# Patient Record
Sex: FEMALE | Race: WHITE | Employment: OTHER | URBAN - NONMETROPOLITAN AREA
[De-identification: names, ages, dates, MRNs, and addresses within clinical notes are randomized per-mention and may not be internally consistent; named-entity substitution may affect disease eponyms.]

---

## 2017-08-08 ENCOUNTER — HOSPITAL ENCOUNTER (OUTPATIENT)
Dept: GENERAL RADIOLOGY | Age: 77
Discharge: HOME | End: 2017-08-08
Payer: MEDICARE

## 2017-08-08 ENCOUNTER — HOSPITAL ENCOUNTER (OUTPATIENT)
Age: 77
Discharge: HOME | End: 2017-08-08
Payer: MEDICARE

## 2017-08-08 DIAGNOSIS — R10.84 GENERALIZED ABDOMINAL PAIN: ICD-10-CM

## 2017-08-08 PROCEDURE — 74000 XR ABDOMEN LIMITED (KUB): CPT

## 2017-08-14 ENCOUNTER — HOSPITAL ENCOUNTER (OUTPATIENT)
Age: 77
Discharge: HOME | End: 2017-08-14
Payer: MEDICARE

## 2017-08-14 ENCOUNTER — HOSPITAL ENCOUNTER (OUTPATIENT)
Dept: GENERAL RADIOLOGY | Age: 77
Discharge: HOME | End: 2017-08-14
Payer: MEDICARE

## 2017-08-14 DIAGNOSIS — K56.0 ADYNAMIC ILEUS (HCC): ICD-10-CM

## 2017-08-14 PROCEDURE — 74000 XR ABDOMEN LIMITED (KUB): CPT

## 2018-06-07 ENCOUNTER — OFFICE VISIT (OUTPATIENT)
Dept: SURGERY | Age: 78
End: 2018-06-07
Payer: MEDICARE

## 2018-06-07 ENCOUNTER — TELEPHONE (OUTPATIENT)
Dept: SURGERY | Age: 78
End: 2018-06-07

## 2018-06-07 VITALS
HEART RATE: 74 BPM | SYSTOLIC BLOOD PRESSURE: 120 MMHG | TEMPERATURE: 97.1 F | BODY MASS INDEX: 27.47 KG/M2 | OXYGEN SATURATION: 96 % | RESPIRATION RATE: 18 BRPM | WEIGHT: 160.9 LBS | HEIGHT: 64 IN | DIASTOLIC BLOOD PRESSURE: 62 MMHG

## 2018-06-07 DIAGNOSIS — R10.84 GENERALIZED ABDOMINAL PAIN: Primary | ICD-10-CM

## 2018-06-07 DIAGNOSIS — Z01.818 PRE-OP TESTING: ICD-10-CM

## 2018-06-07 DIAGNOSIS — K57.90 DIVERTICULOSIS OF INTESTINE WITHOUT BLEEDING, UNSPECIFIED INTESTINAL TRACT LOCATION: ICD-10-CM

## 2018-06-07 PROCEDURE — 1123F ACP DISCUSS/DSCN MKR DOCD: CPT | Performed by: SURGERY

## 2018-06-07 PROCEDURE — G8400 PT W/DXA NO RESULTS DOC: HCPCS | Performed by: SURGERY

## 2018-06-07 PROCEDURE — G8419 CALC BMI OUT NRM PARAM NOF/U: HCPCS | Performed by: SURGERY

## 2018-06-07 PROCEDURE — G8427 DOCREV CUR MEDS BY ELIG CLIN: HCPCS | Performed by: SURGERY

## 2018-06-07 PROCEDURE — 99205 OFFICE O/P NEW HI 60 MIN: CPT | Performed by: SURGERY

## 2018-06-07 PROCEDURE — 1036F TOBACCO NON-USER: CPT | Performed by: SURGERY

## 2018-06-07 PROCEDURE — 4040F PNEUMOC VAC/ADMIN/RCVD: CPT | Performed by: SURGERY

## 2018-06-07 PROCEDURE — 1090F PRES/ABSN URINE INCON ASSESS: CPT | Performed by: SURGERY

## 2018-06-07 RX ORDER — METRONIDAZOLE 500 MG/1
TABLET ORAL
Qty: 3 TABLET | Refills: 0 | Status: ON HOLD | OUTPATIENT
Start: 2018-06-07 | End: 2018-07-06 | Stop reason: HOSPADM

## 2018-06-07 RX ORDER — ACETAMINOPHEN 325 MG/1
650 TABLET ORAL EVERY 6 HOURS PRN
COMMUNITY

## 2018-06-07 RX ORDER — IBUPROFEN 200 MG
200 TABLET ORAL EVERY 6 HOURS PRN
Status: ON HOLD | COMMUNITY
End: 2018-11-09 | Stop reason: HOSPADM

## 2018-06-07 RX ORDER — CHLORAL HYDRATE 500 MG
1000 CAPSULE ORAL DAILY
Status: ON HOLD | COMMUNITY
End: 2018-11-09 | Stop reason: HOSPADM

## 2018-06-07 ASSESSMENT — ENCOUNTER SYMPTOMS
ABDOMINAL PAIN: 1
BACK PAIN: 1
CHEST TIGHTNESS: 0
EYE PAIN: 0
EYE ITCHING: 0
ABDOMINAL DISTENTION: 1
EYE DISCHARGE: 1
COUGH: 1
PHOTOPHOBIA: 0
COLOR CHANGE: 0
SINUS PAIN: 0
CONSTIPATION: 1
SINUS PRESSURE: 1
SHORTNESS OF BREATH: 1
CHOKING: 0
APNEA: 0
STRIDOR: 0
EYE REDNESS: 0
SORE THROAT: 0
TROUBLE SWALLOWING: 0
RHINORRHEA: 0
WHEEZING: 0
FACIAL SWELLING: 0
RECTAL PAIN: 1
VOICE CHANGE: 0

## 2018-06-11 ENCOUNTER — HOSPITAL ENCOUNTER (OUTPATIENT)
Age: 78
Discharge: HOME | End: 2018-06-11
Payer: MEDICARE

## 2018-06-11 DIAGNOSIS — Z01.818 PRE-OP TESTING: ICD-10-CM

## 2018-06-11 DIAGNOSIS — R10.84 GENERALIZED ABDOMINAL PAIN: ICD-10-CM

## 2018-06-11 DIAGNOSIS — K57.90 DIVERTICULOSIS OF INTESTINE WITHOUT BLEEDING, UNSPECIFIED INTESTINAL TRACT LOCATION: ICD-10-CM

## 2018-06-11 LAB
EKG ATRIAL RATE: 64 BPM
EKG P AXIS: 77 DEGREES
EKG P-R INTERVAL: 154 MS
EKG Q-T INTERVAL: 434 MS
EKG QRS DURATION: 96 MS
EKG QTC CALCULATION (BAZETT): 447 MS
EKG R AXIS: -49 DEGREES
EKG T AXIS: 60 DEGREES
EKG VENTRICULAR RATE: 64 BPM
HCT VFR BLD CALC: 40.2 % (ref 37–47)
HEMOGLOBIN: 13.7 GM/DL (ref 12–16)

## 2018-06-11 PROCEDURE — 36415 COLL VENOUS BLD VENIPUNCTURE: CPT

## 2018-06-11 PROCEDURE — 93005 ELECTROCARDIOGRAM TRACING: CPT

## 2018-06-11 PROCEDURE — 85014 HEMATOCRIT: CPT

## 2018-06-11 PROCEDURE — 85018 HEMOGLOBIN: CPT

## 2018-06-12 PROCEDURE — 93010 ELECTROCARDIOGRAM REPORT: CPT | Performed by: INTERNAL MEDICINE

## 2018-06-19 ENCOUNTER — APPOINTMENT (OUTPATIENT)
Dept: CT IMAGING | Age: 78
End: 2018-06-19
Payer: MEDICARE

## 2018-06-19 ENCOUNTER — HOSPITAL ENCOUNTER (EMERGENCY)
Age: 78
Discharge: HOME | End: 2018-06-19
Payer: MEDICARE

## 2018-06-19 ENCOUNTER — APPOINTMENT (OUTPATIENT)
Dept: GENERAL RADIOLOGY | Age: 78
End: 2018-06-19
Payer: MEDICARE

## 2018-06-19 VITALS
TEMPERATURE: 97.7 F | WEIGHT: 165 LBS | OXYGEN SATURATION: 99 % | RESPIRATION RATE: 16 BRPM | DIASTOLIC BLOOD PRESSURE: 70 MMHG | HEART RATE: 67 BPM | SYSTOLIC BLOOD PRESSURE: 129 MMHG | BODY MASS INDEX: 28.32 KG/M2

## 2018-06-19 DIAGNOSIS — K59.01 SLOW TRANSIT CONSTIPATION: ICD-10-CM

## 2018-06-19 DIAGNOSIS — R10.13 EPIGASTRIC PAIN: ICD-10-CM

## 2018-06-19 DIAGNOSIS — R42 DIZZINESS: Primary | ICD-10-CM

## 2018-06-19 LAB
BASOPHILS # BLD: 0.3 %
BASOPHILS ABSOLUTE: 0 THOU/MM3 (ref 0–0.1)
BUN BLDV-MCNC: 10 MG/DL (ref 7–22)
CREAT SERPL-MCNC: 0.5 MG/DL (ref 0.4–1.2)
EKG ATRIAL RATE: 68 BPM
EKG P AXIS: 67 DEGREES
EKG P-R INTERVAL: 156 MS
EKG Q-T INTERVAL: 432 MS
EKG QRS DURATION: 88 MS
EKG QTC CALCULATION (BAZETT): 459 MS
EKG R AXIS: -42 DEGREES
EKG T AXIS: 53 DEGREES
EKG VENTRICULAR RATE: 68 BPM
EOSINOPHIL # BLD: 1.9 %
EOSINOPHILS ABSOLUTE: 0.1 THOU/MM3 (ref 0–0.4)
GFR SERPL CREATININE-BSD FRML MDRD: > 90 ML/MIN/1.73M2
HCT VFR BLD CALC: 42.5 % (ref 37–47)
HEMOGLOBIN: 14.3 GM/DL (ref 12–16)
LACTIC ACID: 1.2 MMOL/L (ref 0.5–2.2)
LYMPHOCYTES # BLD: 21.4 %
LYMPHOCYTES ABSOLUTE: 1.3 THOU/MM3 (ref 1–4.8)
MCH RBC QN AUTO: 30.5 PG (ref 27–31)
MCHC RBC AUTO-ENTMCNC: 33.7 GM/DL (ref 33–37)
MCV RBC AUTO: 90.4 FL (ref 81–99)
MONOCYTES # BLD: 5.3 %
MONOCYTES ABSOLUTE: 0.3 THOU/MM3 (ref 0.4–1.3)
NUCLEATED RED BLOOD CELLS: 0 /100 WBC
PDW BLD-RTO: 14.1 % (ref 11.5–14.5)
PLATELET # BLD: 167 THOU/MM3 (ref 130–400)
PMV BLD AUTO: 9 FL (ref 7.4–10.4)
RBC # BLD: 4.7 MILL/MM3 (ref 4.2–5.4)
SEG NEUTROPHILS: 71.1 %
SEGMENTED NEUTROPHILS ABSOLUTE COUNT: 4.2 THOU/MM3 (ref 1.8–7.7)
TROPONIN T: < 0.01 NG/ML
WBC # BLD: 5.9 THOU/MM3 (ref 4.8–10.8)

## 2018-06-19 PROCEDURE — 6360000004 HC RX CONTRAST MEDICATION: Performed by: PHYSICIAN ASSISTANT

## 2018-06-19 PROCEDURE — 85025 COMPLETE CBC W/AUTO DIFF WBC: CPT

## 2018-06-19 PROCEDURE — 2580000003 HC RX 258: Performed by: PHYSICIAN ASSISTANT

## 2018-06-19 PROCEDURE — 74022 RADEX COMPL AQT ABD SERIES: CPT

## 2018-06-19 PROCEDURE — 93010 ELECTROCARDIOGRAM REPORT: CPT | Performed by: INTERNAL MEDICINE

## 2018-06-19 PROCEDURE — 93005 ELECTROCARDIOGRAM TRACING: CPT | Performed by: PHYSICIAN ASSISTANT

## 2018-06-19 PROCEDURE — 82565 ASSAY OF CREATININE: CPT

## 2018-06-19 PROCEDURE — 96374 THER/PROPH/DIAG INJ IV PUSH: CPT

## 2018-06-19 PROCEDURE — 6360000002 HC RX W HCPCS: Performed by: PHYSICIAN ASSISTANT

## 2018-06-19 PROCEDURE — 84520 ASSAY OF UREA NITROGEN: CPT

## 2018-06-19 PROCEDURE — 99284 EMERGENCY DEPT VISIT MOD MDM: CPT

## 2018-06-19 PROCEDURE — 36415 COLL VENOUS BLD VENIPUNCTURE: CPT

## 2018-06-19 PROCEDURE — 70450 CT HEAD/BRAIN W/O DYE: CPT

## 2018-06-19 PROCEDURE — 74177 CT ABD & PELVIS W/CONTRAST: CPT

## 2018-06-19 PROCEDURE — 83605 ASSAY OF LACTIC ACID: CPT

## 2018-06-19 RX ORDER — ONDANSETRON 2 MG/ML
4 INJECTION INTRAMUSCULAR; INTRAVENOUS ONCE
Status: COMPLETED | OUTPATIENT
Start: 2018-06-19 | End: 2018-06-19

## 2018-06-19 RX ORDER — 0.9 % SODIUM CHLORIDE 0.9 %
1000 INTRAVENOUS SOLUTION INTRAVENOUS ONCE
Status: COMPLETED | OUTPATIENT
Start: 2018-06-19 | End: 2018-06-19

## 2018-06-19 RX ADMIN — SODIUM CHLORIDE 1000 ML: 9 INJECTION, SOLUTION INTRAVENOUS at 11:47

## 2018-06-19 RX ADMIN — IOPAMIDOL 80 ML: 755 INJECTION, SOLUTION INTRAVENOUS at 14:27

## 2018-06-19 RX ADMIN — ONDANSETRON 4 MG: 2 INJECTION INTRAMUSCULAR; INTRAVENOUS at 11:47

## 2018-06-19 ASSESSMENT — ENCOUNTER SYMPTOMS
NAUSEA: 1
DIARRHEA: 0
COUGH: 0
BACK PAIN: 0
VOMITING: 0
SHORTNESS OF BREATH: 0
ABDOMINAL PAIN: 1
RHINORRHEA: 0
PHOTOPHOBIA: 0
SORE THROAT: 0

## 2018-06-19 ASSESSMENT — PAIN DESCRIPTION - LOCATION: LOCATION: CHEST

## 2018-06-19 ASSESSMENT — PAIN DESCRIPTION - DESCRIPTORS: DESCRIPTORS: SHARP

## 2018-06-19 ASSESSMENT — PAIN SCALES - GENERAL: PAINLEVEL_OUTOF10: 8

## 2018-06-19 ASSESSMENT — PAIN DESCRIPTION - ORIENTATION: ORIENTATION: MID

## 2018-06-19 ASSESSMENT — PAIN DESCRIPTION - FREQUENCY: FREQUENCY: CONTINUOUS

## 2018-06-19 ASSESSMENT — PAIN DESCRIPTION - ONSET: ONSET: ON-GOING

## 2018-06-19 ASSESSMENT — PAIN DESCRIPTION - PAIN TYPE: TYPE: ACUTE PAIN

## 2018-06-24 NOTE — H&P
vomiting     Pneumonia     Shingles     Torus mandibularis 2006      Past Surgical History:   Procedure Laterality Date    ABDOMINAL ADHESION SURGERY  1990's    Dr Sam Rene  09/09/2013    Lysis of Adhesions-Dr. Lyle Hunting     BACK SURGERY  1979/1980/2006/2013    X3    [facility]LE[facility]STECTOMY  1975    [facility]    COLONOSCOPY  over 10 yrs ago    COLONOSCOPY  08/11/2017    Dr Pete Spotted ? [facility]    LUMBAR LAMINECTOMY  4/15/2013    OTHER SURGICAL HISTORY  7/8/2013    evacation of seroma-back     UPPER GASTROINTESTINAL ENDOSCOPY  08/11/2017    Dr Mercy Mayberry     Family History   Problem Relation Age of Onset    Arthritis Mother     Asthma Mother     Arthritis Father     Cancer Father     High Blood Pressure Father     Diabetes Brother     Heart Disease Brother     COPD Brother     High Blood Pressure Brother      Social History   Substance Use Topics    Smoking status: Never Smoker    Smokeless tobacco: Never Used    Alcohol use No       Current Outpatient Prescriptions   Medication Sig Dispense Refill    acetaminophen (TYLENOL) 325 MG tablet Take 650 mg by mouth every 6 hours as needed for Pain      ibuprofen (ADVIL;MOTRIN) 200 MG tablet Take 200 mg by mouth every 6 hours as needed for Pain      Omega-3 Fatty Acids (FISH OIL) 1000 MG CAPS Take 1,000 mg by mouth daily      Multiple Vitamins-Minerals (PRESERVISION AREDS 2 PO) Take 1 tablet by mouth daily      polyethylene glycol (GLYCOLAX) powder Take 17 g by mouth daily.  Probiotic Product (PROBIOTIC DAILY PO) Take 1 tablet by mouth daily.  lactulose (CHRONULAC) 10 GM/15ML solution Take 10 g by mouth 2 times daily. No current facility-administered medications for this visit.       Allergies   Allergen Reactions    Corticosteroids Swelling    Pregabalin      Dizziness        Subjective:      Review of Systems   Constitutional: Positive

## 2018-06-25 ENCOUNTER — ANESTHESIA EVENT (OUTPATIENT)
Dept: OPERATING ROOM | Age: 78
DRG: 329 | End: 2018-06-25
Payer: MEDICARE

## 2018-06-25 ENCOUNTER — ANESTHESIA (OUTPATIENT)
Dept: OPERATING ROOM | Age: 78
DRG: 329 | End: 2018-06-25
Payer: MEDICARE

## 2018-06-25 ENCOUNTER — HOSPITAL ENCOUNTER (INPATIENT)
Age: 78
LOS: 11 days | Discharge: HOME | DRG: 329 | End: 2018-07-06
Attending: SURGERY | Admitting: SURGERY
Payer: MEDICARE

## 2018-06-25 VITALS
DIASTOLIC BLOOD PRESSURE: 55 MMHG | OXYGEN SATURATION: 100 % | RESPIRATION RATE: 12 BRPM | SYSTOLIC BLOOD PRESSURE: 123 MMHG | TEMPERATURE: 98.6 F

## 2018-06-25 DIAGNOSIS — Z90.49 S/P PARTIAL RESECTION OF COLON: ICD-10-CM

## 2018-06-25 DIAGNOSIS — R10.9 ACUTE POSTOPERATIVE ABDOMINAL PAIN: Primary | ICD-10-CM

## 2018-06-25 DIAGNOSIS — G89.18 ACUTE POSTOPERATIVE ABDOMINAL PAIN: Primary | ICD-10-CM

## 2018-06-25 PROBLEM — K57.30 DIVERTICULAR DISEASE OF COLON: Status: ACTIVE | Noted: 2018-06-25

## 2018-06-25 LAB
ABO: NORMAL
ANTIBODY SCREEN: NORMAL
RH FACTOR: NORMAL

## 2018-06-25 PROCEDURE — 0UN20ZZ RELEASE BILATERAL OVARIES, OPEN APPROACH: ICD-10-PCS | Performed by: SURGERY

## 2018-06-25 PROCEDURE — 1200000000 HC SEMI PRIVATE

## 2018-06-25 PROCEDURE — 44140 PARTIAL REMOVAL OF COLON: CPT | Performed by: SURGERY

## 2018-06-25 PROCEDURE — 86900 BLOOD TYPING SEROLOGIC ABO: CPT

## 2018-06-25 PROCEDURE — 6370000000 HC RX 637 (ALT 250 FOR IP): Performed by: SURGERY

## 2018-06-25 PROCEDURE — P9045 ALBUMIN (HUMAN), 5%, 250 ML: HCPCS | Performed by: NURSE ANESTHETIST, CERTIFIED REGISTERED

## 2018-06-25 PROCEDURE — 6360000002 HC RX W HCPCS: Performed by: SURGERY

## 2018-06-25 PROCEDURE — 2720000010 HC SURG SUPPLY STERILE: Performed by: SURGERY

## 2018-06-25 PROCEDURE — 0DQ80ZZ REPAIR SMALL INTESTINE, OPEN APPROACH: ICD-10-PCS | Performed by: SURGERY

## 2018-06-25 PROCEDURE — 0DTG0ZZ RESECTION OF LEFT LARGE INTESTINE, OPEN APPROACH: ICD-10-PCS | Performed by: SURGERY

## 2018-06-25 PROCEDURE — C9113 INJ PANTOPRAZOLE SODIUM, VIA: HCPCS | Performed by: SURGERY

## 2018-06-25 PROCEDURE — 3700000001 HC ADD 15 MINUTES (ANESTHESIA): Performed by: SURGERY

## 2018-06-25 PROCEDURE — 88307 TISSUE EXAM BY PATHOLOGIST: CPT

## 2018-06-25 PROCEDURE — 6360000002 HC RX W HCPCS: Performed by: ANESTHESIOLOGY

## 2018-06-25 PROCEDURE — 6360000002 HC RX W HCPCS: Performed by: NURSE ANESTHETIST, CERTIFIED REGISTERED

## 2018-06-25 PROCEDURE — 86901 BLOOD TYPING SEROLOGIC RH(D): CPT

## 2018-06-25 PROCEDURE — 0DN80ZZ RELEASE SMALL INTESTINE, OPEN APPROACH: ICD-10-PCS | Performed by: SURGERY

## 2018-06-25 PROCEDURE — 7100000000 HC PACU RECOVERY - FIRST 15 MIN: Performed by: SURGERY

## 2018-06-25 PROCEDURE — 2580000003 HC RX 258: Performed by: SURGERY

## 2018-06-25 PROCEDURE — 2580000003 HC RX 258: Performed by: ANESTHESIOLOGY

## 2018-06-25 PROCEDURE — 2780000010 HC IMPLANT OTHER: Performed by: SURGERY

## 2018-06-25 PROCEDURE — 3700000000 HC ANESTHESIA ATTENDED CARE: Performed by: SURGERY

## 2018-06-25 PROCEDURE — 7100000001 HC PACU RECOVERY - ADDTL 15 MIN: Performed by: SURGERY

## 2018-06-25 PROCEDURE — 2500000003 HC RX 250 WO HCPCS: Performed by: NURSE ANESTHETIST, CERTIFIED REGISTERED

## 2018-06-25 PROCEDURE — 3600000014 HC SURGERY LEVEL 4 ADDTL 15MIN: Performed by: SURGERY

## 2018-06-25 PROCEDURE — 36415 COLL VENOUS BLD VENIPUNCTURE: CPT

## 2018-06-25 PROCEDURE — 2500000003 HC RX 250 WO HCPCS: Performed by: SURGERY

## 2018-06-25 PROCEDURE — 86850 RBC ANTIBODY SCREEN: CPT

## 2018-06-25 PROCEDURE — 0UN70ZZ RELEASE BILATERAL FALLOPIAN TUBES, OPEN APPROACH: ICD-10-PCS | Performed by: SURGERY

## 2018-06-25 PROCEDURE — 3600000004 HC SURGERY LEVEL 4 BASE: Performed by: SURGERY

## 2018-06-25 PROCEDURE — 2500000003 HC RX 250 WO HCPCS: Performed by: ANESTHESIOLOGY

## 2018-06-25 DEVICE — RELOAD STPL L75MM OPN H3.8MM CLS 1.5MM WIRE DIA0.2MM REG: Type: IMPLANTABLE DEVICE | Status: FUNCTIONAL

## 2018-06-25 RX ORDER — PROMETHAZINE HYDROCHLORIDE 25 MG/ML
12.5 INJECTION, SOLUTION INTRAMUSCULAR; INTRAVENOUS
Status: DISCONTINUED | OUTPATIENT
Start: 2018-06-25 | End: 2018-06-25 | Stop reason: HOSPADM

## 2018-06-25 RX ORDER — PANTOPRAZOLE SODIUM 40 MG/10ML
40 INJECTION, POWDER, LYOPHILIZED, FOR SOLUTION INTRAVENOUS DAILY
Status: DISCONTINUED | OUTPATIENT
Start: 2018-06-25 | End: 2018-07-04 | Stop reason: ALTCHOICE

## 2018-06-25 RX ORDER — FENTANYL CITRATE 50 UG/ML
50 INJECTION, SOLUTION INTRAMUSCULAR; INTRAVENOUS EVERY 5 MIN PRN
Status: DISCONTINUED | OUTPATIENT
Start: 2018-06-25 | End: 2018-06-25 | Stop reason: HOSPADM

## 2018-06-25 RX ORDER — NEOSTIGMINE METHYLSULFATE 1 MG/ML
INJECTION, SOLUTION INTRAVENOUS PRN
Status: DISCONTINUED | OUTPATIENT
Start: 2018-06-25 | End: 2018-06-25 | Stop reason: SDUPTHER

## 2018-06-25 RX ORDER — LIDOCAINE HYDROCHLORIDE 20 MG/ML
INJECTION, SOLUTION INFILTRATION; PERINEURAL PRN
Status: DISCONTINUED | OUTPATIENT
Start: 2018-06-25 | End: 2018-06-25 | Stop reason: SDUPTHER

## 2018-06-25 RX ORDER — DIPHENHYDRAMINE HYDROCHLORIDE 50 MG/ML
INJECTION INTRAMUSCULAR; INTRAVENOUS PRN
Status: DISCONTINUED | OUTPATIENT
Start: 2018-06-25 | End: 2018-06-25 | Stop reason: SDUPTHER

## 2018-06-25 RX ORDER — ALBUMIN, HUMAN INJ 5% 5 %
SOLUTION INTRAVENOUS PRN
Status: DISCONTINUED | OUTPATIENT
Start: 2018-06-25 | End: 2018-06-25 | Stop reason: SDUPTHER

## 2018-06-25 RX ORDER — GLYCOPYRROLATE 1 MG/5 ML
SYRINGE (ML) INTRAVENOUS PRN
Status: DISCONTINUED | OUTPATIENT
Start: 2018-06-25 | End: 2018-06-25 | Stop reason: SDUPTHER

## 2018-06-25 RX ORDER — ACETAMINOPHEN 325 MG/1
650 TABLET ORAL EVERY 4 HOURS PRN
Status: DISCONTINUED | OUTPATIENT
Start: 2018-06-25 | End: 2018-07-06 | Stop reason: HOSPADM

## 2018-06-25 RX ORDER — SODIUM CHLORIDE, SODIUM LACTATE, POTASSIUM CHLORIDE, CALCIUM CHLORIDE 600; 310; 30; 20 MG/100ML; MG/100ML; MG/100ML; MG/100ML
INJECTION, SOLUTION INTRAVENOUS CONTINUOUS PRN
Status: DISCONTINUED | OUTPATIENT
Start: 2018-06-25 | End: 2018-06-25 | Stop reason: SDUPTHER

## 2018-06-25 RX ORDER — ONDANSETRON 2 MG/ML
4 INJECTION INTRAMUSCULAR; INTRAVENOUS EVERY 4 HOURS PRN
Status: DISCONTINUED | OUTPATIENT
Start: 2018-06-25 | End: 2018-06-25 | Stop reason: HOSPADM

## 2018-06-25 RX ORDER — SODIUM CHLORIDE 9 MG/ML
INJECTION, SOLUTION INTRAVENOUS CONTINUOUS
Status: DISCONTINUED | OUTPATIENT
Start: 2018-06-25 | End: 2018-06-25

## 2018-06-25 RX ORDER — MORPHINE SULFATE 2 MG/ML
2 INJECTION, SOLUTION INTRAMUSCULAR; INTRAVENOUS EVERY 4 HOURS PRN
Status: DISCONTINUED | OUTPATIENT
Start: 2018-06-25 | End: 2018-06-26

## 2018-06-25 RX ORDER — PROPOFOL 10 MG/ML
INJECTION, EMULSION INTRAVENOUS PRN
Status: DISCONTINUED | OUTPATIENT
Start: 2018-06-25 | End: 2018-06-25 | Stop reason: SDUPTHER

## 2018-06-25 RX ORDER — ONDANSETRON 2 MG/ML
INJECTION INTRAMUSCULAR; INTRAVENOUS PRN
Status: DISCONTINUED | OUTPATIENT
Start: 2018-06-25 | End: 2018-06-25 | Stop reason: SDUPTHER

## 2018-06-25 RX ORDER — ALVIMOPAN 12 MG/1
12 CAPSULE ORAL ONCE
Status: COMPLETED | OUTPATIENT
Start: 2018-06-25 | End: 2018-06-25

## 2018-06-25 RX ORDER — SODIUM CHLORIDE 0.9 % (FLUSH) 0.9 %
10 SYRINGE (ML) INJECTION EVERY 12 HOURS SCHEDULED
Status: DISCONTINUED | OUTPATIENT
Start: 2018-06-25 | End: 2018-06-29 | Stop reason: SDUPTHER

## 2018-06-25 RX ORDER — SODIUM CHLORIDE 0.9 % (FLUSH) 0.9 %
10 SYRINGE (ML) INJECTION PRN
Status: DISCONTINUED | OUTPATIENT
Start: 2018-06-25 | End: 2018-06-29 | Stop reason: SDUPTHER

## 2018-06-25 RX ORDER — SUCCINYLCHOLINE CHLORIDE 20 MG/ML
INJECTION INTRAMUSCULAR; INTRAVENOUS PRN
Status: DISCONTINUED | OUTPATIENT
Start: 2018-06-25 | End: 2018-06-25 | Stop reason: SDUPTHER

## 2018-06-25 RX ORDER — FENTANYL CITRATE 50 UG/ML
INJECTION, SOLUTION INTRAMUSCULAR; INTRAVENOUS PRN
Status: DISCONTINUED | OUTPATIENT
Start: 2018-06-25 | End: 2018-06-25 | Stop reason: SDUPTHER

## 2018-06-25 RX ORDER — ROCURONIUM BROMIDE 10 MG/ML
INJECTION, SOLUTION INTRAVENOUS PRN
Status: DISCONTINUED | OUTPATIENT
Start: 2018-06-25 | End: 2018-06-25 | Stop reason: SDUPTHER

## 2018-06-25 RX ORDER — ALVIMOPAN 12 MG/1
12 CAPSULE ORAL 2 TIMES DAILY
Status: DISPENSED | OUTPATIENT
Start: 2018-06-26 | End: 2018-07-03

## 2018-06-25 RX ORDER — ONDANSETRON 2 MG/ML
4 INJECTION INTRAMUSCULAR; INTRAVENOUS EVERY 6 HOURS PRN
Status: DISCONTINUED | OUTPATIENT
Start: 2018-06-25 | End: 2018-07-06 | Stop reason: HOSPADM

## 2018-06-25 RX ORDER — 0.9 % SODIUM CHLORIDE 0.9 %
10 VIAL (ML) INJECTION DAILY
Status: DISCONTINUED | OUTPATIENT
Start: 2018-06-25 | End: 2018-07-04 | Stop reason: ALTCHOICE

## 2018-06-25 RX ORDER — ESMOLOL HYDROCHLORIDE 10 MG/ML
INJECTION INTRAVENOUS PRN
Status: DISCONTINUED | OUTPATIENT
Start: 2018-06-25 | End: 2018-06-25 | Stop reason: SDUPTHER

## 2018-06-25 RX ORDER — SODIUM CHLORIDE 9 MG/ML
INJECTION, SOLUTION INTRAVENOUS CONTINUOUS
Status: ACTIVE | OUTPATIENT
Start: 2018-06-25 | End: 2018-06-29

## 2018-06-25 RX ORDER — LABETALOL HYDROCHLORIDE 5 MG/ML
10 INJECTION, SOLUTION INTRAVENOUS EVERY 10 MIN PRN
Status: DISCONTINUED | OUTPATIENT
Start: 2018-06-25 | End: 2018-06-25 | Stop reason: HOSPADM

## 2018-06-25 RX ADMIN — ROCURONIUM BROMIDE 3 MG: 10 INJECTION, SOLUTION INTRAVENOUS at 13:07

## 2018-06-25 RX ADMIN — Medication 0.6 MG: at 16:36

## 2018-06-25 RX ADMIN — CEFTRIAXONE SODIUM 1 G: 1 INJECTION, POWDER, FOR SOLUTION INTRAMUSCULAR; INTRAVENOUS at 13:09

## 2018-06-25 RX ADMIN — ROCURONIUM BROMIDE 10 MG: 10 INJECTION, SOLUTION INTRAVENOUS at 16:19

## 2018-06-25 RX ADMIN — LIDOCAINE HYDROCHLORIDE 50 MG: 20 INJECTION, SOLUTION INFILTRATION; PERINEURAL at 13:12

## 2018-06-25 RX ADMIN — DIPHENHYDRAMINE HYDROCHLORIDE 12.5 MG: 50 INJECTION, SOLUTION INTRAMUSCULAR; INTRAVENOUS at 15:39

## 2018-06-25 RX ADMIN — ROCURONIUM BROMIDE 20 MG: 10 INJECTION, SOLUTION INTRAVENOUS at 14:29

## 2018-06-25 RX ADMIN — FENTANYL CITRATE 50 MCG: 50 INJECTION INTRAMUSCULAR; INTRAVENOUS at 14:31

## 2018-06-25 RX ADMIN — FENTANYL CITRATE 50 MCG: 50 INJECTION, SOLUTION INTRAMUSCULAR; INTRAVENOUS at 17:45

## 2018-06-25 RX ADMIN — NEOSTIGMINE METHYLSULFATE 4 MG: 1 INJECTION, SOLUTION INTRAVENOUS at 16:36

## 2018-06-25 RX ADMIN — SODIUM CHLORIDE: 9 INJECTION, SOLUTION INTRAVENOUS at 18:02

## 2018-06-25 RX ADMIN — Medication 140 MG: at 13:12

## 2018-06-25 RX ADMIN — SODIUM CHLORIDE, POTASSIUM CHLORIDE, SODIUM LACTATE AND CALCIUM CHLORIDE: 600; 310; 30; 20 INJECTION, SOLUTION INTRAVENOUS at 13:00

## 2018-06-25 RX ADMIN — MORPHINE SULFATE 2 MG: 2 INJECTION, SOLUTION INTRAMUSCULAR; INTRAVENOUS at 20:35

## 2018-06-25 RX ADMIN — Medication 10 ML: at 20:34

## 2018-06-25 RX ADMIN — SODIUM CHLORIDE: 9 INJECTION, SOLUTION INTRAVENOUS at 10:12

## 2018-06-25 RX ADMIN — PHENYLEPHRINE HYDROCHLORIDE 100 MCG: 10 INJECTION INTRAVENOUS at 16:03

## 2018-06-25 RX ADMIN — ESMOLOL HYDROCHLORIDE 10 MG: 10 INJECTION, SOLUTION INTRAVENOUS at 16:38

## 2018-06-25 RX ADMIN — SODIUM CHLORIDE: 9 INJECTION, SOLUTION INTRAVENOUS at 20:35

## 2018-06-25 RX ADMIN — ALBUMIN (HUMAN) 250 ML: 12.5 SOLUTION INTRAVENOUS at 16:01

## 2018-06-25 RX ADMIN — ALVIMOPAN 12 MG: 12 CAPSULE ORAL at 10:12

## 2018-06-25 RX ADMIN — PHENYLEPHRINE HYDROCHLORIDE 100 MCG: 10 INJECTION INTRAVENOUS at 16:22

## 2018-06-25 RX ADMIN — FENTANYL CITRATE 50 MCG: 50 INJECTION INTRAMUSCULAR; INTRAVENOUS at 13:37

## 2018-06-25 RX ADMIN — PHENYLEPHRINE HYDROCHLORIDE 100 MCG: 10 INJECTION INTRAVENOUS at 16:17

## 2018-06-25 RX ADMIN — ONDANSETRON 4 MG: 2 INJECTION INTRAMUSCULAR; INTRAVENOUS at 22:03

## 2018-06-25 RX ADMIN — FENTANYL CITRATE 50 MCG: 50 INJECTION INTRAMUSCULAR; INTRAVENOUS at 13:46

## 2018-06-25 RX ADMIN — ALBUMIN (HUMAN) 250 ML: 12.5 SOLUTION INTRAVENOUS at 16:09

## 2018-06-25 RX ADMIN — PANTOPRAZOLE SODIUM 40 MG: 40 INJECTION, POWDER, FOR SOLUTION INTRAVENOUS at 20:33

## 2018-06-25 RX ADMIN — METRONIDAZOLE 500 MG: 500 INJECTION, SOLUTION INTRAVENOUS at 13:35

## 2018-06-25 RX ADMIN — PROPOFOL 130 MG: 10 INJECTION, EMULSION INTRAVENOUS at 13:12

## 2018-06-25 RX ADMIN — ROCURONIUM BROMIDE 10 MG: 10 INJECTION, SOLUTION INTRAVENOUS at 15:31

## 2018-06-25 RX ADMIN — ONDANSETRON HYDROCHLORIDE 4 MG: 4 INJECTION, SOLUTION INTRAMUSCULAR; INTRAVENOUS at 16:38

## 2018-06-25 RX ADMIN — PHENYLEPHRINE HYDROCHLORIDE 100 MCG: 10 INJECTION INTRAVENOUS at 15:36

## 2018-06-25 RX ADMIN — PIPERACILLIN SODIUM AND TAZOBACTAM SODIUM 3.38 G: 3; .375 INJECTION, POWDER, LYOPHILIZED, FOR SOLUTION INTRAVENOUS at 20:33

## 2018-06-25 RX ADMIN — PHENYLEPHRINE HYDROCHLORIDE 100 MCG: 10 INJECTION INTRAVENOUS at 15:38

## 2018-06-25 RX ADMIN — ROCURONIUM BROMIDE 10 MG: 10 INJECTION, SOLUTION INTRAVENOUS at 15:47

## 2018-06-25 RX ADMIN — FENTANYL CITRATE 50 MCG: 50 INJECTION INTRAMUSCULAR; INTRAVENOUS at 16:33

## 2018-06-25 RX ADMIN — ROCURONIUM BROMIDE 27 MG: 10 INJECTION, SOLUTION INTRAVENOUS at 13:19

## 2018-06-25 ASSESSMENT — PULMONARY FUNCTION TESTS
PIF_VALUE: 18
PIF_VALUE: 17
PIF_VALUE: 18
PIF_VALUE: 17
PIF_VALUE: 16
PIF_VALUE: 17
PIF_VALUE: 19
PIF_VALUE: 16
PIF_VALUE: 18
PIF_VALUE: 17
PIF_VALUE: 17
PIF_VALUE: 14
PIF_VALUE: 18
PIF_VALUE: 17
PIF_VALUE: 15
PIF_VALUE: 17
PIF_VALUE: 17
PIF_VALUE: 18
PIF_VALUE: 17
PIF_VALUE: 16
PIF_VALUE: 15
PIF_VALUE: 19
PIF_VALUE: 18
PIF_VALUE: 18
PIF_VALUE: 17
PIF_VALUE: 19
PIF_VALUE: 17
PIF_VALUE: 14
PIF_VALUE: 18
PIF_VALUE: 17
PIF_VALUE: 17
PIF_VALUE: 18
PIF_VALUE: 17
PIF_VALUE: 18
PIF_VALUE: 14
PIF_VALUE: 17
PIF_VALUE: 19
PIF_VALUE: 17
PIF_VALUE: 1
PIF_VALUE: 17
PIF_VALUE: 15
PIF_VALUE: 17
PIF_VALUE: 16
PIF_VALUE: 16
PIF_VALUE: 18
PIF_VALUE: 17
PIF_VALUE: 15
PIF_VALUE: 18
PIF_VALUE: 3
PIF_VALUE: 16
PIF_VALUE: 0
PIF_VALUE: 17
PIF_VALUE: 16
PIF_VALUE: 18
PIF_VALUE: 17
PIF_VALUE: 19
PIF_VALUE: 17
PIF_VALUE: 20
PIF_VALUE: 17
PIF_VALUE: 17
PIF_VALUE: 14
PIF_VALUE: 17
PIF_VALUE: 18
PIF_VALUE: 18
PIF_VALUE: 16
PIF_VALUE: 19
PIF_VALUE: 18
PIF_VALUE: 15
PIF_VALUE: 28
PIF_VALUE: 17
PIF_VALUE: 17
PIF_VALUE: 18
PIF_VALUE: 17
PIF_VALUE: 20
PIF_VALUE: 18
PIF_VALUE: 17
PIF_VALUE: 16
PIF_VALUE: 19
PIF_VALUE: 7
PIF_VALUE: 16
PIF_VALUE: 18
PIF_VALUE: 16
PIF_VALUE: 16
PIF_VALUE: 17
PIF_VALUE: 18
PIF_VALUE: 11
PIF_VALUE: 16
PIF_VALUE: 15
PIF_VALUE: 16
PIF_VALUE: 18
PIF_VALUE: 17
PIF_VALUE: 16
PIF_VALUE: 14
PIF_VALUE: 14
PIF_VALUE: 17
PIF_VALUE: 16
PIF_VALUE: 1
PIF_VALUE: 16
PIF_VALUE: 17
PIF_VALUE: 18
PIF_VALUE: 18
PIF_VALUE: 17
PIF_VALUE: 17
PIF_VALUE: 18
PIF_VALUE: 14
PIF_VALUE: 16
PIF_VALUE: 16
PIF_VALUE: 14
PIF_VALUE: 18
PIF_VALUE: 18
PIF_VALUE: 16
PIF_VALUE: 17
PIF_VALUE: 1
PIF_VALUE: 16
PIF_VALUE: 18
PIF_VALUE: 16
PIF_VALUE: 17
PIF_VALUE: 20
PIF_VALUE: 14
PIF_VALUE: 16
PIF_VALUE: 16
PIF_VALUE: 17
PIF_VALUE: 15
PIF_VALUE: 18
PIF_VALUE: 1
PIF_VALUE: 16
PIF_VALUE: 14
PIF_VALUE: 16
PIF_VALUE: 18
PIF_VALUE: 17
PIF_VALUE: 17
PIF_VALUE: 16
PIF_VALUE: 18
PIF_VALUE: 14
PIF_VALUE: 16
PIF_VALUE: 18
PIF_VALUE: 15
PIF_VALUE: 18
PIF_VALUE: 1
PIF_VALUE: 16
PIF_VALUE: 18
PIF_VALUE: 16
PIF_VALUE: 17
PIF_VALUE: 18
PIF_VALUE: 17
PIF_VALUE: 1
PIF_VALUE: 17
PIF_VALUE: 19
PIF_VALUE: 16
PIF_VALUE: 17
PIF_VALUE: 16
PIF_VALUE: 17
PIF_VALUE: 17
PIF_VALUE: 19
PIF_VALUE: 16
PIF_VALUE: 14
PIF_VALUE: 18
PIF_VALUE: 1
PIF_VALUE: 14
PIF_VALUE: 18
PIF_VALUE: 17
PIF_VALUE: 17
PIF_VALUE: 14
PIF_VALUE: 17
PIF_VALUE: 17
PIF_VALUE: 14
PIF_VALUE: 14
PIF_VALUE: 17
PIF_VALUE: 16
PIF_VALUE: 18
PIF_VALUE: 17
PIF_VALUE: 18
PIF_VALUE: 17
PIF_VALUE: 18
PIF_VALUE: 17
PIF_VALUE: 16
PIF_VALUE: 17
PIF_VALUE: 18
PIF_VALUE: 17
PIF_VALUE: 17
PIF_VALUE: 16
PIF_VALUE: 16
PIF_VALUE: 18
PIF_VALUE: 3
PIF_VALUE: 17
PIF_VALUE: 16
PIF_VALUE: 14
PIF_VALUE: 15
PIF_VALUE: 18
PIF_VALUE: 17
PIF_VALUE: 19
PIF_VALUE: 19
PIF_VALUE: 16
PIF_VALUE: 17
PIF_VALUE: 16
PIF_VALUE: 18
PIF_VALUE: 19
PIF_VALUE: 16
PIF_VALUE: 17
PIF_VALUE: 3
PIF_VALUE: 17
PIF_VALUE: 4
PIF_VALUE: 16
PIF_VALUE: 2

## 2018-06-25 ASSESSMENT — PAIN DESCRIPTION - PAIN TYPE
TYPE: SURGICAL PAIN

## 2018-06-25 ASSESSMENT — PAIN DESCRIPTION - LOCATION
LOCATION: ABDOMEN

## 2018-06-25 ASSESSMENT — PAIN SCALES - GENERAL
PAINLEVEL_OUTOF10: 6
PAINLEVEL_OUTOF10: 8
PAINLEVEL_OUTOF10: 8
PAINLEVEL_OUTOF10: 0
PAINLEVEL_OUTOF10: 8

## 2018-06-25 ASSESSMENT — PAIN - FUNCTIONAL ASSESSMENT: PAIN_FUNCTIONAL_ASSESSMENT: 0-10

## 2018-06-25 NOTE — BRIEF OP NOTE
Brief Postoperative Note    Collin Jang  YOB: 1940  [id number]    Pre-operative Diagnosis: Diverticular Stricture/Redundant Colon    Post-operative Diagnosis: Same with Severe Adhesions    Procedure: 1. Abd Exploration  2. Lysis Adhesions Severe 3. Extended L Colon Resection    Anesthesia: General    Surgeons/Assistants:  Kieran Bhatia    Estimated Blood Loss: less than 50     Complications: None    Specimens: Was Obtained:     Findings: see op note    Electronically signed by Ciera Hogue MD on 6/25/2018 at 5:02 PM

## 2018-06-26 LAB
ANION GAP SERPL CALCULATED.3IONS-SCNC: 15 MEQ/L (ref 8–16)
BASOPHILS # BLD: 0 %
BASOPHILS ABSOLUTE: 0 THOU/MM3 (ref 0–0.1)
BUN BLDV-MCNC: 14 MG/DL (ref 7–22)
CALCIUM SERPL-MCNC: 8.8 MG/DL (ref 8.5–10.5)
CHLORIDE BLD-SCNC: 109 MEQ/L (ref 98–111)
CO2: 15 MEQ/L (ref 23–33)
CREAT SERPL-MCNC: 0.9 MG/DL (ref 0.4–1.2)
EOSINOPHIL # BLD: 0 %
EOSINOPHILS ABSOLUTE: 0 THOU/MM3 (ref 0–0.4)
GFR SERPL CREATININE-BSD FRML MDRD: 60 ML/MIN/1.73M2
GLUCOSE BLD-MCNC: 202 MG/DL (ref 70–108)
HCT VFR BLD CALC: 37.2 % (ref 37–47)
HCT VFR BLD CALC: 37.7 % (ref 37–47)
HEMOGLOBIN: 12.5 GM/DL (ref 12–16)
HEMOGLOBIN: 12.5 GM/DL (ref 12–16)
LYMPHOCYTES # BLD: 5.3 %
LYMPHOCYTES ABSOLUTE: 0.8 THOU/MM3 (ref 1–4.8)
MCH RBC QN AUTO: 30.3 PG (ref 27–31)
MCHC RBC AUTO-ENTMCNC: 33.1 GM/DL (ref 33–37)
MCV RBC AUTO: 91.5 FL (ref 81–99)
MONOCYTES # BLD: 3.7 %
MONOCYTES ABSOLUTE: 0.6 THOU/MM3 (ref 0.4–1.3)
NUCLEATED RED BLOOD CELLS: 0 /100 WBC
PDW BLD-RTO: 14.2 % (ref 11.5–14.5)
PLATELET # BLD: 232 THOU/MM3 (ref 130–400)
PMV BLD AUTO: 8.6 FL (ref 7.4–10.4)
POTASSIUM REFLEX MAGNESIUM: 4 MEQ/L (ref 3.5–5.2)
RBC # BLD: 4.12 MILL/MM3 (ref 4.2–5.4)
SEG NEUTROPHILS: 91 %
SEGMENTED NEUTROPHILS ABSOLUTE COUNT: 13.9 THOU/MM3 (ref 1.8–7.7)
SODIUM BLD-SCNC: 139 MEQ/L (ref 135–145)
WBC # BLD: 15.3 THOU/MM3 (ref 4.8–10.8)

## 2018-06-26 PROCEDURE — APPSS30 APP SPLIT SHARED TIME 16-30 MINUTES: Performed by: NURSE PRACTITIONER

## 2018-06-26 PROCEDURE — 85018 HEMOGLOBIN: CPT

## 2018-06-26 PROCEDURE — 1200000000 HC SEMI PRIVATE

## 2018-06-26 PROCEDURE — 6360000002 HC RX W HCPCS: Performed by: SURGERY

## 2018-06-26 PROCEDURE — 6370000000 HC RX 637 (ALT 250 FOR IP): Performed by: SURGERY

## 2018-06-26 PROCEDURE — 85025 COMPLETE CBC W/AUTO DIFF WBC: CPT

## 2018-06-26 PROCEDURE — 99024 POSTOP FOLLOW-UP VISIT: CPT | Performed by: SURGERY

## 2018-06-26 PROCEDURE — 36415 COLL VENOUS BLD VENIPUNCTURE: CPT

## 2018-06-26 PROCEDURE — 80048 BASIC METABOLIC PNL TOTAL CA: CPT

## 2018-06-26 PROCEDURE — 99024 POSTOP FOLLOW-UP VISIT: CPT | Performed by: NURSE PRACTITIONER

## 2018-06-26 PROCEDURE — C9113 INJ PANTOPRAZOLE SODIUM, VIA: HCPCS | Performed by: SURGERY

## 2018-06-26 PROCEDURE — 2580000003 HC RX 258: Performed by: SURGERY

## 2018-06-26 PROCEDURE — 85014 HEMATOCRIT: CPT

## 2018-06-26 RX ORDER — KETOROLAC TROMETHAMINE 30 MG/ML
15 INJECTION, SOLUTION INTRAMUSCULAR; INTRAVENOUS EVERY 6 HOURS
Status: DISPENSED | OUTPATIENT
Start: 2018-06-26 | End: 2018-06-28

## 2018-06-26 RX ORDER — MORPHINE SULFATE 4 MG/ML
4 INJECTION, SOLUTION INTRAMUSCULAR; INTRAVENOUS
Status: DISCONTINUED | OUTPATIENT
Start: 2018-06-26 | End: 2018-07-06 | Stop reason: HOSPADM

## 2018-06-26 RX ORDER — 0.9 % SODIUM CHLORIDE 0.9 %
250 INTRAVENOUS SOLUTION INTRAVENOUS ONCE
Status: COMPLETED | OUTPATIENT
Start: 2018-06-26 | End: 2018-06-26

## 2018-06-26 RX ORDER — MORPHINE SULFATE 2 MG/ML
2 INJECTION, SOLUTION INTRAMUSCULAR; INTRAVENOUS
Status: DISPENSED | OUTPATIENT
Start: 2018-06-26 | End: 2018-06-27

## 2018-06-26 RX ADMIN — SODIUM CHLORIDE: 9 INJECTION, SOLUTION INTRAVENOUS at 00:52

## 2018-06-26 RX ADMIN — MORPHINE SULFATE 2 MG: 2 INJECTION, SOLUTION INTRAMUSCULAR; INTRAVENOUS at 11:09

## 2018-06-26 RX ADMIN — SODIUM CHLORIDE 250 ML: 9 INJECTION, SOLUTION INTRAVENOUS at 08:38

## 2018-06-26 RX ADMIN — Medication 10 ML: at 09:38

## 2018-06-26 RX ADMIN — PIPERACILLIN SODIUM AND TAZOBACTAM SODIUM 3.38 G: 3; .375 INJECTION, POWDER, LYOPHILIZED, FOR SOLUTION INTRAVENOUS at 04:29

## 2018-06-26 RX ADMIN — PIPERACILLIN SODIUM AND TAZOBACTAM SODIUM 3.38 G: 3; .375 INJECTION, POWDER, LYOPHILIZED, FOR SOLUTION INTRAVENOUS at 21:01

## 2018-06-26 RX ADMIN — SODIUM CHLORIDE: 9 INJECTION, SOLUTION INTRAVENOUS at 08:38

## 2018-06-26 RX ADMIN — KETOROLAC TROMETHAMINE 15 MG: 30 INJECTION, SOLUTION INTRAMUSCULAR at 22:09

## 2018-06-26 RX ADMIN — PANTOPRAZOLE SODIUM 40 MG: 40 INJECTION, POWDER, FOR SOLUTION INTRAVENOUS at 09:38

## 2018-06-26 RX ADMIN — MORPHINE SULFATE 2 MG: 2 INJECTION, SOLUTION INTRAMUSCULAR; INTRAVENOUS at 20:46

## 2018-06-26 RX ADMIN — PIPERACILLIN SODIUM AND TAZOBACTAM SODIUM 3.38 G: 3; .375 INJECTION, POWDER, LYOPHILIZED, FOR SOLUTION INTRAVENOUS at 12:06

## 2018-06-26 RX ADMIN — KETOROLAC TROMETHAMINE 15 MG: 30 INJECTION, SOLUTION INTRAMUSCULAR at 16:13

## 2018-06-26 RX ADMIN — SODIUM CHLORIDE: 9 INJECTION, SOLUTION INTRAVENOUS at 17:45

## 2018-06-26 RX ADMIN — MORPHINE SULFATE 2 MG: 2 INJECTION, SOLUTION INTRAMUSCULAR; INTRAVENOUS at 05:10

## 2018-06-26 RX ADMIN — ALVIMOPAN 12 MG: 12 CAPSULE ORAL at 09:38

## 2018-06-26 RX ADMIN — ALVIMOPAN 12 MG: 12 CAPSULE ORAL at 20:57

## 2018-06-26 RX ADMIN — MORPHINE SULFATE 2 MG: 2 INJECTION, SOLUTION INTRAMUSCULAR; INTRAVENOUS at 00:51

## 2018-06-26 RX ADMIN — KETOROLAC TROMETHAMINE 15 MG: 30 INJECTION, SOLUTION INTRAMUSCULAR at 04:29

## 2018-06-26 ASSESSMENT — PAIN DESCRIPTION - LOCATION
LOCATION: ABDOMEN

## 2018-06-26 ASSESSMENT — PAIN DESCRIPTION - PAIN TYPE
TYPE: SURGICAL PAIN

## 2018-06-26 ASSESSMENT — PAIN SCALES - GENERAL
PAINLEVEL_OUTOF10: 5
PAINLEVEL_OUTOF10: 9
PAINLEVEL_OUTOF10: 6
PAINLEVEL_OUTOF10: 6
PAINLEVEL_OUTOF10: 0
PAINLEVEL_OUTOF10: 6
PAINLEVEL_OUTOF10: 4
PAINLEVEL_OUTOF10: 8
PAINLEVEL_OUTOF10: 9
PAINLEVEL_OUTOF10: 3
PAINLEVEL_OUTOF10: 4
PAINLEVEL_OUTOF10: 5
PAINLEVEL_OUTOF10: 8
PAINLEVEL_OUTOF10: 7
PAINLEVEL_OUTOF10: 9
PAINLEVEL_OUTOF10: 9

## 2018-06-26 ASSESSMENT — PAIN DESCRIPTION - DESCRIPTORS
DESCRIPTORS: DISCOMFORT
DESCRIPTORS: DISCOMFORT
DESCRIPTORS: CONSTANT;DISCOMFORT;SHARP;SHOOTING;THROBBING
DESCRIPTORS: DISCOMFORT
DESCRIPTORS: DISCOMFORT

## 2018-06-26 ASSESSMENT — PAIN DESCRIPTION - ORIENTATION: ORIENTATION: LOWER;MID

## 2018-06-26 NOTE — PLAN OF CARE
Problem: Pain:  Goal: Pain level will decrease  Pain level will decrease   Outcome: Ongoing  Patient states she is having pain of 8 out of 10 on raul scale. Pain medication provided, physician called for patient's lack of pain relief. Orders received    Problem: Cardiovascular  Goal: No DVT, peripheral vascular complications  Outcome: Ongoing  No signs or symptoms of dvt present.   Patient wearing scds  Goal: Understanding of dietary restrictions  Outcome: Ongoing  Patient verbalizes understanding of nothing to eat or drink    Problem: Respiratory  Goal: No pulmonary complications  Outcome: Ongoing  Patient lung sounds are clear, shallow breathing due to pain  Goal: O2 Sat > 90%  Outcome: Ongoing  Patient's oxygen saturations are 95% on 2 liters oxygen, will maintain while patient is asleep  Goal: Supplemental O2 requirements decreased  Outcome: Ongoing  Will attempt o2 decrease with spot check    Problem: GI  Goal: No bowel complications  Outcome: Ongoing  Patient bowel sounds are absent, not passing gas  Goal: Bowel movement at least every other day  Outcome: Ongoing  No bowel movement this shift    Problem:   Goal: Adequate urinary output  Outcome: Ongoing  Patient has low urine output in chaparro catheter  Goal: No urinary complication  Outcome: Ongoing  Urine is yellow, low amount    Problem: Nutrition  Goal: Optimal nutrition therapy  Outcome: Ongoing  To be discussed with patient when diet order is changed    Problem: Skin Integrity/Risk  Goal: No skin breakdown during hospitalization  Outcome: Ongoing  Patient has surgical incision that is intact, no drainage noted  Goal: Wound healing  Outcome: Ongoing  Surgical incision is well approximated, without excessive bleeding    Problem: Musculor/Skeletal Functional Status  Goal: Absence of falls  Outcome: Ongoing  Patient free from falls this shift, skid proof footwear on while up    Problem: Intellectual/Education/Knowledge Deficit  Goal: Teaching initiated upon admission  Outcome: Ongoing  Teaching started when admitted  Goal: Written Disposition Instruction form completed  Outcome: Ongoing  To be completed with patient at discharge    Comments: Care plan reviewed with patient. Patient verbalize understanding of the plan of care and contribute to goal setting.

## 2018-06-26 NOTE — FLOWSHEET NOTE
Daniel Miller CNP updated this am on rounds regarding drainage outpu. Orders received to hold lovenox do to output and to keep chaparro catheter in due low urine out put.

## 2018-06-26 NOTE — PLAN OF CARE
Problem: Pain:  Goal: Pain level will decrease  Pain level will decrease   Outcome: Ongoing  Patient rates pain 6-7 on scale 0-10. Patient resting in bed at intervals and moans in her sleep. Morphine given for pain when patient more awake and alert. Patient becomes drowsy after morphine given    Problem: Cardiovascular  Goal: No DVT, peripheral vascular complications  Outcome: Ongoing  No signs of peripheral vascular complications. lovenox held due to output from drain. scd on while in bed. Problem: Respiratory  Goal: No pulmonary complications  Outcome: Ongoing  Lung sounds clear. resp easy. Pulse ox 96% on room air    Problem: GI  Goal: No bowel complications  Outcome: Ongoing  Bowel sounds absent. Ng tube to LIWS. Denies passing flatus. No c/o nausea    Problem:   Goal: Adequate urinary output  Outcome: Ongoing  Iv bolus given this am due to low urine output. Stearns remains for adequate I & O    Problem: Nutrition  Goal: Optimal nutrition therapy  Outcome: Ongoing  Patient remains NPO. Iv fluids infusing    Problem: Skin Integrity/Risk  Goal: No skin breakdown during hospitalization  Outcome: Ongoing  Skin remains dry and intact. No new skin issues noted. Waffle cushion in chair. Changed and repositioned with pillow support    Problem: Musculor/Skeletal Functional Status  Goal: Highest potential functional level  Outcome: Ongoing  Up with assist and walker to chair. General weakness noted    Problem: Discharge Planning  Intervention: Interaction with patient/family and care team  Plans to go home with faily support. Daughter at bedside and denies need of home health services      Problem: Falls - Risk of:  Goal: Will remain free from falls  Will remain free from falls   Outcome: Ongoing  Remains free from falls. Call light in reach and able to make needs known    Comments: Care plan reviewed with patient. Patient verbalize understanding of the plan of care and contribute to goal setting.

## 2018-06-27 LAB
ANION GAP SERPL CALCULATED.3IONS-SCNC: 10 MEQ/L (ref 8–16)
BUN BLDV-MCNC: 20 MG/DL (ref 7–22)
CALCIUM SERPL-MCNC: 8.9 MG/DL (ref 8.5–10.5)
CHLORIDE BLD-SCNC: 113 MEQ/L (ref 98–111)
CO2: 20 MEQ/L (ref 23–33)
CREAT SERPL-MCNC: 0.7 MG/DL (ref 0.4–1.2)
ERYTHROCYTE [DISTWIDTH] IN BLOOD BY AUTOMATED COUNT: 13.7 % (ref 11.5–14.5)
ERYTHROCYTE [DISTWIDTH] IN BLOOD BY AUTOMATED COUNT: 46.3 FL (ref 35–45)
GFR SERPL CREATININE-BSD FRML MDRD: 81 ML/MIN/1.73M2
GLUCOSE BLD-MCNC: 112 MG/DL (ref 70–108)
HCT VFR BLD CALC: 32.5 % (ref 37–47)
HEMOGLOBIN: 10.6 GM/DL (ref 12–16)
MCH RBC QN AUTO: 29.9 PG (ref 26–33)
MCHC RBC AUTO-ENTMCNC: 32.6 GM/DL (ref 32.2–35.5)
MCV RBC AUTO: 91.8 FL (ref 81–99)
PLATELET # BLD: 200 THOU/MM3 (ref 130–400)
PMV BLD AUTO: 10.2 FL (ref 9.4–12.4)
POTASSIUM SERPL-SCNC: 4.2 MEQ/L (ref 3.5–5.2)
RBC # BLD: 3.54 MILL/MM3 (ref 4.2–5.4)
SODIUM BLD-SCNC: 143 MEQ/L (ref 135–145)
WBC # BLD: 15.1 THOU/MM3 (ref 4.8–10.8)

## 2018-06-27 PROCEDURE — 99024 POSTOP FOLLOW-UP VISIT: CPT | Performed by: NURSE PRACTITIONER

## 2018-06-27 PROCEDURE — 97530 THERAPEUTIC ACTIVITIES: CPT

## 2018-06-27 PROCEDURE — 97167 OT EVAL HIGH COMPLEX 60 MIN: CPT

## 2018-06-27 PROCEDURE — 1200000000 HC SEMI PRIVATE

## 2018-06-27 PROCEDURE — APPSS30 APP SPLIT SHARED TIME 16-30 MINUTES: Performed by: NURSE PRACTITIONER

## 2018-06-27 PROCEDURE — 80048 BASIC METABOLIC PNL TOTAL CA: CPT

## 2018-06-27 PROCEDURE — 99024 POSTOP FOLLOW-UP VISIT: CPT | Performed by: SURGERY

## 2018-06-27 PROCEDURE — 6370000000 HC RX 637 (ALT 250 FOR IP): Performed by: SURGERY

## 2018-06-27 PROCEDURE — G8988 SELF CARE GOAL STATUS: HCPCS

## 2018-06-27 PROCEDURE — 6360000002 HC RX W HCPCS: Performed by: SURGERY

## 2018-06-27 PROCEDURE — 2580000003 HC RX 258: Performed by: NURSE PRACTITIONER

## 2018-06-27 PROCEDURE — C9113 INJ PANTOPRAZOLE SODIUM, VIA: HCPCS | Performed by: SURGERY

## 2018-06-27 PROCEDURE — G8987 SELF CARE CURRENT STATUS: HCPCS

## 2018-06-27 PROCEDURE — 85027 COMPLETE CBC AUTOMATED: CPT

## 2018-06-27 PROCEDURE — 2580000003 HC RX 258: Performed by: SURGERY

## 2018-06-27 PROCEDURE — 36415 COLL VENOUS BLD VENIPUNCTURE: CPT

## 2018-06-27 RX ORDER — 0.9 % SODIUM CHLORIDE 0.9 %
500 INTRAVENOUS SOLUTION INTRAVENOUS ONCE
Status: COMPLETED | OUTPATIENT
Start: 2018-06-27 | End: 2018-06-27

## 2018-06-27 RX ADMIN — PIPERACILLIN SODIUM AND TAZOBACTAM SODIUM 3.38 G: 3; .375 INJECTION, POWDER, LYOPHILIZED, FOR SOLUTION INTRAVENOUS at 11:36

## 2018-06-27 RX ADMIN — Medication 10 ML: at 08:57

## 2018-06-27 RX ADMIN — MORPHINE SULFATE 2 MG: 2 INJECTION, SOLUTION INTRAMUSCULAR; INTRAVENOUS at 03:41

## 2018-06-27 RX ADMIN — CHLORASEPTIC 1 SPRAY: 1.5 LIQUID ORAL at 23:24

## 2018-06-27 RX ADMIN — MORPHINE SULFATE 4 MG: 4 INJECTION INTRAVENOUS at 11:36

## 2018-06-27 RX ADMIN — KETOROLAC TROMETHAMINE 15 MG: 30 INJECTION, SOLUTION INTRAMUSCULAR at 03:41

## 2018-06-27 RX ADMIN — SODIUM CHLORIDE: 9 INJECTION, SOLUTION INTRAVENOUS at 22:38

## 2018-06-27 RX ADMIN — KETOROLAC TROMETHAMINE: 30 INJECTION, SOLUTION INTRAMUSCULAR at 11:36

## 2018-06-27 RX ADMIN — SODIUM CHLORIDE 500 ML: 9 INJECTION, SOLUTION INTRAVENOUS at 17:15

## 2018-06-27 RX ADMIN — KETOROLAC TROMETHAMINE 15 MG: 30 INJECTION, SOLUTION INTRAMUSCULAR at 16:16

## 2018-06-27 RX ADMIN — PANTOPRAZOLE SODIUM 40 MG: 40 INJECTION, POWDER, FOR SOLUTION INTRAVENOUS at 08:57

## 2018-06-27 RX ADMIN — PIPERACILLIN SODIUM AND TAZOBACTAM SODIUM 3.38 G: 3; .375 INJECTION, POWDER, LYOPHILIZED, FOR SOLUTION INTRAVENOUS at 04:52

## 2018-06-27 RX ADMIN — PIPERACILLIN SODIUM AND TAZOBACTAM SODIUM 3.38 G: 3; .375 INJECTION, POWDER, LYOPHILIZED, FOR SOLUTION INTRAVENOUS at 21:08

## 2018-06-27 RX ADMIN — ALVIMOPAN 12 MG: 12 CAPSULE ORAL at 08:57

## 2018-06-27 RX ADMIN — SODIUM CHLORIDE: 9 INJECTION, SOLUTION INTRAVENOUS at 08:57

## 2018-06-27 RX ADMIN — KETOROLAC TROMETHAMINE 15 MG: 30 INJECTION, SOLUTION INTRAMUSCULAR at 22:33

## 2018-06-27 ASSESSMENT — PAIN DESCRIPTION - LOCATION
LOCATION: THROAT
LOCATION: ABDOMEN
LOCATION: ABDOMEN;THROAT

## 2018-06-27 ASSESSMENT — PAIN SCALES - GENERAL
PAINLEVEL_OUTOF10: 0
PAINLEVEL_OUTOF10: 5
PAINLEVEL_OUTOF10: 7
PAINLEVEL_OUTOF10: 2
PAINLEVEL_OUTOF10: 6
PAINLEVEL_OUTOF10: 2
PAINLEVEL_OUTOF10: 3
PAINLEVEL_OUTOF10: 5

## 2018-06-27 ASSESSMENT — PAIN DESCRIPTION - DESCRIPTORS
DESCRIPTORS: ACHING;DISCOMFORT
DESCRIPTORS: CONSTANT;DISCOMFORT;SORE
DESCRIPTORS: ACHING;DISCOMFORT;DULL
DESCRIPTORS: ACHING;DISCOMFORT;SORE;TENDER

## 2018-06-27 ASSESSMENT — PAIN DESCRIPTION - PAIN TYPE
TYPE: ACUTE PAIN;SURGICAL PAIN
TYPE: SURGICAL PAIN
TYPE: SURGICAL PAIN
TYPE: ACUTE PAIN

## 2018-06-27 NOTE — PROGRESS NOTES
Decreased balance, Decreased ROM  Prognosis: Fair  Discharge Recommendations: Patient would benefit from continued therapy after discharge    Clinical Decision Making: Clinical Decision making was of High Complexity as the result of analysis of data from a comprehensive assessment, the presence of comorbidities affecting the plan of care and the need for signficant modifications or assistance required to complete the evaluation. Patient Education:  Patient Education: OT role, OT POC, transfer education     Equipment Recommendations: Other: Continue to monitor. No needs ID'ed at this time. Safety:  Safety Devices in place: Yes  Type of devices: Gait belt, All fall risk precautions in place, Left in chair, Chair alarm in place, Nurse notified    Plan:  Times per week: 3-5x  Current Treatment Recommendations: Strengthening, Balance Training, ROM, Functional Mobility Training, Endurance Training, Self-Care / ADL, Safety Education & Training, Patient/Caregiver Education & Training  Plan Comment: Trial RED FOAM for utensils/toothbrush when pt no longer NPO. Goals:  Patient goals : return home to take care of her daughter     Short term goals  Time Frame for Short term goals: 2 weeks   Short term goal 1: Pt will complete functional mobility to/from BR with CGA to increase endurance needed for ADL routine. Short term goal 2: Pt will tolerate standing >3 minutes with CGA and 1-2 hand release to increase independence in grooming. Short term goal 3: Pt will complete simple ADLs with Min A while utilizing compensatory techniques for grasping ADL objects (silverwear, toothbrush, etc). Short term goal 4: Pt will complete light UE AROM exercises with 0-2 cues for technique to increase strength needed for bathing. Short term goal 5: Pt will complete functional transfers to/from bed/chair/toilet with SBA and 0 cues for safety to increase safety with toileting.    Long term goals  Time Frame for Long term goals : not est d/t ELOS     Evaluation Complexity: Based on the findings of patient history, examination, clinical presentation, and decision making during this evaluation, this patient is of high complexity. OT G-codes  Functional Limitation: Self care  Self Care Current Status (): At least 60 percent but less than 80 percent impaired, limited or restricted  Self Care Goal Status ():  At least 40 percent but less than 60 percent impaired, limited or restricted  AM-PAC Inpatient Daily Activity Raw Score: 11  AM-PAC Inpatient ADL T-Scale Score : 29.04  ADL Inpatient CMS 0-100% Score: 70.42  ADL Inpatient CMS G-Code Modifier : CL

## 2018-06-27 NOTE — FLOWSHEET NOTE
06/27/18 1637   Provider Notification   Reason for Communication Evaluate     Urine output for [id number] was 200ml

## 2018-06-27 NOTE — OP NOTE
TA 60 stapler and we removed the specimen. It should be noted in the  dissection process, the tubes and ovaries had been attached to the small  bowel and they were freed up and both ovaries were still present but  atrophic. At this point in time, we passed a 33 dilator up through the  rectum with a dilated transverse colon, which actually easily swung down  into the upper pelvis. We took a 35 EEA, sutured the proximal into the  descending colon. We passed the other end up through the rectum. We _____  the two firing the stapler. We then checked the anastomosis with Betadine  up through the rectum x2 with no evidence of leak. I then placed some  Shea down into the pelvis. We ran the small bowel again. I saw no  evidence of any other enterotomies and then closure was begun. The fascia  was closed with #1 Vicryl sutures. The skin was closed with staples. I  did place a TIANNA drain laying it anterior to the small bowel. The patient  tolerated the procedure well. EDDIE QUINTERO [facility], MD    D: 06/26/2018 13:33:21       T: 06/26/2018 13:36:57     [email or web address]  Job#: [id number]     Doc#: [id number]    CC:

## 2018-06-27 NOTE — PROGRESS NOTES
[facility]  Post Operative Progress Note  Dr Itzel Dumont    Pt Name: Silvino Mccracken Record Number: [id number]  Date of Birth 1940   Today's Date: 6/27/2018    Hospital day # 2   POD # 2  S/p 1. Abd Exploration  2. Lysis Adhesions Severe 3. Extended L Colon Resection    Chief complaint: diverticular stricture, redundant colon    Patient was stable overnight. She is very sleepy, and having some moderate pain as expected, still having low urine output. Chart reviewed. Updated by nursing staff. Denies chest discomfort or dyspnea. No N/V ; (-) belching, flatus and BM. Choking on pills with NG tube. Patient is also complaining of a very dry mouth, but denies sore throat. Tolerating Diet NPO Effective Now     Past, Family, Social History unchanged from admission. Diet:  Diet NPO Effective Now    Medications:  Scheduled Meds:   ketorolac  15 mg Intravenous Q6H    alvimopan  12 mg Oral BID    sodium chloride flush  10 mL Intravenous 2 times per day    enoxaparin  40 mg Subcutaneous Daily    pantoprazole  40 mg Intravenous Daily    And    sodium chloride (PF)  10 mL Intravenous Daily    piperacillin-tazobactam (ZOSYN) 3.375 g in dextrose 5% IVPB extended infusion (mini-bag)  3.375 g Intravenous Q8H     Continuous Infusions:   sodium chloride Stopped (06/27/18 0858)     PRN Meds:morphine, sodium chloride flush, acetaminophen, ondansetron    Objective:    CBC:   Recent Labs      06/26/18   0639  06/26/18   1653  06/27/18   0509   WBC  15.3*   --   15.1*   HGB  12.5  12.5  10.6*   PLT  232   --   200     BMP:    Recent Labs      06/26/18   0639  06/27/18   0509   NA  139  143   K  4.0  4.2   CL  109  113*   CO2  15*  20*   BUN  14  20   CREATININE  0.9  0.7   GLUCOSE  202*  112*     Calcium:  Recent Labs      06/27/18   0509   CALCIUM  8.9     Ionized Calcium:No results for input(s): IONCA in the last 72 hours.   Magnesium:No results for input(s): MG in the last 72 INFORMATION: Dizzy, nausea, off balance . COMPARISON: 10/31/2013 TECHNIQUE: Noncontrast images from the base of the skull to the vertex. All CT scans at this facility use dose modulation, iterative reconstruction, and/or weight-based dosing when appropriate to reduce radiation dose to as low as reasonably achievable. FINDINGS: No hemorrhage. No extra-axial collection. Gray-white differentiation is maintained. Both testicles low-attenuation area in the right temporal lobe may be related to remote infarction possible chronic small vessel ischemic disease. This is not seen on the prior exam. Ventricles are normal. Calvarium is intact. Visualized mastoid air cells and paranasal sinuses are clear. Low-attenuation focus in the right temporal lobe possibly related to remote infarction or chronic small vessel ischemic disease. No acute process. **This report has been created using voice recognition software. It may contain minor errors which are inherent in voice recognition technology. ** Final report electronically signed by Dr. Faith Coto on 6/19/2018 11:36 AM    Ct Abdomen Pelvis W Iv Contrast Additional Contrast? Oral    Result Date: 6/19/2018  PROCEDURE: CT ABDOMEN PELVIS W IV CONTRAST CLINICAL INFORMATION: ABDOMINAL PAIN,  . COMPARISON: October 31 2013 TECHNIQUE: 5 mm axial CT images were obtained through the abdomen and pelvis after the administration of intravenous and oral contrast. Coronal and sagittal reconstructions were obtained. All CT scans at this facility use dose modulation, iterative reconstruction, and/or weight-based dosing when appropriate to reduce radiation dose to as low as reasonably achievable. FINDINGS: Atelectasis or scarring in the lung bases. There are dilated biliary ducts predominantly on the left. Cholecystectomy. Pancreas, spleen and adrenal glands are unremarkable. Few faint right adrenal gland calcifications unchanged from prior. Kidneys are symmetric without hydronephrosis.  Normal peripheral edema   Surgical Incision: Dressing dry and intact, Hernán drain in place, no stool present (+) moderate bloody drainage    DVT prophylaxis: [x] Lovenox                                 [] SCDs                                 [] SQ Heparin                                 [] Encourage ambulation           [] Already on Anticoagulation                 ASSESSMENT:  S/p S/p 1. Abd Exploration  2. Lysis Adhesions Severe 3. Extended L Colon Resection  POD# 1  1. Acute postoperative pain  2. Low urine output  3. Hypertension- improved  4. Leukocytosis  5. Deconditioned      has a past medical history of Arthritis; Difficult intubation; GERD (gastroesophageal reflux disease); H pylori ulcer; Hx of blood clots; Nausea & vomiting; Pneumonia; Shingles; and Torus mandibularis. PLAN:  1. Incisional care daily   2. Monitor labs, replace lytes per protocol  3. NPO may have ice chips  4. NG tube LIWS continued  5. Continue Iv hydration    6. DVT prophylaxis hold x 24 hours  and GI Prophylaxis  7. Activity - ambulate, OOB to chair, C&DB,  IS, PT/OT on  8. Analgesia and antiemetics as needed  9. AntibioticTherapy continued   10. Stearns for strict I&O  11. Hernán drain- monitor color       Electronically signed by Indiana Berumen on 6/27/2018 at 9:43 AM     Patient has been seen and examined. Reviewed chart and updated by nursing staff. Agree with current plan and assessment above, discussed with student. Electronically signed by SULEIMAN Akers CNP on 6/27/2018 at 10:32 AM Patient seen and examined independently by me. Above discussed and I agree with CNP. Labs, cultures, and radiographs where available were reviewed. See orders for the updated patient care plan.     Robert Maya MD, hgb 10.6 no stool in TIANNA  6/27/2018   12:40 PM

## 2018-06-27 NOTE — PLAN OF CARE
Problem: Pain:  Goal: Pain level will decrease  Pain level will decrease   Outcome: Ongoing  Pt provided pain medications Morphine iv for post op pain. Pt continued NPO. Pt drowsy times but arouses easily A&O. Problem: Cardiovascular  Goal: No DVT, peripheral vascular complications  Outcome: Ongoing  DVT prevention scd while in bed. Up and walking with staff and walker . Up in chair . with assistance

## 2018-06-28 LAB
ERYTHROCYTE [DISTWIDTH] IN BLOOD BY AUTOMATED COUNT: 14.1 % (ref 11.5–14.5)
ERYTHROCYTE [DISTWIDTH] IN BLOOD BY AUTOMATED COUNT: 47.9 FL (ref 35–45)
HCT VFR BLD CALC: 27.7 % (ref 37–47)
HEMOGLOBIN: 8.8 GM/DL (ref 12–16)
MCH RBC QN AUTO: 29.7 PG (ref 26–33)
MCHC RBC AUTO-ENTMCNC: 31.8 GM/DL (ref 32.2–35.5)
MCV RBC AUTO: 93.6 FL (ref 81–99)
PLATELET # BLD: 174 THOU/MM3 (ref 130–400)
PMV BLD AUTO: 10.3 FL (ref 9.4–12.4)
RBC # BLD: 2.96 MILL/MM3 (ref 4.2–5.4)
WBC # BLD: 7.8 THOU/MM3 (ref 4.8–10.8)

## 2018-06-28 PROCEDURE — 97110 THERAPEUTIC EXERCISES: CPT

## 2018-06-28 PROCEDURE — C9113 INJ PANTOPRAZOLE SODIUM, VIA: HCPCS | Performed by: SURGERY

## 2018-06-28 PROCEDURE — 6360000002 HC RX W HCPCS: Performed by: SURGERY

## 2018-06-28 PROCEDURE — APPSS30 APP SPLIT SHARED TIME 16-30 MINUTES: Performed by: NURSE PRACTITIONER

## 2018-06-28 PROCEDURE — 85027 COMPLETE CBC AUTOMATED: CPT

## 2018-06-28 PROCEDURE — G8979 MOBILITY GOAL STATUS: HCPCS

## 2018-06-28 PROCEDURE — 97161 PT EVAL LOW COMPLEX 20 MIN: CPT

## 2018-06-28 PROCEDURE — 99024 POSTOP FOLLOW-UP VISIT: CPT | Performed by: SURGERY

## 2018-06-28 PROCEDURE — 6370000000 HC RX 637 (ALT 250 FOR IP): Performed by: SURGERY

## 2018-06-28 PROCEDURE — 1200000000 HC SEMI PRIVATE

## 2018-06-28 PROCEDURE — 99024 POSTOP FOLLOW-UP VISIT: CPT | Performed by: NURSE PRACTITIONER

## 2018-06-28 PROCEDURE — 2580000003 HC RX 258: Performed by: SURGERY

## 2018-06-28 PROCEDURE — G8978 MOBILITY CURRENT STATUS: HCPCS

## 2018-06-28 PROCEDURE — 36415 COLL VENOUS BLD VENIPUNCTURE: CPT

## 2018-06-28 RX ADMIN — Medication 10 ML: at 09:09

## 2018-06-28 RX ADMIN — SODIUM CHLORIDE: 9 INJECTION, SOLUTION INTRAVENOUS at 16:05

## 2018-06-28 RX ADMIN — PIPERACILLIN SODIUM AND TAZOBACTAM SODIUM 3.38 G: 3; .375 INJECTION, POWDER, LYOPHILIZED, FOR SOLUTION INTRAVENOUS at 12:23

## 2018-06-28 RX ADMIN — MORPHINE SULFATE 4 MG: 4 INJECTION INTRAVENOUS at 14:57

## 2018-06-28 RX ADMIN — MORPHINE SULFATE 4 MG: 4 INJECTION INTRAVENOUS at 20:05

## 2018-06-28 RX ADMIN — ALVIMOPAN 12 MG: 12 CAPSULE ORAL at 09:24

## 2018-06-28 RX ADMIN — CHLORASEPTIC 1 SPRAY: 1.5 LIQUID ORAL at 04:29

## 2018-06-28 RX ADMIN — PIPERACILLIN SODIUM AND TAZOBACTAM SODIUM 3.38 G: 3; .375 INJECTION, POWDER, LYOPHILIZED, FOR SOLUTION INTRAVENOUS at 04:00

## 2018-06-28 RX ADMIN — ALVIMOPAN 12 MG: 12 CAPSULE ORAL at 19:57

## 2018-06-28 RX ADMIN — SODIUM CHLORIDE: 9 INJECTION, SOLUTION INTRAVENOUS at 08:12

## 2018-06-28 RX ADMIN — PIPERACILLIN SODIUM AND TAZOBACTAM SODIUM 3.38 G: 3; .375 INJECTION, POWDER, LYOPHILIZED, FOR SOLUTION INTRAVENOUS at 19:57

## 2018-06-28 RX ADMIN — PANTOPRAZOLE SODIUM 40 MG: 40 INJECTION, POWDER, FOR SOLUTION INTRAVENOUS at 09:08

## 2018-06-28 ASSESSMENT — PAIN DESCRIPTION - LOCATION
LOCATION: ABDOMEN
LOCATION: THROAT
LOCATION: ABDOMEN

## 2018-06-28 ASSESSMENT — PAIN DESCRIPTION - PAIN TYPE
TYPE: SURGICAL PAIN
TYPE: ACUTE PAIN
TYPE: SURGICAL PAIN

## 2018-06-28 ASSESSMENT — PAIN DESCRIPTION - DESCRIPTORS
DESCRIPTORS: DISCOMFORT;SORE
DESCRIPTORS: SORE
DESCRIPTORS: DISCOMFORT;SORE
DESCRIPTORS: DISCOMFORT;SORE
DESCRIPTORS: SORE
DESCRIPTORS: DISCOMFORT;SORE
DESCRIPTORS: SORE

## 2018-06-28 ASSESSMENT — PAIN SCALES - GENERAL
PAINLEVEL_OUTOF10: 6
PAINLEVEL_OUTOF10: 4
PAINLEVEL_OUTOF10: 3
PAINLEVEL_OUTOF10: 5
PAINLEVEL_OUTOF10: 7
PAINLEVEL_OUTOF10: 5
PAINLEVEL_OUTOF10: 3
PAINLEVEL_OUTOF10: 4
PAINLEVEL_OUTOF10: 7
PAINLEVEL_OUTOF10: 4

## 2018-06-28 NOTE — PROGRESS NOTES
[facility]  Post Operative Progress Note  Dr Kota Sparrow    Pt Name: Brice Mariano Record Number: [id number]  Date of Birth 1940   Today's Date: 6/28/2018    Hospital day # 3   POD # 3  S/p 1. Abd Exploration  2. Lysis Adhesions Severe 3. Extended L Colon Resection    Chief complaint: diverticular stricture, redundant colon    Patient was stable overnight. She is awake, having some moderate pain as expected. Low urine output last evening, x 1 fluid bolus, urine output improved, Stearns removed. Chart reviewed. Updated by nursing staff. Denies chest discomfort or dyspnea. No N/V ; (-) belching, flatus and BM. NG tube removed. Patient is also complaining of a very dry mouth, but denies sore throat. Tolerating NPO advance to  DIET CLEAR LIQUID;     Past, Family, Social History unchanged from admission. Diet:  DIET CLEAR LIQUID;    Medications:  Scheduled Meds:   alvimopan  12 mg Oral BID    sodium chloride flush  10 mL Intravenous 2 times per day    enoxaparin  40 mg Subcutaneous Daily    pantoprazole  40 mg Intravenous Daily    And    sodium chloride (PF)  10 mL Intravenous Daily    piperacillin-tazobactam (ZOSYN) 3.375 g in dextrose 5% IVPB extended infusion (mini-bag)  3.375 g Intravenous Q8H     Continuous Infusions:   sodium chloride 125 mL/hr at 06/28/18 0812     PRN Meds:phenol, morphine, sodium chloride flush, acetaminophen, ondansetron    Objective:    CBC:   Recent Labs      06/26/18   0639  06/26/18   1653  06/27/18   0509  06/28/18   0556   WBC  15.3*   --   15.1*  7.8   HGB  12.5  12.5  10.6*  8.8*   PLT  232   --   200  174     BMP:    Recent Labs      06/26/18   0639  06/27/18   0509   NA  139  143   K  4.0  4.2   CL  109  113*   CO2  15*  20*   BUN  14  20   CREATININE  0.9  0.7   GLUCOSE  202*  112*     Calcium:  Recent Labs      06/27/18   0509   CALCIUM  8.9     Ionized Calcium:No results for input(s): IONCA in the last 72 hours.   Magnesium:No results CT HEAD WO CONTRAST CLINICAL INFORMATION: Dizzy, nausea, off balance . COMPARISON: 10/31/2013 TECHNIQUE: Noncontrast images from the base of the skull to the vertex. All CT scans at this facility use dose modulation, iterative reconstruction, and/or weight-based dosing when appropriate to reduce radiation dose to as low as reasonably achievable. FINDINGS: No hemorrhage. No extra-axial collection. Gray-white differentiation is maintained. Both testicles low-attenuation area in the right temporal lobe may be related to remote infarction possible chronic small vessel ischemic disease. This is not seen on the prior exam. Ventricles are normal. Calvarium is intact. Visualized mastoid air cells and paranasal sinuses are clear. Low-attenuation focus in the right temporal lobe possibly related to remote infarction or chronic small vessel ischemic disease. No acute process. **This report has been created using voice recognition software. It may contain minor errors which are inherent in voice recognition technology. ** Final report electronically signed by Dr. Oly Hunt on 6/19/2018 11:36 AM    Ct Abdomen Pelvis W Iv Contrast Additional Contrast? Oral    Result Date: 6/19/2018  PROCEDURE: CT ABDOMEN PELVIS W IV CONTRAST CLINICAL INFORMATION: ABDOMINAL PAIN,  . COMPARISON: October 31 2013 TECHNIQUE: 5 mm axial CT images were obtained through the abdomen and pelvis after the administration of intravenous and oral contrast. Coronal and sagittal reconstructions were obtained. All CT scans at this facility use dose modulation, iterative reconstruction, and/or weight-based dosing when appropriate to reduce radiation dose to as low as reasonably achievable. FINDINGS: Atelectasis or scarring in the lung bases. There are dilated biliary ducts predominantly on the left. Cholecystectomy. Pancreas, spleen and adrenal glands are unremarkable. Few faint right adrenal gland calcifications unchanged from prior.  Kidneys are symmetric

## 2018-06-28 NOTE — PROGRESS NOTES
ESTENDED LEFT COLON RESECTION, LYSIS OF COMPLEX ADHESIONS performed by Sarah Hyde MD at [facility]  08/11/2017    Dr Raynaldo Olszewski       Restrictions/Precautions:  Modified Diet, General Precautions, Fall Risk         Other position/activity restrictions: TIANNA drain anterior to the small bowel. Subjective:  Chart Reviewed: Yes  Patient assessed for rehabilitation services?: Yes  Subjective: Pt resting in bed and agrees to therapy. General:  Overall Orientation Status: Within Functional Limits    Vision: Impaired  Vision Exceptions: Wears glasses for reading    Hearing: Within functional limits       Pain:  Yes. Pain Assessment  Pain Assessment: 0-10  Pain Level: 3  Pain Type: Surgical pain  Pain Location: Abdomen       Social/Functional History:    Lives With: Daughter (disabled daughter whom paitient is the caregiver)  Type of Home: House  Home Layout: One level  Home Access: Stairs to enter with rails  Entrance Stairs - Number of Steps: 1  Home Equipment: Rolling walker, Cane     Bathroom Shower/Tub: Tub/Shower unit, Walk-in shower  Bathroom Toilet: Handicap height  Bathroom Equipment: Shower chair    Receives Help From: Family  ADL Assistance: Independent  Homemaking Assistance: Independent  Homemaking Responsibilities: Yes  Ambulation Assistance: Independent  Transfer Assistance: Independent    Active : No     Additional Comments: 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 Recieves help from 2 other daughters who live in town. Reports recently using the RW for ambulation d/t decreased balance.        Objective:     RLE AROM: WFL     LLE AROM : WFL       Strength RLE: WFL  Comment: grossly 4-/5    Strength LLE: WFL  Comment: grossly 4-/5       Sensation  Overall Sensation Status: WFL       Supine to Sit: Contact guard assistance (with extra time and use of bedrail)    Transfers  Sit to Stand: Contact guard assistance (verbal cue for hand placement)  Stand to sit: Contact term goals: 4 days  Short term goal 1: Pt to be Supervision for supine <> sit to get in/out of bed  Short term goal 2: Pt to be Supervision for sit <> stand to get up to ambulate  Short term goal 3: Pt to ambulate > 80 ft with RW with Supervision for household distances  Long term goals  Time Frame for Long term goals : not set due to short ELOS    Evaluation Complexity: Based on the findings of patient history, examination, clinical presentation, and decision making during this evaluation, the evaluation of Darby Westfall is of low complexity. PT G-Codes  Functional Limitation: Mobility: Walking and moving around  Mobility: Walking and Moving Around Current Status (): At least 40 percent but less than 60 percent impaired, limited or restricted  Mobility: Walking and Moving Around Goal Status (): At least 40 percent but less than 60 percent impaired, limited or restricted       AM-PAC Inpatient Mobility without Stair Climbing Raw Score : 15  AM-PAC Inpatient without Stair Climbing T-Scale Score : 43.03  Mobility Inpatient CMS 0-100% Score: 47.43  Mobility Inpatient without Stair CMS G-Code Modifier : Yossi Lazcano [place] [address]

## 2018-06-28 NOTE — PLAN OF CARE
Goal: No bowel complications  Outcome: Ongoing  abd soft faint bowel sounds no flatus passed no bm. N/G continued with Liws draining yellow /green liquids NPO with mouth swabs     Problem:   Goal: Adequate urinary output  Outcome: Ongoing  chaparro catheter in place patent draining yellow urine. Pt iv fluids continued. .      Problem: Skin Integrity/Risk  Goal: No skin breakdown during hospitalization  Outcome: Ongoing  Abd drsg dry intact with TIANNA drain patent dry intact      Problem: Falls - Risk of:  Goal: Will remain free from falls  Will remain free from falls   Outcome: Ongoing  Safety measures discussed with pt reminded needs call staff assist oob/chair. Call light in reach sr up x2. . Bed alarm on and or chair . Personal items in reach on BS table.        Mauro Pratt RN Registered Nurse Signed   Plan of Care Date of Service: 6/27/2018  3:06 AM         Problem: Pain:  Goal: Pain level will decrease  Pain level will decrease   Outcome: Ongoing  Pt provided pain medications Morphine iv for post op pain. Pt continued NPO. Pt drowsy times but arouses easily A&O.     Problem: Cardiovascular  Goal: No DVT, peripheral vascular complications  Outcome: Ongoing  DVT prevention scd while in bed. Up and walking with staff and walker . Up in chair . with assistance

## 2018-06-29 LAB
ANION GAP SERPL CALCULATED.3IONS-SCNC: 10 MEQ/L (ref 8–16)
BUN BLDV-MCNC: 12 MG/DL (ref 7–22)
CALCIUM IONIZED: 1.02 MMOL/L (ref 1.12–1.32)
CALCIUM SERPL-MCNC: 8.1 MG/DL (ref 8.5–10.5)
CHLORIDE BLD-SCNC: 113 MEQ/L (ref 98–111)
CO2: 20 MEQ/L (ref 23–33)
CREAT SERPL-MCNC: 0.4 MG/DL (ref 0.4–1.2)
ERYTHROCYTE [DISTWIDTH] IN BLOOD BY AUTOMATED COUNT: 14 % (ref 11.5–14.5)
ERYTHROCYTE [DISTWIDTH] IN BLOOD BY AUTOMATED COUNT: 49.1 FL (ref 35–45)
GFR SERPL CREATININE-BSD FRML MDRD: > 90 ML/MIN/1.73M2
GLUCOSE BLD-MCNC: 103 MG/DL (ref 70–108)
HCT VFR BLD CALC: 27.4 % (ref 37–47)
HEMOGLOBIN: 8.7 GM/DL (ref 12–16)
MAGNESIUM: 2 MG/DL (ref 1.6–2.4)
MCH RBC QN AUTO: 30.4 PG (ref 26–33)
MCHC RBC AUTO-ENTMCNC: 31.8 GM/DL (ref 32.2–35.5)
MCV RBC AUTO: 95.8 FL (ref 81–99)
PHOSPHORUS: 1.8 MG/DL (ref 2.4–4.7)
PLATELET # BLD: 204 THOU/MM3 (ref 130–400)
PMV BLD AUTO: 9.9 FL (ref 9.4–12.4)
POTASSIUM SERPL-SCNC: 3.2 MEQ/L (ref 3.5–5.2)
PREALBUMIN: 9.8 MG/DL (ref 20–40)
RBC # BLD: 2.86 MILL/MM3 (ref 4.2–5.4)
SODIUM BLD-SCNC: 143 MEQ/L (ref 135–145)
WBC # BLD: 7 THOU/MM3 (ref 4.8–10.8)

## 2018-06-29 PROCEDURE — 2500000003 HC RX 250 WO HCPCS: Performed by: SURGERY

## 2018-06-29 PROCEDURE — 85027 COMPLETE CBC AUTOMATED: CPT

## 2018-06-29 PROCEDURE — 76937 US GUIDE VASCULAR ACCESS: CPT

## 2018-06-29 PROCEDURE — 6360000002 HC RX W HCPCS: Performed by: NURSE PRACTITIONER

## 2018-06-29 PROCEDURE — APPSS30 APP SPLIT SHARED TIME 16-30 MINUTES: Performed by: NURSE PRACTITIONER

## 2018-06-29 PROCEDURE — C9113 INJ PANTOPRAZOLE SODIUM, VIA: HCPCS | Performed by: SURGERY

## 2018-06-29 PROCEDURE — 84100 ASSAY OF PHOSPHORUS: CPT

## 2018-06-29 PROCEDURE — 1200000000 HC SEMI PRIVATE

## 2018-06-29 PROCEDURE — 2580000003 HC RX 258: Performed by: NURSE PRACTITIONER

## 2018-06-29 PROCEDURE — 36569 INSJ PICC 5 YR+ W/O IMAGING: CPT

## 2018-06-29 PROCEDURE — A4212 NON CORING NEEDLE OR STYLET: HCPCS

## 2018-06-29 PROCEDURE — 82330 ASSAY OF CALCIUM: CPT

## 2018-06-29 PROCEDURE — 36415 COLL VENOUS BLD VENIPUNCTURE: CPT

## 2018-06-29 PROCEDURE — 99024 POSTOP FOLLOW-UP VISIT: CPT | Performed by: SURGERY

## 2018-06-29 PROCEDURE — 6360000002 HC RX W HCPCS: Performed by: SURGERY

## 2018-06-29 PROCEDURE — 83735 ASSAY OF MAGNESIUM: CPT

## 2018-06-29 PROCEDURE — 99024 POSTOP FOLLOW-UP VISIT: CPT | Performed by: NURSE PRACTITIONER

## 2018-06-29 PROCEDURE — 2580000003 HC RX 258: Performed by: SURGERY

## 2018-06-29 PROCEDURE — C1751 CATH, INF, PER/CENT/MIDLINE: HCPCS

## 2018-06-29 PROCEDURE — 2500000003 HC RX 250 WO HCPCS: Performed by: NURSE PRACTITIONER

## 2018-06-29 PROCEDURE — 80048 BASIC METABOLIC PNL TOTAL CA: CPT

## 2018-06-29 PROCEDURE — 6370000000 HC RX 637 (ALT 250 FOR IP): Performed by: NURSE PRACTITIONER

## 2018-06-29 PROCEDURE — 84134 ASSAY OF PREALBUMIN: CPT

## 2018-06-29 PROCEDURE — 6370000000 HC RX 637 (ALT 250 FOR IP): Performed by: SURGERY

## 2018-06-29 RX ORDER — SODIUM CHLORIDE 9 MG/ML
INJECTION, SOLUTION INTRAVENOUS CONTINUOUS
Status: DISCONTINUED | OUTPATIENT
Start: 2018-06-29 | End: 2018-06-29

## 2018-06-29 RX ORDER — LIDOCAINE HYDROCHLORIDE 10 MG/ML
5 INJECTION, SOLUTION EPIDURAL; INFILTRATION; INTRACAUDAL; PERINEURAL ONCE
Status: DISCONTINUED | OUTPATIENT
Start: 2018-06-29 | End: 2018-07-06 | Stop reason: HOSPADM

## 2018-06-29 RX ORDER — SODIUM CHLORIDE 0.9 % (FLUSH) 0.9 %
10 SYRINGE (ML) INJECTION EVERY 12 HOURS SCHEDULED
Status: DISCONTINUED | OUTPATIENT
Start: 2018-06-29 | End: 2018-07-06 | Stop reason: HOSPADM

## 2018-06-29 RX ORDER — SODIUM CHLORIDE 0.9 % (FLUSH) 0.9 %
10 SYRINGE (ML) INJECTION PRN
Status: DISCONTINUED | OUTPATIENT
Start: 2018-06-29 | End: 2018-07-06 | Stop reason: HOSPADM

## 2018-06-29 RX ORDER — POTASSIUM CHLORIDE 7.45 MG/ML
10 INJECTION INTRAVENOUS
Status: COMPLETED | OUTPATIENT
Start: 2018-06-29 | End: 2018-06-29

## 2018-06-29 RX ADMIN — POTASSIUM CHLORIDE 10 MEQ: 10 INJECTION, SOLUTION INTRAVENOUS at 16:16

## 2018-06-29 RX ADMIN — CALCIUM GLUCONATE: 94 INJECTION, SOLUTION INTRAVENOUS at 18:55

## 2018-06-29 RX ADMIN — SODIUM CHLORIDE: 9 INJECTION, SOLUTION INTRAVENOUS at 14:26

## 2018-06-29 RX ADMIN — PIPERACILLIN SODIUM AND TAZOBACTAM SODIUM 3.38 G: 3; .375 INJECTION, POWDER, LYOPHILIZED, FOR SOLUTION INTRAVENOUS at 20:08

## 2018-06-29 RX ADMIN — ALVIMOPAN 12 MG: 12 CAPSULE ORAL at 09:20

## 2018-06-29 RX ADMIN — PIPERACILLIN SODIUM AND TAZOBACTAM SODIUM 3.38 G: 3; .375 INJECTION, POWDER, LYOPHILIZED, FOR SOLUTION INTRAVENOUS at 11:58

## 2018-06-29 RX ADMIN — ENOXAPARIN SODIUM 40 MG: 40 INJECTION, SOLUTION INTRAVENOUS; SUBCUTANEOUS at 09:20

## 2018-06-29 RX ADMIN — POTASSIUM CHLORIDE 10 MEQ: 10 INJECTION, SOLUTION INTRAVENOUS at 13:19

## 2018-06-29 RX ADMIN — PIPERACILLIN SODIUM AND TAZOBACTAM SODIUM 3.38 G: 3; .375 INJECTION, POWDER, LYOPHILIZED, FOR SOLUTION INTRAVENOUS at 04:39

## 2018-06-29 RX ADMIN — Medication 10 ML: at 09:21

## 2018-06-29 RX ADMIN — PANTOPRAZOLE SODIUM 40 MG: 40 INJECTION, POWDER, FOR SOLUTION INTRAVENOUS at 09:21

## 2018-06-29 RX ADMIN — ALVIMOPAN 12 MG: 12 CAPSULE ORAL at 21:16

## 2018-06-29 RX ADMIN — POTASSIUM PHOSPHATE, MONOBASIC AND POTASSIUM PHOSPHATE, DIBASIC 11 MMOL: 224; 236 INJECTION, SOLUTION, CONCENTRATE INTRAVENOUS at 12:14

## 2018-06-29 RX ADMIN — POTASSIUM CHLORIDE 10 MEQ: 10 INJECTION, SOLUTION INTRAVENOUS at 12:09

## 2018-06-29 RX ADMIN — SODIUM CHLORIDE: 9 INJECTION, SOLUTION INTRAVENOUS at 00:19

## 2018-06-29 RX ADMIN — POTASSIUM CHLORIDE 10 MEQ: 10 INJECTION, SOLUTION INTRAVENOUS at 14:22

## 2018-06-29 RX ADMIN — ONDANSETRON 4 MG: 2 INJECTION INTRAMUSCULAR; INTRAVENOUS at 06:15

## 2018-06-29 RX ADMIN — SODIUM CHLORIDE: 9 INJECTION, SOLUTION INTRAVENOUS at 08:07

## 2018-06-29 ASSESSMENT — PAIN DESCRIPTION - PAIN TYPE
TYPE: SURGICAL PAIN

## 2018-06-29 ASSESSMENT — PAIN DESCRIPTION - DESCRIPTORS
DESCRIPTORS: ACHING

## 2018-06-29 ASSESSMENT — PAIN SCALES - GENERAL
PAINLEVEL_OUTOF10: 4
PAINLEVEL_OUTOF10: 5
PAINLEVEL_OUTOF10: 4

## 2018-06-29 ASSESSMENT — PAIN DESCRIPTION - LOCATION
LOCATION: ABDOMEN

## 2018-06-29 NOTE — PROGRESS NOTES
inherent in voice recognition technology. ** Final report electronically signed by Dr. Amanda Newsome on 6/19/2018 11:42 AM    Ct Head Wo Contrast    Result Date: 6/19/2018  PROCEDURE: CT HEAD WO CONTRAST CLINICAL INFORMATION: Dizzy, nausea, off balance . COMPARISON: 10/31/2013 TECHNIQUE: Noncontrast images from the base of the skull to the vertex. All CT scans at this facility use dose modulation, iterative reconstruction, and/or weight-based dosing when appropriate to reduce radiation dose to as low as reasonably achievable. FINDINGS: No hemorrhage. No extra-axial collection. Gray-white differentiation is maintained. Both testicles low-attenuation area in the right temporal lobe may be related to remote infarction possible chronic small vessel ischemic disease. This is not seen on the prior exam. Ventricles are normal. Calvarium is intact. Visualized mastoid air cells and paranasal sinuses are clear. Low-attenuation focus in the right temporal lobe possibly related to remote infarction or chronic small vessel ischemic disease. No acute process. **This report has been created using voice recognition software. It may contain minor errors which are inherent in voice recognition technology. ** Final report electronically signed by Dr. Dionisio Valera on 6/19/2018 11:36 AM    Ct Abdomen Pelvis W Iv Contrast Additional Contrast? Oral    Result Date: 6/19/2018  PROCEDURE: CT ABDOMEN PELVIS W IV CONTRAST CLINICAL INFORMATION: ABDOMINAL PAIN,  . COMPARISON: October 31 2013 TECHNIQUE: 5 mm axial CT images were obtained through the abdomen and pelvis after the administration of intravenous and oral contrast. Coronal and sagittal reconstructions were obtained. All CT scans at this facility use dose modulation, iterative reconstruction, and/or weight-based dosing when appropriate to reduce radiation dose to as low as reasonably achievable. FINDINGS: Atelectasis or scarring in the lung bases.  There are dilated biliary ducts predominantly on the left. Cholecystectomy. Pancreas, spleen and adrenal glands are unremarkable. Few faint right adrenal gland calcifications unchanged from prior. Kidneys are symmetric without hydronephrosis. Normal caliber aorta with atherosclerotic changes. No free intraperitoneal air. Moderate stool throughout the colon with diverticulosis. No evidence for diverticulitis or obstruction. Appendix is not seen. Distended bladder. No bladder wall thickening. Postsurgical changes lumbar spine with laminectomy and bilateral pedicle fusions. SI joints are symmetric. 1. Dilated biliary tree, predominantly in the left. Cholecystectomy. Appearance is similar to prior study. 2. Postsurgical changes lumbar spine following pedicle fusion, stable in appearance from prior. 3. Moderate stool throughout the colon with diverticulosis. No evidence for diverticulitis at this time. **This report has been created using voice recognition software. It may contain minor errors which are inherent in voice recognition technology. ** Final report electronically signed by Dr. Kyle Núñez on 6/19/2018 2:47 PM       Physical Exam:  Vitals: BP (!) 151/68   Pulse 79   Temp 96.7 °F (35.9 °C) (Oral)   Resp 18   Ht 5' 3\" (1.6 m)   Wt 154 lb 12.8 oz (70.2 kg)   SpO2 97%   BMI 27.42 kg/m²   24 hour intake/output:    Intake/Output Summary (Last 24 hours) at 06/29/18 1053  Last data filed at 06/29/18 0600   Gross per 24 hour   Intake             3894 ml   Output             1010 ml   Net             2884 ml     Last 3 weights:   Wt Readings from Last 3 Encounters:   06/25/18 154 lb 12.8 oz (70.2 kg)   06/19/18 165 lb (74.8 kg)   06/07/18 160 lb 14.4 oz (73 kg)       General appearance - ill-appearing, awake and alert  HEENT: Normocephalic and Atraumatic  Chest - clear to auscultation, no wheezes, rales or rhonchi, symmetric air entry  Cardiovascular - normal rate and regular rhythm  Abdomen - soft,  distended, BS active    Neurological - shows no evidence of bile however patient likely has postoperative ileus will place PICC line and begin TPN and keep nothing by mouth for now  6/29/2018   4:34 PM

## 2018-06-29 NOTE — PLAN OF CARE
Problem: Nutrition  Goal: Optimal nutrition therapy  Outcome: Ongoing  Nutrition Problem: Inadequate oral intake  Intervention: Food and/or Nutrient Delivery: Start Parenteral Nutrition  Nutritional Goals: Patient will tolerate adequate nutrition support to meet 75% or more of estimated nutrition needs until appropriate to transition to PO feeds during LOS.

## 2018-06-29 NOTE — PROGRESS NOTES
PICC Procedure Note    [facility]   Admitted- 6/25/2018  9:22 AM  Admission diagnosis- Diverticular disease of colon [K57.30]      Attending Physician- Miri Gao MD  Ordering Physician-KERRI Miller  Indication for Insertion: TPN    Catheter Insertion Date- 6/29/2018   Lot Number- [id number]  Gauge-5  Lumen-dual    Insertion Site- MARYURI Brachial  Vein Diameter- 2.5 mm  Catheter Length- 33 cm  Internal Length- 36 cm  Exposed Catheter Length- 3cm   Upper Arm Circumference- 28cm  Tip Confirmation System Bundle met- Yes  If X-ray required, Tip Location-   Radiologist-     PICC insertion successful- Yes  Ultrasound- yes    Okay To Use PICC- Yes    Electronically signed by Carrie Charles RN on 6/29/2018 at 4:37 PM

## 2018-06-29 NOTE — PROGRESS NOTES
Physical Therapy  [facility]  PHYSICAL THERAPY MISSED TREATMENT NOTE  ACUTE CARE    Date: 2018  Patient Name: Brittany Stephenson        MRN: [id number]   : 1940  (66 y.o.)  Gender: female   Referring Practitioner: SULEIMAN Fleming CNP  Referring Practitioner: SULEIMAN Melo CNP  Diagnosis: Diverticular disease of colon  Diagnosis: Diverticular Disease of Colon         REASON FOR MISSED TREATMENT:  Missed treat due to pt refusal. Pt just had PICC line placed and still resting in bed. Pt states she was up and around all morning and now would like to rest. Will try back later as time allows.

## 2018-06-29 NOTE — PROGRESS NOTES
Body Wt:  (per EHR: 6/7/18 160# 14.4oz, 10/3/13 153# 9.6oz)  · % Weight Change: 3.75% loss,  ~3 weeks  · Ideal Body Wt: 115 lb (52.2 kg),   · Adjusted Body Wt: 125 lb (56.7 kg),   · BMI Classification: BMI 25.0 - 29.9 Overweight  · Comparative Standards (Estimated Nutrition Needs):  · Estimated Daily Total Kcal: 2864-9968 kcals (28-30 kcal/kgm based on adjusted weight 57 kgm)  · Estimated Daily Protein (g): 68-85 grams protein (1.2-1.5 grams protein/kgm based on adjusted weight 57 kgm)    Estimated Intake vs Estimated Needs: Intake Less Than Needs    Nutrition Risk Level: High    Nutrition Interventions:   Start Parenteral Nutrition  Continued Inpatient Monitoring, Education not appropriate at this time    Nutrition Evaluation:   · Evaluation: Goals set   · Goals: Patient will tolerate adequate nutrition support to meet 75% or more of estimated nutrition needs until appropriate to transition to PO feeds during LOS. · Monitoring: Diet Progression, Meal Intake, PN Intake, PN Tolerance, Wound Healing, Weight, Pertinent Labs    See Adult Nutrition Doc Flowsheet for more detail.      Electronically signed by Mariaa Alvarez RD, LD on 6/29/18 at 10:42 AM    Contact Number: [phone]

## 2018-06-30 LAB
ANION GAP SERPL CALCULATED.3IONS-SCNC: 10 MEQ/L (ref 8–16)
BUN BLDV-MCNC: 9 MG/DL (ref 7–22)
CALCIUM IONIZED: 1.17 MMOL/L (ref 1.12–1.32)
CALCIUM SERPL-MCNC: 8 MG/DL (ref 8.5–10.5)
CHLORIDE BLD-SCNC: 106 MEQ/L (ref 98–111)
CO2: 24 MEQ/L (ref 23–33)
CREAT SERPL-MCNC: 0.3 MG/DL (ref 0.4–1.2)
GFR SERPL CREATININE-BSD FRML MDRD: > 90 ML/MIN/1.73M2
GLUCOSE BLD-MCNC: 120 MG/DL (ref 70–108)
GLUCOSE BLD-MCNC: 121 MG/DL (ref 70–108)
GLUCOSE BLD-MCNC: 122 MG/DL (ref 70–108)
GLUCOSE BLD-MCNC: 123 MG/DL (ref 70–108)
GLUCOSE BLD-MCNC: 131 MG/DL (ref 70–108)
GLUCOSE BLD-MCNC: 139 MG/DL (ref 70–108)
HCT VFR BLD CALC: 23.4 % (ref 37–47)
HEMOGLOBIN: 7.7 GM/DL (ref 12–16)
MAGNESIUM: 1.8 MG/DL (ref 1.6–2.4)
PHOSPHORUS: 1.7 MG/DL (ref 2.4–4.7)
POTASSIUM SERPL-SCNC: 3 MEQ/L (ref 3.5–5.2)
SODIUM BLD-SCNC: 140 MEQ/L (ref 135–145)

## 2018-06-30 PROCEDURE — 83735 ASSAY OF MAGNESIUM: CPT

## 2018-06-30 PROCEDURE — 2580000003 HC RX 258: Performed by: SURGERY

## 2018-06-30 PROCEDURE — 36415 COLL VENOUS BLD VENIPUNCTURE: CPT

## 2018-06-30 PROCEDURE — 1200000000 HC SEMI PRIVATE

## 2018-06-30 PROCEDURE — 85018 HEMOGLOBIN: CPT

## 2018-06-30 PROCEDURE — 6360000002 HC RX W HCPCS: Performed by: SURGERY

## 2018-06-30 PROCEDURE — 85014 HEMATOCRIT: CPT

## 2018-06-30 PROCEDURE — 2500000003 HC RX 250 WO HCPCS: Performed by: PHARMACIST

## 2018-06-30 PROCEDURE — 6370000000 HC RX 637 (ALT 250 FOR IP): Performed by: SURGERY

## 2018-06-30 PROCEDURE — 82330 ASSAY OF CALCIUM: CPT

## 2018-06-30 PROCEDURE — 6360000002 HC RX W HCPCS: Performed by: PHARMACIST

## 2018-06-30 PROCEDURE — 80048 BASIC METABOLIC PNL TOTAL CA: CPT

## 2018-06-30 PROCEDURE — C9113 INJ PANTOPRAZOLE SODIUM, VIA: HCPCS | Performed by: SURGERY

## 2018-06-30 PROCEDURE — 84100 ASSAY OF PHOSPHORUS: CPT

## 2018-06-30 PROCEDURE — 99024 POSTOP FOLLOW-UP VISIT: CPT | Performed by: SURGERY

## 2018-06-30 PROCEDURE — 82948 REAGENT STRIP/BLOOD GLUCOSE: CPT

## 2018-06-30 PROCEDURE — 2580000003 HC RX 258: Performed by: PHARMACIST

## 2018-06-30 PROCEDURE — 2580000003 HC RX 258: Performed by: NURSE PRACTITIONER

## 2018-06-30 RX ORDER — DEXTROSE MONOHYDRATE 50 MG/ML
100 INJECTION, SOLUTION INTRAVENOUS PRN
Status: DISCONTINUED | OUTPATIENT
Start: 2018-06-30 | End: 2018-07-06 | Stop reason: HOSPADM

## 2018-06-30 RX ORDER — POTASSIUM CHLORIDE 29.8 MG/ML
20 INJECTION INTRAVENOUS
Status: COMPLETED | OUTPATIENT
Start: 2018-06-30 | End: 2018-06-30

## 2018-06-30 RX ORDER — NICOTINE POLACRILEX 4 MG
15 LOZENGE BUCCAL PRN
Status: DISCONTINUED | OUTPATIENT
Start: 2018-06-30 | End: 2018-07-06 | Stop reason: HOSPADM

## 2018-06-30 RX ORDER — DEXTROSE MONOHYDRATE 25 G/50ML
12.5 INJECTION, SOLUTION INTRAVENOUS PRN
Status: DISCONTINUED | OUTPATIENT
Start: 2018-06-30 | End: 2018-07-06 | Stop reason: HOSPADM

## 2018-06-30 RX ADMIN — Medication 10 ML: at 18:59

## 2018-06-30 RX ADMIN — PIPERACILLIN SODIUM AND TAZOBACTAM SODIUM 3.38 G: 3; .375 INJECTION, POWDER, LYOPHILIZED, FOR SOLUTION INTRAVENOUS at 04:16

## 2018-06-30 RX ADMIN — POTASSIUM CHLORIDE 20 MEQ: 400 INJECTION, SOLUTION INTRAVENOUS at 11:21

## 2018-06-30 RX ADMIN — POTASSIUM CHLORIDE 20 MEQ: 400 INJECTION, SOLUTION INTRAVENOUS at 10:09

## 2018-06-30 RX ADMIN — PANTOPRAZOLE SODIUM 40 MG: 40 INJECTION, POWDER, FOR SOLUTION INTRAVENOUS at 10:14

## 2018-06-30 RX ADMIN — MORPHINE SULFATE 4 MG: 4 INJECTION INTRAVENOUS at 23:36

## 2018-06-30 RX ADMIN — POTASSIUM CHLORIDE 20 MEQ: 400 INJECTION, SOLUTION INTRAVENOUS at 13:41

## 2018-06-30 RX ADMIN — MORPHINE SULFATE 4 MG: 4 INJECTION INTRAVENOUS at 15:45

## 2018-06-30 RX ADMIN — MORPHINE SULFATE 4 MG: 4 INJECTION INTRAVENOUS at 04:17

## 2018-06-30 RX ADMIN — PIPERACILLIN SODIUM AND TAZOBACTAM SODIUM 3.38 G: 3; .375 INJECTION, POWDER, LYOPHILIZED, FOR SOLUTION INTRAVENOUS at 20:23

## 2018-06-30 RX ADMIN — ALVIMOPAN 12 MG: 12 CAPSULE ORAL at 10:14

## 2018-06-30 RX ADMIN — Medication 10 ML: at 21:20

## 2018-06-30 RX ADMIN — Medication 10 ML: at 10:13

## 2018-06-30 RX ADMIN — CALCIUM GLUCONATE: 94 INJECTION, SOLUTION INTRAVENOUS at 18:17

## 2018-06-30 RX ADMIN — PIPERACILLIN SODIUM AND TAZOBACTAM SODIUM 3.38 G: 3; .375 INJECTION, POWDER, LYOPHILIZED, FOR SOLUTION INTRAVENOUS at 12:24

## 2018-06-30 RX ADMIN — ALVIMOPAN 12 MG: 12 CAPSULE ORAL at 20:30

## 2018-06-30 RX ADMIN — ENOXAPARIN SODIUM 40 MG: 40 INJECTION, SOLUTION INTRAVENOUS; SUBCUTANEOUS at 10:14

## 2018-06-30 RX ADMIN — POTASSIUM PHOSPHATE, MONOBASIC AND POTASSIUM PHOSPHATE, DIBASIC 12 MMOL: 224; 236 INJECTION, SOLUTION, CONCENTRATE INTRAVENOUS at 14:48

## 2018-06-30 ASSESSMENT — PAIN DESCRIPTION - PAIN TYPE
TYPE: SURGICAL PAIN

## 2018-06-30 ASSESSMENT — PAIN DESCRIPTION - DESCRIPTORS
DESCRIPTORS: DISCOMFORT;ACHING
DESCRIPTORS: ACHING;DISCOMFORT;SORE;TENDER
DESCRIPTORS: DISCOMFORT
DESCRIPTORS: DISCOMFORT;ACHING
DESCRIPTORS: ACHING
DESCRIPTORS: DISCOMFORT;ACHING
DESCRIPTORS: ACHING;DISCOMFORT;DULL
DESCRIPTORS: ACHING

## 2018-06-30 ASSESSMENT — PAIN SCALES - GENERAL
PAINLEVEL_OUTOF10: 7
PAINLEVEL_OUTOF10: 2
PAINLEVEL_OUTOF10: 4
PAINLEVEL_OUTOF10: 7
PAINLEVEL_OUTOF10: 5
PAINLEVEL_OUTOF10: 3
PAINLEVEL_OUTOF10: 3
PAINLEVEL_OUTOF10: 7
PAINLEVEL_OUTOF10: 4

## 2018-06-30 ASSESSMENT — PAIN DESCRIPTION - LOCATION
LOCATION: ABDOMEN
LOCATION: ABDOMEN;GENERALIZED
LOCATION: ABDOMEN

## 2018-06-30 NOTE — PLAN OF CARE
Problem: Pain:  Goal: Pain level will decrease  Pain level will decrease   Outcome: Ongoing  Patient states pain is 5 out of 10 on raul scale. Pain medication given, patient resting quietly with eyes closed  Goal: Control of acute pain  Control of acute pain   Outcome: Ongoing  Patient's surgical pain is being decreased with pain medication  Goal: Control of chronic pain  Control of chronic pain   Outcome: Completed Date Met: 06/29/18        Problem: Cardiovascular  Goal: No DVT, peripheral vascular complications  Outcome: Ongoing  Patient has no signs or symptoms of dvt present, wearing scds  Goal: Understanding of dietary restrictions  Outcome: Ongoing  Patient verbalizes understanding of clear liquid diet     Problem: Respiratory  Goal: No pulmonary complications  Outcome: Ongoing  Patient's lungs sounds are clear  Goal: O2 Sat > 90%  Outcome: Ongoing  Oxygen saturation is 97% on room air  Goal: Supplemental O2 requirements decreased  Outcome: Completed Date Met: 06/29/18  Patient is not wearing oxygen     Problem: GI  Goal: No bowel complications  Outcome: Ongoing  Patient has active bowel sounds, passing gas  Goal: Bowel movement at least every other day  Outcome: Ongoing  2 bowel movements on day shift     Problem:   Goal: Adequate urinary output  Outcome: Ongoing  Patient is voiding adequate amounts of urine  Goal: No urinary complication  Outcome: Ongoing  Patient is not having difficulty with urinating     Problem: Nutrition  Goal: Optimal nutrition therapy  Outcome: Ongoing  Patient is currently on sparing ice chips and tolerating it well.  TPN started.     Problem: Skin Integrity/Risk  Goal: No skin breakdown during hospitalization  Outcome: Ongoing  Patient has surgical incision and lefty drain opening, no further breakdown noted  Goal: Wound healing  Outcome: Ongoing  Patient's surgical incision is open to air with staples and well approximated     Problem: Musculor/Skeletal Functional Status  Goal: Highest potential functional level  Outcome: Ongoing  Patient is alert and oriented, ambulating as encouraged  Goal: Absence of falls  Outcome: Ongoing  Patient free from falls this shift, up with assistance, skid proof footwear on while up, bed alarm on     Problem: Intellectual/Education/Knowledge Deficit  Goal: Teaching initiated upon admission  Outcome: Ongoing  Patient teaching initiated upon admission  Goal: Written Disposition Instruction form completed  Outcome: Ongoing  To be discussed at discharge     Problem: Falls - Risk of:  Goal: Will remain free from falls  Will remain free from falls   Outcome: Ongoing  Patient free from falls this shift, up with assistance and skid proof footwear     Comments: Care plan reviewed with patient and daughter. Patient and daughter verbalize understanding of the plan of care and contribute to goal setting.

## 2018-06-30 NOTE — PLAN OF CARE
Problem: Pain:  Goal: Pain level will decrease  Pain level will decrease   Outcome: Ongoing  Pain goal met after IV Morphine given. Problem: Cardiovascular  Goal: No DVT, peripheral vascular complications  Outcome: Ongoing  Negative Homans sign. SCDs and Lovenox for DVT prophylaxis. Problem: Respiratory  Goal: No pulmonary complications  Outcome: Ongoing  Lungs clear, diminished. Encouraged use of incentive spirometer, goal 1000, achieves 500. O2 saturation > 90%. Problem: GI  Goal: No bowel complications  Outcome: Ongoing  Abdomen soft, rounded, hypoactive bowel sounds. Problem:   Goal: Adequate urinary output  Outcome: Ongoing  Voiding cloudy yellow urine. Problem: Nutrition  Goal: Optimal nutrition therapy  Outcome: Ongoing  NPO, TPN continues. Problem: Skin Integrity/Risk  Goal: No skin breakdown during hospitalization  Outcome: Ongoing  TIANNA drain intact/left abdomen draining serosanguinous liquid; dressing to site changed once this shift. Midline abdominal incision intact, healing with staples. Problem: Musculor/Skeletal Functional Status  Goal: Highest potential functional level  Outcome: Ongoing  Up with assistance. Slow, steady gait. Problem: Discharge Planning  Intervention: Interaction with patient/family and care team  Plan home on discharge, no needs identified at present time. Problem: Falls - Risk of:  Goal: Will remain free from falls  Will remain free from falls   Outcome: Ongoing  Alert, oriented, slow to respond. Call light within reach, appropriate use. Up with assistance, gait slow and steady. Problem: Infection - Central Venous Catheter-Associated Bloodstream Infection:  Goal: Will show no infection signs and symptoms  Will show no infection signs and symptoms   Outcome: Met This Shift      Comments: Care plan reviewed with patient. Patient verbalizes understanding of the plan of care and contribute to goal setting.

## 2018-07-01 LAB
ANION GAP SERPL CALCULATED.3IONS-SCNC: 10 MEQ/L (ref 8–16)
BUN BLDV-MCNC: 8 MG/DL (ref 7–22)
CALCIUM IONIZED: 1.14 MMOL/L (ref 1.12–1.32)
CALCIUM SERPL-MCNC: 8.4 MG/DL (ref 8.5–10.5)
CHLORIDE BLD-SCNC: 103 MEQ/L (ref 98–111)
CO2: 23 MEQ/L (ref 23–33)
CREAT SERPL-MCNC: 0.3 MG/DL (ref 0.4–1.2)
GFR SERPL CREATININE-BSD FRML MDRD: > 90 ML/MIN/1.73M2
GLUCOSE BLD-MCNC: 113 MG/DL (ref 70–108)
GLUCOSE BLD-MCNC: 117 MG/DL (ref 70–108)
GLUCOSE BLD-MCNC: 121 MG/DL (ref 70–108)
GLUCOSE BLD-MCNC: 122 MG/DL (ref 70–108)
MAGNESIUM: 1.9 MG/DL (ref 1.6–2.4)
PHOSPHORUS: 2.5 MG/DL (ref 2.4–4.7)
POTASSIUM SERPL-SCNC: 3.7 MEQ/L (ref 3.5–5.2)
SODIUM BLD-SCNC: 136 MEQ/L (ref 135–145)

## 2018-07-01 PROCEDURE — 6360000002 HC RX W HCPCS: Performed by: SURGERY

## 2018-07-01 PROCEDURE — 36415 COLL VENOUS BLD VENIPUNCTURE: CPT

## 2018-07-01 PROCEDURE — 80048 BASIC METABOLIC PNL TOTAL CA: CPT

## 2018-07-01 PROCEDURE — 82948 REAGENT STRIP/BLOOD GLUCOSE: CPT

## 2018-07-01 PROCEDURE — 1200000000 HC SEMI PRIVATE

## 2018-07-01 PROCEDURE — 2580000003 HC RX 258: Performed by: NURSE PRACTITIONER

## 2018-07-01 PROCEDURE — 82330 ASSAY OF CALCIUM: CPT

## 2018-07-01 PROCEDURE — 6370000000 HC RX 637 (ALT 250 FOR IP): Performed by: SURGERY

## 2018-07-01 PROCEDURE — 2580000003 HC RX 258: Performed by: SURGERY

## 2018-07-01 PROCEDURE — 84100 ASSAY OF PHOSPHORUS: CPT

## 2018-07-01 PROCEDURE — 99024 POSTOP FOLLOW-UP VISIT: CPT | Performed by: SURGERY

## 2018-07-01 PROCEDURE — 83735 ASSAY OF MAGNESIUM: CPT

## 2018-07-01 PROCEDURE — C9113 INJ PANTOPRAZOLE SODIUM, VIA: HCPCS | Performed by: SURGERY

## 2018-07-01 PROCEDURE — 2500000003 HC RX 250 WO HCPCS: Performed by: PHARMACIST

## 2018-07-01 RX ORDER — METOCLOPRAMIDE HYDROCHLORIDE 5 MG/ML
10 INJECTION INTRAMUSCULAR; INTRAVENOUS EVERY 8 HOURS
Status: DISCONTINUED | OUTPATIENT
Start: 2018-07-01 | End: 2018-07-06 | Stop reason: HOSPADM

## 2018-07-01 RX ORDER — BISACODYL 10 MG
10 SUPPOSITORY, RECTAL RECTAL DAILY
Status: DISCONTINUED | OUTPATIENT
Start: 2018-07-01 | End: 2018-07-06 | Stop reason: HOSPADM

## 2018-07-01 RX ADMIN — CALCIUM GLUCONATE: 94 INJECTION, SOLUTION INTRAVENOUS at 19:02

## 2018-07-01 RX ADMIN — PIPERACILLIN SODIUM AND TAZOBACTAM SODIUM 3.38 G: 3; .375 INJECTION, POWDER, LYOPHILIZED, FOR SOLUTION INTRAVENOUS at 04:26

## 2018-07-01 RX ADMIN — ENOXAPARIN SODIUM 40 MG: 40 INJECTION, SOLUTION INTRAVENOUS; SUBCUTANEOUS at 08:32

## 2018-07-01 RX ADMIN — MORPHINE SULFATE 4 MG: 4 INJECTION INTRAVENOUS at 08:32

## 2018-07-01 RX ADMIN — PANTOPRAZOLE SODIUM 40 MG: 40 INJECTION, POWDER, FOR SOLUTION INTRAVENOUS at 08:31

## 2018-07-01 RX ADMIN — Medication 10 ML: at 08:32

## 2018-07-01 RX ADMIN — Medication 10 ML: at 11:45

## 2018-07-01 RX ADMIN — Medication 10 ML: at 19:57

## 2018-07-01 RX ADMIN — ALVIMOPAN 12 MG: 12 CAPSULE ORAL at 08:32

## 2018-07-01 RX ADMIN — Medication 10 ML: at 12:40

## 2018-07-01 RX ADMIN — MORPHINE SULFATE 4 MG: 4 INJECTION INTRAVENOUS at 05:22

## 2018-07-01 RX ADMIN — Medication 10 ML: at 14:15

## 2018-07-01 RX ADMIN — ONDANSETRON 4 MG: 2 INJECTION INTRAMUSCULAR; INTRAVENOUS at 05:21

## 2018-07-01 RX ADMIN — PIPERACILLIN SODIUM AND TAZOBACTAM SODIUM 3.38 G: 3; .375 INJECTION, POWDER, LYOPHILIZED, FOR SOLUTION INTRAVENOUS at 20:39

## 2018-07-01 RX ADMIN — ALVIMOPAN 12 MG: 12 CAPSULE ORAL at 20:44

## 2018-07-01 RX ADMIN — MORPHINE SULFATE 4 MG: 4 INJECTION INTRAVENOUS at 14:14

## 2018-07-01 RX ADMIN — BISACODYL 10 MG: 10 SUPPOSITORY RECTAL at 11:45

## 2018-07-01 RX ADMIN — METOCLOPRAMIDE 10 MG: 5 INJECTION, SOLUTION INTRAMUSCULAR; INTRAVENOUS at 19:55

## 2018-07-01 RX ADMIN — METOCLOPRAMIDE 10 MG: 5 INJECTION, SOLUTION INTRAMUSCULAR; INTRAVENOUS at 11:45

## 2018-07-01 RX ADMIN — PIPERACILLIN SODIUM AND TAZOBACTAM SODIUM 3.38 G: 3; .375 INJECTION, POWDER, LYOPHILIZED, FOR SOLUTION INTRAVENOUS at 12:40

## 2018-07-01 RX ADMIN — Medication 10 ML: at 16:45

## 2018-07-01 ASSESSMENT — PAIN SCALES - GENERAL
PAINLEVEL_OUTOF10: 5
PAINLEVEL_OUTOF10: 0
PAINLEVEL_OUTOF10: 7
PAINLEVEL_OUTOF10: 0
PAINLEVEL_OUTOF10: 6
PAINLEVEL_OUTOF10: 3
PAINLEVEL_OUTOF10: 7
PAINLEVEL_OUTOF10: 7

## 2018-07-01 ASSESSMENT — PAIN DESCRIPTION - LOCATION
LOCATION: ABDOMEN
LOCATION: ABDOMEN;GENERALIZED
LOCATION: ABDOMEN

## 2018-07-01 ASSESSMENT — PAIN DESCRIPTION - PAIN TYPE
TYPE: SURGICAL PAIN

## 2018-07-01 ASSESSMENT — PAIN DESCRIPTION - DESCRIPTORS
DESCRIPTORS: DISCOMFORT
DESCRIPTORS: ACHING;DISCOMFORT
DESCRIPTORS: ACHING;DISCOMFORT

## 2018-07-01 NOTE — PROGRESS NOTES
0.3*  0.3*   GLUCOSE  103  120*  121*     Calcium:  Recent Labs      07/01/18   0425   CALCIUM  8.4*     Ionized Calcium:No results for input(s): IONCA in the last 72 hours. Magnesium:  Recent Labs      07/01/18   0425   MG  1.9     Phosphorus:  Recent Labs      07/01/18   0425   PHOS  2.5     BNP:No results for input(s): BNP in the last 72 hours. Glucose:  Recent Labs      06/30/18   1343  06/30/18   1819  06/30/18   2342   POCGLU  123*  131*  139*     HgbA1C: No results for input(s): LABA1C in the last 72 hours. INR: No results for input(s): INR in the last 72 hours. Hepatic: No results for input(s): ALKPHOS, ALT, AST, PROT, BILITOT, BILIDIR, LABALBU in the last 72 hours. Amylase and Lipase:No results for input(s): LACTA, AMYLASE in the last 72 hours. Lactic Acid: No results for input(s): LACTA in the last 72 hours. Troponin: No results for input(s): CKTOTAL, CKMB, TROPONINT in the last 72 hours. BNP: No results for input(s): BNP in the last 72 hours. Lipids: No results for input(s): CHOL, TRIG, HDL, LDLCALC in the last 72 hours. Invalid input(s): LDL  ABGs: No results found for: PH, PCO2, PO2, HCO3, O2SAT    Radiology reports as per the Radiologist  Radiology: Xr Acute Abd Series Chest 1 Vw    Result Date: 6/19/2018  PROCEDURE: XR ACUTE ABD SERIES CHEST 1 VW CLINICAL INFORMATION: Epigastric pain, shortness of breath, nausea, TECHNIQUE: A PA view of the chest as well as supine and upright views of the abdomen were obtained. FINDINGS: The accompanying chest radiograph reveals no acute findings. No infiltrates or effusions are seen. There is mild gaseous distention of several bowel loops in the pelvic region, most of these represent colonic loops. Findings consistent with mild ileus. There has been prior lumbar fusion, L1-L5. Moderate degenerative changes right hip. 1. Accompanying chest x-ray unremarkable.  2. Findings of mild pelvic/abdominal ileus **This report has been created using voice rhythm  Abdomen - soft,  distended, BS hypoactive    Neurological - Alert and oriented and Normal speech  Integumentary - Skin color, texture, turgor normal. No Rashes or lesions  Musculoskeletal -Full ROM times 4 extremities, No clubbing or cyanosis and No peripheral edema   Surgical Incision: midline incision looks good, Hernán drain in place, no stool present in drain, serous output from both incision and drain    DVT prophylaxis: [x] Lovenox                                 [] SCDs                                 [] SQ Heparin                                 [] Encourage ambulation           [] Already on Anticoagulation                 ASSESSMENT:  S/p Abd Exploration Lysis Adhesions Extended L Colon Resection POD #6  1. Acute postoperative pain  2. Hgb stable   3. adequate urine output  4. Hypertension- improved  5. Leukocytosis resolved  6. Deconditioned   7. Acute blood loss anemia secondary to surgical intervention   8. Hypokalemia - corrected  9. Overweight  10. Malnutrition pre albumin 9.8  11. + stool      has a past medical history of Arthritis; Difficult intubation; GERD (gastroesophageal reflux disease); H pylori ulcer; Hx of blood clots; Nausea & vomiting; Pneumonia; Shingles; and Torus mandibularis. PLAN:  1. Incisional care daily   2. Monitor labs, replace lytes per protocol  3. Clear liquids continued, reglan and dulcolax started  4. TPN for nutrition  5. DVT prophylaxis today  and GI Prophylaxis  6. Activity - ambulate, OOB to chair, C&DB,  IS, PT/OT on  7. Analgesia and antiemetics as needed  8. AntibioticTherapy continued   9. I&O  10. Hernán drain- serous output.        Electronically signed by Jazzmine Koch DO on 7/1/2018 at 9:33 AM

## 2018-07-01 NOTE — PROGRESS NOTES
TPN Follow Up Note    Assessment: starting clear liquids. Remain on TPN  Electrolyte Replacement: none    TPN changes for (today) at 1800: Increase Na acetate to 60 mEq  Increase NaCl to 180 mEq    Re-check BMP, Mg, PO4, iCa in am          Cinda Perdomo, BCPS 7/1/2018 11:21 AM

## 2018-07-01 NOTE — PLAN OF CARE
Problem: Pain:  Goal: Pain level will decrease  Pain level will decrease   Outcome: Ongoing  Given IV Morphine for pain. Patient rested after given. Problem: Cardiovascular  Goal: No DVT, peripheral vascular complications  Outcome: Ongoing  Negative Homans sign. SCDs maintained when in bed. Lovenox given for DVT prophylaxis. Problem: Respiratory  Goal: No pulmonary complications  Outcome: Ongoing  Lungs clear, diminished. Encouraged use of incentive spirometer. O2 saturation >90%. Problem: GI  Goal: No bowel complications  Outcome: Ongoing  Abdomen soft, rounded, hypoactive bowel sounds. Passed one loose stool this am.    Problem:   Goal: Adequate urinary output  Outcome: Ongoing  Voiding without difficulty. Problem: Nutrition  Goal: Optimal nutrition therapy  Outcome: Ongoing  Clear liquid diet ordered. TPN continued. Problem: Skin Integrity/Risk  Goal: No skin breakdown during hospitalization  Outcome: Ongoing  Midline abdominal incision healing without signs of infection, staples intact. TIANNA drain to LLQ abdomen intact, compressed, draining serosanguinous fluid. TIANNA dressing 2/3 saturated, dressing changed. Problem: Musculor/Skeletal Functional Status  Goal: Highest potential functional level  Outcome: Ongoing  Generalized weakness. Patient up to bathroom, up in vivas, up in chair. Gait slow and steady. Uses front wheeled walker. Problem: Intellectual/Education/Knowledge Deficit  Goal: Teaching initiated upon admission  Outcome: Ongoing  Reinforced importance of using incentive spirometer to prevent pneumonia. Reinforced importance of ambulation to decrease risk of DVT. Problem: Discharge Planning  Intervention: Interaction with patient/family and care team  Plan home with family upon discharge. May need home health.  to follow. Problem: Falls - Risk of:  Goal: Will remain free from falls  Will remain free from falls   Outcome: Ongoing  Alert, oriented.  Call light within

## 2018-07-02 PROBLEM — E43 SEVERE MALNUTRITION (HCC): Status: ACTIVE | Noted: 2018-07-02

## 2018-07-02 LAB
ANION GAP SERPL CALCULATED.3IONS-SCNC: 8 MEQ/L (ref 8–16)
BUN BLDV-MCNC: 10 MG/DL (ref 7–22)
CALCIUM IONIZED: 1.13 MMOL/L (ref 1.12–1.32)
CALCIUM SERPL-MCNC: 8.3 MG/DL (ref 8.5–10.5)
CHLORIDE BLD-SCNC: 101 MEQ/L (ref 98–111)
CO2: 25 MEQ/L (ref 23–33)
CREAT SERPL-MCNC: 0.4 MG/DL (ref 0.4–1.2)
GFR SERPL CREATININE-BSD FRML MDRD: > 90 ML/MIN/1.73M2
GLUCOSE BLD-MCNC: 117 MG/DL (ref 70–108)
GLUCOSE BLD-MCNC: 120 MG/DL (ref 70–108)
GLUCOSE BLD-MCNC: 129 MG/DL (ref 70–108)
MAGNESIUM: 2 MG/DL (ref 1.6–2.4)
PHOSPHORUS: 3 MG/DL (ref 2.4–4.7)
POTASSIUM SERPL-SCNC: 3.8 MEQ/L (ref 3.5–5.2)
SODIUM BLD-SCNC: 134 MEQ/L (ref 135–145)

## 2018-07-02 PROCEDURE — 97116 GAIT TRAINING THERAPY: CPT

## 2018-07-02 PROCEDURE — 97110 THERAPEUTIC EXERCISES: CPT

## 2018-07-02 PROCEDURE — 84100 ASSAY OF PHOSPHORUS: CPT

## 2018-07-02 PROCEDURE — 36415 COLL VENOUS BLD VENIPUNCTURE: CPT

## 2018-07-02 PROCEDURE — 6370000000 HC RX 637 (ALT 250 FOR IP): Performed by: SURGERY

## 2018-07-02 PROCEDURE — 97535 SELF CARE MNGMENT TRAINING: CPT

## 2018-07-02 PROCEDURE — C9113 INJ PANTOPRAZOLE SODIUM, VIA: HCPCS | Performed by: SURGERY

## 2018-07-02 PROCEDURE — 6360000002 HC RX W HCPCS: Performed by: SURGERY

## 2018-07-02 PROCEDURE — 82948 REAGENT STRIP/BLOOD GLUCOSE: CPT

## 2018-07-02 PROCEDURE — 2580000003 HC RX 258: Performed by: SURGERY

## 2018-07-02 PROCEDURE — 99024 POSTOP FOLLOW-UP VISIT: CPT | Performed by: SURGERY

## 2018-07-02 PROCEDURE — 2500000003 HC RX 250 WO HCPCS: Performed by: SURGERY

## 2018-07-02 PROCEDURE — 2580000003 HC RX 258: Performed by: NURSE PRACTITIONER

## 2018-07-02 PROCEDURE — 83735 ASSAY OF MAGNESIUM: CPT

## 2018-07-02 PROCEDURE — 80048 BASIC METABOLIC PNL TOTAL CA: CPT

## 2018-07-02 PROCEDURE — 82330 ASSAY OF CALCIUM: CPT

## 2018-07-02 PROCEDURE — 97530 THERAPEUTIC ACTIVITIES: CPT

## 2018-07-02 PROCEDURE — 1200000000 HC SEMI PRIVATE

## 2018-07-02 RX ORDER — OXYCODONE HYDROCHLORIDE AND ACETAMINOPHEN 5; 325 MG/1; MG/1
1 TABLET ORAL EVERY 4 HOURS PRN
Status: DISCONTINUED | OUTPATIENT
Start: 2018-07-02 | End: 2018-07-06 | Stop reason: HOSPADM

## 2018-07-02 RX ADMIN — Medication 10 ML: at 08:01

## 2018-07-02 RX ADMIN — OXYCODONE HYDROCHLORIDE AND ACETAMINOPHEN 1 TABLET: 5; 325 TABLET ORAL at 20:26

## 2018-07-02 RX ADMIN — PIPERACILLIN SODIUM AND TAZOBACTAM SODIUM 3.38 G: 3; .375 INJECTION, POWDER, LYOPHILIZED, FOR SOLUTION INTRAVENOUS at 20:26

## 2018-07-02 RX ADMIN — PANTOPRAZOLE SODIUM 40 MG: 40 INJECTION, POWDER, FOR SOLUTION INTRAVENOUS at 08:00

## 2018-07-02 RX ADMIN — BISACODYL 10 MG: 10 SUPPOSITORY RECTAL at 08:00

## 2018-07-02 RX ADMIN — ALVIMOPAN 12 MG: 12 CAPSULE ORAL at 20:26

## 2018-07-02 RX ADMIN — OXYCODONE HYDROCHLORIDE AND ACETAMINOPHEN 1 TABLET: 5; 325 TABLET ORAL at 15:29

## 2018-07-02 RX ADMIN — MORPHINE SULFATE 4 MG: 4 INJECTION INTRAVENOUS at 03:37

## 2018-07-02 RX ADMIN — METOCLOPRAMIDE 10 MG: 5 INJECTION, SOLUTION INTRAMUSCULAR; INTRAVENOUS at 03:25

## 2018-07-02 RX ADMIN — PIPERACILLIN SODIUM AND TAZOBACTAM SODIUM 3.38 G: 3; .375 INJECTION, POWDER, LYOPHILIZED, FOR SOLUTION INTRAVENOUS at 04:20

## 2018-07-02 RX ADMIN — Medication 10 ML: at 08:02

## 2018-07-02 RX ADMIN — ENOXAPARIN SODIUM 40 MG: 40 INJECTION, SOLUTION INTRAVENOUS; SUBCUTANEOUS at 08:00

## 2018-07-02 RX ADMIN — CALCIUM GLUCONATE: 94 INJECTION, SOLUTION INTRAVENOUS at 17:25

## 2018-07-02 RX ADMIN — ALVIMOPAN 12 MG: 12 CAPSULE ORAL at 08:00

## 2018-07-02 RX ADMIN — PIPERACILLIN SODIUM AND TAZOBACTAM SODIUM 3.38 G: 3; .375 INJECTION, POWDER, LYOPHILIZED, FOR SOLUTION INTRAVENOUS at 11:08

## 2018-07-02 ASSESSMENT — PAIN SCALES - GENERAL
PAINLEVEL_OUTOF10: 5
PAINLEVEL_OUTOF10: 5
PAINLEVEL_OUTOF10: 7
PAINLEVEL_OUTOF10: 6
PAINLEVEL_OUTOF10: 6
PAINLEVEL_OUTOF10: 0
PAINLEVEL_OUTOF10: 5
PAINLEVEL_OUTOF10: 6
PAINLEVEL_OUTOF10: 3

## 2018-07-02 ASSESSMENT — PAIN DESCRIPTION - PROGRESSION

## 2018-07-02 ASSESSMENT — PAIN DESCRIPTION - PAIN TYPE
TYPE: SURGICAL PAIN

## 2018-07-02 ASSESSMENT — PAIN DESCRIPTION - DESCRIPTORS
DESCRIPTORS: ACHING
DESCRIPTORS: ACHING

## 2018-07-02 ASSESSMENT — PAIN DESCRIPTION - FREQUENCY: FREQUENCY: CONTINUOUS

## 2018-07-02 ASSESSMENT — PAIN DESCRIPTION - ORIENTATION
ORIENTATION: MID
ORIENTATION: MID

## 2018-07-02 ASSESSMENT — PAIN DESCRIPTION - LOCATION
LOCATION: ABDOMEN

## 2018-07-02 ASSESSMENT — PAIN DESCRIPTION - ONSET: ONSET: ON-GOING

## 2018-07-02 NOTE — PLAN OF CARE
Problem: Nutrition  Goal: Optimal nutrition therapy  Outcome: Ongoing  Nutrition Problem: Severe malnutrition, in context of acute illness or injury  Intervention: Food and/or Nutrient Delivery: Modify current Parenteral Nutrition (diet as per Surgeon)  Nutritional Goals: Patient will tolerate adequate nutrition support to meet 75% or more of estimated nutrition needs until appropriate to transition to PO feeds during LOS.

## 2018-07-02 NOTE — PROGRESS NOTES
Kristaelizabeth Degrootangela 60  INPATIENT OCCUPATIONAL THERAPY  STR SURGICAL 5E  DAILY NOTE    Time:  Time In: 1330  Time Out: 1354  Timed Code Treatment Minutes: 24 Minutes  Minutes: 24          Date: 2018  Patient Name: Renetta Owens,   Gender: female      Room: [facility]/052-A  MRN: [id number]  : 1940  (66 y.o.)  Referring Practitioner: SULEIMAN Matos - CNP  Diagnosis: Diverticular disease of colon  Additional Pertinent Hx: 1. Abd Exploration  2. Lysis Adhesions Severe 3. Extended L Colon Resection, on     Past Medical History:   Diagnosis Date    Arthritis     Difficult intubation     GERD (gastroesophageal reflux disease)     H pylori ulcer     Hx of blood clots     RIGHT LEG    Nausea & vomiting     Pneumonia     Shingles     Torus mandibularis 2006     Past Surgical History:   Procedure Laterality Date    ABDOMINAL ADHESION SURGERY      Dr Fernando Iqbal  2013    Lysis of Adhesions-Dr. Wisdom Varinder     BACK SURGERY  //2006/2013    X3    [facility]  [facility]    [facility]    COLONOSCOPY  over 10 yrs ago    COLONOSCOPY  2017    Dr Marita Avalos ? [facility]    LUMBAR LAMINECTOMY  4/15/2013    OTHER SURGICAL HISTORY  2013    evacation of seroma-back     [place] OFFICE/OUTPT VISIT,PROCEDURE ONLY N/A 2018    ABDOMINAL EXPLORATION AND ESTENDED LEFT COLON RESECTION, LYSIS OF COMPLEX ADHESIONS performed by Ashish Kaur MD at [address] [place] [address]  2017    Dr Calvo Upham       Restrictions/Precautions:  Modified Diet, General Precautions, Fall Risk                    Other position/activity restrictions: TIANNA drain anterior to the small bowel.         Prior Level of Function:  ADL Assistance: Independent  Homemaking Assistance: Independent  Ambulation Assistance: Independent  Transfer Assistance: Independent  Additional Comments: Pt is

## 2018-07-02 NOTE — PLAN OF CARE
complications  Outcome: Ongoing  abd soft bowel sounds passing flatus having loose green stools     Problem:   Goal: Adequate urinary output  Outcome: Ongoing  Pt up to br with staff voiding well. Yellow urine taking ice chips po     Problem: Skin Integrity/Risk  Goal: No skin breakdown during hospitalization  Outcome: Ongoing  Abd surgical incision healing  with TIANNA drain patent dry intact      Problem: Falls - Risk of:  Goal: Will remain free from falls  Will remain free from falls   Outcome: Ongoing  Safety measures discussed with pt reminded needs call staff assist oob/chair. Call light in reach sr up x2. . Bed alarm on and or chair .  Personal items in reach on BS table.

## 2018-07-02 NOTE — PROGRESS NOTES
EXPLORATION AND ESTENDED LEFT COLON RESECTION, LYSIS OF COMPLEX ADHESIONS performed by Trever Ayers MD at [facility]  08/11/2017    Dr Ford Stands       Restrictions/Precautions:  Modified Diet, General Precautions, Fall Risk        Other position/activity restrictions: TIANNA drain anterior to the small bowel. 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 Recieves help from 2 other daughters who live in town. Reports recently using the RW for ambulation d/t decreased balance. Subjective:  Chart Reviewed: Yes  Family / Caregiver Present: No  Subjective: RN approved session. Pt resting in bedside chair upon arrival, pleasant and agreeable to therapy. Pt requests to use restroom before going for a walk. Pain:  Yes. Pain Assessment  Pain Level: 5  Pain Type: Surgical pain  Pain Location: Abdomen       Social/Functional:  Lives With: Daughter (disabled daughter whom paitient is the caregiver)  Type of Home: House  Home Layout: One level  Home Access: Stairs to enter with rails  Entrance Stairs - Number of Steps: 1  Home Equipment: Rolling walker, Cane     Objective:       Transfers  Sit to Stand: Contact guard assistance (From bedside chair and from toilet. )  Stand to sit: Contact guard assistance       Ambulation 1  Surface: level tile  Device: Rolling Walker  Assistance: Contact guard assistance  Quality of Gait: Slow jon. Cuing for improved upright posture. Steady with no LOB  Distance: 150 feet x1          Balance  Comments: Dynamic stand to complete kailey clean up and to wash hands in sink all at Stand by Assist for safety.        Exercises:  Exercises  Comments: Pt completed reclined there ex including BLE ankle pumps, quad set, glut set, hip abd/add, heel slides, SLR All x10 reps to increase strength for improved functional mobility. Activity Tolerance:  Activity Tolerance: Patient limited by pain; Patient limited by endurance; Patient limited by fatigue    Assessment: Body structures, Functions, Activity limitations: Decreased functional mobility , Decreased endurance, Decreased strength  Assessment: Pt tolerated session well. Limited by decreased strength and decreased endurance. Would benefit from continued therapy at discharge. Prognosis: Excellent  REQUIRES PT FOLLOW UP: Yes  Discharge Recommendations: Continue to assess pending progress    Patient Education:  Patient Education: Gait technique. POC    Equipment Recommendations:  Equipment Needed: No    Safety:  Type of devices:  All fall risk precautions in place, Left in chair, Gait belt, Call light within reach, Patient at risk for falls, Nurse notified    Plan:  Times per week: 5x GM  Times per day: Daily  Specific instructions for Next Treatment: ther ex, ther act, gait trng  Current Treatment Recommendations: Strengthening, Functional Mobility Training, Transfer Training, Endurance Training, Gait Training, Safety Education & Training, Patient/Caregiver Education & Training    Goals:  Patient goals : go home    Short term goals  Time Frame for Short term goals: 4 days  Short term goal 1: Pt to be Supervision for supine <> sit to get in/out of bed  Short term goal 2: Pt to be Supervision for sit <> stand to get up to ambulate  Short term goal 3: Pt to ambulate > 80 ft with RW with Supervision for household distances    Long term goals  Time Frame for Long term goals : not set due to short ELOS

## 2018-07-02 NOTE — PROGRESS NOTES
Labs    See Adult Nutrition Doc Flowsheet for more detail.      Electronically signed by Sadie Alves RD, DIGNA on 7/2/18 at 10:34 AM    Contact Number: [phone]

## 2018-07-03 LAB
ANION GAP SERPL CALCULATED.3IONS-SCNC: 13 MEQ/L (ref 8–16)
BUN BLDV-MCNC: 10 MG/DL (ref 7–22)
CALCIUM IONIZED: 1.14 MMOL/L (ref 1.12–1.32)
CALCIUM SERPL-MCNC: 8.5 MG/DL (ref 8.5–10.5)
CHLORIDE BLD-SCNC: 101 MEQ/L (ref 98–111)
CO2: 23 MEQ/L (ref 23–33)
CREAT SERPL-MCNC: 0.4 MG/DL (ref 0.4–1.2)
GFR SERPL CREATININE-BSD FRML MDRD: > 90 ML/MIN/1.73M2
GLUCOSE BLD-MCNC: 118 MG/DL (ref 70–108)
GLUCOSE BLD-MCNC: 121 MG/DL (ref 70–108)
GLUCOSE BLD-MCNC: 129 MG/DL (ref 70–108)
GLUCOSE BLD-MCNC: 134 MG/DL (ref 70–108)
HCT VFR BLD CALC: 24.7 % (ref 37–47)
HEMOGLOBIN: 8.1 GM/DL (ref 12–16)
MAGNESIUM: 2.1 MG/DL (ref 1.6–2.4)
PHOSPHORUS: 3.3 MG/DL (ref 2.4–4.7)
POTASSIUM SERPL-SCNC: 3.9 MEQ/L (ref 3.5–5.2)
SODIUM BLD-SCNC: 137 MEQ/L (ref 135–145)

## 2018-07-03 PROCEDURE — 80048 BASIC METABOLIC PNL TOTAL CA: CPT

## 2018-07-03 PROCEDURE — 85018 HEMOGLOBIN: CPT

## 2018-07-03 PROCEDURE — 97110 THERAPEUTIC EXERCISES: CPT

## 2018-07-03 PROCEDURE — 2580000003 HC RX 258: Performed by: NURSE PRACTITIONER

## 2018-07-03 PROCEDURE — 82330 ASSAY OF CALCIUM: CPT

## 2018-07-03 PROCEDURE — 36415 COLL VENOUS BLD VENIPUNCTURE: CPT

## 2018-07-03 PROCEDURE — 6370000000 HC RX 637 (ALT 250 FOR IP): Performed by: SURGERY

## 2018-07-03 PROCEDURE — 99024 POSTOP FOLLOW-UP VISIT: CPT | Performed by: NURSE PRACTITIONER

## 2018-07-03 PROCEDURE — APPSS30 APP SPLIT SHARED TIME 16-30 MINUTES: Performed by: NURSE PRACTITIONER

## 2018-07-03 PROCEDURE — 99024 POSTOP FOLLOW-UP VISIT: CPT | Performed by: SURGERY

## 2018-07-03 PROCEDURE — 97535 SELF CARE MNGMENT TRAINING: CPT

## 2018-07-03 PROCEDURE — 2500000003 HC RX 250 WO HCPCS: Performed by: NURSE PRACTITIONER

## 2018-07-03 PROCEDURE — 6360000002 HC RX W HCPCS: Performed by: SURGERY

## 2018-07-03 PROCEDURE — 83735 ASSAY OF MAGNESIUM: CPT

## 2018-07-03 PROCEDURE — 2580000003 HC RX 258: Performed by: SURGERY

## 2018-07-03 PROCEDURE — C9113 INJ PANTOPRAZOLE SODIUM, VIA: HCPCS | Performed by: SURGERY

## 2018-07-03 PROCEDURE — 85014 HEMATOCRIT: CPT

## 2018-07-03 PROCEDURE — 82948 REAGENT STRIP/BLOOD GLUCOSE: CPT

## 2018-07-03 PROCEDURE — 84100 ASSAY OF PHOSPHORUS: CPT

## 2018-07-03 PROCEDURE — 1200000000 HC SEMI PRIVATE

## 2018-07-03 PROCEDURE — 97116 GAIT TRAINING THERAPY: CPT

## 2018-07-03 RX ADMIN — ENOXAPARIN SODIUM 40 MG: 40 INJECTION, SOLUTION INTRAVENOUS; SUBCUTANEOUS at 09:03

## 2018-07-03 RX ADMIN — OXYCODONE HYDROCHLORIDE AND ACETAMINOPHEN 1 TABLET: 5; 325 TABLET ORAL at 04:36

## 2018-07-03 RX ADMIN — PIPERACILLIN SODIUM AND TAZOBACTAM SODIUM 3.38 G: 3; .375 INJECTION, POWDER, LYOPHILIZED, FOR SOLUTION INTRAVENOUS at 04:36

## 2018-07-03 RX ADMIN — Medication 10 ML: at 09:03

## 2018-07-03 RX ADMIN — METOCLOPRAMIDE 10 MG: 5 INJECTION, SOLUTION INTRAMUSCULAR; INTRAVENOUS at 04:36

## 2018-07-03 RX ADMIN — PANTOPRAZOLE SODIUM 40 MG: 40 INJECTION, POWDER, FOR SOLUTION INTRAVENOUS at 09:02

## 2018-07-03 RX ADMIN — CALCIUM GLUCONATE: 94 INJECTION, SOLUTION INTRAVENOUS at 17:56

## 2018-07-03 ASSESSMENT — PAIN DESCRIPTION - FREQUENCY
FREQUENCY: INTERMITTENT
FREQUENCY: CONTINUOUS

## 2018-07-03 ASSESSMENT — PAIN DESCRIPTION - LOCATION
LOCATION: ABDOMEN

## 2018-07-03 ASSESSMENT — PAIN SCALES - GENERAL
PAINLEVEL_OUTOF10: 6
PAINLEVEL_OUTOF10: 3
PAINLEVEL_OUTOF10: 5

## 2018-07-03 ASSESSMENT — PAIN DESCRIPTION - ORIENTATION
ORIENTATION: MID
ORIENTATION: MID

## 2018-07-03 ASSESSMENT — PAIN DESCRIPTION - PROGRESSION: CLINICAL_PROGRESSION: GRADUALLY IMPROVING

## 2018-07-03 ASSESSMENT — PAIN DESCRIPTION - PAIN TYPE
TYPE: SURGICAL PAIN

## 2018-07-03 ASSESSMENT — PAIN DESCRIPTION - DESCRIPTORS
DESCRIPTORS: ACHING
DESCRIPTORS: ACHING

## 2018-07-03 ASSESSMENT — PAIN DESCRIPTION - ONSET
ONSET: ON-GOING
ONSET: ON-GOING

## 2018-07-03 NOTE — PLAN OF CARE
Problem: Nutrition  Goal: Optimal nutrition therapy  Outcome: Ongoing  Nutrition Problem: Severe malnutrition, in context of acute illness or injury  Intervention: Food and/or Nutrient Delivery: Continue Parenteral Nutrition (rate to be cut in half today and to discontinue tomorrow if doing well per Surgeon and diet progession per Surgeon)  Nutritional Goals: Patient will tolerate adequate nutrition support to meet 75% or more of estimated nutrition needs until appropriate to transition to PO feeds during LOS.

## 2018-07-03 NOTE — PROGRESS NOTES
[facility]  Post Operative Progress Note  Dr Indiana Hall    Pt Name: Maddie Castillo Record Number: [id number]  Date of Birth 1940   Today's Date: 7/3/2018    Hospital day # 8   POD # 8  S/p 1. Abd Exploration  2. Lysis Adhesions Severe 3. Extended L Colon Resection    Chief complaint: diverticular stricture, redundant colon    Patient was stable overnight. She is awake and in chair. Having small amount of postoperative pain, denies N&V, multiple stools. Abdomen is distended and firm,  Chart reviewed. Updated by nursing staff. Denies chest discomfort or dyspnea. Taking in full liquids, tolerated well     Past, Family, Social History unchanged from admission.     Diet:  DIET FULL LIQUID;  Dietary Nutrition Supplements: Standard High Calorie Oral Supplement  PN-Adult  3 IN 1 Central Line (Custom)    Medications:  Scheduled Meds:   metoclopramide  10 mg Intravenous Q8H    bisacodyl  10 mg Rectal Daily    lidocaine 1 % injection  5 mL Intradermal Once    sodium chloride flush  10 mL Intravenous 2 times per day    potassium replacement protocol   Other RX Placeholder    insulin lispro  0-12 Units Subcutaneous Q6H    enoxaparin  40 mg Subcutaneous Daily    pantoprazole  40 mg Intravenous Daily    And    sodium chloride (PF)  10 mL Intravenous Daily     Continuous Infusions:   PN-Adult  3 IN 1 Central Line (Custom) 100 mL/hr at 07/02/18 1725    dextrose       PRN Meds:oxyCODONE-acetaminophen, glucose, dextrose, glucagon (rDNA), dextrose, sodium chloride flush, phenol, morphine, acetaminophen, ondansetron    Objective:    CBC:   Recent Labs      07/03/18   0500   HGB  8.1*     BMP:    Recent Labs      07/01/18   0425  07/02/18   0447  07/03/18   0500   NA  136  134*  137   K  3.7  3.8  3.9   CL  103  101  101   CO2  23  25  23   BUN  8  10  10   CREATININE  0.3*  0.4  0.4   GLUCOSE  121*  117*  118*     Calcium:  Recent Labs      07/03/18   0500   CALCIUM  8.5     Ionized electronically signed by Dr. Quin Leiva on 6/19/2018 11:42 AM    Ct Head Wo Contrast    Result Date: 6/19/2018  PROCEDURE: CT HEAD WO CONTRAST CLINICAL INFORMATION: Dizzy, nausea, off balance . COMPARISON: 10/31/2013 TECHNIQUE: Noncontrast images from the base of the skull to the vertex. All CT scans at this facility use dose modulation, iterative reconstruction, and/or weight-based dosing when appropriate to reduce radiation dose to as low as reasonably achievable. FINDINGS: No hemorrhage. No extra-axial collection. Gray-white differentiation is maintained. Both testicles low-attenuation area in the right temporal lobe may be related to remote infarction possible chronic small vessel ischemic disease. This is not seen on the prior exam. Ventricles are normal. Calvarium is intact. Visualized mastoid air cells and paranasal sinuses are clear. Low-attenuation focus in the right temporal lobe possibly related to remote infarction or chronic small vessel ischemic disease. No acute process. **This report has been created using voice recognition software. It may contain minor errors which are inherent in voice recognition technology. ** Final report electronically signed by Dr. Mahnaz Read on 6/19/2018 11:36 AM    Ct Abdomen Pelvis W Iv Contrast Additional Contrast? Oral    Result Date: 6/19/2018  PROCEDURE: CT ABDOMEN PELVIS W IV CONTRAST CLINICAL INFORMATION: ABDOMINAL PAIN,  . COMPARISON: October 31 2013 TECHNIQUE: 5 mm axial CT images were obtained through the abdomen and pelvis after the administration of intravenous and oral contrast. Coronal and sagittal reconstructions were obtained. All CT scans at this facility use dose modulation, iterative reconstruction, and/or weight-based dosing when appropriate to reduce radiation dose to as low as reasonably achievable. FINDINGS: Atelectasis or scarring in the lung bases. There are dilated biliary ducts predominantly on the left. Cholecystectomy.  Pancreas, spleen

## 2018-07-03 NOTE — PROGRESS NOTES
Physical Therapy   [address] - 5E-52/052-A    Time In: 1496  Time Out: 3092  Timed Code Treatment Minutes: 24 Minutes  Minutes: 24          Date: 7/3/2018  Patient Name: Marlyn Saucedo,  Gender:  female        MRN: [id number]  : 1940  (66 y.o.)     Referring Practitioner: SULEIMAN Casiano CNP  Diagnosis: Diverticular Disease of Colon  Additional Pertinent Hx: 80-year-old white female who has seen in the past performing an attempted laparoscopy in  which had to be converted to an open procedure for lysis of adhesions of the omentum. The patient has a lifelong history of problems with bowel movements mainly constipation but over the last several months it's got increasingly severe she's been having a lot of abdominal bloating and pain especially after she eats. She is on multiple agents to help her with bowel movements but still goes per days without having a bowel movement. Pt is now s/p 1. Abd Exploration  2. Lysis Adhesions Severe 3. Extended L Colon Resection on . Past Medical History:   Diagnosis Date    Arthritis     Difficult intubation     GERD (gastroesophageal reflux disease)     H pylori ulcer     Hx of blood clots     RIGHT LEG    Nausea & vomiting     Pneumonia     Shingles     Torus mandibularis 2006     Past Surgical History:   Procedure Laterality Date    ABDOMINAL ADHESION SURGERY  's    Dr Neeta Briscoe  2013    Lysis of Adhesions-Dr. Jailyn Rojas     BACK SURGERY  //2006/2013    X3    [facility]    COLONOSCOPY  over 10 yrs ago    COLONOSCOPY  2017    Dr Saida Preston ?     [facility]    LUMBAR LAMINECTOMY  4/15/2013    OTHER SURGICAL HISTORY  2013    evacation of seroma-back     [place] OFFICE/OUTPT VISIT,PROCEDURE ONLY N/A 2018    ABDOMINAL functional mobility. Activity Tolerance:  Activity Tolerance: Patient limited by pain; Patient limited by endurance; Patient limited by fatigue    Assessment: Body structures, Functions, Activity limitations: Decreased functional mobility , Decreased endurance, Decreased strength  Assessment: Pt tolerated session well. Limited by decreased strength, decreased endurance. Would benefit from continued therapy at discharge. Prognosis: Excellent  REQUIRES PT FOLLOW UP: Yes  Discharge Recommendations: Continue to assess pending progress    Patient Education:  Patient Education: Gait technique. POC    Equipment Recommendations:  Equipment Needed: No    Safety:  Type of devices:  All fall risk precautions in place, Bed alarm in place, Call light within reach, Left in bed, Gait belt, Nurse notified    Plan:  Times per week: 5x GM  Times per day: Daily  Specific instructions for Next Treatment: ther ex, ther act, gait trng  Current Treatment Recommendations: Strengthening, Functional Mobility Training, Transfer Training, Endurance Training, Gait Training, Safety Education & Training, Patient/Caregiver Education & Training    Goals:  Patient goals : go home    Short term goals  Time Frame for Short term goals: 4 days  Short term goal 1: Pt to be Supervision for supine <> sit to get in/out of bed  Short term goal 2: Pt to be Supervision for sit <> stand to get up to ambulate  Short term goal 3: Pt to ambulate > 80 ft with RW with Supervision for household distances    Long term goals  Time Frame for Long term goals : not set due to short ELOS

## 2018-07-03 NOTE — PROGRESS NOTES
Vel Loving 60  OCCUPATIONAL THERAPY MISSED TREATMENT NOTE  STRZ SURGICAL 5E  5E-52/052-A      Date: 7/3/2018  Patient Name: Maggie Farnsworth        CSN: [id number]   : 1940  (66 y.o.)  Gender: female   Referring Practitioner: SULEIMAN Eid CNP  Diagnosis: Diverticular disease of colon         REASON FOR MISSED TREATMENT:  Pt lying in bed, reports that she was up twice this AM, and reports she is \"exhausted. \" Agreeable for therapy in the PM if OT has time to check back.

## 2018-07-03 NOTE — PROGRESS NOTES
Vel Loving 60  INPATIENT OCCUPATIONAL THERAPY  STR SURGICAL 5E  DAILY NOTE    Time:  Time In: 5351  Time Out: 1533  Timed Code Treatment Minutes: 26 Minutes  Minutes: 26          Date: 7/3/2018  Patient Name: Caryl Arango,   Gender: female      Room: [facility]/052-  MRN: [id number]  : 1940  (66 y.o.)  Referring Practitioner: SULEIMAN Aguila - CNP  Diagnosis: Diverticular disease of colon  Additional Pertinent Hx: 1. Abd Exploration  2. Lysis Adhesions Severe 3. Extended L Colon Resection, on     Past Medical History:   Diagnosis Date    Arthritis     Difficult intubation     GERD (gastroesophageal reflux disease)     H pylori ulcer     Hx of blood clots     RIGHT LEG    Nausea & vomiting     Pneumonia     Shingles     Torus mandibularis 2006     Past Surgical History:   Procedure Laterality Date    ABDOMINAL ADHESION SURGERY      Dr Michael   2013    Lysis of Adhesions-Dr. Dejesus Cymro     BACK SURGERY  //2006/2013    X3    [facility]Y  1975    [facility]    COLONOSCOPY  over 10 yrs ago    COLONOSCOPY  2017    Dr Frances Anderson ? [facility]    LUMBAR LAMINECTOMY  4/15/2013    OTHER SURGICAL HISTORY  2013    evacation of seroma-back     [place] OFFICE/OUTPT VISIT,PROCEDURE ONLY N/A 2018    ABDOMINAL EXPLORATION AND ESTENDED LEFT COLON RESECTION, LYSIS OF COMPLEX ADHESIONS performed by Juliet Lewis MD at [address] [facility]  2017    Dr Maximiliano Dallas       Restrictions/Precautions:  Modified Diet, General Precautions, Fall Risk         Other position/activity restrictions: TIANNA drain anterior to the small bowel.         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 Recieves help from 2 other daughters who live in town. Reports recently using the RW for ambulation d/t decreased balance. Subjective       Subjective: cooperative, pleasant, requesting to go back to bed              Pain:  Pain Assessment  Patient Currently in Pain: No (just tired)       Objective  Overall Cognitive Status: WNL            ADL  Grooming: Contact guard assistance (stood at sink to wash hands)  Toileting: Contact guard assistance          Bed mobility  Sit to Supine: Stand by assistance    Transfers  Sit to stand: Stand by assistance  Stand to sit: Stand by assistance  Toilet Transfers  Toilet - Technique: Ambulating  Equipment Used: Grab bars  Toilet Transfer: Contact guard assistance    Balance  Sitting Balance: Stand by assistance  Standing Balance: Contact guard assistance           Functional Mobility  Functional - Mobility Device: Rolling Walker  Activity: To/from bathroom (+ around nursing unit)  Assist Level: Contact guard assistance (-SBA at times)                                                                                                                                                                                     Activity Tolerance:  Activity Tolerance: Patient Tolerated treatment well    Assessment:     Performance deficits / Impairments: Decreased functional mobility , Decreased ADL status, Decreased endurance, Decreased strength, Decreased balance, Decreased ROM  Prognosis: Fair  Discharge Recommendations: Patient would benefit from continued therapy after discharge    Patient Education:  Patient Education: OT role, OT POC, transfer education     Equipment Recommendations: Other: Continue to monitor. No needs ID'ed at this time. Safety:  Safety Devices in place: Yes  Type of devices:  All fall risk precautions in place, Call light within reach, Gait belt, Bed alarm in place    Plan:  Times per week:  5x  Current Treatment Recommendations:

## 2018-07-03 NOTE — PLAN OF CARE
Problem: GI  Goal: No bowel complications  Outcome: Met This Shift  Pt passing gas passing stool  Active bowel sounds      Problem: Nutrition  Goal: Optimal nutrition therapy  Outcome: Ongoing  Pt continues with TPN decreased to 50 ml /hr  Pt on soft diet taking in 25% of meals      Problem: Falls - Risk of:  Goal: Absence of physical injury  Absence of physical injury   Outcome: Met This Shift  No falls this shift  Call light in reach  Up 1 assist with walker with steady gait

## 2018-07-04 LAB
GLUCOSE BLD-MCNC: 102 MG/DL (ref 70–108)
GLUCOSE BLD-MCNC: 111 MG/DL (ref 70–108)
GLUCOSE BLD-MCNC: 113 MG/DL (ref 70–108)

## 2018-07-04 PROCEDURE — 99024 POSTOP FOLLOW-UP VISIT: CPT | Performed by: SURGERY

## 2018-07-04 PROCEDURE — 1200000000 HC SEMI PRIVATE

## 2018-07-04 PROCEDURE — 6360000002 HC RX W HCPCS: Performed by: SURGERY

## 2018-07-04 PROCEDURE — 6370000000 HC RX 637 (ALT 250 FOR IP): Performed by: SURGERY

## 2018-07-04 PROCEDURE — 2580000003 HC RX 258: Performed by: NURSE PRACTITIONER

## 2018-07-04 PROCEDURE — 82948 REAGENT STRIP/BLOOD GLUCOSE: CPT

## 2018-07-04 RX ORDER — PANTOPRAZOLE SODIUM 40 MG/1
40 TABLET, DELAYED RELEASE ORAL
Status: DISCONTINUED | OUTPATIENT
Start: 2018-07-04 | End: 2018-07-06 | Stop reason: HOSPADM

## 2018-07-04 RX ADMIN — ENOXAPARIN SODIUM 40 MG: 40 INJECTION, SOLUTION INTRAVENOUS; SUBCUTANEOUS at 08:45

## 2018-07-04 RX ADMIN — Medication 10 ML: at 21:45

## 2018-07-04 RX ADMIN — Medication 10 ML: at 08:45

## 2018-07-04 RX ADMIN — PANTOPRAZOLE SODIUM 40 MG: 40 TABLET, DELAYED RELEASE ORAL at 08:45

## 2018-07-04 RX ADMIN — METOCLOPRAMIDE 10 MG: 5 INJECTION, SOLUTION INTRAMUSCULAR; INTRAVENOUS at 18:55

## 2018-07-04 RX ADMIN — METOCLOPRAMIDE 10 MG: 5 INJECTION, SOLUTION INTRAMUSCULAR; INTRAVENOUS at 04:28

## 2018-07-04 RX ADMIN — METOCLOPRAMIDE 10 MG: 5 INJECTION, SOLUTION INTRAMUSCULAR; INTRAVENOUS at 11:41

## 2018-07-04 ASSESSMENT — PAIN DESCRIPTION - LOCATION
LOCATION: ABDOMEN

## 2018-07-04 ASSESSMENT — PAIN SCALES - GENERAL
PAINLEVEL_OUTOF10: 5
PAINLEVEL_OUTOF10: 5
PAINLEVEL_OUTOF10: 4
PAINLEVEL_OUTOF10: 3

## 2018-07-04 ASSESSMENT — PAIN DESCRIPTION - DESCRIPTORS
DESCRIPTORS: DISCOMFORT
DESCRIPTORS: ACHING
DESCRIPTORS: ACHING

## 2018-07-04 ASSESSMENT — PAIN DESCRIPTION - ORIENTATION: ORIENTATION: MID

## 2018-07-04 ASSESSMENT — PAIN DESCRIPTION - PAIN TYPE
TYPE: SURGICAL PAIN

## 2018-07-04 ASSESSMENT — PAIN DESCRIPTION - PROGRESSION: CLINICAL_PROGRESSION: GRADUALLY IMPROVING

## 2018-07-04 ASSESSMENT — PAIN DESCRIPTION - FREQUENCY: FREQUENCY: CONTINUOUS

## 2018-07-04 ASSESSMENT — PAIN DESCRIPTION - ONSET: ONSET: ON-GOING

## 2018-07-04 NOTE — PLAN OF CARE
Problem: Pain:  Goal: Pain level will decrease  Pain level will decrease   Outcome: Ongoing  Pain goal of 3 and reaching with rest and percocet, taking prn percocet with pain of 5, stating surgical aching pain of the abdomen.  Denies any other interventions needed.      Problem: Cardiovascular  Goal: No DVT, peripheral vascular complications  Outcome: Ongoing  Patient denies calf pain, no redness of legs, scd's refused while in bed, patient to ambulate in hallway     Problem: Respiratory  Goal: No pulmonary complications  Outcome: Ongoing  Lungs clear in all lobes, chest expansion symmetrical, unlabored, oxygen levels above 93%, no complaints from patient, using incentive spirometer and reaching goal      Problem: GI  Goal: No bowel complications  Outcome: Ongoing  Hypoactive bowel sounds, passing gas, having loose bm's on day shift, tolerating little food, denies needing any food this evening, on TPN through PICC line     Problem:   Goal: Adequate urinary output  Outcome: Ongoing  Voiding sediment yellow urine with no complaints from patient     Problem: Nutrition  Goal: Optimal nutrition therapy  Outcome: Ongoing  TPN through PICC line, tolerating little food, decreased appetite     Problem: Skin Integrity/Risk  Goal: Wound healing  Outcome: Ongoing  Midline incision held with staples open to air that is dry and intact, TIANNA to left lower quadrant that is draining serosanguinous fluid with dry dressing overtop     Problem: Musculor/Skeletal Functional Status  Goal: Highest potential functional level  Outcome: Ongoing  Patient to ambulate in hallway, up with walker and gait belt, patient able to get out of bed without help, standby assist     Problem: Discharge Planning  Intervention: Interaction with patient/family and care team  Patient to go home at discharge, no other needs expressed at this time        Problem: Falls - Risk of:  Goal: Will remain free from falls  Will remain free from falls   Outcome:

## 2018-07-04 NOTE — PLAN OF CARE
noted.    Comments: Care plan reviewed with patient. Patient verbalize understanding of the plan of care and contribute to goal setting.

## 2018-07-05 PROCEDURE — 1200000000 HC SEMI PRIVATE

## 2018-07-05 PROCEDURE — 97110 THERAPEUTIC EXERCISES: CPT

## 2018-07-05 PROCEDURE — 97116 GAIT TRAINING THERAPY: CPT

## 2018-07-05 PROCEDURE — 97530 THERAPEUTIC ACTIVITIES: CPT

## 2018-07-05 PROCEDURE — 2580000003 HC RX 258: Performed by: NURSE PRACTITIONER

## 2018-07-05 PROCEDURE — 99024 POSTOP FOLLOW-UP VISIT: CPT | Performed by: NURSE PRACTITIONER

## 2018-07-05 PROCEDURE — 6360000002 HC RX W HCPCS: Performed by: SURGERY

## 2018-07-05 PROCEDURE — 99024 POSTOP FOLLOW-UP VISIT: CPT | Performed by: SURGERY

## 2018-07-05 PROCEDURE — APPSS30 APP SPLIT SHARED TIME 16-30 MINUTES: Performed by: NURSE PRACTITIONER

## 2018-07-05 PROCEDURE — 6370000000 HC RX 637 (ALT 250 FOR IP): Performed by: SURGERY

## 2018-07-05 RX ORDER — SIMETHICONE 80 MG
160 TABLET,CHEWABLE ORAL 3 TIMES DAILY PRN
Status: DISCONTINUED | OUTPATIENT
Start: 2018-07-05 | End: 2018-07-06 | Stop reason: HOSPADM

## 2018-07-05 RX ADMIN — PANTOPRAZOLE SODIUM 40 MG: 40 TABLET, DELAYED RELEASE ORAL at 06:54

## 2018-07-05 RX ADMIN — METOCLOPRAMIDE 10 MG: 5 INJECTION, SOLUTION INTRAMUSCULAR; INTRAVENOUS at 02:52

## 2018-07-05 RX ADMIN — Medication 10 ML: at 08:05

## 2018-07-05 RX ADMIN — ENOXAPARIN SODIUM 40 MG: 40 INJECTION, SOLUTION INTRAVENOUS; SUBCUTANEOUS at 08:04

## 2018-07-05 RX ADMIN — METOCLOPRAMIDE 10 MG: 5 INJECTION, SOLUTION INTRAMUSCULAR; INTRAVENOUS at 18:57

## 2018-07-05 RX ADMIN — Medication 10 ML: at 21:00

## 2018-07-05 RX ADMIN — METOCLOPRAMIDE 10 MG: 5 INJECTION, SOLUTION INTRAMUSCULAR; INTRAVENOUS at 11:26

## 2018-07-05 ASSESSMENT — PAIN SCALES - GENERAL
PAINLEVEL_OUTOF10: 4
PAINLEVEL_OUTOF10: 3
PAINLEVEL_OUTOF10: 4
PAINLEVEL_OUTOF10: 3
PAINLEVEL_OUTOF10: 5

## 2018-07-05 ASSESSMENT — PAIN DESCRIPTION - FREQUENCY
FREQUENCY: CONTINUOUS
FREQUENCY: INTERMITTENT

## 2018-07-05 ASSESSMENT — PAIN DESCRIPTION - DESCRIPTORS
DESCRIPTORS: ACHING
DESCRIPTORS: ACHING

## 2018-07-05 ASSESSMENT — PAIN DESCRIPTION - PAIN TYPE
TYPE: SURGICAL PAIN

## 2018-07-05 ASSESSMENT — PAIN DESCRIPTION - LOCATION
LOCATION: ABDOMEN

## 2018-07-05 ASSESSMENT — PAIN DESCRIPTION - ORIENTATION
ORIENTATION: MID
ORIENTATION: MID

## 2018-07-05 ASSESSMENT — PAIN DESCRIPTION - ONSET
ONSET: ON-GOING
ONSET: ON-GOING

## 2018-07-05 ASSESSMENT — PAIN DESCRIPTION - PROGRESSION
CLINICAL_PROGRESSION: NOT CHANGED
CLINICAL_PROGRESSION: NOT CHANGED

## 2018-07-05 NOTE — PROGRESS NOTES
Physical Therapy   [address] SURGICAL 5E - 5E-52/052-A    Time In: 0805  Time Out: 8377  Timed Code Treatment Minutes: 27 Minutes  Minutes: 30          Date: 2018  Patient Name: Marlyn Saucedo,  Gender:  female        MRN: [id number]  : 1940  (66 y.o.)     Referring Practitioner: SULEIMAN Casiano CNP  Diagnosis: Diverticular Disease of Colon  Additional Pertinent Hx: 72-year-old white female who has seen in the past performing an attempted laparoscopy in  which had to be converted to an open procedure for lysis of adhesions of the omentum. The patient has a lifelong history of problems with bowel movements mainly constipation but over the last several months it's got increasingly severe she's been having a lot of abdominal bloating and pain especially after she eats. She is on multiple agents to help her with bowel movements but still goes per days without having a bowel movement. Pt is now s/p 1. Abd Exploration  2. Lysis Adhesions Severe 3. Extended L Colon Resection on . Past Medical History:   Diagnosis Date    Arthritis     Difficult intubation     GERD (gastroesophageal reflux disease)     H pylori ulcer     Hx of blood clots     RIGHT LEG    Nausea & vomiting     Pneumonia     Shingles     Torus mandibularis 2006     Past Surgical History:   Procedure Laterality Date    ABDOMINAL ADHESION SURGERY  's    Dr Neeta Briscoe  2013    Lysis of Adhesions-Dr. Jailyn Rojas     BACK SURGERY  //2006/2013    X3    [facility]  1975    [facility]    COLONOSCOPY  over 10 yrs ago    COLONOSCOPY  2017    Dr Saida Preston ?     [facility]    LUMBAR LAMINECTOMY  4/15/2013    OTHER SURGICAL HISTORY  2013    evacation of seroma-back     [place] OFFICE/OUTPT VISIT,PROCEDURE ONLY N/A 2018    ABDOMINAL

## 2018-07-05 NOTE — PLAN OF CARE
Problem: Pain:  Goal: Pain level will decrease  Pain level will decrease   Outcome: Met This Shift  Pt has no dvt noted. scds on while in bed.  lovenox given. Problem: GI  Goal: No bowel complications  Outcome: Met This Shift  Pt having bowel movements passing gas. Up to chair. abd still distended. Encourage splinting for movement. Problem: Nutrition  Goal: Optimal nutrition therapy  Outcome: Met This Shift  Pt eating small frequent meals. Encouraged to eat meals and drink shakes for snacks. Problem: Skin Integrity/Risk  Goal: No skin breakdown during hospitalization  Outcome: Met This [facility] [facility] intact with staples. Cleansed wound with geoff hex. No redness or edema noted. Problem: Musculor/Skeletal Functional Status  Goal: Highest potential functional level  Outcome: Met This Shift  Pt up ambulation with 1 assist and walker. Problem: Discharge Planning  Intervention: Interaction with patient/family and care team  Patient speaking openly about discharge plan. Patient up in room as tolerated, ambulating and tolerating well, preforming ADLs. Pt tolerating oral medications. Nutritional status and needs discussed, tolerating PO well. Patient participating in plan of care, discussing options, needs and concerns. Pt will be discharged home with family support. Problem: Falls - Risk of:  Goal: Will remain free from falls  Will remain free from falls   Outcome: Met This Shift  No falls this shift. Up with help   Gait steady. Comments: Pt aware of plan of care, continuing to update and work with patient to address needs and expectations.

## 2018-07-05 NOTE — PROGRESS NOTES
POCGLU  113*  111*  102     HgbA1C: No results for input(s): LABA1C in the last 72 hours. INR: No results for input(s): INR in the last 72 hours. Hepatic: No results for input(s): ALKPHOS, ALT, AST, PROT, BILITOT, BILIDIR, LABALBU in the last 72 hours. Amylase and Lipase:No results for input(s): LACTA, AMYLASE in the last 72 hours. Lactic Acid: No results for input(s): LACTA in the last 72 hours. Troponin: No results for input(s): CKTOTAL, CKMB, TROPONINT in the last 72 hours. BNP: No results for input(s): BNP in the last 72 hours. Lipids: No results for input(s): CHOL, TRIG, HDL, LDLCALC in the last 72 hours. Invalid input(s): LDL  ABGs: No results found for: PH, PCO2, PO2, HCO3, O2SAT    Radiology reports as per the Radiologist  Radiology: Xr Acute Abd Series Chest 1 Vw    Result Date: 6/19/2018  PROCEDURE: XR ACUTE ABD SERIES CHEST 1 VW CLINICAL INFORMATION: Epigastric pain, shortness of breath, nausea, TECHNIQUE: A PA view of the chest as well as supine and upright views of the abdomen were obtained. FINDINGS: The accompanying chest radiograph reveals no acute findings. No infiltrates or effusions are seen. There is mild gaseous distention of several bowel loops in the pelvic region, most of these represent colonic loops. Findings consistent with mild ileus. There has been prior lumbar fusion, L1-L5. Moderate degenerative changes right hip. 1. Accompanying chest x-ray unremarkable. 2. Findings of mild pelvic/abdominal ileus **This report has been created using voice recognition software. It may contain minor errors which are inherent in voice recognition technology. ** Final report electronically signed by Dr. Troy Alves on 6/19/2018 11:42 AM    Ct Head Wo Contrast    Result Date: 6/19/2018  PROCEDURE: CT HEAD WO CONTRAST CLINICAL INFORMATION: Dizzy, nausea, off balance . COMPARISON: 10/31/2013 TECHNIQUE: Noncontrast images from the base of the skull to the vertex.  All CT scans at this facility use dose modulation, iterative reconstruction, and/or weight-based dosing when appropriate to reduce radiation dose to as low as reasonably achievable. FINDINGS: No hemorrhage. No extra-axial collection. Gray-white differentiation is maintained. Both testicles low-attenuation area in the right temporal lobe may be related to remote infarction possible chronic small vessel ischemic disease. This is not seen on the prior exam. Ventricles are normal. Calvarium is intact. Visualized mastoid air cells and paranasal sinuses are clear. Low-attenuation focus in the right temporal lobe possibly related to remote infarction or chronic small vessel ischemic disease. No acute process. **This report has been created using voice recognition software. It may contain minor errors which are inherent in voice recognition technology. ** Final report electronically signed by Dr. Elizabeth Staley on 6/19/2018 11:36 AM    Ct Abdomen Pelvis W Iv Contrast Additional Contrast? Oral    Result Date: 6/19/2018  PROCEDURE: CT ABDOMEN PELVIS W IV CONTRAST CLINICAL INFORMATION: ABDOMINAL PAIN,  . COMPARISON: October 31 2013 TECHNIQUE: 5 mm axial CT images were obtained through the abdomen and pelvis after the administration of intravenous and oral contrast. Coronal and sagittal reconstructions were obtained. All CT scans at this facility use dose modulation, iterative reconstruction, and/or weight-based dosing when appropriate to reduce radiation dose to as low as reasonably achievable. FINDINGS: Atelectasis or scarring in the lung bases. There are dilated biliary ducts predominantly on the left. Cholecystectomy. Pancreas, spleen and adrenal glands are unremarkable. Few faint right adrenal gland calcifications unchanged from prior. Kidneys are symmetric without hydronephrosis. Normal caliber aorta with atherosclerotic changes. No free intraperitoneal air. Moderate stool throughout the colon with diverticulosis.  No evidence for diverticulitis or obstruction. Appendix is not seen. Distended bladder. No bladder wall thickening. Postsurgical changes lumbar spine with laminectomy and bilateral pedicle fusions. SI joints are symmetric. 1. Dilated biliary tree, predominantly in the left. Cholecystectomy. Appearance is similar to prior study. 2. Postsurgical changes lumbar spine following pedicle fusion, stable in appearance from prior. 3. Moderate stool throughout the colon with diverticulosis. No evidence for diverticulitis at this time. **This report has been created using voice recognition software. It may contain minor errors which are inherent in voice recognition technology. ** Final report electronically signed by Dr. Roxana Fang on 6/19/2018 2:47 PM       Physical Exam:  Vitals: BP (!) 130/58   Pulse 95   Temp 96.4 °F (35.8 °C)   Resp 18   Ht 5' 3\" (1.6 m)   Wt 154 lb 12.8 oz (70.2 kg)   SpO2 96%   BMI 27.42 kg/m²   24 hour intake/output:    Intake/Output Summary (Last 24 hours) at 07/05/18 1054  Last data filed at 07/05/18 0432   Gross per 24 hour   Intake             1397 ml   Output              600 ml   Net              797 ml     Last 3 weights:   Wt Readings from Last 3 Encounters:   06/25/18 154 lb 12.8 oz (70.2 kg)   06/19/18 165 lb (74.8 kg)   06/07/18 160 lb 14.4 oz (73 kg)       General appearance - appears fatigued, awake and alert  HEENT: Normocephalic and Atraumatic  Chest - clear to auscultation, no wheezes, rales or rhonchi, symmetric air entry  Cardiovascular - normal rate and regular rhythm  Abdomen - firm,  distended, BS active    Neurological - Alert and oriented and Normal speech  Integumentary - Skin color, texture, turgor normal. No Rashes or lesions  Musculoskeletal -Full ROM times 4 extremities, No clubbing or cyanosis and No peripheral edema   Surgical Incision: midline incision looks good, Hernán drain removed    DVT prophylaxis: [x] Lovenox                                 [] SCDs [] SQ Heparin                                 [] Encourage ambulation           [] Already on Anticoagulation                 ASSESSMENT:  S/p Abd Exploration Lysis Adhesions Extended L Colon Resection POD #6  1. Acute postoperative pain  2. Hgb stable   3. Leukocytosis resolved  4. Deconditioned   5. Acute blood loss anemia secondary to surgical intervention   6. Overweight  7. Severe Malnutrition pre albumin 9.8  8. + stool   9. Gas pain      has a past medical history of Arthritis; Difficult intubation; GERD (gastroesophageal reflux disease); H pylori ulcer; Hx of blood clots; Nausea & vomiting; Pneumonia; Shingles; and Torus mandibularis. PLAN:  1. Incisional care daily   2. Monitor labs, replace lytes per protocol  3. reglan and dulcolax started  4. Increase to soft diet  5. TPN discontinued   6. DVT prophylaxis and GI Prophylaxis  7. Activity - ambulate, OOB to chair, C&DB,  IS, PT/OT on  8. Analgesia and antiemetics as needed added simethicone   9. AntibioticTherapy discontinued   10. I&O  11. Hernán drain removed  12. Plan to discharge Friday if doing well      Electronically signed by SULEIMAN Aguila - CNP on 7/5/2018 at 10:54 AM Patient seen and examined independently by me. Above discussed and I agree with CNP. Labs, cultures, and radiographs where available were reviewed. See orders for the updated patient care plan.     Juliet Lewis MD,   7/5/2018   2:14 PM

## 2018-07-06 VITALS
BODY MASS INDEX: 27.43 KG/M2 | HEART RATE: 91 BPM | SYSTOLIC BLOOD PRESSURE: 129 MMHG | TEMPERATURE: 98 F | HEIGHT: 63 IN | OXYGEN SATURATION: 98 % | RESPIRATION RATE: 16 BRPM | WEIGHT: 154.8 LBS | DIASTOLIC BLOOD PRESSURE: 58 MMHG

## 2018-07-06 PROBLEM — K57.30 DIVERTICULAR DISEASE OF COLON: Status: RESOLVED | Noted: 2018-06-25 | Resolved: 2018-07-06

## 2018-07-06 PROBLEM — Z90.49 S/P PARTIAL RESECTION OF COLON: Status: ACTIVE | Noted: 2018-07-06

## 2018-07-06 PROCEDURE — 6370000000 HC RX 637 (ALT 250 FOR IP): Performed by: SURGERY

## 2018-07-06 PROCEDURE — 6360000002 HC RX W HCPCS: Performed by: SURGERY

## 2018-07-06 PROCEDURE — 6370000000 HC RX 637 (ALT 250 FOR IP): Performed by: NURSE PRACTITIONER

## 2018-07-06 PROCEDURE — 97116 GAIT TRAINING THERAPY: CPT

## 2018-07-06 PROCEDURE — 99024 POSTOP FOLLOW-UP VISIT: CPT | Performed by: NURSE PRACTITIONER

## 2018-07-06 PROCEDURE — 99024 POSTOP FOLLOW-UP VISIT: CPT | Performed by: SURGERY

## 2018-07-06 PROCEDURE — 97110 THERAPEUTIC EXERCISES: CPT

## 2018-07-06 RX ORDER — HYDROCODONE BITARTRATE AND ACETAMINOPHEN 5; 325 MG/1; MG/1
1 TABLET ORAL EVERY 6 HOURS PRN
Qty: 20 TABLET | Refills: 0 | Status: SHIPPED | OUTPATIENT
Start: 2018-07-06 | End: 2018-07-13

## 2018-07-06 RX ORDER — SIMETHICONE 80 MG
160 TABLET,CHEWABLE ORAL 3 TIMES DAILY PRN
Qty: 42 TABLET | Refills: 0 | Status: ON HOLD | OUTPATIENT
Start: 2018-07-06 | End: 2018-09-02

## 2018-07-06 RX ADMIN — METOCLOPRAMIDE 10 MG: 5 INJECTION, SOLUTION INTRAMUSCULAR; INTRAVENOUS at 01:29

## 2018-07-06 RX ADMIN — PANTOPRAZOLE SODIUM 40 MG: 40 TABLET, DELAYED RELEASE ORAL at 05:55

## 2018-07-06 RX ADMIN — ENOXAPARIN SODIUM 40 MG: 40 INJECTION, SOLUTION INTRAVENOUS; SUBCUTANEOUS at 09:10

## 2018-07-06 RX ADMIN — SIMETHICONE CHEW TAB 80 MG 160 MG: 80 TABLET ORAL at 01:28

## 2018-07-06 ASSESSMENT — PAIN SCALES - GENERAL
PAINLEVEL_OUTOF10: 4
PAINLEVEL_OUTOF10: 0
PAINLEVEL_OUTOF10: 0

## 2018-07-06 NOTE — CARE COORDINATION
6/28/18, 3:06 PM      Vickienia 40 day: 3  Location: -/[facility] Reason for admit: Diverticular disease of colon [K57.30]   Procedure: Abd Exploration  2. Lysis Adhesions Severe 3. Extended L Colon Resection     Treatment Plan of Care: POD #3  Low U/O, IV bolus, N/G tube removed, begin ice, clear liq for supper, daily wound care,  Stearns removed, PT & OT following    PCP: Ileana Fitzgerald MD  Readmission Risk Score: 7%  Discharge Plan:  Home with daughter; monitor for needs.
7/2/18, 2:50 PM      Lana 40 day: 7  Location: -/[facility] Reason for admit: Diverticular disease of colon [K57.30]   Procedure: 6/25/18    1. Abd Exploration  2. Lysis Adhesions Severe 3. Extended L Colon Resection    Treatment Plan of Care: TPN thru PICC line, Zosyn IVPB conts, monitor drain outputs 10ml, on full liquids, ambulate, following PT & OT notes, incentive spirometer, wound care, accu checks every 6 hours with medium -dose ISS      PCP: Justin Kennedy MD  Readmission Risk Score: 10%  Discharge Plan: spoke with Rosy Hinkle; she currently feels her daughters (3) can assist her and she denies needs for [facility] when discharged. Will monitor progress.
7/6/18, 10:01 AM    Discharge plan discussed by  and . Discharge plan reviewed with patient/ family. Patient/ family verbalize understanding of discharge plan and are in agreement with plan. Understanding was demonstrated using the teach back method. IMM Letter  IMM Letter date given[de-identified] 07/05/18  IMM Letter time given[de-identified] 1506       Discharge order in place. Revisited pt today and again offered [facility] services but pt again denies needs stating that her daughters are very helpful and are familiar with physical therapy exercises due to previous surgery she has had. Plans to return home with daughter.
Expected Discharge date: Follow Up Appointment: Best Day/ Time:      Discharge Plan: Spoke with daughter this morning as pt appears asleep with snoring respirations. Pt from home with another daughter. They are self sufficient although they do not drive. No services or DME's. There are numerous family members who are supportive and helpful. No transportation or medication issues. Pt advised that CM is here Help with any needs at discharge. Dtr feels they will not have needs but she is understanding of CM role.        Evaluation: no

## 2018-07-06 NOTE — PROGRESS NOTES
to increase strength for improved functional mobility. Activity Tolerance:  Activity Tolerance: Patient limited by pain; Patient limited by endurance; Patient limited by fatigue    Assessment: Body structures, Functions, Activity limitations: Decreased functional mobility , Decreased endurance, Decreased strength  Assessment: Pt tolerated sesesion fairly well. Limited by decreased strength and decreased mobility. Would benefit fom home health therapy at discharge. Prognosis: Excellent  REQUIRES PT FOLLOW UP: Yes  Discharge Recommendations: Continue to assess pending progress, Home with Home health PT    Patient Education:  Patient Education: POC    Equipment Recommendations:  Equipment Needed: No    Safety:  Type of devices:  All fall risk precautions in place, Call light within reach, Gait belt, Nurse notified, Left in chair, Chair alarm in place    Plan:  Times per week: 5x GM  Times per day: Daily  Specific instructions for Next Treatment: ther ex, ther act, gait trng  Current Treatment Recommendations: Strengthening, Functional Mobility Training, Transfer Training, Endurance Training, Gait Training, Safety Education & Training, Patient/Caregiver Education & Training    Goals:  Patient goals : go home    Short term goals  Time Frame for Short term goals: 4 days  Short term goal 1: Pt to be Supervision for supine <> sit to get in/out of bed  Short term goal 2: Pt to be Supervision for sit <> stand to get up to ambulate  Short term goal 3: Pt to ambulate > 80 ft with RW with Supervision for household distances    Long term goals  Time Frame for Long term goals : not set due to short ELOS

## 2018-07-06 NOTE — DISCHARGE SUMMARY
Discharge Summary     Patient Identification:  Mireille La  : 1940  MRN: [id number]   Account: [de-identified]     Admit date: 2018  Discharge date: 18  Attending provider: Karolyn Real MD        Primary care provider: Norm Vallecillo MD     Discharge Diagnoses: Active Problems:    Severe protein calorie malnutrition (HCC)    S/P partial resection of colon  Resolved Problems:    Diverticular disease of colon       Hospital Course:   Mireille La is a 66 y.o. female admitted to [facility] on 2018 for abdominal exploration for chronic constipation, chronic abdominal bloating and a sigmoid stricture. she was taken to the operative suite per Guille Rick MD and the planned procedure was performed as noted above. She was admitted to [address] [place] [address] for postoperative care and was initially managed with nasogastric decompression, bowel rest, analgesics for pain control, IV fluid hydration, GI and DVT prophylaxis and IV antibiotics. She developed an ileus, continued NPO and started TPN. Over the hospital stay she slowly improved in ability to tolerate increasing levels of activity and to take po fluids, solid foods with evidence of returning bowel function, and spontaneously voiding, her abdomen is distended and firm but clinically she is doing well. At the time of discharge she was able to tolerate pain with oral analgesic and was medically stable. Procedures:   DATE OF PROCEDURE:  2018     PREOPERATIVE DIAGNOSES:  1. Sigmoid stricture with atonic colon. 2.  Probable adhesions.     POSTOPERATIVE DIAGNOSES:  1. Sigmoid stricture with atonic colon. 2.  Probable adhesions.     OPERATION:  1. Abdominal exploration. 2.  Lysis of severe adhesions (complex case).   3.  Extended left colon resection.     SURGEON:  Dr. Dariela Stafford.     ANESTHESIA:  General.     COMPLICATIONS:  None.     INDICATION FOR PROCEDURE:  The patient is a 66-year-old white female who  has had a history of some chronic abdominal bloating. The patient has had  severe constipation associated with this, has been worked up by the GI  physicians and has what appears to be a sigmoid stricture, but a very  atonic and redundant colon. The patient is here for probable left colon  resection.     FINDINGS:  The patient had severe adhesions of small bowel. A complete  small bowel lysis of adhesions was performed in a painstaking fashion. The  left colon started basically at the mid transverse, splenic flexure,  descending was redundant along with a significantly redundant sigmoid colon  that was all dilated. A extended left colon resection was performed. It  should be noted the ovaries were still present. The uterus was gone.     DESCRIPTION OF PROCEDURE:  The patient was brought into the operating  suite, placed supine on the table. After adequate inhalational anesthesia  was administered, the patient's abdomen was prepped and draped in the usual  sterile fashion. She was placed up in stirrups. A Stearns catheter was  placed. SCDs were in place. A midline incision was made, taken down  through the subcutaneous tissue down to a scarred linea alba and as we  tried to enter into the abdominal cavity, there were significant adhesions  of omentum and these had to be taken down. We freed up the peritoneum of  the omentum. We also encountered a lot of small bowel attached to the  peritoneum and then we could see markedly dilated colon, but we began  lysing significant small bowel adhesions. The small bowel is attached to  itself, small bowel is attached to the colon, mesentery to the colon. Small bowel is attached to other small bowel mesentery. We did a  painstaking lysis of adhesions. There were two areas where we had made  enterotomies, and these were closed with interrupted 3-0 Vicryl sutures. There were some other serosal tears that were again closed with 3-0 Vicryl  sutures.   We it anterior to the small bowel. The patient  tolerated the procedure well.          Code Status: Full Code     Consults:   none    Examination:  Vitals:  Vitals:    07/05/18 2013 07/06/18 0127 07/06/18 0439 07/06/18 0734   BP: (!) 116/58 (!) 118/56 (!) 120/56 (!) 124/56   Pulse: 97 97 95 92   Resp: 14  16 16   Temp: 99.1 °F (37.3 °C) 98.3 °F (36.8 °C)  98.2 °F (36.8 °C)   TempSrc: Oral Oral Oral Oral   SpO2: 96% 96% 96% 96%   Weight:       Height:         Weight: Weight: 154 lb 12.8 oz (70.2 kg)     24 hour intake/output:    Intake/Output Summary (Last 24 hours) at 07/06/18 6982  Last data filed at 07/06/18 0601   Gross per 24 hour   Intake              480 ml   Output              200 ml   Net              280 ml       General appearance - oriented to person, place, and time, overweight and chronically ill-appearing  Chest - clear to auscultation, no wheezes, rales or rhonchi, symmetric air entry  Heart - normal rate and regular rhythm  Abdomen - tenderness noted as expected, distended, BS active and firm  Neurological - alert, oriented, normal speech, no focal findings or movement disorder noted  Extremities - peripheral pulses normal, no pedal edema, no clubbing or cyanosis  Skin - normal coloration and turgor, no rashes, no suspicious skin lesions noted    Significant Diagnostics:   Radiology: Xr Acute Abd Series Chest 1 Vw    Result Date: 6/19/2018  PROCEDURE: XR ACUTE ABD SERIES CHEST 1 VW CLINICAL INFORMATION: Epigastric pain, shortness of breath, nausea, TECHNIQUE: A PA view of the chest as well as supine and upright views of the abdomen were obtained. FINDINGS: The accompanying chest radiograph reveals no acute findings. No infiltrates or effusions are seen. There is mild gaseous distention of several bowel loops in the pelvic region, most of these represent colonic loops. Findings consistent with mild ileus. There has been prior lumbar fusion, L1-L5. Moderate degenerative changes right hip.      1. Accompanying chest x-ray unremarkable. 2. Findings of mild pelvic/abdominal ileus **This report has been created using voice recognition software. It may contain minor errors which are inherent in voice recognition technology. ** Final report electronically signed by Dr. Colten Castrejon on 6/19/2018 11:42 AM    Ct Head Wo Contrast    Result Date: 6/19/2018  PROCEDURE: CT HEAD WO CONTRAST CLINICAL INFORMATION: Dizzy, nausea, off balance . COMPARISON: 10/31/2013 TECHNIQUE: Noncontrast images from the base of the skull to the vertex. All CT scans at this facility use dose modulation, iterative reconstruction, and/or weight-based dosing when appropriate to reduce radiation dose to as low as reasonably achievable. FINDINGS: No hemorrhage. No extra-axial collection. Gray-white differentiation is maintained. Both testicles low-attenuation area in the right temporal lobe may be related to remote infarction possible chronic small vessel ischemic disease. This is not seen on the prior exam. Ventricles are normal. Calvarium is intact. Visualized mastoid air cells and paranasal sinuses are clear. Low-attenuation focus in the right temporal lobe possibly related to remote infarction or chronic small vessel ischemic disease. No acute process. **This report has been created using voice recognition software. It may contain minor errors which are inherent in voice recognition technology. ** Final report electronically signed by Dr. Dane Bowen on 6/19/2018 11:36 AM    Ct Abdomen Pelvis W Iv Contrast Additional Contrast? Oral    Result Date: 6/19/2018  PROCEDURE: CT ABDOMEN PELVIS W IV CONTRAST CLINICAL INFORMATION: ABDOMINAL PAIN,  . COMPARISON: October 31 2013 TECHNIQUE: 5 mm axial CT images were obtained through the abdomen and pelvis after the administration of intravenous and oral contrast. Coronal and sagittal reconstructions were obtained.  All CT scans at this facility use dose modulation, iterative reconstruction, and/or weight-based dosing when appropriate to reduce radiation dose to as low as reasonably achievable. FINDINGS: Atelectasis or scarring in the lung bases. There are dilated biliary ducts predominantly on the left. Cholecystectomy. Pancreas, spleen and adrenal glands are unremarkable. Few faint right adrenal gland calcifications unchanged from prior. Kidneys are symmetric without hydronephrosis. Normal caliber aorta with atherosclerotic changes. No free intraperitoneal air. Moderate stool throughout the colon with diverticulosis. No evidence for diverticulitis or obstruction. Appendix is not seen. Distended bladder. No bladder wall thickening. Postsurgical changes lumbar spine with laminectomy and bilateral pedicle fusions. SI joints are symmetric. 1. Dilated biliary tree, predominantly in the left. Cholecystectomy. Appearance is similar to prior study. 2. Postsurgical changes lumbar spine following pedicle fusion, stable in appearance from prior. 3. Moderate stool throughout the colon with diverticulosis. No evidence for diverticulitis at this time. **This report has been created using voice recognition software. It may contain minor errors which are inherent in voice recognition technology. ** Final report electronically signed by Dr. Paulo Grubbs on 6/19/2018 2:47 PM      Labs:   Recent Results (from the past 72 hour(s))   POCT glucose    Collection Time: 07/03/18  5:06 PM   Result Value Ref Range    POC Glucose 121 (H) 70 - 108 mg/dl   POCT glucose    Collection Time: 07/04/18  7:21 AM   Result Value Ref Range    POC Glucose 113 (H) 70 - 108 mg/dl   POCT glucose    Collection Time: 07/04/18 12:08 PM   Result Value Ref Range    POC Glucose 111 (H) 70 - 108 mg/dl   POCT glucose    Collection Time: 07/04/18  4:21 PM   Result Value Ref Range    POC Glucose 102 70 - 108 mg/dl       Patient Instructions:    DR. Tang [facility]    Pt Name: [address] [place] [facility] Record Number: [id number]  Today's shower daily,  but do not soak in a bath. Keep the abdominal binder in place during the day. May remove to shower and at night. ABDOMINAL/LAPAROSCOPIC SURGERY  [x]You are encouraged to get up and move around as this helps with the circulation and speeds up the healing process. [x]Breath deeply and cough from time to time. This helps to clear your lungs and helps prevent pneumonia. [x]Supporting your incision with a pillow or your hand helps to minimize discomfort and pain. [x]Laparoscopic patients may develop shoulder pain in the first 48 hours from the gas used during the procedure. FOLLOW-UP CARE. SPECIFICALLY WATCH FOR:   Fever over 101 degrees by mouth   Increased redness, warmth, hardness at operative site. Blood soaked dressing (small amounts of oozing may be normal.)   Increased or progressive drainage from the surgical area   Inability to urinate or blood in the urine   Pain not relieved by the medications ordered   Persistent nausea and/or vomiting, unable to retain fluids. FOLLOW-UP APPOINTMENT:  As scheduled     Call my office if you have any problem that concerns you [phone]. After hours, you can reach the answering service via the office phone number. IF YOU NEED IMMEDIATE ATTENTION, GO TO THE EMERGENCY ROOM AND YOUR DOCTOR WILL BE CONTACTED.     Prepared By:  Wilmar Cevallos CNP  For Paulina Angulo MD    Electronically signed 7/6/2018 at 9:15 AM    Diet: Dietary Nutrition Supplements: Standard High Calorie Oral Supplement  DIET DENTAL SOFT;      Follow-up visits:   Dante Comer MD  [address] [place] [address]  [address] [place] [address], [address] [place]  [phone]    On 7/12/2018  3:30       Discharge condition: fair  Disposition: Home  Time spent on discharge: 35 minutes    Discharge Medications:   Kb Leavitt   Home Medication Instructions ILM:[id number]    Printed on:07/06/18 2575   Medication Information                      acetaminophen (TYLENOL) 325 MG tablet  Take 650 mg by mouth every 6 hours as needed for Pain             HYDROcodone-acetaminophen (NORCO) 5-325 MG per tablet  Take 1 tablet by mouth every 6 hours as needed for Pain for up to 7 days. Dequan Inch ibuprofen (ADVIL;MOTRIN) 200 MG tablet  Take 200 mg by mouth every 6 hours as needed for Pain             lactulose (CHRONULAC) 10 GM/15ML solution  Take 10 g by mouth 2 times daily. Multiple Vitamins-Minerals (PRESERVISION AREDS 2 PO)  Take 1 tablet by mouth daily             Omega-3 Fatty Acids (FISH OIL) 1000 MG CAPS  Take 1,000 mg by mouth daily             polyethylene glycol (GLYCOLAX) powder  Take 17 g by mouth daily. Probiotic Product (PROBIOTIC DAILY PO)  Take 1 tablet by mouth daily.              simethicone (MYLICON) 80 MG chewable tablet  Take 2 tablets by mouth 3 times daily as needed for Flatulence                 Electronically signed by SULEIMAN Moore CNP on 7/6/2018 at 9:25 AM

## 2018-07-12 ENCOUNTER — OFFICE VISIT (OUTPATIENT)
Dept: SURGERY | Age: 78
End: 2018-07-12

## 2018-07-12 VITALS
BODY MASS INDEX: 28.35 KG/M2 | TEMPERATURE: 98.3 F | SYSTOLIC BLOOD PRESSURE: 120 MMHG | DIASTOLIC BLOOD PRESSURE: 66 MMHG | HEART RATE: 64 BPM | OXYGEN SATURATION: 96 % | HEIGHT: 63 IN | WEIGHT: 160 LBS | RESPIRATION RATE: 18 BRPM

## 2018-07-12 DIAGNOSIS — Z48.02 REMOVAL OF STAPLES: ICD-10-CM

## 2018-07-12 DIAGNOSIS — Z90.49 S/P PARTIAL RESECTION OF COLON: Primary | ICD-10-CM

## 2018-07-12 PROCEDURE — 99024 POSTOP FOLLOW-UP VISIT: CPT | Performed by: NURSE PRACTITIONER

## 2018-07-16 ENCOUNTER — TELEPHONE (OUTPATIENT)
Dept: SURGERY | Age: 78
End: 2018-07-16

## 2018-07-16 ASSESSMENT — ENCOUNTER SYMPTOMS
DIARRHEA: 1
SHORTNESS OF BREATH: 1
ABDOMINAL PAIN: 1

## 2018-07-16 NOTE — TELEPHONE ENCOUNTER
I will discuss concerns with Dr Jailyn Rojas tomorrow. As I have explained to the patient and daughter the poor appetite is normal, needs to eat small frequent meals x 6 through out the day. Patient needs to be up ambulating in home, x 4 daily appx 10 min. The swelling should start to resolve when able to ambulate more. I encouraged an appt with her PCP last week if swelling was not improving. They may want to do further work up, this is something I will not follow. Keep scheduled appointment. If Dr Jailyn Rojas has any other suggestions or recommendations we will get back with her tomorrow. Patient may need to think about ECF placement temporarily if she needs extra medical attention.

## 2018-07-16 NOTE — PATIENT INSTRUCTIONS
1. Continue wound care. Monitor for redness, swelling, or purulent drainage. No lotions, ointments, alcohol or peroxide to incision sites. 2. Maintain lifting restrictions for the full 6 weeks after surgery. No heavy lifting, pulling, or tugging greater than 10-15 lbs. Gradually ease into normal routine (total of 8 weeks after surgery) before lifting heavier objects. 3. Bowel Function: Continue stool softeners as needed. Over the counter- Colace or Miralax is recommended. Do not allow yourself to become constipated     4. Increase water to 64oz per day    5. Ambulate 4 times a day for 15 minutes each time to help increase increase stamina and help stimulate GI motility    6. Continue at home diet- may need to eat smaller more frequent meals     7. Call for any other the follow symptoms:    Fever over 101 degrees by mouth   Increased redness, warmth, hardness at operative site   Blood soaked dressing (small amounts of oozing may be normal)   Increased or progressive drainage from the surgical area   Inability to urinate or blood in the urine   Pain not relieved by the medications ordered   Persistent nausea and/or vomiting, unable to retain fluids   Pain or swelling in your legs   Shortness of breath or chest pain    8. Signs and symptoms have been reviewed with patient that would be concerning and need her to return to office for re-evaluation. Patient states she will call if she has questions or concerns.

## 2018-07-16 NOTE — TELEPHONE ENCOUNTER
Relayed information to daughter. Daughter is just not happy at this time. Does not understand why our office just cannot order water pill. Johnna Hoit up on nurse.

## 2018-07-16 NOTE — PROGRESS NOTES
OTHER SURGICAL HISTORY  7/8/2013    evacation of seroma-back     [place] OFFICE/OUTPT VISIT,PROCEDURE ONLY N/A 6/25/2018    ABDOMINAL EXPLORATION AND ESTENDED LEFT COLON RESECTION, LYSIS OF COMPLEX ADHESIONS performed by Ashish Kaur MD at [address]  08/11/2017    Dr Calvo Bethel     Family History   Problem Relation Age of Onset    Arthritis Mother     Asthma Mother     Arthritis Father     Cancer Father     High Blood Pressure Father     Diabetes Brother     Heart Disease Brother     COPD Brother     High Blood Pressure Brother      Social History   Substance Use Topics    Smoking status: Never Smoker    Smokeless tobacco: Never Used    Alcohol use No        Current Outpatient Prescriptions   Medication Sig Dispense Refill    simethicone (MYLICON) 80 MG chewable tablet Take 2 tablets by mouth 3 times daily as needed for Flatulence 42 tablet 0    acetaminophen (TYLENOL) 325 MG tablet Take 650 mg by mouth every 6 hours as needed for Pain      ibuprofen (ADVIL;MOTRIN) 200 MG tablet Take 200 mg by mouth every 6 hours as needed for Pain      Omega-3 Fatty Acids (FISH OIL) 1000 MG CAPS Take 1,000 mg by mouth daily      Multiple Vitamins-Minerals (PRESERVISION AREDS 2 PO) Take 1 tablet by mouth daily      polyethylene glycol (GLYCOLAX) powder Take 17 g by mouth daily.  Probiotic Product (PROBIOTIC DAILY PO) Take 1 tablet by mouth daily.  lactulose (CHRONULAC) 10 GM/15ML solution Take 10 g by mouth 2 times daily. No current facility-administered medications for this visit. Allergies   Allergen Reactions    Corticosteroids Swelling    Pregabalin      Dizziness        Subjective:      Review of Systems   Constitutional: Positive for activity change, appetite change and fatigue. Respiratory: Positive for shortness of breath. Cardiovascular: Positive for leg swelling. Gastrointestinal: Positive for abdominal pain and diarrhea.    Musculoskeletal: Positive for gait problem. Neurological: Positive for weakness. Objective:     /66 (Site: Left Arm, Position: Sitting, Cuff Size: Medium Adult)   Pulse 64   Temp 98.3 °F (36.8 °C) (Tympanic)   Resp 18   Ht 5' 3\" (1.6 m)   Wt 160 lb (72.6 kg)   SpO2 96%   Breastfeeding? No   BMI 28.34 kg/m²     Wt Readings from Last 3 Encounters:   07/12/18 160 lb (72.6 kg)   06/25/18 154 lb 12.8 oz (70.2 kg)   06/19/18 165 lb (74.8 kg)       Physical Exam   Constitutional: She is oriented to person, place, and time. overweight   HENT:   Head: Normocephalic. Eyes: Pupils are equal, round, and reactive to light. Neck: Normal range of motion. Pulmonary/Chest: Breath sounds normal.   Abdominal: Soft. She exhibits distension. There is tenderness. Musculoskeletal:   Unsteady, uses cane and walker   Neurological: She is alert and oriented to person, place, and time. Assessment/Plan:     Tamela Piedra was seen today for post-op check. Diagnoses and all orders for this visit:    S/P partial resection of colon    Removal of staples        No Follow-up on file. Patient Instructions   1. Continue wound care. Monitor for redness, swelling, or purulent drainage. No lotions, ointments, alcohol or peroxide to incision sites. 2. Maintain lifting restrictions for the full 6 weeks after surgery. No heavy lifting, pulling, or tugging greater than 10-15 lbs. Gradually ease into normal routine (total of 8 weeks after surgery) before lifting heavier objects. 3. Bowel Function: Continue stool softeners as needed. Over the counter- Colace or Miralax is recommended. Do not allow yourself to become constipated     4. Increase water to 64oz per day    5. Ambulate 4 times a day for 15 minutes each time to help increase increase stamina and help stimulate GI motility    6. Continue at home diet- may need to eat smaller more frequent meals     7.  Call for any other the follow symptoms:    Fever over 101 degrees by

## 2018-07-19 ENCOUNTER — OFFICE VISIT (OUTPATIENT)
Dept: SURGERY | Age: 78
End: 2018-07-19

## 2018-07-19 VITALS
OXYGEN SATURATION: 97 % | TEMPERATURE: 97.7 F | RESPIRATION RATE: 18 BRPM | HEART RATE: 80 BPM | BODY MASS INDEX: 26.07 KG/M2 | DIASTOLIC BLOOD PRESSURE: 62 MMHG | WEIGHT: 152.7 LBS | HEIGHT: 64 IN | SYSTOLIC BLOOD PRESSURE: 110 MMHG

## 2018-07-19 DIAGNOSIS — E43 PROTEIN-CALORIE MALNUTRITION, SEVERE (HCC): ICD-10-CM

## 2018-07-19 DIAGNOSIS — Z90.49 S/P PARTIAL RESECTION OF COLON: ICD-10-CM

## 2018-07-19 DIAGNOSIS — Z48.02 REMOVAL OF STAPLES: ICD-10-CM

## 2018-07-19 DIAGNOSIS — R63.0 POOR APPETITE: Primary | ICD-10-CM

## 2018-07-19 PROCEDURE — 99024 POSTOP FOLLOW-UP VISIT: CPT | Performed by: NURSE PRACTITIONER

## 2018-07-19 RX ORDER — DRONABINOL 2.5 MG/1
2.5 CAPSULE ORAL
Qty: 60 CAPSULE | Refills: 0 | Status: SHIPPED | OUTPATIENT
Start: 2018-07-19 | End: 2018-08-18

## 2018-07-19 ASSESSMENT — ENCOUNTER SYMPTOMS
SORE THROAT: 0
SINUS PAIN: 0
DIARRHEA: 1
RECTAL PAIN: 0
WHEEZING: 0
APNEA: 0
VOICE CHANGE: 0
EYE DISCHARGE: 0
PHOTOPHOBIA: 0
SHORTNESS OF BREATH: 0
EYE REDNESS: 0
EYE PAIN: 0
NAUSEA: 1
VOMITING: 0
COUGH: 0
EYE ITCHING: 0
RHINORRHEA: 0
SINUS PRESSURE: 0
STRIDOR: 0
FACIAL SWELLING: 0
ABDOMINAL DISTENTION: 0
COLOR CHANGE: 0
BACK PAIN: 0
ANAL BLEEDING: 0
CHEST TIGHTNESS: 0
BLOOD IN STOOL: 0
CHOKING: 0
ABDOMINAL PAIN: 1
CONSTIPATION: 0
TROUBLE SWALLOWING: 0

## 2018-07-19 NOTE — PROGRESS NOTES
[facility]  [facility]  [address] [address]  [address] [address]  Dept: [phone]  Dept Fax: [phone]  Loc: [phone]    Visit Date: 7/19/2018       Britta Juares is a 66 y.o. female who presents today for:  Chief Complaint   Patient presents with    Post-Op Check     s/p Abdominal exploration, GENOVEVA-complex, Extended left colon resection 6/26/18       HPI:     David Trimble presents today for a 3 week post op check. Today She complains of activity changes, weakness and fatigue which is expected after surgery. Her appetite is still poor and now has developed thrush, has a difficult time eating and drinking without pain, Rx for nystatin and magic mouthwash and marinol for appetite stimulant will be provided. The bilateral leg swelling has slightly improved,  No concerns at this time. This is a normal process after surgery, as she moves around more the swelling should resolve. Also encouraged her to follow up with PCP, appt was made today prior to leaving [facility]. She denies calf pain, no calf swelling, no erythema noted. The surgical incision looks good, remaining staples have been removed, Postsurgical tenderness expected. The constipation issues she had prior to surgery have improved, now she is having diarrhea, this will hopefully improved when she starts eating better. Follow up in 2 weeks.        Past Medical History:   Diagnosis Date    Arthritis     Difficult intubation 2006    GERD (gastroesophageal reflux disease)     H pylori ulcer     Hx of blood clots     RIGHT LEG    Nausea & vomiting     Pneumonia     S/P partial resection of colon 7/6/2018    Shingles     Torus mandibularis 2006      Past Surgical History:   Procedure Laterality Date    ABDOMINAL ADHESION SURGERY  1990's    Dr Emma Hemphill  09/09/2013    Lysis of Adhesions- [address] [facility]     BACK SURGERY  1979/1980/2006/2013    [address]  easily. Psychiatric/Behavioral: Negative for agitation, behavioral problems, confusion, decreased concentration, dysphoric mood, hallucinations, self-injury, sleep disturbance and suicidal ideas. The patient is not nervous/anxious and is not hyperactive. Objective:     /62 (Site: Left Arm, Position: Sitting, Cuff Size: Medium Adult)   Pulse 80   Temp 97.7 °F (36.5 °C) (Tympanic)   Resp 18   Ht 5' 3.5\" (1.613 m)   Wt 152 lb 11.2 oz (69.3 kg)   SpO2 97%   BMI 26.63 kg/m²     Wt Readings from Last 3 Encounters:   07/19/18 152 lb 11.2 oz (69.3 kg)   07/12/18 160 lb (72.6 kg)   06/25/18 154 lb 12.8 oz (70.2 kg)       Physical Exam   Constitutional: She is oriented to person, place, and time. overweight   HENT:   Head: Normocephalic. Eyes: Pupils are equal, round, and reactive to light. Neck: Normal range of motion. Pulmonary/Chest: Breath sounds normal.   Abdominal: Soft. She exhibits distension. There is tenderness. Musculoskeletal:   Unsteady, uses cane and walker   Neurological: She is alert and oriented to person, place, and time. Assessment/Plan:     Marguerite Perez was seen today for post-op check. Diagnoses and all orders for this visit:    Poor appetite  -     dronabinol (MARINOL) 2.5 MG capsule; Take 1 capsule by mouth 2 times daily (before meals) for 30 days. .    Protein-calorie malnutrition, severe ([address]  -     dronabinol (MARINOL) 2.5 MG capsule; Take 1 capsule by mouth 2 times daily (before meals) for 30 days. .    Removal of staples    S/P partial resection of colon    Other orders  -     nystatin (MYCOSTATIN) 379631 UNIT/ML suspension; Take 5 mLs by mouth 4 times daily        Return in about 2 weeks (around 8/2/2018). Patient Instructions   1. Continue wound care. Monitor for redness, swelling, or purulent drainage. No lotions, ointments, alcohol or peroxide to incision sites. 2. Maintain lifting restrictions for the full 6 weeks after surgery.  No heavy lifting,

## 2018-07-19 NOTE — PATIENT INSTRUCTIONS
1. Continue wound care. Monitor for redness, swelling, or purulent drainage. No lotions, ointments, alcohol or peroxide to incision sites. 2. Maintain lifting restrictions for the full 6 weeks after surgery. No heavy lifting, pulling, or tugging greater than 10-15 lbs. Gradually ease into normal routine (total of 8 weeks after surgery) before lifting heavier objects. 3. Bowel Function: Continue stool softeners as needed. Over the counter- Colace or Miralax is recommended. Do not allow yourself to become constipated     4. Increase water to 64oz per day    5. Ambulate 4 times a day for 15 minutes each time to help increase increase stamina and help stimulate GI motility    6. Continue at home diet- may need to eat smaller more frequent meals     7. Call for any other the follow symptoms:    Fever over 101 degrees by mouth   Increased redness, warmth, hardness at operative site   Blood soaked dressing (small amounts of oozing may be normal)   Increased or progressive drainage from the surgical area   Inability to urinate or blood in the urine   Pain not relieved by the medications ordered   Persistent nausea and/or vomiting, unable to retain fluids   Pain or swelling in your legs   Shortness of breath or chest pain    8. Signs and symptoms have been reviewed with patient that would be concerning and need her to return to office for re-evaluation. Patient states she will call if she has questions or concerns. Take nystatin, magic mouth for sores    Marinol for appetite stimulant        Appt with Dr Carrie Waldrop 7/19/18 @ 145pm to evaluate leg swelling.

## 2018-08-07 ENCOUNTER — OFFICE VISIT (OUTPATIENT)
Dept: SURGERY | Age: 78
End: 2018-08-07

## 2018-08-07 VITALS
HEIGHT: 63 IN | DIASTOLIC BLOOD PRESSURE: 64 MMHG | RESPIRATION RATE: 18 BRPM | HEART RATE: 94 BPM | TEMPERATURE: 98.7 F | WEIGHT: 148.6 LBS | OXYGEN SATURATION: 95 % | BODY MASS INDEX: 26.33 KG/M2 | SYSTOLIC BLOOD PRESSURE: 108 MMHG

## 2018-08-07 DIAGNOSIS — Z90.49 S/P PARTIAL RESECTION OF COLON: Primary | ICD-10-CM

## 2018-08-07 PROCEDURE — 99024 POSTOP FOLLOW-UP VISIT: CPT | Performed by: NURSE PRACTITIONER

## 2018-08-07 ASSESSMENT — ENCOUNTER SYMPTOMS
BLOOD IN STOOL: 0
ANAL BLEEDING: 0
PHOTOPHOBIA: 0
ABDOMINAL DISTENTION: 1
COLOR CHANGE: 0
STRIDOR: 0
CONSTIPATION: 0
ABDOMINAL PAIN: 1
CHEST TIGHTNESS: 0
VOICE CHANGE: 0
EYE REDNESS: 0
APNEA: 0
EYE DISCHARGE: 0
RHINORRHEA: 0
DIARRHEA: 1
EYE PAIN: 0
COUGH: 0
SHORTNESS OF BREATH: 0
VOMITING: 0
WHEEZING: 0
EYE ITCHING: 0
TROUBLE SWALLOWING: 0
SORE THROAT: 0
SINUS PRESSURE: 0
RECTAL PAIN: 0
CHOKING: 0
NAUSEA: 0
FACIAL SWELLING: 0
BACK PAIN: 0

## 2018-08-07 NOTE — PATIENT INSTRUCTIONS
Follow up with PCP for weight loss and nutrition, bilateral leg swelling.     No follow up from a surgical standpoint needed

## 2018-09-02 ENCOUNTER — HOSPITAL ENCOUNTER (INPATIENT)
Age: 78
LOS: 2 days | Discharge: HOME | DRG: 388 | End: 2018-09-04
Attending: SURGERY | Admitting: SURGERY
Payer: MEDICARE

## 2018-09-02 PROBLEM — K56.609 SMALL BOWEL OBSTRUCTION (HCC): Status: ACTIVE | Noted: 2018-09-02

## 2018-09-02 PROCEDURE — 2580000003 HC RX 258: Performed by: SURGERY

## 2018-09-02 PROCEDURE — 1200000000 HC SEMI PRIVATE

## 2018-09-02 PROCEDURE — 2709999900 HC NON-CHARGEABLE SUPPLY

## 2018-09-02 RX ORDER — SODIUM CHLORIDE 0.9 % (FLUSH) 0.9 %
10 SYRINGE (ML) INJECTION PRN
Status: DISCONTINUED | OUTPATIENT
Start: 2018-09-02 | End: 2018-09-04 | Stop reason: HOSPADM

## 2018-09-02 RX ORDER — ACETAMINOPHEN 325 MG/1
650 TABLET ORAL EVERY 4 HOURS PRN
Status: DISCONTINUED | OUTPATIENT
Start: 2018-09-02 | End: 2018-09-04 | Stop reason: HOSPADM

## 2018-09-02 RX ORDER — ONDANSETRON 2 MG/ML
4 INJECTION INTRAMUSCULAR; INTRAVENOUS EVERY 6 HOURS PRN
Status: DISCONTINUED | OUTPATIENT
Start: 2018-09-02 | End: 2018-09-04

## 2018-09-02 RX ORDER — SODIUM CHLORIDE 0.9 % (FLUSH) 0.9 %
10 SYRINGE (ML) INJECTION EVERY 12 HOURS SCHEDULED
Status: DISCONTINUED | OUTPATIENT
Start: 2018-09-02 | End: 2018-09-04 | Stop reason: HOSPADM

## 2018-09-02 RX ORDER — MORPHINE SULFATE 2 MG/ML
2 INJECTION, SOLUTION INTRAMUSCULAR; INTRAVENOUS
Status: DISCONTINUED | OUTPATIENT
Start: 2018-09-02 | End: 2018-09-04

## 2018-09-02 RX ORDER — SODIUM CHLORIDE 9 MG/ML
INJECTION, SOLUTION INTRAVENOUS CONTINUOUS
Status: DISCONTINUED | OUTPATIENT
Start: 2018-09-02 | End: 2018-09-04

## 2018-09-02 RX ORDER — MORPHINE SULFATE 4 MG/ML
4 INJECTION, SOLUTION INTRAMUSCULAR; INTRAVENOUS
Status: DISCONTINUED | OUTPATIENT
Start: 2018-09-02 | End: 2018-09-04

## 2018-09-02 RX ADMIN — SODIUM CHLORIDE: 9 INJECTION, SOLUTION INTRAVENOUS at 22:38

## 2018-09-02 RX ADMIN — Medication 10 ML: at 22:39

## 2018-09-03 ENCOUNTER — APPOINTMENT (OUTPATIENT)
Dept: GENERAL RADIOLOGY | Age: 78
DRG: 388 | End: 2018-09-03
Attending: SURGERY
Payer: MEDICARE

## 2018-09-03 LAB
ANION GAP SERPL CALCULATED.3IONS-SCNC: 10 MEQ/L (ref 8–16)
BASOPHILS # BLD: 0.3 %
BASOPHILS ABSOLUTE: 0 THOU/MM3 (ref 0–0.1)
BUN BLDV-MCNC: 8 MG/DL (ref 7–22)
CALCIUM SERPL-MCNC: 8.4 MG/DL (ref 8.5–10.5)
CHLORIDE BLD-SCNC: 95 MEQ/L (ref 98–111)
CO2: 27 MEQ/L (ref 23–33)
CREAT SERPL-MCNC: 0.5 MG/DL (ref 0.4–1.2)
EOSINOPHIL # BLD: 1.6 %
EOSINOPHILS ABSOLUTE: 0.1 THOU/MM3 (ref 0–0.4)
ERYTHROCYTE [DISTWIDTH] IN BLOOD BY AUTOMATED COUNT: 14.8 % (ref 11.5–14.5)
ERYTHROCYTE [DISTWIDTH] IN BLOOD BY AUTOMATED COUNT: 42.5 FL (ref 35–45)
GFR SERPL CREATININE-BSD FRML MDRD: > 90 ML/MIN/1.73M2
GLUCOSE BLD-MCNC: 99 MG/DL (ref 70–108)
HCT VFR BLD CALC: 28.2 % (ref 37–47)
HEMOGLOBIN: 9 GM/DL (ref 12–16)
IMMATURE GRANS (ABS): 0.05 THOU/MM3 (ref 0–0.07)
IMMATURE GRANULOCYTES: 0.7 %
LYMPHOCYTES # BLD: 38.8 %
LYMPHOCYTES ABSOLUTE: 2.9 THOU/MM3 (ref 1–4.8)
MCH RBC QN AUTO: 25.6 PG (ref 26–33)
MCHC RBC AUTO-ENTMCNC: 31.9 GM/DL (ref 32.2–35.5)
MCV RBC AUTO: 80.1 FL (ref 81–99)
MONOCYTES # BLD: 8.8 %
MONOCYTES ABSOLUTE: 0.7 THOU/MM3 (ref 0.4–1.3)
NUCLEATED RED BLOOD CELLS: 0 /100 WBC
PLATELET # BLD: 375 THOU/MM3 (ref 130–400)
PMV BLD AUTO: 8.3 FL (ref 9.4–12.4)
POTASSIUM REFLEX MAGNESIUM: 3.9 MEQ/L (ref 3.5–5.2)
RBC # BLD: 3.52 MILL/MM3 (ref 4.2–5.4)
SEG NEUTROPHILS: 49.8 %
SEGMENTED NEUTROPHILS ABSOLUTE COUNT: 3.7 THOU/MM3 (ref 1.8–7.7)
SODIUM BLD-SCNC: 132 MEQ/L (ref 135–145)
WBC # BLD: 7.5 THOU/MM3 (ref 4.8–10.8)

## 2018-09-03 PROCEDURE — 1200000000 HC SEMI PRIVATE

## 2018-09-03 PROCEDURE — 85025 COMPLETE CBC W/AUTO DIFF WBC: CPT

## 2018-09-03 PROCEDURE — 2580000003 HC RX 258: Performed by: SURGERY

## 2018-09-03 PROCEDURE — 6360000002 HC RX W HCPCS: Performed by: SURGERY

## 2018-09-03 PROCEDURE — 2709999900 HC NON-CHARGEABLE SUPPLY

## 2018-09-03 PROCEDURE — 99222 1ST HOSP IP/OBS MODERATE 55: CPT | Performed by: SURGERY

## 2018-09-03 PROCEDURE — 36415 COLL VENOUS BLD VENIPUNCTURE: CPT

## 2018-09-03 PROCEDURE — 6370000000 HC RX 637 (ALT 250 FOR IP): Performed by: SURGERY

## 2018-09-03 PROCEDURE — 80048 BASIC METABOLIC PNL TOTAL CA: CPT

## 2018-09-03 PROCEDURE — 74018 RADEX ABDOMEN 1 VIEW: CPT

## 2018-09-03 RX ADMIN — ACETAMINOPHEN 650 MG: 325 TABLET ORAL at 23:23

## 2018-09-03 RX ADMIN — CEFTRIAXONE SODIUM 1 G: 1 INJECTION, POWDER, FOR SOLUTION INTRAMUSCULAR; INTRAVENOUS at 12:08

## 2018-09-03 RX ADMIN — SODIUM CHLORIDE: 9 INJECTION, SOLUTION INTRAVENOUS at 15:52

## 2018-09-03 ASSESSMENT — PAIN SCALES - GENERAL
PAINLEVEL_OUTOF10: 5
PAINLEVEL_OUTOF10: 0
PAINLEVEL_OUTOF10: 3

## 2018-09-03 ASSESSMENT — PAIN DESCRIPTION - PAIN TYPE
TYPE: ACUTE PAIN
TYPE: ACUTE PAIN

## 2018-09-03 ASSESSMENT — PAIN DESCRIPTION - DESCRIPTORS: DESCRIPTORS: ACHING

## 2018-09-03 ASSESSMENT — PAIN DESCRIPTION - ORIENTATION
ORIENTATION: RIGHT;LEFT
ORIENTATION: MID;UPPER

## 2018-09-03 ASSESSMENT — PAIN DESCRIPTION - LOCATION
LOCATION: LEG
LOCATION: ABDOMEN

## 2018-09-03 NOTE — PROGRESS NOTES
Pt alert and oriented x 4 pt taking in 50-75% of full liquids tolerating well. No c/o of abdominal pain, pt having loose liquid stools.

## 2018-09-03 NOTE — PLAN OF CARE
Problem: Neurological  Intervention: Neurological Status Assessment  Pt alert and oriented this shift        Problem: Nutrition  Goal: Optimal nutrition therapy  Outcome: Met This Shift  Pt taking in full liquids this shift  Tolerating well    Problem: Discharge Planning  Intervention: Interaction with patient/family and care team  Pt plans to return home at discharge with family

## 2018-09-04 VITALS
TEMPERATURE: 96.2 F | OXYGEN SATURATION: 99 % | RESPIRATION RATE: 16 BRPM | DIASTOLIC BLOOD PRESSURE: 70 MMHG | HEART RATE: 78 BPM | SYSTOLIC BLOOD PRESSURE: 112 MMHG

## 2018-09-04 PROBLEM — K56.609 SMALL BOWEL OBSTRUCTION (HCC): Status: RESOLVED | Noted: 2018-09-02 | Resolved: 2018-09-04

## 2018-09-04 LAB — PREALBUMIN: 11 MG/DL (ref 20–40)

## 2018-09-04 PROCEDURE — APPSS30 APP SPLIT SHARED TIME 16-30 MINUTES: Performed by: NURSE PRACTITIONER

## 2018-09-04 PROCEDURE — 2580000003 HC RX 258: Performed by: SURGERY

## 2018-09-04 PROCEDURE — 36415 COLL VENOUS BLD VENIPUNCTURE: CPT

## 2018-09-04 PROCEDURE — 6360000002 HC RX W HCPCS: Performed by: SURGERY

## 2018-09-04 PROCEDURE — 84134 ASSAY OF PREALBUMIN: CPT

## 2018-09-04 RX ORDER — CEFUROXIME AXETIL 250 MG/1
500 TABLET ORAL 2 TIMES DAILY
Qty: 20 TABLET | Refills: 0 | Status: SHIPPED | OUTPATIENT
Start: 2018-09-04 | End: 2018-09-09

## 2018-09-04 RX ADMIN — SODIUM CHLORIDE: 9 INJECTION, SOLUTION INTRAVENOUS at 04:53

## 2018-09-04 RX ADMIN — CEFTRIAXONE SODIUM 1 G: 1 INJECTION, POWDER, FOR SOLUTION INTRAMUSCULAR; INTRAVENOUS at 11:42

## 2018-09-04 NOTE — DISCHARGE SUMMARY
lower abdomen of indeterminate etiology. There is gas within colon distally. Posterior lumbar fusion hardware is noted. Surgical clips are seen overlying the left mid abdomen. Arthrosis of the right hip joint is noted with joint space narrowing, increased sclerosis and spurring along the superior-lateral acetabulum an superior-lateral right femoral head. 1. Indeterminate bowel gas pattern with distended gas-filled bowel loops overlying the left lower abdomen. This may represent focal ileus or partial small bowel obstruction. Recommend continued follow-up. **This report has been created using voice recognition software. It may contain minor errors which are inherent in voice recognition technology. ** Final report electronically signed by Dr. Qi Lopez on 9/3/2018 9:58 AM      Labs:   No results found for this or any previous visit (from the past 72 hour(s)). Patient Instructions:     Follow up with PCP  Take antibiotics as prescribed  High protein drinks       Follow-up visits:   Danita Carcamo MD  [address] [place] [phone]    On 9/11/2018  11:05am       Discharge condition: fair  Disposition: Home  Time spent on discharge: 35 minutes     Discharge Medications:   Amparo Heardgh   Home Medication Instructions [id number]    Printed on:09/07/18 1223   Medication Information                      acetaminophen (TYLENOL) 325 MG tablet  Take 650 mg by mouth every 6 hours as needed for Pain             cefUROXime (CEFTIN) 250 MG tablet  Take 2 tablets by mouth 2 times daily for 5 days             ibuprofen (ADVIL;MOTRIN) 200 MG tablet  Take 200 mg by mouth every 6 hours as needed for Pain             Multiple Vitamins-Minerals (PRESERVISION AREDS 2 PO)  Take 1 tablet by mouth daily             Omega-3 Fatty Acids (FISH OIL) 1000 MG CAPS  Take 1,000 mg by mouth daily                 Electronically signed by SULEIMAN Jenkins CNP on 9/7/2018 at 12:23 PM

## 2018-09-04 NOTE — H&P
patient does state  that she received apparently oral contrast for that CT at [address] [place] [address]  throughout the evening last night, and then when she got to [facility]. Cadence's, had  a rather extensive bowel movements and her abdomen did go down. Again, she  has been admitted for further evaluation. It should be noted that the  patient has a long history of rather extensive adhesions and has had a  lifelong problem with constipation and bowel habit abnormalities. PAST MEDICAL HISTORY:  Positive for arthritis, reflux disease, history of  DVT of the right leg, history of shingles, and a history of torus  mandibularis. PAST SURGICAL HISTORY:  Multiple including an abdominal exploration in the  1990s, then a repeat laparoscopy with abdominal exploration in 2013. She  has had back surgery x3. She has had a cholecystectomy. She has had a  cataract surgery. She has had a hysterectomy and then the surgery as  mentioned above a little over two months ago with an extended left colon  resection. FAMILY HISTORY:  Positive for asthma, hypertension, cancer, coronary artery  disease, and COPD. SOCIAL HISTORY:  The patient is a nonsmoker and nondrinker. MEDICATIONS AT HOME:  Includes Nystatin, Tylenol, Advil, and fish oil. REVIEW OF SYSTEMS:  A 10-point review of systems is otherwise negative  except as mentioned above. PHYSICAL EXAMINATION:  GENERAL:  The patient is a 80-year-old white female. She is resting in  bed. Her daughter is at the bedside. HEAD, EARS, EYES, NOSE, AND THROAT:  Pupils are equal.  EOMs are intact. Sclerae are clear. TMJs are normal.  NECK:  Normal.  There are no palpable masses. CARDIAC:  S1, S2.  RESPIRATORY:  Respirations are clear. ABDOMEN:  Distended, but soft. There is a well-healed midline incision. Bowel sounds are present, although not real active. Palpation does not  yield any type of guarding or peritonitis and is soft. EXTREMITIES:  Upper extremities, good radial pulses. Good  strength. Long bones are normal.  Lower extremities, the SCD compression hose is  removed. She has some mild 1+ pitting edema bilaterally, but there is no  cellulitis. She has good DP pulses bilaterally. ASSESSMENT AND PLAN:  Possible small bowel obstruction that may have  resolved with the CT dye as it apparently came through significantly last  night. However, she has been transferred here. We will begin full liquids  and monitor. Clinically, she has no evidence of small bowel obstruction. Should also be noted the patient had an albumin that was 2.5 at [facility]. We will check a prealbumin level. Thank you very much. EDDIE QUINTERO [facility], MD    D: 09/03/2018 19:04:02       T: 09/03/2018 19:06:39     [email or web address]  Job#: [id number]     Doc#: [id number]    CC:

## 2018-09-04 NOTE — CARE COORDINATION
where a special needs daughter lives with her. Pt states they help one another. Two other daughters are very helpful and supportive. No current services. She does have a quad cane that she uses. She has a PCP, transportation and no issues getting meds. She is advised that CM is here to Help with home going needs and she denies needs at this time.  Evaluation: no    9/4/18, 2:22 PM    Discharge plan discussed by  and . Discharge plan reviewed with patient/ family. Patient/ family verbalize understanding of discharge plan and are in agreement with plan. Understanding was demonstrated using the teach back method. Discharge order in place. Pt denies discharge needs.

## 2018-09-24 ENCOUNTER — HOSPITAL ENCOUNTER (INPATIENT)
Age: 78
LOS: 22 days | Discharge: NURSING, REHAB OR LONG-TERM CARE | DRG: 329 | End: 2018-10-16
Attending: SURGERY | Admitting: SURGERY
Payer: MEDICARE

## 2018-09-24 ENCOUNTER — APPOINTMENT (OUTPATIENT)
Dept: CT IMAGING | Age: 78
DRG: 329 | End: 2018-09-24
Payer: MEDICARE

## 2018-09-24 ENCOUNTER — APPOINTMENT (OUTPATIENT)
Dept: GENERAL RADIOLOGY | Age: 78
DRG: 329 | End: 2018-09-24
Payer: MEDICARE

## 2018-09-24 DIAGNOSIS — K92.2 UPPER GI BLEED: ICD-10-CM

## 2018-09-24 DIAGNOSIS — D50.0 IRON DEFICIENCY ANEMIA DUE TO CHRONIC BLOOD LOSS: ICD-10-CM

## 2018-09-24 DIAGNOSIS — K56.609 SBO (SMALL BOWEL OBSTRUCTION) (HCC): Primary | ICD-10-CM

## 2018-09-24 LAB
ALBUMIN SERPL-MCNC: 3.3 G/DL (ref 3.5–5.1)
ALP BLD-CCNC: 61 U/L (ref 38–126)
ALT SERPL-CCNC: 7 U/L (ref 11–66)
ANION GAP SERPL CALCULATED.3IONS-SCNC: 13 MEQ/L (ref 8–16)
AST SERPL-CCNC: 9 U/L (ref 5–40)
BASOPHILS # BLD: 0.1 %
BASOPHILS ABSOLUTE: 0 THOU/MM3 (ref 0–0.1)
BILIRUB SERPL-MCNC: 0.3 MG/DL (ref 0.3–1.2)
BILIRUBIN DIRECT: < 0.2 MG/DL (ref 0–0.3)
BUN BLDV-MCNC: 16 MG/DL (ref 7–22)
CALCIUM SERPL-MCNC: 9.3 MG/DL (ref 8.5–10.5)
CHLORIDE BLD-SCNC: 96 MEQ/L (ref 98–111)
CO2: 24 MEQ/L (ref 23–33)
CREAT SERPL-MCNC: 0.3 MG/DL (ref 0.4–1.2)
EKG ATRIAL RATE: 79 BPM
EKG P AXIS: 111 DEGREES
EKG P-R INTERVAL: 146 MS
EKG Q-T INTERVAL: 406 MS
EKG QRS DURATION: 86 MS
EKG QTC CALCULATION (BAZETT): 465 MS
EKG R AXIS: -35 DEGREES
EKG T AXIS: 48 DEGREES
EKG VENTRICULAR RATE: 79 BPM
EOSINOPHIL # BLD: 1.1 %
EOSINOPHILS ABSOLUTE: 0.1 THOU/MM3 (ref 0–0.4)
ERYTHROCYTE [DISTWIDTH] IN BLOOD BY AUTOMATED COUNT: 15.8 % (ref 11.5–14.5)
ERYTHROCYTE [DISTWIDTH] IN BLOOD BY AUTOMATED COUNT: 47 FL (ref 35–45)
FOLATE: 15.2 NG/ML (ref 4.8–24.2)
GFR SERPL CREATININE-BSD FRML MDRD: > 90 ML/MIN/1.73M2
GLUCOSE BLD-MCNC: 96 MG/DL (ref 70–108)
HCT VFR BLD CALC: 32.2 % (ref 37–47)
HEMOCCULT STL QL: POSITIVE
HEMOGLOBIN: 9.8 GM/DL (ref 12–16)
IMMATURE GRANS (ABS): 0.03 THOU/MM3 (ref 0–0.07)
IMMATURE GRANULOCYTES: 0.3 %
IRON: 18 UG/DL (ref 50–170)
LIPASE: 12.7 U/L (ref 5.6–51.3)
LYMPHOCYTES # BLD: 24.7 %
LYMPHOCYTES ABSOLUTE: 2.5 THOU/MM3 (ref 1–4.8)
MCH RBC QN AUTO: 25.3 PG (ref 26–33)
MCHC RBC AUTO-ENTMCNC: 30.4 GM/DL (ref 32.2–35.5)
MCV RBC AUTO: 83 FL (ref 81–99)
MONOCYTES # BLD: 7.4 %
MONOCYTES ABSOLUTE: 0.8 THOU/MM3 (ref 0.4–1.3)
NUCLEATED RED BLOOD CELLS: 0 /100 WBC
OSMOLALITY CALCULATION: 267.4 MOSMOL/KG (ref 275–300)
PLATELET # BLD: 439 THOU/MM3 (ref 130–400)
PMV BLD AUTO: 8.2 FL (ref 9.4–12.4)
POTASSIUM REFLEX MAGNESIUM: 3.6 MEQ/L (ref 3.5–5.2)
RBC # BLD: 3.88 MILL/MM3 (ref 4.2–5.4)
SEG NEUTROPHILS: 66.4 %
SEGMENTED NEUTROPHILS ABSOLUTE COUNT: 6.8 THOU/MM3 (ref 1.8–7.7)
SODIUM BLD-SCNC: 133 MEQ/L (ref 135–145)
TOTAL PROTEIN: 6.7 G/DL (ref 6.1–8)
TROPONIN T: < 0.01 NG/ML
VITAMIN B-12: 475 PG/ML (ref 211–911)
WBC # BLD: 10.2 THOU/MM3 (ref 4.8–10.8)

## 2018-09-24 PROCEDURE — 71045 X-RAY EXAM CHEST 1 VIEW: CPT

## 2018-09-24 PROCEDURE — 74177 CT ABD & PELVIS W/CONTRAST: CPT

## 2018-09-24 PROCEDURE — 2580000003 HC RX 258: Performed by: STUDENT IN AN ORGANIZED HEALTH CARE EDUCATION/TRAINING PROGRAM

## 2018-09-24 PROCEDURE — 6370000000 HC RX 637 (ALT 250 FOR IP): Performed by: STUDENT IN AN ORGANIZED HEALTH CARE EDUCATION/TRAINING PROGRAM

## 2018-09-24 PROCEDURE — 93005 ELECTROCARDIOGRAM TRACING: CPT | Performed by: STUDENT IN AN ORGANIZED HEALTH CARE EDUCATION/TRAINING PROGRAM

## 2018-09-24 PROCEDURE — 96374 THER/PROPH/DIAG INJ IV PUSH: CPT

## 2018-09-24 PROCEDURE — 84484 ASSAY OF TROPONIN QUANT: CPT

## 2018-09-24 PROCEDURE — 36415 COLL VENOUS BLD VENIPUNCTURE: CPT

## 2018-09-24 PROCEDURE — 83690 ASSAY OF LIPASE: CPT

## 2018-09-24 PROCEDURE — 80048 BASIC METABOLIC PNL TOTAL CA: CPT

## 2018-09-24 PROCEDURE — 2580000003 HC RX 258: Performed by: SURGERY

## 2018-09-24 PROCEDURE — 99285 EMERGENCY DEPT VISIT HI MDM: CPT

## 2018-09-24 PROCEDURE — 6360000002 HC RX W HCPCS: Performed by: STUDENT IN AN ORGANIZED HEALTH CARE EDUCATION/TRAINING PROGRAM

## 2018-09-24 PROCEDURE — 6360000004 HC RX CONTRAST MEDICATION: Performed by: NURSE PRACTITIONER

## 2018-09-24 PROCEDURE — 2709999900 HC NON-CHARGEABLE SUPPLY

## 2018-09-24 PROCEDURE — 93010 ELECTROCARDIOGRAM REPORT: CPT | Performed by: NUCLEAR MEDICINE

## 2018-09-24 PROCEDURE — C9113 INJ PANTOPRAZOLE SODIUM, VIA: HCPCS | Performed by: STUDENT IN AN ORGANIZED HEALTH CARE EDUCATION/TRAINING PROGRAM

## 2018-09-24 PROCEDURE — 1200000000 HC SEMI PRIVATE

## 2018-09-24 PROCEDURE — 85025 COMPLETE CBC W/AUTO DIFF WBC: CPT

## 2018-09-24 PROCEDURE — 82746 ASSAY OF FOLIC ACID SERUM: CPT

## 2018-09-24 PROCEDURE — 82272 OCCULT BLD FECES 1-3 TESTS: CPT

## 2018-09-24 PROCEDURE — 83540 ASSAY OF IRON: CPT

## 2018-09-24 PROCEDURE — 82607 VITAMIN B-12: CPT

## 2018-09-24 PROCEDURE — 80076 HEPATIC FUNCTION PANEL: CPT

## 2018-09-24 PROCEDURE — 82608 B-12 BINDING CAPACITY: CPT

## 2018-09-24 RX ORDER — SODIUM CHLORIDE 0.9 % (FLUSH) 0.9 %
10 SYRINGE (ML) INJECTION PRN
Status: DISCONTINUED | OUTPATIENT
Start: 2018-09-24 | End: 2018-09-24 | Stop reason: SDUPTHER

## 2018-09-24 RX ORDER — RANITIDINE HCL 75 MG
75 TABLET ORAL 2 TIMES DAILY
Status: ON HOLD | COMMUNITY
End: 2018-11-09 | Stop reason: HOSPADM

## 2018-09-24 RX ORDER — SUCRALFATE 1 G/1
1 TABLET ORAL ONCE
Status: COMPLETED | OUTPATIENT
Start: 2018-09-24 | End: 2018-09-24

## 2018-09-24 RX ORDER — LACTULOSE 10 G/15ML
20 SOLUTION ORAL 2 TIMES DAILY
Status: ON HOLD | COMMUNITY
End: 2018-10-16 | Stop reason: HOSPADM

## 2018-09-24 RX ORDER — FERROUS SULFATE 325(65) MG
325 TABLET ORAL
Status: ON HOLD | COMMUNITY
End: 2018-11-09 | Stop reason: HOSPADM

## 2018-09-24 RX ORDER — SODIUM CHLORIDE 9 MG/ML
INJECTION, SOLUTION INTRAVENOUS CONTINUOUS
Status: ACTIVE | OUTPATIENT
Start: 2018-09-25 | End: 2018-09-27

## 2018-09-24 RX ORDER — SIMETHICONE 125 MG
CAPSULE ORAL
Status: ON HOLD | COMMUNITY
End: 2018-11-09 | Stop reason: HOSPADM

## 2018-09-24 RX ORDER — SODIUM CHLORIDE 9 MG/ML
INJECTION, SOLUTION INTRAVENOUS CONTINUOUS
Status: DISCONTINUED | OUTPATIENT
Start: 2018-09-24 | End: 2018-09-24 | Stop reason: DRUGHIGH

## 2018-09-24 RX ORDER — FERROUS SULFATE 325(65) MG
325 TABLET ORAL
Status: DISCONTINUED | OUTPATIENT
Start: 2018-09-25 | End: 2018-10-05

## 2018-09-24 RX ORDER — LACTULOSE 10 G/15ML
20 SOLUTION ORAL 2 TIMES DAILY
Status: DISCONTINUED | OUTPATIENT
Start: 2018-09-25 | End: 2018-09-29

## 2018-09-24 RX ORDER — PANTOPRAZOLE SODIUM 40 MG/10ML
40 INJECTION, POWDER, LYOPHILIZED, FOR SOLUTION INTRAVENOUS DAILY
Status: DISCONTINUED | OUTPATIENT
Start: 2018-09-24 | End: 2018-10-04 | Stop reason: SDUPTHER

## 2018-09-24 RX ORDER — SODIUM CHLORIDE 0.9 % (FLUSH) 0.9 %
10 SYRINGE (ML) INJECTION EVERY 12 HOURS SCHEDULED
Status: DISCONTINUED | OUTPATIENT
Start: 2018-09-24 | End: 2018-09-24 | Stop reason: SDUPTHER

## 2018-09-24 RX ORDER — ONDANSETRON 2 MG/ML
4 INJECTION INTRAMUSCULAR; INTRAVENOUS EVERY 6 HOURS PRN
Status: DISCONTINUED | OUTPATIENT
Start: 2018-09-24 | End: 2018-10-04 | Stop reason: SDUPTHER

## 2018-09-24 RX ORDER — 0.9 % SODIUM CHLORIDE 0.9 %
1000 INTRAVENOUS SOLUTION INTRAVENOUS ONCE
Status: COMPLETED | OUTPATIENT
Start: 2018-09-24 | End: 2018-09-24

## 2018-09-24 RX ORDER — SODIUM CHLORIDE 0.9 % (FLUSH) 0.9 %
10 SYRINGE (ML) INJECTION EVERY 12 HOURS SCHEDULED
Status: DISCONTINUED | OUTPATIENT
Start: 2018-09-25 | End: 2018-10-04 | Stop reason: SDUPTHER

## 2018-09-24 RX ORDER — ONDANSETRON 4 MG/1
4 TABLET, ORALLY DISINTEGRATING ORAL ONCE
Status: COMPLETED | OUTPATIENT
Start: 2018-09-24 | End: 2018-09-24

## 2018-09-24 RX ORDER — SODIUM CHLORIDE 0.9 % (FLUSH) 0.9 %
10 SYRINGE (ML) INJECTION PRN
Status: DISCONTINUED | OUTPATIENT
Start: 2018-09-24 | End: 2018-10-04 | Stop reason: SDUPTHER

## 2018-09-24 RX ORDER — ACETAMINOPHEN 325 MG/1
650 TABLET ORAL EVERY 4 HOURS PRN
Status: DISCONTINUED | OUTPATIENT
Start: 2018-09-24 | End: 2018-10-04 | Stop reason: SDUPTHER

## 2018-09-24 RX ADMIN — ONDANSETRON 4 MG: 4 TABLET, ORALLY DISINTEGRATING ORAL at 12:25

## 2018-09-24 RX ADMIN — PANTOPRAZOLE SODIUM 40 MG: 40 INJECTION, POWDER, FOR SOLUTION INTRAVENOUS at 12:25

## 2018-09-24 RX ADMIN — SODIUM CHLORIDE 1000 ML: 9 INJECTION, SOLUTION INTRAVENOUS at 12:26

## 2018-09-24 RX ADMIN — IOPAMIDOL 80 ML: 755 INJECTION, SOLUTION INTRAVENOUS at 14:40

## 2018-09-24 RX ADMIN — SODIUM CHLORIDE: 9 INJECTION, SOLUTION INTRAVENOUS at 19:00

## 2018-09-24 RX ADMIN — SUCRALFATE 1 G: 1 TABLET ORAL at 12:33

## 2018-09-24 ASSESSMENT — ENCOUNTER SYMPTOMS
BACK PAIN: 0
DIARRHEA: 0
COUGH: 0
WHEEZING: 0
RHINORRHEA: 0
NAUSEA: 0
EYE PAIN: 0
SORE THROAT: 0
SHORTNESS OF BREATH: 0
ABDOMINAL PAIN: 0
EYE DISCHARGE: 0
VOMITING: 0

## 2018-09-24 ASSESSMENT — PAIN SCALES - GENERAL
PAINLEVEL_OUTOF10: 0
PAINLEVEL_OUTOF10: 0

## 2018-09-24 NOTE — ED PROVIDER NOTES
[facility]  eMERGENCY dEPARTMENT eNCOUnter          [address] [facility]       Chief Complaint   Patient presents with    Abdominal Pain     Not able to eat or drink       Nurses Notes reviewed and I agree except as noted in the HPI. HISTORY OF PRESENT ILLNESS    Gali Iraheta is a 66 y.o. female who has history of Pylori and cholecystectomy. Patient presents to the Emergency Department for the evaluation of abdominal pain that has been ongoing. Patient reports that she is experiencing abdominal distention, melena, decreased appetite, fear of eating secondary to abdominal pain, and small bowel movements. Patient was admitted to hospital on Sep. 2nd due to small bowel obstruction. She was cared for by Dr. Santi Paz (general surgeon). Per daughter, patient has been experiencing abdominal complications since left colon resection in June. While admitted patient was managed with nasogastric decompression, bowel rest, analgesics for pain control, IV fluid hydration, GI and DVT prophylaxis. Patient denies chest pain, SOB, emesis, or fevers. There are no further complaints at this time. REVIEW OF SYSTEMS     Review of Systems   Constitutional: Negative for appetite change, chills, fatigue and fever. HENT: Negative for congestion, ear pain, rhinorrhea and sore throat. Eyes: Negative for pain, discharge and visual disturbance. Respiratory: Negative for cough, shortness of breath and wheezing. Cardiovascular: Negative for chest pain, palpitations and leg swelling. Gastrointestinal: Negative for abdominal pain, diarrhea, nausea and vomiting. Genitourinary: Negative for difficulty urinating, dysuria, hematuria and vaginal discharge. Musculoskeletal: Negative for arthralgias, back pain, joint swelling and neck pain. Skin: Negative for pallor and rash. Neurological: Negative for dizziness, syncope, weakness, light-headedness, numbness and headaches.    Hematological: Negative Correlation    with symptoms and appropriate follow-up advised. These results were communicated directly with Joseph Cordova PA-C on 9/24/2018 3:06 PM,  [ ]   **This report has been created using voice recognition software. It may contain minor errors which are inherent in voice recognition technology. **      Final report electronically signed by Dr. Kodak Curiel on 9/24/2018 3:07 PM      XR CHEST PORTABLE   Final Result      Bibasilar atelectasis/infiltrate. **This report has been created using voice recognition software. It may contain minor errors which are inherent in voice recognition technology. **      Final report electronically signed by Dr. Juliette Garza on 9/24/2018 11:49 AM            LABS:   Labs Reviewed   IRON - Abnormal; Notable for the following:        Result Value    Iron 18 (*)     All other components within normal limits   CBC WITH AUTO DIFFERENTIAL - Abnormal; Notable for the following:     RBC 3.88 (*)     Hemoglobin 9.8 (*)     Hematocrit 32.2 (*)     MCH 25.3 (*)     MCHC 30.4 (*)     RDW-CV 15.8 (*)     RDW-SD 47.0 (*)     Platelets 071 (*)     MPV 8.2 (*)     All other components within normal limits   BASIC METABOLIC PANEL W/ REFLEX TO MG FOR LOW K  - Abnormal; Notable for the following:     Sodium 133 (*)     Chloride 96 (*)     CREATININE 0.3 (*)     All other components within normal limits   HEPATIC FUNCTION PANEL - Abnormal; Notable for the following:     Alb 3.3 (*)     ALT 7 (*)     All other components within normal limits   OSMOLALITY - Abnormal; Notable for the following:     Osmolality Calc 267.4 (*)     All other components within normal limits   LIPASE   VITAMIN B12 & FOLATE   BLOOD OCCULT STOOL SCREEN #1   TROPONIN   ANION GAP   GLOMERULAR FILTRATION RATE, ESTIMATED   B-12 BINDING CAPACITY       EMERGENCY DEPARTMENT COURSE:   Vitals:    Vitals:    09/24/18 1331 09/24/18 1518 09/24/18 1723 09/24/18 1725   BP: (!) 106/46 109/78 (!) 132/58    Pulse: 78 83

## 2018-09-24 NOTE — ED NOTES
Pt denies any pain at this time. Pt daughter states that earlier pt was c/o pain to feet and abd. Pt started to drink for CT scan. Pt voices no other c/o or needs at this time. Pt update given .   Will continue to monitor/     Pato Hernadez RN  09/24/18 3896

## 2018-09-25 ENCOUNTER — APPOINTMENT (OUTPATIENT)
Dept: GENERAL RADIOLOGY | Age: 78
DRG: 329 | End: 2018-09-25
Payer: MEDICARE

## 2018-09-25 PROBLEM — E43 SEVERE MALNUTRITION (HCC): Status: ACTIVE | Noted: 2018-09-25

## 2018-09-25 LAB
BASOPHILS # BLD: 0.3 %
BASOPHILS ABSOLUTE: 0 THOU/MM3 (ref 0–0.1)
EOSINOPHIL # BLD: 2.1 %
EOSINOPHILS ABSOLUTE: 0.1 THOU/MM3 (ref 0–0.4)
ERYTHROCYTE [DISTWIDTH] IN BLOOD BY AUTOMATED COUNT: 15.7 % (ref 11.5–14.5)
ERYTHROCYTE [DISTWIDTH] IN BLOOD BY AUTOMATED COUNT: 47.6 FL (ref 35–45)
HCT VFR BLD CALC: 29.4 % (ref 37–47)
HEMOGLOBIN: 8.9 GM/DL (ref 12–16)
IMMATURE GRANS (ABS): 0.03 THOU/MM3 (ref 0–0.07)
IMMATURE GRANULOCYTES: 0.4 %
LYMPHOCYTES # BLD: 41.6 %
LYMPHOCYTES ABSOLUTE: 3 THOU/MM3 (ref 1–4.8)
MCH RBC QN AUTO: 25.5 PG (ref 26–33)
MCHC RBC AUTO-ENTMCNC: 30.3 GM/DL (ref 32.2–35.5)
MCV RBC AUTO: 84.2 FL (ref 81–99)
MONOCYTES # BLD: 8.8 %
MONOCYTES ABSOLUTE: 0.6 THOU/MM3 (ref 0.4–1.3)
NUCLEATED RED BLOOD CELLS: 0 /100 WBC
PLATELET # BLD: 432 THOU/MM3 (ref 130–400)
PMV BLD AUTO: 9 FL (ref 9.4–12.4)
RBC # BLD: 3.49 MILL/MM3 (ref 4.2–5.4)
SEG NEUTROPHILS: 46.8 %
SEGMENTED NEUTROPHILS ABSOLUTE COUNT: 3.3 THOU/MM3 (ref 1.8–7.7)
WBC # BLD: 7.1 THOU/MM3 (ref 4.8–10.8)

## 2018-09-25 PROCEDURE — 6360000002 HC RX W HCPCS: Performed by: STUDENT IN AN ORGANIZED HEALTH CARE EDUCATION/TRAINING PROGRAM

## 2018-09-25 PROCEDURE — 6370000000 HC RX 637 (ALT 250 FOR IP): Performed by: NURSE PRACTITIONER

## 2018-09-25 PROCEDURE — 36415 COLL VENOUS BLD VENIPUNCTURE: CPT

## 2018-09-25 PROCEDURE — 94760 N-INVAS EAR/PLS OXIMETRY 1: CPT

## 2018-09-25 PROCEDURE — APPSS60 APP SPLIT SHARED TIME 46-60 MINUTES: Performed by: NURSE PRACTITIONER

## 2018-09-25 PROCEDURE — 85025 COMPLETE CBC W/AUTO DIFF WBC: CPT

## 2018-09-25 PROCEDURE — C9113 INJ PANTOPRAZOLE SODIUM, VIA: HCPCS | Performed by: STUDENT IN AN ORGANIZED HEALTH CARE EDUCATION/TRAINING PROGRAM

## 2018-09-25 PROCEDURE — 6360000004 HC RX CONTRAST MEDICATION: Performed by: NURSE PRACTITIONER

## 2018-09-25 PROCEDURE — 2709999900 HC NON-CHARGEABLE SUPPLY

## 2018-09-25 PROCEDURE — 6370000000 HC RX 637 (ALT 250 FOR IP): Performed by: SURGERY

## 2018-09-25 PROCEDURE — 74270 X-RAY XM COLON 1CNTRST STD: CPT

## 2018-09-25 PROCEDURE — 1200000000 HC SEMI PRIVATE

## 2018-09-25 PROCEDURE — 99223 1ST HOSP IP/OBS HIGH 75: CPT | Performed by: NURSE PRACTITIONER

## 2018-09-25 PROCEDURE — 2580000003 HC RX 258: Performed by: SURGERY

## 2018-09-25 RX ORDER — SODIUM PHOSPHATE, DIBASIC AND SODIUM PHOSPHATE, MONOBASIC 7; 19 G/133ML; G/133ML
1 ENEMA RECTAL PRN
Status: DISCONTINUED | OUTPATIENT
Start: 2018-09-25 | End: 2018-09-25

## 2018-09-25 RX ADMIN — Medication 10 ML: at 08:14

## 2018-09-25 RX ADMIN — SODIUM PHOSPHATE 1 ENEMA: 7; 19 ENEMA RECTAL at 11:51

## 2018-09-25 RX ADMIN — SODIUM CHLORIDE: 9 INJECTION, SOLUTION INTRAVENOUS at 00:20

## 2018-09-25 RX ADMIN — DIATRIZOATE MEGLUMINE AND DIATRIZOATE SODIUM 240 ML: 600; 100 SOLUTION ORAL; RECTAL at 13:54

## 2018-09-25 RX ADMIN — SODIUM CHLORIDE: 9 INJECTION, SOLUTION INTRAVENOUS at 02:28

## 2018-09-25 RX ADMIN — SODIUM CHLORIDE: 9 INJECTION, SOLUTION INTRAVENOUS at 18:13

## 2018-09-25 RX ADMIN — SODIUM CHLORIDE: 9 INJECTION, SOLUTION INTRAVENOUS at 10:16

## 2018-09-25 RX ADMIN — LACTULOSE 20 G: 20 SOLUTION ORAL at 00:19

## 2018-09-25 RX ADMIN — FERROUS SULFATE TAB 325 MG (65 MG ELEMENTAL FE) 325 MG: 325 (65 FE) TAB at 08:14

## 2018-09-25 RX ADMIN — PANTOPRAZOLE SODIUM 40 MG: 40 INJECTION, POWDER, FOR SOLUTION INTRAVENOUS at 08:14

## 2018-09-25 RX ADMIN — LACTULOSE 20 G: 20 SOLUTION ORAL at 08:14

## 2018-09-25 RX ADMIN — LACTULOSE 20 G: 20 SOLUTION ORAL at 20:27

## 2018-09-25 ASSESSMENT — PAIN DESCRIPTION - DESCRIPTORS: DESCRIPTORS: ACHING

## 2018-09-25 ASSESSMENT — PAIN DESCRIPTION - PAIN TYPE: TYPE: ACUTE PAIN

## 2018-09-25 ASSESSMENT — PAIN SCALES - GENERAL
PAINLEVEL_OUTOF10: 2
PAINLEVEL_OUTOF10: 0
PAINLEVEL_OUTOF10: 2

## 2018-09-25 ASSESSMENT — PAIN DESCRIPTION - LOCATION: LOCATION: ABDOMEN

## 2018-09-25 NOTE — H&P
systems:  Constitutional: no fever, no night sweats, + fatigue  Head: no headache, no head injury, no migranes. Eye: no blurring of vision, no double vision. Mouth/throat: no ulceration, dental caries, dysphagia  Lungs: no cough, no shortness of breath, no wheeze  CVS: no palpitation, no chest pain, + shortness of breath  GI: + abdominal discomfort, no nausea , no vomiting, + constipation  SUMIT: no dysuria, frequency and urgency  Musculoskeletal: no joint pain, swelling , stiffness, walks with cane   Hematology: no anemia, no easy brusing or bleeding, no hx of clotting disorder  Psychiatry: no depression, no anxiety,no panic attacks, no suicide ideation  Neurology: no syncope, no seizures, no numbness or tingling of hands, no numbness or tingling of feet, no paresis    10 point review of systems completed, all other than noted above are negative. Vitals:   Vitals:    09/25/18 1539   BP: (!) 162/72   Pulse: 86   Resp: 16   Temp: 96.9 °F (36.1 °C)   SpO2: 99%      BMI: Body mass index is 25.33 kg/m².                 Exam:  Physical Examination: General appearance - oriented to person, place, and time and chronically ill appearing  Mental status - alert, oriented to person, place, and time  Neck - supple, no significant adenopathy, no JVD, or carotid bruits  Chest - clear to auscultation, no wheezes, rales or rhonchi, symmetric air entry  Heart - normal rate and regular rhythm  Abdomen - tenderness noted and distended, borborygmi bowel sounds   Neurological - alert, oriented, normal speech, no focal findings or movement disorder noted  Musculoskeletal - no joint tenderness, deformity or swelling  Extremities - peripheral pulses normal, no pedal edema, no clubbing or cyanosis  Skin - normal coloration and turgor, no rashes, no suspicious skin lesions noted      Review of Labs and Diagnostic Testing:    Recent Results (from the past 24 hour(s))   CBC auto differential    Collection Time: 09/25/18  5:00 AM   Result There are multiple air-fluid levels within the distal small bowel loops with enhancement of the mucosa and areas of narrowing concerning for obstruction. There is mesenteric edema and likely reactive central mesenteric lymph nodes. Small amount of abdominal and pelvic ascites. There is oral contrast in the stomach and proximal small bowel bowel loops. Findings are concerning for obstruction which may be related to stricture versus adhesion high grade obstruction is suspected given extensive mesenteric edema. Correlation with symptoms and appropriate follow-up advised. Hysterectomy. There are multiple air-fluid levels within the distal small bowel loops with enhancement of the mucosa and areas of narrowing concerning for obstruction. There is mesenteric edema and likely reactive central mesenteric lymph nodes. Small amount of abdominal and pelvic ascites. There is oral contrast in the stomach and proximal small bowel bowel loops. Findings are concerning for obstruction which may be related to stricture versus adhesion. High grade obstruction cannot be excluded given extensive mesenteric edema. Correlation  with symptoms and appropriate follow-up advised. These results were communicated directly with Chantelle Tam PA-C on 9/24/2018 3:06 PM,  [ ] **This report has been created using voice recognition software. It may contain minor errors which are inherent in voice recognition technology. ** Final report electronically signed by Dr. Delia Castelan on 9/24/2018 3:07 PM    Xr Chest Portable    Result Date: 9/24/2018  PROCEDURE: XR CHEST PORTABLE CLINICAL INFORMATION: Heartburn TECHNIQUE: Mobile AP chest radiograph. COMPARISON: PA chest radiograph 6/19/2018 FINDINGS: Cardiomediastinal silhouette is within normal limits. Indistinct reticular opacities are seen at the bilateral lung bases. Degenerative and surgical changes in the thoracolumbar spine are poorly visualized. Soft tissues are unremarkable.      Bibasilar atelectasis/infiltrate. **This report has been created using voice recognition software. It may contain minor errors which are inherent in voice recognition technology. ** Final report electronically signed by Dr. Cuco Correa on 9/24/2018 11:49 AM      Assessment:  1. Stricture vs adhesion   2. Possible small bowel obstruction  3. Abdominal distention  4. Abdominal discomfort  5. Chronic anemia     Active Problems:    SBO (small bowel obstruction) (HCC)    Small bowel obstruction (HCC)    Severe malnutrition (HCC)  Resolved Problems:    * No resolved hospital problems. *      Plan:  1. Conservative treatment  2. Barium enema today- if negative will consider a sbft  3. NPO - may have ice chips  4. IV hydration  5. Analgesia and antiemetics as needed  6. Monitor labs   7. DVT prophylaxis - SCD's   8. GI prophylaxis   9.  I&O        DVT prophylaxis: [] Lovenox                                 [x] SCDs                                 [] SQ Heparin                                 [] Encourage ambulation, low risk for DVT, no chemical or mechanical prophylaxis necessary              [] Already on Anticoagulation                Anticipated Disposition upon discharge: [x] Home                                                                         [] Home with Home Health                                                                         [] [facility]                                                                         [] [address]          Electronically signed by SULEIMAN Sears CNP on 9/25/2018 at 4:14 PM

## 2018-09-26 ENCOUNTER — APPOINTMENT (OUTPATIENT)
Dept: CT IMAGING | Age: 78
DRG: 329 | End: 2018-09-26
Payer: MEDICARE

## 2018-09-26 LAB
ANION GAP SERPL CALCULATED.3IONS-SCNC: 13 MEQ/L (ref 8–16)
BUN BLDV-MCNC: 5 MG/DL (ref 7–22)
CALCIUM SERPL-MCNC: 8.1 MG/DL (ref 8.5–10.5)
CHLORIDE BLD-SCNC: 99 MEQ/L (ref 98–111)
CO2: 21 MEQ/L (ref 23–33)
CREAT SERPL-MCNC: 0.2 MG/DL (ref 0.4–1.2)
ERYTHROCYTE [DISTWIDTH] IN BLOOD BY AUTOMATED COUNT: 15.5 % (ref 11.5–14.5)
ERYTHROCYTE [DISTWIDTH] IN BLOOD BY AUTOMATED COUNT: 46.5 FL (ref 35–45)
GFR SERPL CREATININE-BSD FRML MDRD: > 90 ML/MIN/1.73M2
GLUCOSE BLD-MCNC: 89 MG/DL (ref 70–108)
HCT VFR BLD CALC: 27.4 % (ref 37–47)
HEMOGLOBIN: 8.5 GM/DL (ref 12–16)
MCH RBC QN AUTO: 25.6 PG (ref 26–33)
MCHC RBC AUTO-ENTMCNC: 31 GM/DL (ref 32.2–35.5)
MCV RBC AUTO: 82.5 FL (ref 81–99)
PLATELET # BLD: 298 THOU/MM3 (ref 130–400)
PMV BLD AUTO: 8.6 FL (ref 9.4–12.4)
POTASSIUM SERPL-SCNC: 2.6 MEQ/L (ref 3.5–5.2)
POTASSIUM SERPL-SCNC: 3.5 MEQ/L (ref 3.5–5.2)
PREALBUMIN: 10.2 MG/DL (ref 20–40)
RBC # BLD: 3.32 MILL/MM3 (ref 4.2–5.4)
SODIUM BLD-SCNC: 133 MEQ/L (ref 135–145)
WBC # BLD: 6.9 THOU/MM3 (ref 4.8–10.8)

## 2018-09-26 PROCEDURE — 6370000000 HC RX 637 (ALT 250 FOR IP): Performed by: SURGERY

## 2018-09-26 PROCEDURE — 36415 COLL VENOUS BLD VENIPUNCTURE: CPT

## 2018-09-26 PROCEDURE — 6360000002 HC RX W HCPCS

## 2018-09-26 PROCEDURE — 74177 CT ABD & PELVIS W/CONTRAST: CPT

## 2018-09-26 PROCEDURE — 6360000004 HC RX CONTRAST MEDICATION: Performed by: NURSE PRACTITIONER

## 2018-09-26 PROCEDURE — 99232 SBSQ HOSP IP/OBS MODERATE 35: CPT | Performed by: SURGERY

## 2018-09-26 PROCEDURE — 1200000000 HC SEMI PRIVATE

## 2018-09-26 PROCEDURE — 84132 ASSAY OF SERUM POTASSIUM: CPT

## 2018-09-26 PROCEDURE — C9113 INJ PANTOPRAZOLE SODIUM, VIA: HCPCS | Performed by: STUDENT IN AN ORGANIZED HEALTH CARE EDUCATION/TRAINING PROGRAM

## 2018-09-26 PROCEDURE — 84134 ASSAY OF PREALBUMIN: CPT

## 2018-09-26 PROCEDURE — 6360000002 HC RX W HCPCS: Performed by: SURGERY

## 2018-09-26 PROCEDURE — APPSS30 APP SPLIT SHARED TIME 16-30 MINUTES: Performed by: NURSE PRACTITIONER

## 2018-09-26 PROCEDURE — 85027 COMPLETE CBC AUTOMATED: CPT

## 2018-09-26 PROCEDURE — 80048 BASIC METABOLIC PNL TOTAL CA: CPT

## 2018-09-26 PROCEDURE — 2709999900 HC NON-CHARGEABLE SUPPLY

## 2018-09-26 PROCEDURE — 2580000003 HC RX 258: Performed by: SURGERY

## 2018-09-26 PROCEDURE — 6360000002 HC RX W HCPCS: Performed by: STUDENT IN AN ORGANIZED HEALTH CARE EDUCATION/TRAINING PROGRAM

## 2018-09-26 RX ORDER — POTASSIUM CHLORIDE 7.45 MG/ML
10 INJECTION INTRAVENOUS
Status: COMPLETED | OUTPATIENT
Start: 2018-09-26 | End: 2018-09-26

## 2018-09-26 RX ORDER — MORPHINE SULFATE 2 MG/ML
2 INJECTION, SOLUTION INTRAMUSCULAR; INTRAVENOUS
Status: DISCONTINUED | OUTPATIENT
Start: 2018-09-26 | End: 2018-10-16 | Stop reason: HOSPADM

## 2018-09-26 RX ORDER — POTASSIUM CHLORIDE 7.45 MG/ML
10 INJECTION INTRAVENOUS
Status: COMPLETED | OUTPATIENT
Start: 2018-09-26 | End: 2018-09-27

## 2018-09-26 RX ADMIN — POTASSIUM CHLORIDE 10 MEQ: 7.46 INJECTION, SOLUTION INTRAVENOUS at 11:47

## 2018-09-26 RX ADMIN — ACETAMINOPHEN 650 MG: 325 TABLET ORAL at 11:45

## 2018-09-26 RX ADMIN — POTASSIUM CHLORIDE 10 MEQ: 10 INJECTION, SOLUTION INTRAVENOUS at 23:25

## 2018-09-26 RX ADMIN — POTASSIUM CHLORIDE 10 MEQ: 7.46 INJECTION, SOLUTION INTRAVENOUS at 08:13

## 2018-09-26 RX ADMIN — MORPHINE SULFATE 2 MG: 2 INJECTION, SOLUTION INTRAMUSCULAR; INTRAVENOUS at 23:24

## 2018-09-26 RX ADMIN — POTASSIUM CHLORIDE 10 MEQ: 7.46 INJECTION, SOLUTION INTRAVENOUS at 14:35

## 2018-09-26 RX ADMIN — POTASSIUM CHLORIDE 10 MEQ: 7.46 INJECTION, SOLUTION INTRAVENOUS at 16:13

## 2018-09-26 RX ADMIN — POTASSIUM CHLORIDE 10 MEQ: 7.46 INJECTION, SOLUTION INTRAVENOUS at 09:30

## 2018-09-26 RX ADMIN — FERROUS SULFATE TAB 325 MG (65 MG ELEMENTAL FE) 325 MG: 325 (65 FE) TAB at 08:14

## 2018-09-26 RX ADMIN — SODIUM CHLORIDE: 9 INJECTION, SOLUTION INTRAVENOUS at 17:31

## 2018-09-26 RX ADMIN — PANTOPRAZOLE SODIUM 40 MG: 40 INJECTION, POWDER, FOR SOLUTION INTRAVENOUS at 08:14

## 2018-09-26 RX ADMIN — SODIUM CHLORIDE: 9 INJECTION, SOLUTION INTRAVENOUS at 06:02

## 2018-09-26 RX ADMIN — SODIUM CHLORIDE: 9 INJECTION, SOLUTION INTRAVENOUS at 23:29

## 2018-09-26 RX ADMIN — IOPAMIDOL 100 ML: 755 INJECTION, SOLUTION INTRAVENOUS at 16:01

## 2018-09-26 RX ADMIN — Medication 10 ML: at 08:14

## 2018-09-26 RX ADMIN — MORPHINE SULFATE 2 MG: 2 INJECTION, SOLUTION INTRAMUSCULAR; INTRAVENOUS at 20:42

## 2018-09-26 RX ADMIN — POTASSIUM CHLORIDE 10 MEQ: 10 INJECTION, SOLUTION INTRAVENOUS at 21:55

## 2018-09-26 RX ADMIN — POTASSIUM CHLORIDE 10 MEQ: 7.46 INJECTION, SOLUTION INTRAVENOUS at 13:12

## 2018-09-26 ASSESSMENT — PAIN SCALES - GENERAL
PAINLEVEL_OUTOF10: 5
PAINLEVEL_OUTOF10: 5
PAINLEVEL_OUTOF10: 6
PAINLEVEL_OUTOF10: 3
PAINLEVEL_OUTOF10: 0
PAINLEVEL_OUTOF10: 5
PAINLEVEL_OUTOF10: 0

## 2018-09-26 ASSESSMENT — PAIN DESCRIPTION - DESCRIPTORS
DESCRIPTORS: SHARP;SHOOTING
DESCRIPTORS: SHARP;SHOOTING

## 2018-09-26 ASSESSMENT — PAIN DESCRIPTION - FREQUENCY
FREQUENCY: INTERMITTENT
FREQUENCY: INTERMITTENT

## 2018-09-26 ASSESSMENT — PAIN DESCRIPTION - LOCATION
LOCATION: FOOT
LOCATION: FOOT

## 2018-09-26 ASSESSMENT — PAIN DESCRIPTION - PAIN TYPE
TYPE: ACUTE PAIN
TYPE: ACUTE PAIN

## 2018-09-26 ASSESSMENT — PAIN DESCRIPTION - PROGRESSION: CLINICAL_PROGRESSION: GRADUALLY WORSENING

## 2018-09-26 ASSESSMENT — PAIN DESCRIPTION - ORIENTATION
ORIENTATION: RIGHT;LEFT
ORIENTATION: RIGHT;LEFT

## 2018-09-26 NOTE — PLAN OF CARE
Problem: SAFETY  Goal: Free from accidental physical injury  Outcome: Ongoing  Patient in bed with alarms in place, ambulates with the assist of one. Problem: DAILY CARE  Goal: Daily care needs are met  Outcome: Met This Shift  Needs are met with maximum assistance    Problem: PAIN  Goal: Patient's pain/discomfort is manageable  Outcome: Ongoing  Patient continues with increased pain . Stating the unicorns will take it away     Problem: KNOWLEDGE DEFICIT  Goal: Patient/S.O. demonstrates understanding of disease process, treatment plan, medications, and discharge instructions. Outcome: Not Met This Shift      Problem: DISCHARGE BARRIERS  Goal: Patient's continuum of care needs are met  Outcome: Ongoing  Patient plans to discharge to home with help of a friend    Problem: Pain:  Goal: Pain level will decrease  Pain level will decrease   Outcome: Ongoing  Patient denies pain    Problem: Falls - Risk of:  Goal: Will remain free from falls  Will remain free from falls   Outcome: Met This Shift  Patient in bed with alarms in place    Problem: Nutrition  Goal: Optimal nutrition therapy  Outcome: Ongoing  NPO    Problem: GI  Goal: No bowel complications  Outcome: Ongoing  Continues with distended abdomen, bowel sounds active, passing bm    Comments: Care plan reviewed with patient and family. Patient and family verbalize understanding of the plan of care and contribute to goal setting.

## 2018-09-26 NOTE — PROGRESS NOTES
[facility]   Daily Progress Note  Dr Kojo Little      Patient:  Joshua Yancey  YOB: 1940  Date of Service: 9/26/2018  MRN: [id number]   Acct:  [de-identified]   Primary Care Physician: Any Eastman MD    Chief Complaint: abdominal distention  PAD#2    Subjective:   Patient sitting in chair today, incontinent of stools. The abdomen is still distended, has discomfort but no significant pain. She is NPO, denies N&V. No new concerns today. 10 point review of symptoms otherwise negative     Past Medical History:        Diagnosis Date    Arthritis     Cellulitis     Difficult intubation 2006    GERD (gastroesophageal reflux disease)     H pylori ulcer     Hx of blood clots     RIGHT LEG    Nausea & vomiting     Pneumonia     S/P partial resection of colon 7/6/2018    Shingles     Torus mandibularis 2006       Past Surgical History:        Procedure Laterality Date    ABDOMEN SURGERY      ABDOMINAL ADHESION SURGERY  1990's    Dr Briseida Back  09/09/2013    Lysis of Adhesions-Dr. Kojo Little     BACK SURGERY  1979/1980/2006/2013    X3    [facility]LECY[facility]OMY  1975    [facility]    COLON SURGERY      COLONOSCOPY  over 10 yrs ago    COLONOSCOPY  08/11/2017    Dr Ammon Lesches, COLON, DIAGNOSTIC     [address] ? [facility]    LUMBAR LAMINECTOMY  4/15/2013    OTHER SURGICAL HISTORY  7/8/2013    evacation of seroma-back     ME OFFICE/OUTPT VISIT,PROCEDURE ONLY N/A 6/25/2018    ABDOMINAL EXPLORATION AND ESTENDED LEFT COLON RESECTION, LYSIS OF COMPLEX ADHESIONS performed by Cheyanne Guidry MD at [address]  08/11/2017    Dr Pop Davila Medications:   No current facility-administered medications on file prior to encounter.       Current Outpatient Prescriptions on File Prior to Encounter   Medication Sig Dispense Refill   

## 2018-09-27 ENCOUNTER — APPOINTMENT (OUTPATIENT)
Dept: GENERAL RADIOLOGY | Age: 78
DRG: 329 | End: 2018-09-27
Payer: MEDICARE

## 2018-09-27 LAB
ANION GAP SERPL CALCULATED.3IONS-SCNC: 17 MEQ/L (ref 8–16)
BUN BLDV-MCNC: 4 MG/DL (ref 7–22)
CALCIUM IONIZED: 1.07 MMOL/L (ref 1.12–1.32)
CALCIUM SERPL-MCNC: 8.5 MG/DL (ref 8.5–10.5)
CHLORIDE BLD-SCNC: 99 MEQ/L (ref 98–111)
CO2: 18 MEQ/L (ref 23–33)
CREAT SERPL-MCNC: 0.3 MG/DL (ref 0.4–1.2)
GFR SERPL CREATININE-BSD FRML MDRD: > 90 ML/MIN/1.73M2
GLUCOSE BLD-MCNC: 78 MG/DL (ref 70–108)
MAGNESIUM: 1.7 MG/DL (ref 1.6–2.4)
PHOSPHORUS: 2.4 MG/DL (ref 2.4–4.7)
POTASSIUM SERPL-SCNC: 4 MEQ/L (ref 3.5–5.2)
POTASSIUM SERPL-SCNC: 4.2 MEQ/L (ref 3.5–5.2)
SODIUM BLD-SCNC: 134 MEQ/L (ref 135–145)
VITAMIN B12 BIND CAP: NORMAL

## 2018-09-27 PROCEDURE — 71045 X-RAY EXAM CHEST 1 VIEW: CPT

## 2018-09-27 PROCEDURE — 36569 INSJ PICC 5 YR+ W/O IMAGING: CPT

## 2018-09-27 PROCEDURE — 02HV33Z INSERTION OF INFUSION DEVICE INTO SUPERIOR VENA CAVA, PERCUTANEOUS APPROACH: ICD-10-PCS | Performed by: SURGERY

## 2018-09-27 PROCEDURE — 6370000000 HC RX 637 (ALT 250 FOR IP): Performed by: SURGERY

## 2018-09-27 PROCEDURE — 84132 ASSAY OF SERUM POTASSIUM: CPT

## 2018-09-27 PROCEDURE — 83735 ASSAY OF MAGNESIUM: CPT

## 2018-09-27 PROCEDURE — B548ZZA ULTRASONOGRAPHY OF SUPERIOR VENA CAVA, GUIDANCE: ICD-10-PCS | Performed by: SURGERY

## 2018-09-27 PROCEDURE — 2580000003 HC RX 258: Performed by: NURSE PRACTITIONER

## 2018-09-27 PROCEDURE — 80048 BASIC METABOLIC PNL TOTAL CA: CPT

## 2018-09-27 PROCEDURE — 2709999900 HC NON-CHARGEABLE SUPPLY

## 2018-09-27 PROCEDURE — 2580000003 HC RX 258: Performed by: SURGERY

## 2018-09-27 PROCEDURE — 82330 ASSAY OF CALCIUM: CPT

## 2018-09-27 PROCEDURE — 6360000002 HC RX W HCPCS: Performed by: SURGERY

## 2018-09-27 PROCEDURE — APPSS30 APP SPLIT SHARED TIME 16-30 MINUTES: Performed by: NURSE PRACTITIONER

## 2018-09-27 PROCEDURE — 2500000003 HC RX 250 WO HCPCS: Performed by: SURGERY

## 2018-09-27 PROCEDURE — 84100 ASSAY OF PHOSPHORUS: CPT

## 2018-09-27 PROCEDURE — 99233 SBSQ HOSP IP/OBS HIGH 50: CPT | Performed by: SURGERY

## 2018-09-27 PROCEDURE — 36415 COLL VENOUS BLD VENIPUNCTURE: CPT

## 2018-09-27 PROCEDURE — 6360000002 HC RX W HCPCS: Performed by: STUDENT IN AN ORGANIZED HEALTH CARE EDUCATION/TRAINING PROGRAM

## 2018-09-27 PROCEDURE — C9113 INJ PANTOPRAZOLE SODIUM, VIA: HCPCS | Performed by: STUDENT IN AN ORGANIZED HEALTH CARE EDUCATION/TRAINING PROGRAM

## 2018-09-27 PROCEDURE — 1200000000 HC SEMI PRIVATE

## 2018-09-27 RX ORDER — SODIUM CHLORIDE 9 MG/ML
INJECTION, SOLUTION INTRAVENOUS CONTINUOUS
Status: DISCONTINUED | OUTPATIENT
Start: 2018-09-27 | End: 2018-10-04

## 2018-09-27 RX ORDER — NICOTINE POLACRILEX 4 MG
15 LOZENGE BUCCAL PRN
Status: DISCONTINUED | OUTPATIENT
Start: 2018-09-27 | End: 2018-10-16 | Stop reason: HOSPADM

## 2018-09-27 RX ORDER — SODIUM CHLORIDE 0.9 % (FLUSH) 0.9 %
10 SYRINGE (ML) INJECTION EVERY 12 HOURS SCHEDULED
Status: DISCONTINUED | OUTPATIENT
Start: 2018-09-27 | End: 2018-10-04 | Stop reason: SDUPTHER

## 2018-09-27 RX ORDER — DEXTROSE MONOHYDRATE 50 MG/ML
100 INJECTION, SOLUTION INTRAVENOUS PRN
Status: DISCONTINUED | OUTPATIENT
Start: 2018-09-27 | End: 2018-10-16 | Stop reason: HOSPADM

## 2018-09-27 RX ORDER — DEXTROSE MONOHYDRATE 25 G/50ML
12.5 INJECTION, SOLUTION INTRAVENOUS PRN
Status: DISCONTINUED | OUTPATIENT
Start: 2018-09-27 | End: 2018-10-16 | Stop reason: HOSPADM

## 2018-09-27 RX ORDER — SODIUM CHLORIDE 0.9 % (FLUSH) 0.9 %
10 SYRINGE (ML) INJECTION PRN
Status: DISCONTINUED | OUTPATIENT
Start: 2018-09-27 | End: 2018-10-04 | Stop reason: SDUPTHER

## 2018-09-27 RX ORDER — LIDOCAINE HYDROCHLORIDE 10 MG/ML
5 INJECTION, SOLUTION EPIDURAL; INFILTRATION; INTRACAUDAL; PERINEURAL ONCE
Status: DISCONTINUED | OUTPATIENT
Start: 2018-09-27 | End: 2018-10-16 | Stop reason: HOSPADM

## 2018-09-27 RX ADMIN — SODIUM CHLORIDE: 9 INJECTION, SOLUTION INTRAVENOUS at 17:48

## 2018-09-27 RX ADMIN — POTASSIUM CHLORIDE 10 MEQ: 10 INJECTION, SOLUTION INTRAVENOUS at 03:38

## 2018-09-27 RX ADMIN — PANTOPRAZOLE SODIUM 40 MG: 40 INJECTION, POWDER, FOR SOLUTION INTRAVENOUS at 09:53

## 2018-09-27 RX ADMIN — MORPHINE SULFATE 2 MG: 2 INJECTION, SOLUTION INTRAMUSCULAR; INTRAVENOUS at 17:45

## 2018-09-27 RX ADMIN — Medication 10 ML: at 17:49

## 2018-09-27 RX ADMIN — Medication 10 ML: at 17:47

## 2018-09-27 RX ADMIN — MORPHINE SULFATE 2 MG: 2 INJECTION, SOLUTION INTRAMUSCULAR; INTRAVENOUS at 03:45

## 2018-09-27 RX ADMIN — POTASSIUM CHLORIDE 10 MEQ: 10 INJECTION, SOLUTION INTRAVENOUS at 01:41

## 2018-09-27 RX ADMIN — Medication 10 ML: at 21:00

## 2018-09-27 RX ADMIN — CALCIUM GLUCONATE: 94 INJECTION, SOLUTION INTRAVENOUS at 17:48

## 2018-09-27 RX ADMIN — SODIUM CHLORIDE: 9 INJECTION, SOLUTION INTRAVENOUS at 09:56

## 2018-09-27 RX ADMIN — Medication 10 ML: at 17:48

## 2018-09-27 RX ADMIN — MORPHINE SULFATE 2 MG: 2 INJECTION, SOLUTION INTRAMUSCULAR; INTRAVENOUS at 21:15

## 2018-09-27 ASSESSMENT — PAIN DESCRIPTION - PAIN TYPE
TYPE: ACUTE PAIN

## 2018-09-27 ASSESSMENT — PAIN DESCRIPTION - DESCRIPTORS
DESCRIPTORS: ACHING
DESCRIPTORS: ACHING

## 2018-09-27 ASSESSMENT — PAIN SCALES - GENERAL
PAINLEVEL_OUTOF10: 6
PAINLEVEL_OUTOF10: 3
PAINLEVEL_OUTOF10: 7
PAINLEVEL_OUTOF10: 2
PAINLEVEL_OUTOF10: 2
PAINLEVEL_OUTOF10: 6

## 2018-09-27 ASSESSMENT — PAIN DESCRIPTION - LOCATION
LOCATION: ABDOMEN
LOCATION: GENERALIZED
LOCATION: ABDOMEN
LOCATION: ARM

## 2018-09-27 ASSESSMENT — PAIN DESCRIPTION - ORIENTATION: ORIENTATION: LOWER

## 2018-09-27 ASSESSMENT — PAIN DESCRIPTION - FREQUENCY: FREQUENCY: INTERMITTENT

## 2018-09-27 ASSESSMENT — PAIN DESCRIPTION - PROGRESSION: CLINICAL_PROGRESSION: NOT CHANGED

## 2018-09-27 NOTE — PROGRESS NOTES
Pharmacy Consult       TPN    Ordering Provider: Dr. Flip Stephens     Indication for TPN: Bowel obstruction    Macronutrients   DW: 65kg   AA: 1.5 g/kg   Total Kcal: 975 Kcal/kg   Lipids: 30 % of Total Kcal   Infusion Rate: 100 mL/hr    Electrolytes (per bag)   Na Acetate:    140 meq   NaCl:         80 meq   NaPhos:       15 mmol     KCl:               60 meq     Calcium Gluc:   4.65 meq   Magnesium:      8.12 meq      MV:      10 mL    Electrolyte Replacement: 80 mEq potassium 9/26, 40 mEq 9/27    Assessment/ Plan:  1. Will start medium sliding scale insulin   2. Will order BMP, phos, mag, ical for AM   3.   Will decrease maintenance fluids to 20 ml/hm starting at 1800

## 2018-09-27 NOTE — PROGRESS NOTES
CHO/day  · Anthropometric Measures:  · Ht: 5' 3\" (160 cm)   · Current Body Wt: 143 lb (64.9 kg) (9/24, no edema)  · Admission Body Wt: 143 lb (64.9 kg) (9/24, no edema)  · Usual Body Wt:  (per EMR (6/25/18): 154# 12.8oz)  · % Weight Change: 7.1% unintentional wt. loss,  3 months  · Ideal Body Wt: 115 lb (52.2 kg),   · BMI Classification: BMI 25.0 - 29.9 Overweight  · Comparative Standards (Estimated Nutrition Needs):  · Estimated Daily Total Kcal: 1623-1947kcals (25-30kcals/kgm admit wt. 64.9kgm)  · Estimated Daily Protein (g):  grams (1.5-2 grams protein/kgm ideal wt 52kgm)    Estimated Intake vs Estimated Needs: Intake Less Than Needs    Nutrition Risk Level: High    Nutrition Interventions:   Continue NPO, Start Parenteral Nutrition  Continued Inpatient Monitoring, Education Initiated, Coordination of Care (9/27 Discussed TPN with pt and daughter. Answered questions)    Nutrition Evaluation:   · Evaluation: Goals set   · Goals: Pt. will tolerate TPN to meet 75% or more of nutritional needs until able to transition to po diet. · Monitoring: NPO Status, PN Intake, PN Tolerance, Ascites/Edema, Weight, Pertinent Labs, Nausea or Vomiting, Constipation, Patient/Family Education    See Adult Nutrition Doc Flowsheet for more detail.      Electronically signed by Zari Amezcua RD, LD on 9/27/18 at 12:25 PM    Contact Number: [phone]

## 2018-09-27 NOTE — PROGRESS NOTES
09/27/18  10:13 AM  Pt resting in chair talking on the phone to support member. Pt ambulated the vivas. Tolerated well. Pt voiced limited pain 2/10. No further concerns at this time. Call light placed within reach.

## 2018-09-27 NOTE — PROGRESS NOTES
Discussed new orders with patient (PICC line insertion and TPN). Instructed that she is to remain NPO (nothing to eat/drink). Voiced understanding.

## 2018-09-27 NOTE — PROGRESS NOTES
[facility]   Daily Progress Note  Dr Rosa Diaz      Patient:  Francis Helms  YOB: 1940  Date of Service: 9/27/2018  MRN: [id number]   Acct:  [de-identified]   Primary Care Physician: Muna Edwards MD    Chief Complaint: abdominal distention  PAD#3    Subjective:   Patient sitting in chair today, incontinent of stools. The abdomen is still distended, has discomfort but no significant pain. She states she feels better then she did on admission. She is NPO, denies N&V. No new concerns today. 10 point review of symptoms otherwise negative     Past Medical History:        Diagnosis Date    Arthritis     Cellulitis     Difficult intubation 2006    GERD (gastroesophageal reflux disease)     H pylori ulcer     Hx of blood clots     RIGHT LEG    Nausea & vomiting     Pneumonia     S/P partial resection of colon 7/6/2018    Shingles     Torus mandibularis 2006       Past Surgical History:        Procedure Laterality Date    ABDOMEN SURGERY      ABDOMINAL ADHESION SURGERY  1990's    Dr Sridhar Jose  09/09/2013    Lysis of Adhesions-Dr. Rosa Diaz     BACK SURGERY  1979/1980/2006/2013    X3    CHOLECYSTECTOMY  1975    [facility]    COLON SURGERY      COLONOSCOPY  over 10 yrs ago    COLONOSCOPY  08/11/2017    Dr Tani Huff, COLON, DIAGNOSTIC     [address] ? [facility]    LUMBAR LAMINECTOMY  4/15/2013    OTHER SURGICAL HISTORY  7/8/2013    evacation of seroma-back     CO OFFICE/OUTPT VISIT,PROCEDURE ONLY N/A 6/25/2018    ABDOMINAL EXPLORATION AND ESTENDED LEFT COLON RESECTION, LYSIS OF COMPLEX ADHESIONS performed by Kylee Gordillo MD at [facility]  08/11/2017    Dr Mary Beth Card Medications:   No current facility-administered medications on file prior to encounter.       Current Outpatient Prescriptions on File Prior to Encounter   Medication Sig Dispense Refill    acetaminophen (TYLENOL) 325 MG tablet Take 650 mg by mouth every 6 hours as needed for Pain      ibuprofen (ADVIL;MOTRIN) 200 MG tablet Take 200 mg by mouth every 6 hours as needed for Pain      Omega-3 Fatty Acids (FISH OIL) 1000 MG CAPS Take 1,000 mg by mouth daily      Multiple Vitamins-Minerals (PRESERVISION AREDS 2 PO) Take 1 tablet by mouth daily         Allergies:  Corticosteroids and Pregabalin    Social History:    reports that she has never smoked. She has never used smokeless tobacco. She reports that she does not drink alcohol or use drugs. Family History:   Family History   Problem Relation Age of Onset    Arthritis Mother     Asthma Mother     Arthritis Father     Cancer Father     High Blood Pressure Father     Diabetes Brother     Heart Disease Brother     COPD Brother     High Blood Pressure Brother           Vitals:   Vitals:    09/27/18 0720   BP:    Pulse: 84   Resp:    Temp:    SpO2:       BMI: Body mass index is 25.33 kg/m².                 Exam:  Physical Examination: General appearance - oriented to person, place, and time and chronically ill appearing  Mental status - alert, oriented to person, place, and time  Chest - clear to auscultation, no wheezes, rales or rhonchi, symmetric air entry  Heart - normal rate and regular rhythm  Abdomen - tenderness noted and distended,  bowel sounds   Neurological - alert, oriented, normal speech, no focal findings or movement disorder noted  Musculoskeletal - no joint tenderness, deformity or swelling  Extremities - peripheral pulses normal, no pedal edema, no clubbing or cyanosis  Skin - normal coloration and turgor, no rashes, no suspicious skin lesions noted      Review of Labs and Diagnostic Testing:    Recent Results (from the past 24 hour(s))   Potassium    Collection Time: 09/26/18  6:55 PM   Result Value Ref Range    Potassium 3.5 3.5 - 5.2 meq/L   Potassium    Collection Time: 09/27/18  6:27 AM   Result Value Ref Range    Potassium 4.2 3.5 - 5.2 meq/L       Radiology:     Ct Abdomen Pelvis W Iv Contrast Additional Contrast? Oral    Result Date: 9/24/2018  PROCEDURE: CT ABDOMEN PELVIS W IV CONTRAST CLINICAL INFORMATION: r/o SBO, decreased appetite, decreased stool . COMPARISON: 6/19/2018 TECHNIQUE: 5 mm axial CT images were obtained through the abdomen and pelvis after the administration of intravenous and oral contrast. Coronal and sagittal reconstructions were obtained. All CT scans at this facility use dose modulation, iterative reconstruction, and/or weight-based dosing when appropriate to reduce radiation dose to as low as reasonably achievable. FINDINGS: minimal atelectasis or scarring in the lung bases. Liver, spleen and adrenal glands are unremarkable. Gallbladder is not seen. Partially atrophic changes involving the pancreas. Fullness of the biliary ducts, likely postsurgical. Kidneys are symmetric without hydronephrosis. Postsurgical changes lumbar spine. Degenerative changes lumbar spine. SI joints are symmetric. Normal caliber abdominal aorta with atherosclerotic changes. No free intraperitoneal air. Postsurgical changes sigmoid colon. The colon is relatively decompressed. There are multiple air-fluid levels within the distal small bowel loops with enhancement of the mucosa and areas of narrowing concerning for obstruction. There is mesenteric edema and likely reactive central mesenteric lymph nodes. Small amount of abdominal and pelvic ascites. There is oral contrast in the stomach and proximal small bowel bowel loops. Findings are concerning for obstruction which may be related to stricture versus adhesion high grade obstruction is suspected given extensive mesenteric edema. Correlation with symptoms and appropriate follow-up advised. Hysterectomy.      There are multiple air-fluid levels within the distal small bowel loops with enhancement of the mucosa and areas of narrowing Correlation: CT abdomen and pelvis 9/24/2018       FINDINGS:        Preliminary radiograph demonstrates multiple dilated gas-filled large and small bowel loops. Distal bowel gas is present. Surgical clips are seen in the left hemiabdomen. Phleboliths are noted in the pelvis. There are degenerative and surgical changes in    the thoracolumbar spine.       There is adequate opacification of the colon in the left hemiabdomen. There is no evidence of stricture in this region. The colon is relatively featureless. There is passage of contrast into the transverse colon and cecum. There is reflux of contrast    into small bowel loops.           Impression       No evidence of colonic obstruction or stricture.       Final report electronically signed by Dr. Alexandra Joya on 9/25/2018 2:12 PM       CT Enterography  Narrative   CT ABDOMEN AND PELVIS WITH CONTRAST/(ENTEROGRAPHY STUDY):       CLINICAL INFORMATION: 24-year-old female with generalized abdominal bloating and pain.       TECHNIQUE: Multiple axial 5 mm images of the abdomen, pelvis, lung bases were obtained following the administration of Enterovue oral contrast material and intravenous contrast material (ISOVUE). Computer generated coronal images of the abdomen and    pelvis were reconstructed. ALL CT SCANS AT THIS FACILITY use dose modulation, iterative reconstruction, and/or weight-based dosing when appropriate to reduce radiation dose to as low as reasonably achievable.           FINDINGS:        There is some atelectasis/scarring at the left lung base. No pleural effusions or pneumothorax.       The heart is normal in size.  There is no pericardial effusion.       The liver and spleen both have smooth contours and are normal in size.       The pancreas and adrenal glands are within normal limits.       The gallbladder is not visualized.       There is prominence of the intrahepatic biliary ducts, likely compensatory in nature.       Kidneys are symmetric in size, shape and degree of enhancement. No hydronephrosis.       Atherosclerotic calcifications seen throughout the aorta. No aneurysmal dilatation.       The IVC has a normal caliber.       Multiple air-fluid levels are again seen within the small bowel there is questionable gas within the wall of the stomach which could represent pneumatosis. The majority of the colon is decompressed. There is some edema throughout the mesentery, slightly    decreased when compared to the previous study.       There is no free intraperitoneal air.           Impression   Redemonstration of multiple air-fluid levels throughout the small bowel with enhancement of the mucosa. There is near complete decompression of the large intestine. These findings are highly concerning for small bowel obstruction. There is persistence of    edema throughout the mesentery.       There are tiny foci of gas at the nondependent portion of the stomach for which pneumatosis cannot be entirely excluded.       Findings discussed by Dr. Lela Morales with nurse Dieter Alexander 7:10 PM 9/26/2018 via telephone.       **This report has been created using voice recognition software.  It may contain minor errors which are inherent in voice recognition technology. **       Final report electronically signed by Dr Chaitanya Allen on 9/26/2018 7:13 PM         Assessment:  1. Stricture vs adhesion   2. Possible small bowel obstruction  3. Abdominal distention  4. Abdominal discomfort  5. Chronic anemia   6. Hypokalemia improved  7. Barium enema - negative for stricture   8. Pre-albumin - sever malnutrition   9. Overweight, BMI 25.33  10. CT Enterogrphy - decompressed large bowel, suggest SBO, mesentery edema     Active Problems:    SBO (small bowel obstruction) (HCC)    Small bowel obstruction (HCC)    Severe malnutrition (HCC)  Resolved Problems:    * No resolved hospital problems. *      Plan:  1. Conservative treatment  2. Barium enema completed- negative for stricture  3.  NPO -

## 2018-09-27 NOTE — PROGRESS NOTES
PICC Procedure Note    [facility]   Admitted- 9/24/2018 10:36 AM  Admission diagnosis- SBO (small bowel obstruction) ([facility] [address] [K56.609]  Small bowel obstruction ([facility] [address] [S51.114]      Attending Physician- Kylee Gordillo MD  Ordering Physician-Chelo Miller CNP  Indication for Insertion: TPN    Catheter Insertion Date- 9/27/2018   Lot Number- [id number]  Gauge-5  Lumen-dual    Insertion Site- MARYURI Basilic  Vein Diameter- 3 mm  Catheter Length- 44 cm  Internal Length- 44 cm  Exposed Catheter Length- 0cm   Upper Arm Circumference- 26cm  Tip Confirmation System Bundle met- No: CXR   If X-ray required, Tip Location- yes SVC RA Junction  Radiologist- Dr Guillermo Valenzuela    PICC insertion successful- Yes  Ultrasound- yes    Okay To Use PICC- Yes    Electronically signed by Laureano Callaway RN on 9/27/2018 at 4:17 PM

## 2018-09-28 LAB
ANION GAP SERPL CALCULATED.3IONS-SCNC: 11 MEQ/L (ref 8–16)
ANION GAP SERPL CALCULATED.3IONS-SCNC: 15 MEQ/L (ref 8–16)
BUN BLDV-MCNC: 7 MG/DL (ref 7–22)
BUN BLDV-MCNC: 9 MG/DL (ref 7–22)
CALCIUM IONIZED: 1.01 MMOL/L (ref 1.12–1.32)
CALCIUM SERPL-MCNC: 7.5 MG/DL (ref 8.5–10.5)
CALCIUM SERPL-MCNC: 8.4 MG/DL (ref 8.5–10.5)
CHLORIDE BLD-SCNC: 91 MEQ/L (ref 98–111)
CHLORIDE BLD-SCNC: 93 MEQ/L (ref 98–111)
CO2: 22 MEQ/L (ref 23–33)
CO2: 27 MEQ/L (ref 23–33)
CREAT SERPL-MCNC: 0.3 MG/DL (ref 0.4–1.2)
CREAT SERPL-MCNC: 0.3 MG/DL (ref 0.4–1.2)
GFR SERPL CREATININE-BSD FRML MDRD: > 90 ML/MIN/1.73M2
GFR SERPL CREATININE-BSD FRML MDRD: > 90 ML/MIN/1.73M2
GLUCOSE BLD-MCNC: 112 MG/DL (ref 70–108)
GLUCOSE BLD-MCNC: 125 MG/DL (ref 70–108)
GLUCOSE BLD-MCNC: 127 MG/DL (ref 70–108)
GLUCOSE BLD-MCNC: 136 MG/DL (ref 70–108)
GLUCOSE BLD-MCNC: 378 MG/DL (ref 70–108)
GLUCOSE BLD-MCNC: 87 MG/DL (ref 70–108)
MAGNESIUM: 1.9 MG/DL (ref 1.6–2.4)
PHOSPHORUS: 2.1 MG/DL (ref 2.4–4.7)
PHOSPHORUS: 4 MG/DL (ref 2.4–4.7)
POTASSIUM SERPL-SCNC: 3 MEQ/L (ref 3.5–5.2)
POTASSIUM SERPL-SCNC: 5.3 MEQ/L (ref 3.5–5.2)
SODIUM BLD-SCNC: 128 MEQ/L (ref 135–145)
SODIUM BLD-SCNC: 131 MEQ/L (ref 135–145)

## 2018-09-28 PROCEDURE — 76937 US GUIDE VASCULAR ACCESS: CPT

## 2018-09-28 PROCEDURE — 86677 HELICOBACTER PYLORI ANTIBODY: CPT

## 2018-09-28 PROCEDURE — 2709999900 HC NON-CHARGEABLE SUPPLY: Performed by: SURGERY

## 2018-09-28 PROCEDURE — 6360000002 HC RX W HCPCS: Performed by: SURGERY

## 2018-09-28 PROCEDURE — 6360000002 HC RX W HCPCS: Performed by: STUDENT IN AN ORGANIZED HEALTH CARE EDUCATION/TRAINING PROGRAM

## 2018-09-28 PROCEDURE — 84100 ASSAY OF PHOSPHORUS: CPT

## 2018-09-28 PROCEDURE — C9113 INJ PANTOPRAZOLE SODIUM, VIA: HCPCS | Performed by: STUDENT IN AN ORGANIZED HEALTH CARE EDUCATION/TRAINING PROGRAM

## 2018-09-28 PROCEDURE — 43239 EGD BIOPSY SINGLE/MULTIPLE: CPT | Performed by: SURGERY

## 2018-09-28 PROCEDURE — 83735 ASSAY OF MAGNESIUM: CPT

## 2018-09-28 PROCEDURE — 7100000000 HC PACU RECOVERY - FIRST 15 MIN: Performed by: SURGERY

## 2018-09-28 PROCEDURE — 7100000001 HC PACU RECOVERY - ADDTL 15 MIN: Performed by: SURGERY

## 2018-09-28 PROCEDURE — 3609012400 HC EGD TRANSORAL BIOPSY SINGLE/MULTIPLE: Performed by: SURGERY

## 2018-09-28 PROCEDURE — 1200000000 HC SEMI PRIVATE

## 2018-09-28 PROCEDURE — 36591 DRAW BLOOD OFF VENOUS DEVICE: CPT

## 2018-09-28 PROCEDURE — 6370000000 HC RX 637 (ALT 250 FOR IP): Performed by: SURGERY

## 2018-09-28 PROCEDURE — 2709999900 HC NON-CHARGEABLE SUPPLY

## 2018-09-28 PROCEDURE — 82330 ASSAY OF CALCIUM: CPT

## 2018-09-28 PROCEDURE — C1751 CATH, INF, PER/CENT/MIDLINE: HCPCS

## 2018-09-28 PROCEDURE — 6360000002 HC RX W HCPCS: Performed by: NURSE PRACTITIONER

## 2018-09-28 PROCEDURE — 2580000003 HC RX 258: Performed by: NURSE PRACTITIONER

## 2018-09-28 PROCEDURE — 0DB68ZX EXCISION OF STOMACH, VIA NATURAL OR ARTIFICIAL OPENING ENDOSCOPIC, DIAGNOSTIC: ICD-10-PCS | Performed by: SURGERY

## 2018-09-28 PROCEDURE — 82948 REAGENT STRIP/BLOOD GLUCOSE: CPT

## 2018-09-28 PROCEDURE — A4212 NON CORING NEEDLE OR STYLET: HCPCS

## 2018-09-28 PROCEDURE — 2580000003 HC RX 258: Performed by: SURGERY

## 2018-09-28 PROCEDURE — 2500000003 HC RX 250 WO HCPCS: Performed by: SURGERY

## 2018-09-28 PROCEDURE — 80048 BASIC METABOLIC PNL TOTAL CA: CPT

## 2018-09-28 RX ORDER — FENTANYL CITRATE 50 UG/ML
INJECTION, SOLUTION INTRAMUSCULAR; INTRAVENOUS PRN
Status: DISCONTINUED | OUTPATIENT
Start: 2018-09-28 | End: 2018-09-28 | Stop reason: HOSPADM

## 2018-09-28 RX ORDER — DRONABINOL 2.5 MG/1
2.5 CAPSULE ORAL 2 TIMES DAILY
Status: DISCONTINUED | OUTPATIENT
Start: 2018-09-28 | End: 2018-10-04

## 2018-09-28 RX ORDER — POTASSIUM CHLORIDE 20 MEQ/1
20 TABLET, EXTENDED RELEASE ORAL ONCE
Status: COMPLETED | OUTPATIENT
Start: 2018-09-28 | End: 2018-09-28

## 2018-09-28 RX ORDER — MIDAZOLAM HYDROCHLORIDE 1 MG/ML
INJECTION INTRAMUSCULAR; INTRAVENOUS PRN
Status: DISCONTINUED | OUTPATIENT
Start: 2018-09-28 | End: 2018-09-28 | Stop reason: HOSPADM

## 2018-09-28 RX ORDER — DEXTROSE MONOHYDRATE 100 MG/ML
INJECTION, SOLUTION INTRAVENOUS CONTINUOUS
Status: DISCONTINUED | OUTPATIENT
Start: 2018-09-28 | End: 2018-10-04

## 2018-09-28 RX ADMIN — Medication 10 ML: at 09:36

## 2018-09-28 RX ADMIN — DRONABINOL 2.5 MG: 2.5 CAPSULE ORAL at 21:17

## 2018-09-28 RX ADMIN — Medication 10 ML: at 21:29

## 2018-09-28 RX ADMIN — POTASSIUM CHLORIDE 20 MEQ: 20 TABLET, EXTENDED RELEASE ORAL at 16:11

## 2018-09-28 RX ADMIN — FERROUS SULFATE TAB 325 MG (65 MG ELEMENTAL FE) 325 MG: 325 (65 FE) TAB at 09:36

## 2018-09-28 RX ADMIN — LACTULOSE 20 G: 20 SOLUTION ORAL at 21:27

## 2018-09-28 RX ADMIN — DRONABINOL 2.5 MG: 2.5 CAPSULE ORAL at 14:17

## 2018-09-28 RX ADMIN — DEXTROSE MONOHYDRATE: 100 INJECTION, SOLUTION INTRAVENOUS at 10:48

## 2018-09-28 RX ADMIN — Medication 10 ML: at 09:37

## 2018-09-28 RX ADMIN — POTASSIUM PHOSPHATE, MONOBASIC AND POTASSIUM PHOSPHATE, DIBASIC 10 MMOL: 224; 236 INJECTION, SOLUTION, CONCENTRATE INTRAVENOUS at 16:11

## 2018-09-28 RX ADMIN — SODIUM CHLORIDE: 9 INJECTION, SOLUTION INTRAVENOUS at 18:16

## 2018-09-28 RX ADMIN — LACTULOSE 20 G: 20 SOLUTION ORAL at 09:40

## 2018-09-28 RX ADMIN — PANTOPRAZOLE SODIUM 40 MG: 40 INJECTION, POWDER, FOR SOLUTION INTRAVENOUS at 09:36

## 2018-09-28 RX ADMIN — MORPHINE SULFATE 2 MG: 2 INJECTION, SOLUTION INTRAMUSCULAR; INTRAVENOUS at 03:45

## 2018-09-28 RX ADMIN — CALCIUM GLUCONATE: 94 INJECTION, SOLUTION INTRAVENOUS at 18:16

## 2018-09-28 ASSESSMENT — PAIN SCALES - GENERAL
PAINLEVEL_OUTOF10: 0
PAINLEVEL_OUTOF10: 7
PAINLEVEL_OUTOF10: 7
PAINLEVEL_OUTOF10: 6

## 2018-09-28 ASSESSMENT — PAIN DESCRIPTION - LOCATION
LOCATION: ABDOMEN
LOCATION: GENERALIZED

## 2018-09-28 ASSESSMENT — PAIN DESCRIPTION - PAIN TYPE
TYPE: ACUTE PAIN
TYPE: ACUTE PAIN

## 2018-09-28 ASSESSMENT — PAIN DESCRIPTION - PROGRESSION
CLINICAL_PROGRESSION: NOT CHANGED
CLINICAL_PROGRESSION: NOT CHANGED

## 2018-09-28 ASSESSMENT — PAIN DESCRIPTION - DESCRIPTORS
DESCRIPTORS: ACHING
DESCRIPTORS: ACHING

## 2018-09-28 ASSESSMENT — PAIN DESCRIPTION - FREQUENCY: FREQUENCY: INTERMITTENT

## 2018-09-28 ASSESSMENT — PAIN - FUNCTIONAL ASSESSMENT: PAIN_FUNCTIONAL_ASSESSMENT: 0-10

## 2018-09-28 NOTE — PROCEDURES
[address] [id number]                                  PROCEDURE NOTE    PATIENT NAME: Silvia Salgado                :        1940  MED REC NO:   [id number]                           ROOM:       [id number]  ACCOUNT NO:   [de-identified]                           ADMIT DATE: 2018  PROVIDER:     Inessa Traylor. Kathryn Land MD    DATE OF PROCEDURE:  2018    PREOPERATIVE DIAGNOSIS:  Blood in the stool. POSTOPERATIVE DIAGNOSIS:  Normal EGD. No evidence of masses, ulcers, or  other abnormalities. OPERATION:  EGD with biopsies for SHANTANU. SURGEON:  Inessa Traylor. MD Sriram    ANESTHESIA:  IV sedation with 100 mcg of fentanyl and 4 mg of Versed. COMPLICATIONS:  None. INDICATIONS FOR PROCEDURE:  The patient is a challenging 79-year-old white  female who had undergone a left colon resection a few months ago and has  been having persistent abdominal distention. The patient had blood in the  stool. She also has had evidence of a partial small-bowel obstruction and  she is here for EGD. FINDINGS:  The patient had no evidence of any masses, ulcerations  throughout the stomach, antrum, or duodenum. There was no evidence of any  bleeding. She did have one small gastric polyp on a retroflexed view. No  evidence of hiatal hernia, no esophagitis. DESCRIPTION OF PROCEDURE:  The patient was brought into the endoscopy  suite, placed in the left lateral decubitus position. After adequate IV  sedation with 100 mcg of fentanyl and 3 mg of Versed, then another 1 mg of  Versed, the Olympus endoscope was inserted in the back of throat, easily  slid down through the esophagus down to the EG junction, which was about 40  cm from the incisors. The scope was passed on into the body of the stomach  which was normal, on into the antrum. A picture of the antrum was taken  x2. The antrum was cannulated and the duodenum was entered.   It was passed  about the third portion of the duodenum, which was normal.  Duodenal bulb  was completely normal, no evidence of ulcerations. Scope was withdrawn  back into the antrum where a retroflexed view of the EG junction was  obtained showing no evidence of hiatal hernia. There was one small gastric  polyp in the upper stomach, but otherwise it was isolated and no other  polyps seen. No other masses. The scope was straightened. We again went  back to the EG junction and the rest the esophagus was normal.  The patient  tolerated procedure well. EDDIE QUINTERO [facility], MD    D: 09/28/2018 8:03:34       T: 09/28/2018 8:04:56     [email or web address]  Job#: [id number]     Doc#: [id number]    CC:

## 2018-09-28 NOTE — PLAN OF CARE
Problem: SAFETY  Goal: Free from accidental physical injury  Outcome: Met This Shift  Patient free from injury/harm this shift. Complying well with medical devices and following safety precautions. Fall risk continues to be assessed. Fall precautions in place, 5 P's used to provide safe environment. Bed in low and locked position, call light in reach, side rails upx2-3. Needs being met. Problem: DAILY CARE  Goal: Daily care needs are met  Outcome: Met This Shift  Patient  assisted to accomplish ADLs. Patient voicing needs appropriately. Encouraging and promoting as much  independence as possible per patient ability and assisting with ADLS and hygiene needs as needed. Skin and mucous membranes appropriate. Problem: PAIN  Goal: Patient's pain/discomfort is manageable  Outcome: Met This Shift  Pt c/o chronic pain, medications used to alleviate pain. Also encouraging patient to decrease stimulation at time of increased pain. Problem: DISCHARGE BARRIERS  Goal: Patient's continuum of care needs are met  Outcome: Met This Shift  Patient speaking openly about discharge plan. Patient up in room as tolerated, ambulating and tolerating well, preforming ADLs. Pt tolerating oral medications. Nutritional status and needs discussed, tolerating PO well. Patient participating in plan of care, discussing options, needs and concerns. Pt will be discharged home with family support. Problem: Falls - Risk of:  Goal: Will remain free from falls  Will remain free from falls   Outcome: Met This Shift  Call light, overbed table, and belongings within reach. Bed/ chair alarm activated. Up with 1 assist. Patient included in hourly rounds. Problem: Nutrition  Goal: Optimal nutrition therapy  tpn  Eating 75% of meals    Problem: GI  Goal: No bowel complications  Outcome: Met This Shift  Bowel sounds active x4. No c/o nausea, vomiting. Rounded but soft observed upon assessment. Bowel movement in the last 24hrs. Continuing to monitor. Problem: Infection - Central Venous Catheter-Associated Bloodstream Infection:  Goal: Will show no infection signs and symptoms  Will show no infection signs and symptoms   Outcome: Met This Shift      Comments: Pt aware of plan of care, continuing to update and work with patient to address needs and expectations.

## 2018-09-28 NOTE — PROGRESS NOTES
Nutrition Assessment    Type and Reason for Visit: Reassess    Nutrition Recommendations: Recommend TPN changes with next bag: Increase kcals to 20/kgm, increase protein to 2 gm/kgm; 30% kcals from lipids with dosing wt: 65 kgm. Recommend diet advancement as GI function allows. Consider SLP evaluation if trouble with swallowing observed. Malnutrition Assessment:  · Malnutrition Status: Meets the criteria for severe malnutrition  · Context: Chronic illness  · Findings of the 6 clinical characteristics of malnutrition (Minimum of 2 out of 6 clinical characteristics is required to make the diagnosis of moderate or severe Protein Calorie Malnutrition based on AND/ASPEN Guidelines):  1. Energy Intake-Less than or equal to 75%, greater than or equal to 1 month    2. Fat Loss-Severe subcutaneous fat loss, Orbital  3. Muscle Loss-Severe muscle mass loss, Clavicles (pectoralis and deltoids), Scapula (trapezius), Temples (temporalis muscle)    Nutrition Diagnosis:   · Problem: Severe malnutrition, in context of chronic illness  · Etiology: related to Alteration in GI function     Signs and symptoms:  as evidenced by Diet history of poor intake, Severe loss of subcutaneous fat, Severe muscle loss    Nutrition Assessment:  · Subjective Assessment: Pt. Seen with daughter. Reports passing gas. 2 BMs noted past 24 hours. Pt. States stomach looks less distended than it had been. S/p EGD with biopsy. 9/28: Potassium 5.3, Glucose 378 (however chem 127 4 minutes prior to BMP- ? Accuracy - glucose in line?); Magnesium 1.9, Phosphorus 4.0. Chemsticks: 87, 127 mg/dl. Rx includes Lactulose, Humalog. TPN stopped per pharmacy as potassium elevated. Daughter is upset that new TPN will not be hung until tonight. States pt needs nutrition. Called pharmacy - they state they will check with supervisor to see if bag could be made earlier.     · Wound Type: None  · Current Nutrition Therapies:  · Oral Diet Orders: NPO   · Oral

## 2018-09-29 LAB
ANION GAP SERPL CALCULATED.3IONS-SCNC: 12 MEQ/L (ref 8–16)
BUN BLDV-MCNC: 12 MG/DL (ref 7–22)
CALCIUM IONIZED: 1.19 MMOL/L (ref 1.12–1.32)
CALCIUM SERPL-MCNC: 8.7 MG/DL (ref 8.5–10.5)
CHLORIDE BLD-SCNC: 95 MEQ/L (ref 98–111)
CO2: 26 MEQ/L (ref 23–33)
CREAT SERPL-MCNC: 0.3 MG/DL (ref 0.4–1.2)
GFR SERPL CREATININE-BSD FRML MDRD: > 90 ML/MIN/1.73M2
GLUCOSE BLD-MCNC: 108 MG/DL (ref 70–108)
GLUCOSE BLD-MCNC: 109 MG/DL (ref 70–108)
GLUCOSE BLD-MCNC: 114 MG/DL (ref 70–108)
GLUCOSE BLD-MCNC: 118 MG/DL (ref 70–108)
GLUCOSE BLD-MCNC: 118 MG/DL (ref 70–108)
GLUCOSE BLD-MCNC: 119 MG/DL (ref 70–108)
MAGNESIUM: 1.5 MG/DL (ref 1.6–2.4)
PHOSPHORUS: 2.8 MG/DL (ref 2.4–4.7)
POTASSIUM SERPL-SCNC: 3.8 MEQ/L (ref 3.5–5.2)
SODIUM BLD-SCNC: 133 MEQ/L (ref 135–145)
UREASE-CLO-C. PYLORI: NEGATIVE

## 2018-09-29 PROCEDURE — 82330 ASSAY OF CALCIUM: CPT

## 2018-09-29 PROCEDURE — 2709999900 HC NON-CHARGEABLE SUPPLY

## 2018-09-29 PROCEDURE — 2580000003 HC RX 258: Performed by: NURSE PRACTITIONER

## 2018-09-29 PROCEDURE — 80048 BASIC METABOLIC PNL TOTAL CA: CPT

## 2018-09-29 PROCEDURE — 1200000000 HC SEMI PRIVATE

## 2018-09-29 PROCEDURE — 6360000002 HC RX W HCPCS: Performed by: SURGERY

## 2018-09-29 PROCEDURE — 2500000003 HC RX 250 WO HCPCS: Performed by: SURGERY

## 2018-09-29 PROCEDURE — 6360000002 HC RX W HCPCS: Performed by: NURSE PRACTITIONER

## 2018-09-29 PROCEDURE — C9113 INJ PANTOPRAZOLE SODIUM, VIA: HCPCS | Performed by: STUDENT IN AN ORGANIZED HEALTH CARE EDUCATION/TRAINING PROGRAM

## 2018-09-29 PROCEDURE — 84100 ASSAY OF PHOSPHORUS: CPT

## 2018-09-29 PROCEDURE — 6360000002 HC RX W HCPCS: Performed by: STUDENT IN AN ORGANIZED HEALTH CARE EDUCATION/TRAINING PROGRAM

## 2018-09-29 PROCEDURE — 82948 REAGENT STRIP/BLOOD GLUCOSE: CPT

## 2018-09-29 PROCEDURE — 99232 SBSQ HOSP IP/OBS MODERATE 35: CPT | Performed by: SURGERY

## 2018-09-29 PROCEDURE — 6370000000 HC RX 637 (ALT 250 FOR IP): Performed by: SURGERY

## 2018-09-29 PROCEDURE — 2580000003 HC RX 258: Performed by: SURGERY

## 2018-09-29 PROCEDURE — 83735 ASSAY OF MAGNESIUM: CPT

## 2018-09-29 RX ORDER — MAGNESIUM SULFATE IN WATER 40 MG/ML
2 INJECTION, SOLUTION INTRAVENOUS ONCE
Status: COMPLETED | OUTPATIENT
Start: 2018-09-29 | End: 2018-09-29

## 2018-09-29 RX ADMIN — MAGNESIUM SULFATE HEPTAHYDRATE 2 G: 40 INJECTION, SOLUTION INTRAVENOUS at 09:04

## 2018-09-29 RX ADMIN — DRONABINOL 2.5 MG: 2.5 CAPSULE ORAL at 09:15

## 2018-09-29 RX ADMIN — PANTOPRAZOLE SODIUM 40 MG: 40 INJECTION, POWDER, FOR SOLUTION INTRAVENOUS at 09:04

## 2018-09-29 RX ADMIN — Medication 10 ML: at 22:39

## 2018-09-29 RX ADMIN — Medication 10 ML: at 09:14

## 2018-09-29 RX ADMIN — Medication 10 ML: at 09:04

## 2018-09-29 RX ADMIN — CALCIUM GLUCONATE: 94 INJECTION, SOLUTION INTRAVENOUS at 18:16

## 2018-09-29 RX ADMIN — FERROUS SULFATE TAB 325 MG (65 MG ELEMENTAL FE) 325 MG: 325 (65 FE) TAB at 09:04

## 2018-09-29 RX ADMIN — DRONABINOL 2.5 MG: 2.5 CAPSULE ORAL at 22:37

## 2018-09-29 ASSESSMENT — PAIN DESCRIPTION - DESCRIPTORS: DESCRIPTORS: ACHING

## 2018-09-29 ASSESSMENT — PAIN SCALES - GENERAL
PAINLEVEL_OUTOF10: 0
PAINLEVEL_OUTOF10: 0
PAINLEVEL_OUTOF10: 4

## 2018-09-29 ASSESSMENT — PAIN DESCRIPTION - LOCATION: LOCATION: ABDOMEN

## 2018-09-29 ASSESSMENT — PAIN DESCRIPTION - PAIN TYPE: TYPE: ACUTE PAIN

## 2018-09-29 NOTE — PROGRESS NOTES
to Encounter   Medication Sig Dispense Refill    acetaminophen (TYLENOL) 325 MG tablet Take 650 mg by mouth every 6 hours as needed for Pain      ibuprofen (ADVIL;MOTRIN) 200 MG tablet Take 200 mg by mouth every 6 hours as needed for Pain      Omega-3 Fatty Acids (FISH OIL) 1000 MG CAPS Take 1,000 mg by mouth daily      Multiple Vitamins-Minerals (PRESERVISION AREDS 2 PO) Take 1 tablet by mouth daily         Allergies:  Corticosteroids and Pregabalin    Social History:    reports that she has never smoked. She has never used smokeless tobacco. She reports that she does not drink alcohol or use drugs. Family History:   Family History   Problem Relation Age of Onset    Arthritis Mother     Asthma Mother     Arthritis Father     Cancer Father     High Blood Pressure Father     Diabetes Brother     Heart Disease Brother     COPD Brother     High Blood Pressure Brother           Vitals:   Vitals:    09/29/18 0800   BP: 124/60   Pulse: 78   Resp: 16   Temp: 98.9 °F (37.2 °C)   SpO2: 96%      BMI: Body mass index is 25.33 kg/m².                 Exam:  Physical Examination: General appearance - oriented to person, place, and time and chronically ill appearing  Mental status - alert, oriented to person, place, and time  Chest - clear to auscultation, no wheezes, rales or rhonchi, symmetric air entry  Heart - normal rate and regular rhythm  Abdomen - tenderness noted and distended,  bowel sounds   Neurological - alert, oriented, normal speech, no focal findings or movement disorder noted  Musculoskeletal - no joint tenderness, deformity or swelling  Extremities - peripheral pulses normal, no pedal edema, no clubbing or cyanosis  Skin - normal coloration and turgor, no rashes, no suspicious skin lesions noted      Review of Labs and Diagnostic Testing:    Recent Results (from the past 24 hour(s))   POCT glucose    Collection Time: 09/28/18 12:21 PM   Result Value Ref Range    POC Glucose 136 (H) 70 - 108 mg/dl Value Ref Range    POC Glucose 118 (H) 70 - 108 mg/dl       Radiology:     Ct Abdomen Pelvis W Iv Contrast Additional Contrast? Oral    Result Date: 9/24/2018  PROCEDURE: CT ABDOMEN PELVIS W IV CONTRAST CLINICAL INFORMATION: r/o SBO, decreased appetite, decreased stool . COMPARISON: 6/19/2018 TECHNIQUE: 5 mm axial CT images were obtained through the abdomen and pelvis after the administration of intravenous and oral contrast. Coronal and sagittal reconstructions were obtained. All CT scans at this facility use dose modulation, iterative reconstruction, and/or weight-based dosing when appropriate to reduce radiation dose to as low as reasonably achievable. FINDINGS: minimal atelectasis or scarring in the lung bases. Liver, spleen and adrenal glands are unremarkable. Gallbladder is not seen. Partially atrophic changes involving the pancreas. Fullness of the biliary ducts, likely postsurgical. Kidneys are symmetric without hydronephrosis. Postsurgical changes lumbar spine. Degenerative changes lumbar spine. SI joints are symmetric. Normal caliber abdominal aorta with atherosclerotic changes. No free intraperitoneal air. Postsurgical changes sigmoid colon. The colon is relatively decompressed. There are multiple air-fluid levels within the distal small bowel loops with enhancement of the mucosa and areas of narrowing concerning for obstruction. There is mesenteric edema and likely reactive central mesenteric lymph nodes. Small amount of abdominal and pelvic ascites. There is oral contrast in the stomach and proximal small bowel bowel loops. Findings are concerning for obstruction which may be related to stricture versus adhesion high grade obstruction is suspected given extensive mesenteric edema. Correlation with symptoms and appropriate follow-up advised. Hysterectomy.      There are multiple air-fluid levels within the distal small bowel loops with enhancement of the mucosa and areas of narrowing concerning for obstruction. There is mesenteric edema and likely reactive central mesenteric lymph nodes. Small amount of abdominal and pelvic ascites. There is oral contrast in the stomach and proximal small bowel bowel loops. Findings are concerning for obstruction which may be related to stricture versus adhesion. High grade obstruction cannot be excluded given extensive mesenteric edema. Correlation  with symptoms and appropriate follow-up advised. These results were communicated directly with Riya Jamison PA-C on 9/24/2018 3:06 PM,  [ ] **This report has been created using voice recognition software. It may contain minor errors which are inherent in voice recognition technology. ** Final report electronically signed by Dr. Keaton Olivas on 9/24/2018 3:07 PM    Xr Chest Portable    Result Date: 9/24/2018  PROCEDURE: XR CHEST PORTABLE CLINICAL INFORMATION: Heartburn TECHNIQUE: Mobile AP chest radiograph. COMPARISON: PA chest radiograph 6/19/2018 FINDINGS: Cardiomediastinal silhouette is within normal limits. Indistinct reticular opacities are seen at the bilateral lung bases. Degenerative and surgical changes in the thoracolumbar spine are poorly visualized. Soft tissues are unremarkable. Bibasilar atelectasis/infiltrate. **This report has been created using voice recognition software. It may contain minor errors which are inherent in voice recognition technology. ** Final report electronically signed by Dr. Phyllis Mane on 9/24/2018 11:49 AM    FL BARIUM ENEMA  Narrative   PROCEDURE: FL BARIUM ENEMA       CLINICAL INFORMATION: History of left colon resection, suspected anastomotic stricture.       TECHNIQUE: A preliminary abdominal radiograph was obtained. Gastrografin contrast was instilled into the colon through a rectal tube and filled the colon and a retrograde fashion. A total of 11 spot images and radiographs were obtained.  Total fluoroscopy    time 1.9 minutes.       COMPARISON: None.       Correlation: CT

## 2018-09-29 NOTE — PROGRESS NOTES
Lab called and stated that we are unable to send stool for bi carlos so many hours after admission and to check with  and see if the want stool sent for culture. Dr stroud notified and  states she wants stool sent for culture and c-diff. Orders placed in system.

## 2018-09-30 LAB
ANION GAP SERPL CALCULATED.3IONS-SCNC: 8 MEQ/L (ref 8–16)
BUN BLDV-MCNC: 17 MG/DL (ref 7–22)
CALCIUM IONIZED: 1.19 MMOL/L (ref 1.12–1.32)
CALCIUM SERPL-MCNC: 8 MG/DL (ref 8.5–10.5)
CHLORIDE BLD-SCNC: 100 MEQ/L (ref 98–111)
CO2: 27 MEQ/L (ref 23–33)
CREAT SERPL-MCNC: 0.3 MG/DL (ref 0.4–1.2)
ERYTHROCYTE [DISTWIDTH] IN BLOOD BY AUTOMATED COUNT: 15.9 % (ref 11.5–14.5)
ERYTHROCYTE [DISTWIDTH] IN BLOOD BY AUTOMATED COUNT: 47.1 FL (ref 35–45)
GFR SERPL CREATININE-BSD FRML MDRD: > 90 ML/MIN/1.73M2
GLUCOSE BLD-MCNC: 100 MG/DL (ref 70–108)
GLUCOSE BLD-MCNC: 110 MG/DL (ref 70–108)
GLUCOSE BLD-MCNC: 85 MG/DL (ref 70–108)
GLUCOSE BLD-MCNC: 97 MG/DL (ref 70–108)
GLUCOSE BLD-MCNC: 98 MG/DL (ref 70–108)
HCT VFR BLD CALC: 28.4 % (ref 37–47)
HEMOGLOBIN: 8.9 GM/DL (ref 12–16)
MAGNESIUM: 2 MG/DL (ref 1.6–2.4)
MCH RBC QN AUTO: 25.5 PG (ref 26–33)
MCHC RBC AUTO-ENTMCNC: 31.3 GM/DL (ref 32.2–35.5)
MCV RBC AUTO: 81.4 FL (ref 81–99)
PHOSPHORUS: 2.7 MG/DL (ref 2.4–4.7)
PLATELET # BLD: 192 THOU/MM3 (ref 130–400)
PMV BLD AUTO: 8.7 FL (ref 9.4–12.4)
POTASSIUM SERPL-SCNC: 3.9 MEQ/L (ref 3.5–5.2)
RBC # BLD: 3.49 MILL/MM3 (ref 4.2–5.4)
SODIUM BLD-SCNC: 135 MEQ/L (ref 135–145)
WBC # BLD: 5.2 THOU/MM3 (ref 4.8–10.8)

## 2018-09-30 PROCEDURE — 2580000003 HC RX 258: Performed by: SURGERY

## 2018-09-30 PROCEDURE — 2500000003 HC RX 250 WO HCPCS: Performed by: SURGERY

## 2018-09-30 PROCEDURE — 1200000000 HC SEMI PRIVATE

## 2018-09-30 PROCEDURE — 6360000002 HC RX W HCPCS: Performed by: NURSE PRACTITIONER

## 2018-09-30 PROCEDURE — 80048 BASIC METABOLIC PNL TOTAL CA: CPT

## 2018-09-30 PROCEDURE — 2580000003 HC RX 258: Performed by: NURSE PRACTITIONER

## 2018-09-30 PROCEDURE — 82330 ASSAY OF CALCIUM: CPT

## 2018-09-30 PROCEDURE — 82948 REAGENT STRIP/BLOOD GLUCOSE: CPT

## 2018-09-30 PROCEDURE — C9113 INJ PANTOPRAZOLE SODIUM, VIA: HCPCS | Performed by: STUDENT IN AN ORGANIZED HEALTH CARE EDUCATION/TRAINING PROGRAM

## 2018-09-30 PROCEDURE — 83735 ASSAY OF MAGNESIUM: CPT

## 2018-09-30 PROCEDURE — 2709999900 HC NON-CHARGEABLE SUPPLY

## 2018-09-30 PROCEDURE — 85027 COMPLETE CBC AUTOMATED: CPT

## 2018-09-30 PROCEDURE — 99232 SBSQ HOSP IP/OBS MODERATE 35: CPT | Performed by: SURGERY

## 2018-09-30 PROCEDURE — 84100 ASSAY OF PHOSPHORUS: CPT

## 2018-09-30 PROCEDURE — 6360000002 HC RX W HCPCS: Performed by: STUDENT IN AN ORGANIZED HEALTH CARE EDUCATION/TRAINING PROGRAM

## 2018-09-30 PROCEDURE — 6370000000 HC RX 637 (ALT 250 FOR IP): Performed by: SURGERY

## 2018-09-30 RX ADMIN — CALCIUM GLUCONATE: 94 INJECTION, SOLUTION INTRAVENOUS at 17:41

## 2018-09-30 RX ADMIN — Medication 10 ML: at 08:42

## 2018-09-30 RX ADMIN — DRONABINOL 2.5 MG: 2.5 CAPSULE ORAL at 08:39

## 2018-09-30 RX ADMIN — Medication 10 ML: at 08:39

## 2018-09-30 RX ADMIN — PANTOPRAZOLE SODIUM 40 MG: 40 INJECTION, POWDER, FOR SOLUTION INTRAVENOUS at 08:39

## 2018-09-30 RX ADMIN — DRONABINOL 2.5 MG: 2.5 CAPSULE ORAL at 22:05

## 2018-09-30 RX ADMIN — FERROUS SULFATE TAB 325 MG (65 MG ELEMENTAL FE) 325 MG: 325 (65 FE) TAB at 08:39

## 2018-09-30 RX ADMIN — SODIUM CHLORIDE: 9 INJECTION, SOLUTION INTRAVENOUS at 17:41

## 2018-09-30 ASSESSMENT — PAIN SCALES - GENERAL
PAINLEVEL_OUTOF10: 0

## 2018-09-30 NOTE — PROGRESS NOTES
[facility]   Daily Progress Note  Dr. Agustin Shirley for Dr Kojo Little      Patient:  Joshua Yancey  YOB: 1940  Date of Service: 9/30/2018  MRN: [id number]   Acct:  [de-identified]   Primary Care Physician: Any Eastman MD    Chief Complaint: abdominal distention  PAD#6    Subjective:   Patient in chair complains of diarrhea. Abdomen distended but soft. Denies nausea or emesis. Tolerating full liquids with no complaints of abdominal pain. Ambulating. 10 point review of symptoms otherwise negative     Past Medical History:        Diagnosis Date    Arthritis     Cellulitis     Difficult intubation 2006    GERD (gastroesophageal reflux disease)     H pylori ulcer     Hx of blood clots     RIGHT LEG    Nausea & vomiting     Pneumonia     S/P partial resection of colon 7/6/2018    Shingles     Torus mandibularis 2006       Past Surgical History:        Procedure Laterality Date    ABDOMEN SURGERY      ABDOMINAL ADHESION SURGERY  1990's    Dr Briseida Back  09/09/2013    Lysis of Adhesions-Dr. Varma Learn     BACK SURGERY  1979/1980/2006/2013    X3    CHOLECYSTECTOMY  1975    [facility]    COLON SURGERY      COLONOSCOPY  over 10 yrs ago    COLONOSCOPY  08/11/2017    Dr Ammon Lesches, COLON, DIAGNOSTIC     [address] ?     [facility]    LUMBAR LAMINECTOMY  4/15/2013    OTHER SURGICAL HISTORY  7/8/2013    evacation of seroma-back     TX OFFICE/OUTPT VISIT,PROCEDURE ONLY N/A 6/25/2018    ABDOMINAL EXPLORATION AND ESTENDED LEFT COLON RESECTION, LYSIS OF COMPLEX ADHESIONS performed by Cheyanne Guidry MD at [address]  08/11/2017    Dr Payton He Left 9/28/2018    EGD BIOPSY performed by Cheyanne Guidry MD at [facility]       Home Medications:   No current facility-administered medications on file prior to encounter. Current Outpatient Prescriptions on File Prior to Encounter   Medication Sig Dispense Refill    acetaminophen (TYLENOL) 325 MG tablet Take 650 mg by mouth every 6 hours as needed for Pain      ibuprofen (ADVIL;MOTRIN) 200 MG tablet Take 200 mg by mouth every 6 hours as needed for Pain      Omega-3 Fatty Acids (FISH OIL) 1000 MG CAPS Take 1,000 mg by mouth daily      Multiple Vitamins-Minerals (PRESERVISION AREDS 2 PO) Take 1 tablet by mouth daily         Allergies:  Corticosteroids and Pregabalin    Social History:    reports that she has never smoked. She has never used smokeless tobacco. She reports that she does not drink alcohol or use drugs. Family History:   Family History   Problem Relation Age of Onset    Arthritis Mother     Asthma Mother     Arthritis Father     Cancer Father     High Blood Pressure Father     Diabetes Brother     Heart Disease Brother     COPD Brother     High Blood Pressure Brother      Vitals:   Vitals:    09/30/18 0754   BP: (!) 117/57   Pulse: 72   Resp: 16   Temp: 96.5 °F (35.8 °C)   SpO2: 97%      BMI: Body mass index is 25.33 kg/m².     Exam:  Physical Examination: General appearance - oriented to person, place, and time and chronically ill appearing  Mental status - alert, oriented to person, place, and time  Chest - clear to auscultation, no wheezes, rales or rhonchi, symmetric air entry  Heart - normal rate and regular rhythm  Abdomen - distended but soft, non tender, + bowel sounds  Neurological - alert, oriented, normal speech, no focal findings or movement disorder noted  Musculoskeletal - no joint tenderness, deformity or swelling  Extremities - peripheral pulses normal, no pedal edema, no clubbing or cyanosis  Skin - normal coloration and turgor, no rashes, no suspicious skin lesions noted    Review of Labs and Diagnostic Testing:    Recent Results (from the past 24 hour(s))   POCT glucose    Collection Time: 09/29/18 11:55 AM Result Value Ref Range    POC Glucose 119 (H) 70 - 108 mg/dl   POCT glucose    Collection Time: 09/29/18  6:18 PM   Result Value Ref Range    POC Glucose 109 (H) 70 - 108 mg/dl   POCT glucose    Collection Time: 09/29/18 11:24 PM   Result Value Ref Range    POC Glucose 108 70 - 108 mg/dl   POCT glucose    Collection Time: 09/30/18  5:01 AM   Result Value Ref Range    POC Glucose 85 70 - 108 mg/dl   Basic Metabolic Panel    Collection Time: 09/30/18  5:25 AM   Result Value Ref Range    Sodium 135 135 - 145 meq/L    Potassium 3.9 3.5 - 5.2 meq/L    Chloride 100 98 - 111 meq/L    CO2 27 23 - 33 meq/L    Glucose 97 70 - 108 mg/dL    BUN 17 7 - 22 mg/dL    CREATININE 0.3 (L) 0.4 - 1.2 mg/dL    Calcium 8.0 (L) 8.5 - 10.5 mg/dL   Magnesium    Collection Time: 09/30/18  5:25 AM   Result Value Ref Range    Magnesium 2.0 1.6 - 2.4 mg/dL   Phosphorus    Collection Time: 09/30/18  5:25 AM   Result Value Ref Range    Phosphorus 2.7 2.4 - 4.7 mg/dL   Calcium, Ionized    Collection Time: 09/30/18  5:25 AM   Result Value Ref Range    Calcium, Ion 1.19 1.12 - 1.32 mmol/L   CBC    Collection Time: 09/30/18  5:25 AM   Result Value Ref Range    WBC 5.2 4.8 - 10.8 thou/mm3    RBC 3.49 (L) 4.20 - 5.40 mill/mm3    Hemoglobin 8.9 (L) 12.0 - 16.0 gm/dl    Hematocrit 28.4 (L) 37.0 - 47.0 %    MCV 81.4 81.0 - 99.0 fL    MCH 25.5 (L) 26.0 - 33.0 pg    MCHC 31.3 (L) 32.2 - 35.5 gm/dl    RDW-CV 15.9 (H) 11.5 - 14.5 %    RDW-SD 47.1 (H) 35.0 - 45.0 fL    Platelets 689 795 - 757 thou/mm3    MPV 8.7 (L) 9.4 - 12.4 fL   Anion Gap    Collection Time: 09/30/18  5:25 AM   Result Value Ref Range    Anion Gap 8.0 8.0 - 16.0 meq/L   Glomerular Filtration Rate, Estimated    Collection Time: 09/30/18  5:25 AM   Result Value Ref Range    Est, Glom Filt Rate >90 ml/min/1.73m2       Radiology:     Ct Abdomen Pelvis W Iv Contrast Additional Contrast? Oral    Result Date: 9/24/2018  PROCEDURE: CT ABDOMEN PELVIS W IV CONTRAST CLINICAL INFORMATION: r/o SBO, in the pelvis. There are degenerative and surgical changes in    the thoracolumbar spine.       There is adequate opacification of the colon in the left hemiabdomen. There is no evidence of stricture in this region. The colon is relatively featureless. There is passage of contrast into the transverse colon and cecum. There is reflux of contrast    into small bowel loops.           Impression       No evidence of colonic obstruction or stricture.       Final report electronically signed by Dr. Phyllis Mane on 9/25/2018 2:12 PM       CT Enterography  Narrative   CT ABDOMEN AND PELVIS WITH CONTRAST/(ENTEROGRAPHY STUDY):       CLINICAL INFORMATION: 66-year-old female with generalized abdominal bloating and pain.       TECHNIQUE: Multiple axial 5 mm images of the abdomen, pelvis, lung bases were obtained following the administration of Enterovue oral contrast material and intravenous contrast material (ISOVUE). Computer generated coronal images of the abdomen and    pelvis were reconstructed. ALL CT SCANS AT THIS FACILITY use dose modulation, iterative reconstruction, and/or weight-based dosing when appropriate to reduce radiation dose to as low as reasonably achievable.           FINDINGS:        There is some atelectasis/scarring at the left lung base. No pleural effusions or pneumothorax.       The heart is normal in size. There is no pericardial effusion.       The liver and spleen both have smooth contours and are normal in size.       The pancreas and adrenal glands are within normal limits.       The gallbladder is not visualized.       There is prominence of the intrahepatic biliary ducts, likely compensatory in nature.       Kidneys are symmetric in size, shape and degree of enhancement. No hydronephrosis.       Atherosclerotic calcifications seen throughout the aorta.  No aneurysmal dilatation.       The IVC has a normal caliber.       Multiple air-fluid levels are again seen within the small bowel there is

## 2018-10-01 ENCOUNTER — APPOINTMENT (OUTPATIENT)
Dept: GENERAL RADIOLOGY | Age: 78
DRG: 329 | End: 2018-10-01
Payer: MEDICARE

## 2018-10-01 LAB
ANION GAP SERPL CALCULATED.3IONS-SCNC: 9 MEQ/L (ref 8–16)
BUN BLDV-MCNC: 16 MG/DL (ref 7–22)
CALCIUM IONIZED: 1.19 MMOL/L (ref 1.12–1.32)
CALCIUM SERPL-MCNC: 8.3 MG/DL (ref 8.5–10.5)
CHLORIDE BLD-SCNC: 96 MEQ/L (ref 98–111)
CO2: 25 MEQ/L (ref 23–33)
CREAT SERPL-MCNC: 0.3 MG/DL (ref 0.4–1.2)
GFR SERPL CREATININE-BSD FRML MDRD: > 90 ML/MIN/1.73M2
GLUCOSE BLD-MCNC: 100 MG/DL (ref 70–108)
GLUCOSE BLD-MCNC: 102 MG/DL (ref 70–108)
GLUCOSE BLD-MCNC: 105 MG/DL (ref 70–108)
GLUCOSE BLD-MCNC: 98 MG/DL (ref 70–108)
MAGNESIUM: 2.1 MG/DL (ref 1.6–2.4)
PHOSPHORUS: 3 MG/DL (ref 2.4–4.7)
POTASSIUM SERPL-SCNC: 4.1 MEQ/L (ref 3.5–5.2)
SODIUM BLD-SCNC: 130 MEQ/L (ref 135–145)

## 2018-10-01 PROCEDURE — 74018 RADEX ABDOMEN 1 VIEW: CPT

## 2018-10-01 PROCEDURE — 2580000003 HC RX 258: Performed by: NURSE PRACTITIONER

## 2018-10-01 PROCEDURE — APPSS30 APP SPLIT SHARED TIME 16-30 MINUTES: Performed by: NURSE PRACTITIONER

## 2018-10-01 PROCEDURE — 1200000000 HC SEMI PRIVATE

## 2018-10-01 PROCEDURE — 6360000002 HC RX W HCPCS: Performed by: STUDENT IN AN ORGANIZED HEALTH CARE EDUCATION/TRAINING PROGRAM

## 2018-10-01 PROCEDURE — 2709999900 HC NON-CHARGEABLE SUPPLY

## 2018-10-01 PROCEDURE — 82330 ASSAY OF CALCIUM: CPT

## 2018-10-01 PROCEDURE — 99233 SBSQ HOSP IP/OBS HIGH 50: CPT | Performed by: SURGERY

## 2018-10-01 PROCEDURE — 84100 ASSAY OF PHOSPHORUS: CPT

## 2018-10-01 PROCEDURE — 2500000003 HC RX 250 WO HCPCS: Performed by: SURGERY

## 2018-10-01 PROCEDURE — 2580000003 HC RX 258: Performed by: SURGERY

## 2018-10-01 PROCEDURE — A6250 SKIN SEAL PROTECT MOISTURIZR: HCPCS

## 2018-10-01 PROCEDURE — 6370000000 HC RX 637 (ALT 250 FOR IP): Performed by: SURGERY

## 2018-10-01 PROCEDURE — 82948 REAGENT STRIP/BLOOD GLUCOSE: CPT

## 2018-10-01 PROCEDURE — 80048 BASIC METABOLIC PNL TOTAL CA: CPT

## 2018-10-01 PROCEDURE — 83735 ASSAY OF MAGNESIUM: CPT

## 2018-10-01 PROCEDURE — C9113 INJ PANTOPRAZOLE SODIUM, VIA: HCPCS | Performed by: STUDENT IN AN ORGANIZED HEALTH CARE EDUCATION/TRAINING PROGRAM

## 2018-10-01 PROCEDURE — 6360000002 HC RX W HCPCS: Performed by: NURSE PRACTITIONER

## 2018-10-01 RX ADMIN — DRONABINOL 2.5 MG: 2.5 CAPSULE ORAL at 19:50

## 2018-10-01 RX ADMIN — SODIUM CHLORIDE: 9 INJECTION, SOLUTION INTRAVENOUS at 18:21

## 2018-10-01 RX ADMIN — Medication 10 ML: at 10:24

## 2018-10-01 RX ADMIN — CALCIUM GLUCONATE: 94 INJECTION, SOLUTION INTRAVENOUS at 18:20

## 2018-10-01 RX ADMIN — Medication 10 ML: at 19:50

## 2018-10-01 RX ADMIN — ACETAMINOPHEN 650 MG: 325 TABLET ORAL at 19:49

## 2018-10-01 RX ADMIN — Medication 10 ML: at 21:00

## 2018-10-01 RX ADMIN — PANTOPRAZOLE SODIUM 40 MG: 40 INJECTION, POWDER, FOR SOLUTION INTRAVENOUS at 10:21

## 2018-10-01 RX ADMIN — Medication 10 ML: at 10:21

## 2018-10-01 ASSESSMENT — PAIN SCALES - GENERAL
PAINLEVEL_OUTOF10: 3
PAINLEVEL_OUTOF10: 0
PAINLEVEL_OUTOF10: 0
PAINLEVEL_OUTOF10: 5

## 2018-10-01 ASSESSMENT — PAIN DESCRIPTION - ORIENTATION: ORIENTATION: LOWER

## 2018-10-01 ASSESSMENT — PAIN DESCRIPTION - PAIN TYPE: TYPE: ACUTE PAIN

## 2018-10-01 ASSESSMENT — PAIN DESCRIPTION - DESCRIPTORS: DESCRIPTORS: ACHING

## 2018-10-01 ASSESSMENT — PAIN DESCRIPTION - PROGRESSION: CLINICAL_PROGRESSION: NOT CHANGED

## 2018-10-01 ASSESSMENT — PAIN DESCRIPTION - FREQUENCY: FREQUENCY: INTERMITTENT

## 2018-10-01 ASSESSMENT — PAIN DESCRIPTION - LOCATION: LOCATION: ABDOMEN

## 2018-10-01 NOTE — PROGRESS NOTES
Nutrition Assessment    Type and Reason for Visit: Reassess    Nutrition Recommendations: Recommend TPN changes with next bag: Increase kcals to 25/kgm. Continue with 2 gm protein/kgm and 30% kcals from lipids; dosing wt: 65 kgm. Resume diet when ok with MD.  Consider SLP evaluation if trouble observed with swallowing. Malnutrition Assessment:  · Malnutrition Status: Meets the criteria for severe malnutrition  · Context: Chronic illness  · Findings of the 6 clinical characteristics of malnutrition (Minimum of 2 out of 6 clinical characteristics is required to make the diagnosis of moderate or severe Protein Calorie Malnutrition based on AND/ASPEN Guidelines):  1. Energy Intake-Less than or equal to 75%, greater than or equal to 1 month    2. Fat Loss-Severe subcutaneous fat loss, Orbital  3. Muscle Loss-Severe muscle mass loss, Clavicles (pectoralis and deltoids), Scapula (trapezius), Temples (temporalis muscle)    Nutrition Diagnosis:   · Problem: Severe malnutrition, in context of chronic illness  · Etiology: related to Alteration in GI function     Signs and symptoms:  as evidenced by Diet history of poor intake, Severe loss of subcutaneous fat, Severe muscle loss    Nutrition Assessment:  · Subjective Assessment: Pt. Seen. Reports not feeling well. States belly is Mariza again and hurts\". Denies nausea. 1 BM noted past 24 hours. Spoke with Laura Aguilar CNP. Reports she is making pt NPO until she speaks with Dr. Catalino Sesay. States pt's abdomen is as big as it was on admission. Questions need to return to surgery. 10/1: Glucose 98, Potassium 4.1, Magnesium 2.1, Phosphorus 3.0, BUN 16, Cr 0.3. Rx includes Zofran, Marinol.    · Wound Type: None  · Current Nutrition Therapies:  · Oral Diet Orders: Low Fiber   · Oral Diet intake: 26-50%  · Parenteral Nutrition Orders:  · Type and Formula: 3-in-1 Custom (Dosing wt 65 kgm, 20 kcals/kgm, 2 gm protein/kgm and 30% kcals from lipids)   · Lipids: Daily  · Additives: per Rph  · Rate/Volume: 100 ml/hr  · Duration: Continuous 24 hrs  · Current PN Order Provides: 1301 kcals, 130 gm protein and 115 gm CHO/day  · Next bag will provide pt with 1625 kcals, 130 gm protein and 181 gm CHO/day  · Anthropometric Measures:  · Ht: 5' 3\" (160 cm)   · Current Body Wt: 143 lb (64.9 kg) (9/24, no edema)  · Admission Body Wt: 143 lb (64.9 kg) (9/24, no edema)  · Usual Body Wt:  (per EMR (6/25/18): 154# 12.8oz)  · % Weight Change: 7.1% unintentional wt. loss,  3 months  · Ideal Body Wt: 115 lb (52.2 kg),   · BMI Classification: BMI 25.0 - 29.9 Overweight  · Comparative Standards (Estimated Nutrition Needs):  · Estimated Daily Total Kcal: 1623-1947kcals (25-30kcals/kgm admit wt. 64.9kgm)  · Estimated Daily Protein (g):  grams (1.5-2 grams protein/kgm ideal wt 52kgm)    Estimated Intake vs Estimated Needs: Intake Less Than Needs    Nutrition Risk Level: High    Nutrition Interventions:   Continue NPO, Modify current Parenteral Nutrition  Continued Inpatient Monitoring, Education Initiated, Coordination of Care (10/1 Discussed TPN with pt.  )    Nutrition Evaluation:   · Evaluation: Progressing toward goals   · Goals: Pt. will tolerate TPN to meet 75% or more of nutritional needs until able to transition to po diet. · Monitoring: NPO Status, PN Intake, PN Tolerance, Ascites/Edema, Weight, Pertinent Labs, Nausea or Vomiting, Constipation, Patient/Family Education    See Adult Nutrition Doc Flowsheet for more detail.      Electronically signed by aJiro Oliveros RD, LD on 10/1/18 at 9:29 AM    Contact Number: [phone]

## 2018-10-01 NOTE — CONSULTS
with distention  7.  Follow      Assessment and Plan discussed with Dr Jesica Mckinnon, APRN, CNP, 10/1/2018, 3:03 PM

## 2018-10-01 NOTE — PROGRESS NOTES
TPN Follow Up Note    Assessment: Low fiber diet  Electrolyte Replacement: None    TPN changes for (today) at 1800:      Increase NaCl to 180mEq    RDLD increased macronutrients as well (see dietary progress note)    Asked RN to weigh patient to assess TPN rate  [facility]  10/1/2018  12:33 PM      Re-check [facility] 10/2/18

## 2018-10-01 NOTE — PROGRESS NOTES
Collection Time: 09/30/18 12:09 PM   Result Value Ref Range    POC Glucose 110 (H) 70 - 108 mg/dl   POCT glucose    Collection Time: 09/30/18  5:03 PM   Result Value Ref Range    POC Glucose 98 70 - 108 mg/dl   POCT glucose    Collection Time: 09/30/18 11:45 PM   Result Value Ref Range    POC Glucose 100 70 - 108 mg/dl   Basic Metabolic Panel    Collection Time: 10/01/18  4:15 AM   Result Value Ref Range    Sodium 130 (L) 135 - 145 meq/L    Potassium 4.1 3.5 - 5.2 meq/L    Chloride 96 (L) 98 - 111 meq/L    CO2 25 23 - 33 meq/L    Glucose 98 70 - 108 mg/dL    BUN 16 7 - 22 mg/dL    CREATININE 0.3 (L) 0.4 - 1.2 mg/dL    Calcium 8.3 (L) 8.5 - 10.5 mg/dL   Calcium, Ionized    Collection Time: 10/01/18  4:15 AM   Result Value Ref Range    Calcium, Ion 1.19 1.12 - 1.32 mmol/L   Phosphorus    Collection Time: 10/01/18  4:15 AM   Result Value Ref Range    Phosphorus 3.0 2.4 - 4.7 mg/dL   Magnesium    Collection Time: 10/01/18  4:15 AM   Result Value Ref Range    Magnesium 2.1 1.6 - 2.4 mg/dL   Anion Gap    Collection Time: 10/01/18  4:15 AM   Result Value Ref Range    Anion Gap 9.0 8.0 - 16.0 meq/L   Glomerular Filtration Rate, Estimated    Collection Time: 10/01/18  4:15 AM   Result Value Ref Range    Est, Glom Filt Rate >90 ml/min/1.73m2   POCT glucose    Collection Time: 10/01/18  5:57 AM   Result Value Ref Range    POC Glucose 102 70 - 108 mg/dl       Radiology:     Ct Abdomen Pelvis W Iv Contrast Additional Contrast? Oral    Result Date: 9/24/2018  PROCEDURE: CT ABDOMEN PELVIS W IV CONTRAST CLINICAL INFORMATION: r/o SBO, decreased appetite, decreased stool . COMPARISON: 6/19/2018 TECHNIQUE: 5 mm axial CT images were obtained through the abdomen and pelvis after the administration of intravenous and oral contrast. Coronal and sagittal reconstructions were obtained.  All CT scans at this facility use dose modulation, iterative reconstruction, and/or weight-based dosing when appropriate to reduce radiation dose to as low as reasonably achievable. FINDINGS: minimal atelectasis or scarring in the lung bases. Liver, spleen and adrenal glands are unremarkable. Gallbladder is not seen. Partially atrophic changes involving the pancreas. Fullness of the biliary ducts, likely postsurgical. Kidneys are symmetric without hydronephrosis. Postsurgical changes lumbar spine. Degenerative changes lumbar spine. SI joints are symmetric. Normal caliber abdominal aorta with atherosclerotic changes. No free intraperitoneal air. Postsurgical changes sigmoid colon. The colon is relatively decompressed. There are multiple air-fluid levels within the distal small bowel loops with enhancement of the mucosa and areas of narrowing concerning for obstruction. There is mesenteric edema and likely reactive central mesenteric lymph nodes. Small amount of abdominal and pelvic ascites. There is oral contrast in the stomach and proximal small bowel bowel loops. Findings are concerning for obstruction which may be related to stricture versus adhesion high grade obstruction is suspected given extensive mesenteric edema. Correlation with symptoms and appropriate follow-up advised. Hysterectomy. There are multiple air-fluid levels within the distal small bowel loops with enhancement of the mucosa and areas of narrowing concerning for obstruction. There is mesenteric edema and likely reactive central mesenteric lymph nodes. Small amount of abdominal and pelvic ascites. There is oral contrast in the stomach and proximal small bowel bowel loops. Findings are concerning for obstruction which may be related to stricture versus adhesion. High grade obstruction cannot be excluded given extensive mesenteric edema. Correlation  with symptoms and appropriate follow-up advised. These results were communicated directly with Freddy Kaiser PA-C on 9/24/2018 3:06 PM,  [ ] **This report has been created using voice recognition software.  It may contain minor errors which are

## 2018-10-02 ENCOUNTER — APPOINTMENT (OUTPATIENT)
Dept: GENERAL RADIOLOGY | Age: 78
DRG: 329 | End: 2018-10-02
Payer: MEDICARE

## 2018-10-02 LAB
ABSOLUTE RETIC #: 84 THOU/MM3 (ref 20–115)
ANION GAP SERPL CALCULATED.3IONS-SCNC: 10 MEQ/L (ref 8–16)
BUN BLDV-MCNC: 18 MG/DL (ref 7–22)
CALCIUM IONIZED: 1.2 MMOL/L (ref 1.12–1.32)
CALCIUM SERPL-MCNC: 8.6 MG/DL (ref 8.5–10.5)
CHLORIDE BLD-SCNC: 100 MEQ/L (ref 98–111)
CO2: 25 MEQ/L (ref 23–33)
CREAT SERPL-MCNC: 0.2 MG/DL (ref 0.4–1.2)
FERRITIN: 34 NG/ML (ref 10–291)
GFR SERPL CREATININE-BSD FRML MDRD: > 90 ML/MIN/1.73M2
GLUCOSE BLD-MCNC: 100 MG/DL (ref 70–108)
GLUCOSE BLD-MCNC: 103 MG/DL (ref 70–108)
GLUCOSE BLD-MCNC: 103 MG/DL (ref 70–108)
GLUCOSE BLD-MCNC: 58 MG/DL (ref 70–108)
GLUCOSE BLD-MCNC: 96 MG/DL (ref 70–108)
GLUCOSE BLD-MCNC: 98 MG/DL (ref 70–108)
IMMATURE RETIC FRACT: 23.5 % (ref 3–15.9)
IRON: 55 UG/DL (ref 50–170)
MAGNESIUM: 2.3 MG/DL (ref 1.6–2.4)
PHOSPHORUS: 3.9 MG/DL (ref 2.4–4.7)
POTASSIUM SERPL-SCNC: 4.4 MEQ/L (ref 3.5–5.2)
RETIC HEMOGLOBIN: 27.2 PG (ref 28.2–35.7)
RETICULOCYTE ABSOLUTE COUNT: 2.7 % (ref 0.5–2)
SODIUM BLD-SCNC: 135 MEQ/L (ref 135–145)
TOTAL IRON BINDING CAPACITY: 218 UG/DL (ref 171–450)

## 2018-10-02 PROCEDURE — 2500000003 HC RX 250 WO HCPCS

## 2018-10-02 PROCEDURE — 6360000002 HC RX W HCPCS: Performed by: STUDENT IN AN ORGANIZED HEALTH CARE EDUCATION/TRAINING PROGRAM

## 2018-10-02 PROCEDURE — 6370000000 HC RX 637 (ALT 250 FOR IP)

## 2018-10-02 PROCEDURE — G8988 SELF CARE GOAL STATUS: HCPCS

## 2018-10-02 PROCEDURE — 80048 BASIC METABOLIC PNL TOTAL CA: CPT

## 2018-10-02 PROCEDURE — 2580000003 HC RX 258: Performed by: NURSE PRACTITIONER

## 2018-10-02 PROCEDURE — 2580000003 HC RX 258: Performed by: SURGERY

## 2018-10-02 PROCEDURE — 6360000002 HC RX W HCPCS: Performed by: NURSE PRACTITIONER

## 2018-10-02 PROCEDURE — 97110 THERAPEUTIC EXERCISES: CPT

## 2018-10-02 PROCEDURE — 83550 IRON BINDING TEST: CPT

## 2018-10-02 PROCEDURE — G8978 MOBILITY CURRENT STATUS: HCPCS

## 2018-10-02 PROCEDURE — 83735 ASSAY OF MAGNESIUM: CPT

## 2018-10-02 PROCEDURE — C9113 INJ PANTOPRAZOLE SODIUM, VIA: HCPCS | Performed by: STUDENT IN AN ORGANIZED HEALTH CARE EDUCATION/TRAINING PROGRAM

## 2018-10-02 PROCEDURE — 83540 ASSAY OF IRON: CPT

## 2018-10-02 PROCEDURE — 99232 SBSQ HOSP IP/OBS MODERATE 35: CPT | Performed by: SURGERY

## 2018-10-02 PROCEDURE — 97166 OT EVAL MOD COMPLEX 45 MIN: CPT

## 2018-10-02 PROCEDURE — 97161 PT EVAL LOW COMPLEX 20 MIN: CPT

## 2018-10-02 PROCEDURE — 6370000000 HC RX 637 (ALT 250 FOR IP): Performed by: SURGERY

## 2018-10-02 PROCEDURE — G8979 MOBILITY GOAL STATUS: HCPCS

## 2018-10-02 PROCEDURE — 82948 REAGENT STRIP/BLOOD GLUCOSE: CPT

## 2018-10-02 PROCEDURE — 2709999900 HC NON-CHARGEABLE SUPPLY

## 2018-10-02 PROCEDURE — G8987 SELF CARE CURRENT STATUS: HCPCS

## 2018-10-02 PROCEDURE — 1200000000 HC SEMI PRIVATE

## 2018-10-02 PROCEDURE — APPSS30 APP SPLIT SHARED TIME 16-30 MINUTES: Performed by: NURSE PRACTITIONER

## 2018-10-02 PROCEDURE — 74018 RADEX ABDOMEN 1 VIEW: CPT

## 2018-10-02 PROCEDURE — 84100 ASSAY OF PHOSPHORUS: CPT

## 2018-10-02 PROCEDURE — 82330 ASSAY OF CALCIUM: CPT

## 2018-10-02 PROCEDURE — 82728 ASSAY OF FERRITIN: CPT

## 2018-10-02 PROCEDURE — 85046 RETICYTE/HGB CONCENTRATE: CPT

## 2018-10-02 PROCEDURE — 97535 SELF CARE MNGMENT TRAINING: CPT

## 2018-10-02 RX ADMIN — FERROUS SULFATE TAB 325 MG (65 MG ELEMENTAL FE) 325 MG: 325 (65 FE) TAB at 09:28

## 2018-10-02 RX ADMIN — DRONABINOL 2.5 MG: 2.5 CAPSULE ORAL at 21:08

## 2018-10-02 RX ADMIN — PANTOPRAZOLE SODIUM 40 MG: 40 INJECTION, POWDER, FOR SOLUTION INTRAVENOUS at 09:28

## 2018-10-02 RX ADMIN — Medication 10 ML: at 09:28

## 2018-10-02 RX ADMIN — DRONABINOL 2.5 MG: 2.5 CAPSULE ORAL at 09:29

## 2018-10-02 RX ADMIN — CALCIUM GLUCONATE: 94 INJECTION, SOLUTION INTRAVENOUS at 18:38

## 2018-10-02 RX ADMIN — Medication 10 ML: at 21:08

## 2018-10-02 RX ADMIN — Medication 15 G: at 01:10

## 2018-10-02 ASSESSMENT — PAIN DESCRIPTION - LOCATION: LOCATION: ABDOMEN

## 2018-10-02 ASSESSMENT — PAIN SCALES - GENERAL
PAINLEVEL_OUTOF10: 0
PAINLEVEL_OUTOF10: 0
PAINLEVEL_OUTOF10: 4
PAINLEVEL_OUTOF10: 3

## 2018-10-02 ASSESSMENT — PAIN DESCRIPTION - PAIN TYPE: TYPE: ACUTE PAIN

## 2018-10-02 NOTE — PLAN OF CARE
Problem: Nutrition  Goal: Optimal nutrition therapy  Outcome: Ongoing  Nutrition Problem: Severe malnutrition, in context of chronic illness  Intervention: Food and/or Nutrient Delivery: Continue NPO, Modify current Parenteral Nutrition  Nutritional Goals: Pt. will tolerate TPN to meet 75% or more of nutritional needs until able to transition to po diet.

## 2018-10-02 NOTE — PROGRESS NOTES
Daily  · Additives: per Rph  · Rate/Volume: 100 ml/hr via PICC line  · Duration: Continuous 24 hrs  · Current PN Order Provides: 1625 kcals, 130 gm protein and 181 gm CHO/day  · Next bag (goal) will provide pt with 1950 kcals, 111 gm protein and 271 gm CHO/day  · Anthropometric Measures:  · Ht: 5' 3\" (160 cm)   · Current Body Wt: 138 lb 11.2 oz (62.9 kg) (10/2, trace edema; 10/1: 142# 9.6 oz (+1 edema))  · Admission Body Wt: 143 lb (64.9 kg) (9/24, no edema)  · Usual Body Wt:  (per EMR (6/25/18): 154# 12.8oz)  · % Weight Change: 7.1% unintentional wt. loss,  3 months  · Ideal Body Wt: 115 lb (52.2 kg),    · BMI Classification: BMI 18.5 - 24.9 Normal Weight  · Comparative Standards (Estimated Nutrition Needs):  · Estimated Daily Total Kcal: 1623-1947kcals (25-30kcals/kgm admit wt. 64.9kgm)  · Estimated Daily Protein (g):  grams (1.5-2 grams protein/kgm ideal wt 52kgm)    Estimated Intake vs Estimated Needs: Intake Meets Needs (with TPN at goal)    Nutrition Risk Level: High    Nutrition Interventions:   Continue NPO, Modify current Parenteral Nutrition  Continued Inpatient Monitoring, Education Initiated, Coordination of Care    Nutrition Evaluation:   · Evaluation: Progressing toward goals   · Goals: Pt. will tolerate TPN to meet 75% or more of nutritional needs until able to transition to po diet. · Monitoring: NPO Status, PN Intake, PN Tolerance, Ascites/Edema, Weight, Pertinent Labs, Nausea or Vomiting, Constipation, Patient/Family Education    See Adult Nutrition Doc Flowsheet for more detail.      Electronically signed by Juan Bourgeois RD, LD on 10/2/18 at 11:03 AM    Contact Number: [phone]

## 2018-10-02 NOTE — PROGRESS NOTES
TPN Follow Up Note    Assessment: Patient NPO, repeat KUB today, tap water enemas   Electrolyte Replacement: none    TPN changes for (today) at 1800:   1. See dietician changes to macronutrients  2. Decreased sodium phos to 18mmol  3. Decrease magnesium sulfate to 8.12 meq    Patient has not received insulin since starting TPN. Changed insulin to BID.      Re-check BMP, Mg, PO4, iCa 65/8/39 AM    Mariaelena Kitchen, PharmD, [facility]  10/2/2018 10:02 AM

## 2018-10-02 NOTE — PROGRESS NOTES
Ref Range    Calcium, Ion 1.20 1. 12 - 1.32 mmol/L       Radiology:     Ct Abdomen Pelvis W Iv Contrast Additional Contrast? Oral    Result Date: 9/24/2018  PROCEDURE: CT ABDOMEN PELVIS W IV CONTRAST CLINICAL INFORMATION: r/o SBO, decreased appetite, decreased stool . COMPARISON: 6/19/2018 TECHNIQUE: 5 mm axial CT images were obtained through the abdomen and pelvis after the administration of intravenous and oral contrast. Coronal and sagittal reconstructions were obtained. All CT scans at this facility use dose modulation, iterative reconstruction, and/or weight-based dosing when appropriate to reduce radiation dose to as low as reasonably achievable. FINDINGS: minimal atelectasis or scarring in the lung bases. Liver, spleen and adrenal glands are unremarkable. Gallbladder is not seen. Partially atrophic changes involving the pancreas. Fullness of the biliary ducts, likely postsurgical. Kidneys are symmetric without hydronephrosis. Postsurgical changes lumbar spine. Degenerative changes lumbar spine. SI joints are symmetric. Normal caliber abdominal aorta with atherosclerotic changes. No free intraperitoneal air. Postsurgical changes sigmoid colon. The colon is relatively decompressed. There are multiple air-fluid levels within the distal small bowel loops with enhancement of the mucosa and areas of narrowing concerning for obstruction. There is mesenteric edema and likely reactive central mesenteric lymph nodes. Small amount of abdominal and pelvic ascites. There is oral contrast in the stomach and proximal small bowel bowel loops. Findings are concerning for obstruction which may be related to stricture versus adhesion high grade obstruction is suspected given extensive mesenteric edema. Correlation with symptoms and appropriate follow-up advised. Hysterectomy.      There are multiple air-fluid levels within the distal small bowel loops with enhancement of the mucosa and areas of narrowing concerning for obstruction. There is mesenteric edema and likely reactive central mesenteric lymph nodes. Small amount of abdominal and pelvic ascites. There is oral contrast in the stomach and proximal small bowel bowel loops. Findings are concerning for obstruction which may be related to stricture versus adhesion. High grade obstruction cannot be excluded given extensive mesenteric edema. Correlation  with symptoms and appropriate follow-up advised. These results were communicated directly with Dong Mendoza PA-C on 9/24/2018 3:06 PM,  [ ] **This report has been created using voice recognition software. It may contain minor errors which are inherent in voice recognition technology. ** Final report electronically signed by Dr. Deborah Sanford on 9/24/2018 3:07 PM    Xr Chest Portable    Result Date: 9/24/2018  PROCEDURE: XR CHEST PORTABLE CLINICAL INFORMATION: Heartburn TECHNIQUE: Mobile AP chest radiograph. COMPARISON: PA chest radiograph 6/19/2018 FINDINGS: Cardiomediastinal silhouette is within normal limits. Indistinct reticular opacities are seen at the bilateral lung bases. Degenerative and surgical changes in the thoracolumbar spine are poorly visualized. Soft tissues are unremarkable. Bibasilar atelectasis/infiltrate. **This report has been created using voice recognition software. It may contain minor errors which are inherent in voice recognition technology. ** Final report electronically signed by Dr. Good Treviño on 9/24/2018 11:49 AM    FL BARIUM ENEMA  Narrative   PROCEDURE: FL BARIUM ENEMA       CLINICAL INFORMATION: History of left colon resection, suspected anastomotic stricture.       TECHNIQUE: A preliminary abdominal radiograph was obtained. Gastrografin contrast was instilled into the colon through a rectal tube and filled the colon and a retrograde fashion. A total of 11 spot images and radiographs were obtained.  Total fluoroscopy    time 1.9 minutes.       COMPARISON: None.       Correlation: CT abdomen and pelvis 9/24/2018       FINDINGS:        Preliminary radiograph demonstrates multiple dilated gas-filled large and small bowel loops. Distal bowel gas is present. Surgical clips are seen in the left hemiabdomen. Phleboliths are noted in the pelvis. There are degenerative and surgical changes in    the thoracolumbar spine.       There is adequate opacification of the colon in the left hemiabdomen. There is no evidence of stricture in this region. The colon is relatively featureless. There is passage of contrast into the transverse colon and cecum. There is reflux of contrast    into small bowel loops.           Impression       No evidence of colonic obstruction or stricture.       Final report electronically signed by Dr. Good Treviño on 9/25/2018 2:12 PM       CT Enterography  Narrative   CT ABDOMEN AND PELVIS WITH CONTRAST/(ENTEROGRAPHY STUDY):       CLINICAL INFORMATION: 70-year-old female with generalized abdominal bloating and pain.       TECHNIQUE: Multiple axial 5 mm images of the abdomen, pelvis, lung bases were obtained following the administration of Enterovue oral contrast material and intravenous contrast material (ISOVUE). Computer generated coronal images of the abdomen and    pelvis were reconstructed. ALL CT SCANS AT THIS FACILITY use dose modulation, iterative reconstruction, and/or weight-based dosing when appropriate to reduce radiation dose to as low as reasonably achievable.           FINDINGS:        There is some atelectasis/scarring at the left lung base. No pleural effusions or pneumothorax.       The heart is normal in size.  There is no pericardial effusion.       The liver and spleen both have smooth contours and are normal in size.       The pancreas and adrenal glands are within normal limits.       The gallbladder is not visualized.       There is prominence of the intrahepatic biliary ducts, likely compensatory in nature.       Kidneys are symmetric in size, shape and degree of enhancement. No hydronephrosis.       Atherosclerotic calcifications seen throughout the aorta. No aneurysmal dilatation.       The IVC has a normal caliber.       Multiple air-fluid levels are again seen within the small bowel there is questionable gas within the wall of the stomach which could represent pneumatosis. The majority of the colon is decompressed. There is some edema throughout the mesentery, slightly    decreased when compared to the previous study.       There is no free intraperitoneal air.           Impression   Redemonstration of multiple air-fluid levels throughout the small bowel with enhancement of the mucosa. There is near complete decompression of the large intestine. These findings are highly concerning for small bowel obstruction. There is persistence of    edema throughout the mesentery.       There are tiny foci of gas at the nondependent portion of the stomach for which pneumatosis cannot be entirely excluded.       Findings discussed by Dr. Frieda Dunlap with nurse Francisco Mckinney 7:10 PM 9/26/2018 via telephone.       **This report has been created using voice recognition software.  It may contain minor errors which are inherent in voice recognition technology. **       Final report electronically signed by Dr Zenaida Colin on 9/26/2018 7:13 PM     KUB   PROCEDURE: XR ABDOMEN (KUB) (SINGLE AP VIEW)       CLINICAL INFORMATION: obstruction, distention, upper abd pain, .       COMPARISON:  film from a barium enema dated 9/25/2018       TECHNIQUE: A supine AP view of the abdomen was obtained.         FINDINGS:        Bowel gas pattern: There is gas within mild to moderately distended small bowel and colon consistent with a generalized ileus. Findings are similar to but less severe compared to the prior study.       Free intraperitoneal air: none visualized       Miscellaneous: No suspicious abdominal pelvic calcifications. There are again left lower quadrant surgical clips.  There is an the issue discussed with patient and daughter plan exploration on Thursday the fourth patient and daughter are agreeable  10/2/2018   11:35 PM

## 2018-10-02 NOTE — PROGRESS NOTES
been providing increasing assist with IADLs around home. Walks with cane or RW.     Objective        Overall Cognitive Status: WFL         Sensation  Overall Sensation Status: WFL                 LUE AROM (degrees)  LUE AROM : WNL          RUE AROM (degrees)  RUE AROM : WNL       LUE Strength  LUE Strength Comment: generalized weakness noted throughout                RUE Strength  RUE Strength Comment: generalized weakness noted throughout                 ADL  Grooming: Increased time to complete, Setup, Contact guard assistance (hand hygiene standing sinkside at CGA; pt initally equesting oral care however then refused when standing)  LE Dressing: Maximum assistance (adaptive technique for doffing, unable to thu socks)  Toileting: Contact guard assistance, Increased time to complete (significantly inc time permitted)  Additional Comments: declines further ADLs at time of eval     Bed mobility  Comment: received and returned to bed    Transfers  Sit to stand: Contact guard assistance  Stand to sit: Contact guard assistance  Transfer Comments: cues for safety, pt impulsive  Toilet Transfers  Toilet - Technique: Ambulating  Equipment Used: Standard toilet  Toilet Transfer: Contact guard assistance  Toilet Transfers Comments: cues for safe approach    Balance  Sitting Balance: Stand by assistance  Standing Balance: Contact guard assistance     Time: 45 seconds, 45 seconds, 2 min  Activity: prep for mobility, toilet hgiene and clothing management, grooming at sink     Functional Mobility  Functional - Mobility Device: Cane (quad cane)  Activity: To/from bathroom, Other (within nursing uni)  Assist Level: Contact guard assistance  Functional Mobility Comments: min unsteadiness noted, slow pace, no LOB; demonstrated fatigue             Activity Tolerance:  Activity Tolerance: Patient Tolerated treatment well, Patient limited by fatigue  Activity Tolerance: Treatment initaition: OT eval completed, see above for more

## 2018-10-02 NOTE — PROGRESS NOTES
evidence of bowel obstruction.       **This report has been created using voice recognition software. It may contain minor errors which are inherent in voice recognition technology. **       Final report electronically signed by Dr. Asha Valdez on 10/2/2018 3:09 AM         PROCEDURE: XR ABDOMEN (KUB) (SINGLE AP VIEW) 10-2-18       CLINICAL INFORMATION: obstruction, distention, upper abd pain, .       COMPARISON: KUB dated 10/1/2018       TECHNIQUE: A supine AP view of the abdomen was obtained.       FINDINGS:        Lower chest: unremarkable.       Lines/tubes: None       Bowel gas pattern: There are again moderately distended air-filled bowel loops in the abdomen and pelvis which may represent an ileus or obstruction.        Free intraperitoneal air: none visualized       Miscellaneous: No suspicious abdominal pelvic calcifications. Surgical clips again noted in the left lower quadrant and anastomotic staple line again noted in the left pelvis.       Regional skeleton: Stable postoperative changes of the lumbar spine with multilevel pedicle screw fusion. 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           Impression       Stable moderately distended air-filled bowel loops in the abdomen and pelvis which may represent an ileus or obstruction.           **This report has been created using voice recognition software. It may contain minor errors which are inherent in voice recognition technology. **       Final report electronically signed by Dr. Asha Valdez on 10/2/2018 6:54 AM         Lab Results   Component Value Date    IRON 55 10/02/2018    TIBC 218 10/02/2018    [id number] 475 09/24/2018    FERRITIN 34 10/02/2018    FOLATE 15.2 09/24/2018    RETICABS 2.7 10/02/2018       Assessment:  1. Abdominal distention  2. Upper abdominal pain, \"fullness\"  3. Poor appetite  4. Weight loss 10 lbs in last month per daughter  5. Melena PTA. Onset after starting iron per daughter. 6. Constipation.  Small BM 2

## 2018-10-03 ENCOUNTER — APPOINTMENT (OUTPATIENT)
Dept: GENERAL RADIOLOGY | Age: 78
DRG: 329 | End: 2018-10-03
Payer: MEDICARE

## 2018-10-03 LAB
ANION GAP SERPL CALCULATED.3IONS-SCNC: 9 MEQ/L (ref 8–16)
BUN BLDV-MCNC: 17 MG/DL (ref 7–22)
CALCIUM IONIZED: 1.23 MMOL/L (ref 1.12–1.32)
CALCIUM SERPL-MCNC: 8.7 MG/DL (ref 8.5–10.5)
CHLORIDE BLD-SCNC: 101 MEQ/L (ref 98–111)
CO2: 24 MEQ/L (ref 23–33)
CREAT SERPL-MCNC: 0.2 MG/DL (ref 0.4–1.2)
GFR SERPL CREATININE-BSD FRML MDRD: > 90 ML/MIN/1.73M2
GLUCOSE BLD-MCNC: 103 MG/DL (ref 70–108)
GLUCOSE BLD-MCNC: 107 MG/DL (ref 70–108)
GLUCOSE BLD-MCNC: 108 MG/DL (ref 70–108)
GLUCOSE BLD-MCNC: 112 MG/DL (ref 70–108)
GLUCOSE BLD-MCNC: 112 MG/DL (ref 70–108)
MAGNESIUM: 2.3 MG/DL (ref 1.6–2.4)
PHOSPHORUS: 3.9 MG/DL (ref 2.4–4.7)
POTASSIUM SERPL-SCNC: 4.4 MEQ/L (ref 3.5–5.2)
SODIUM BLD-SCNC: 134 MEQ/L (ref 135–145)

## 2018-10-03 PROCEDURE — 2580000003 HC RX 258: Performed by: NURSE PRACTITIONER

## 2018-10-03 PROCEDURE — 82948 REAGENT STRIP/BLOOD GLUCOSE: CPT

## 2018-10-03 PROCEDURE — 97530 THERAPEUTIC ACTIVITIES: CPT

## 2018-10-03 PROCEDURE — 6370000000 HC RX 637 (ALT 250 FOR IP): Performed by: SURGERY

## 2018-10-03 PROCEDURE — 6360000002 HC RX W HCPCS: Performed by: NURSE PRACTITIONER

## 2018-10-03 PROCEDURE — 2580000003 HC RX 258: Performed by: SURGERY

## 2018-10-03 PROCEDURE — 82330 ASSAY OF CALCIUM: CPT

## 2018-10-03 PROCEDURE — 83735 ASSAY OF MAGNESIUM: CPT

## 2018-10-03 PROCEDURE — 74018 RADEX ABDOMEN 1 VIEW: CPT

## 2018-10-03 PROCEDURE — 2500000003 HC RX 250 WO HCPCS

## 2018-10-03 PROCEDURE — 1200000000 HC SEMI PRIVATE

## 2018-10-03 PROCEDURE — 84100 ASSAY OF PHOSPHORUS: CPT

## 2018-10-03 PROCEDURE — 2709999900 HC NON-CHARGEABLE SUPPLY

## 2018-10-03 PROCEDURE — 80048 BASIC METABOLIC PNL TOTAL CA: CPT

## 2018-10-03 PROCEDURE — 97116 GAIT TRAINING THERAPY: CPT

## 2018-10-03 PROCEDURE — 97535 SELF CARE MNGMENT TRAINING: CPT

## 2018-10-03 PROCEDURE — 97110 THERAPEUTIC EXERCISES: CPT

## 2018-10-03 PROCEDURE — 6360000002 HC RX W HCPCS: Performed by: STUDENT IN AN ORGANIZED HEALTH CARE EDUCATION/TRAINING PROGRAM

## 2018-10-03 PROCEDURE — 99232 SBSQ HOSP IP/OBS MODERATE 35: CPT | Performed by: SURGERY

## 2018-10-03 PROCEDURE — C9113 INJ PANTOPRAZOLE SODIUM, VIA: HCPCS | Performed by: STUDENT IN AN ORGANIZED HEALTH CARE EDUCATION/TRAINING PROGRAM

## 2018-10-03 PROCEDURE — APPSS30 APP SPLIT SHARED TIME 16-30 MINUTES: Performed by: NURSE PRACTITIONER

## 2018-10-03 RX ADMIN — Medication 10 ML: at 10:14

## 2018-10-03 RX ADMIN — FERROUS SULFATE TAB 325 MG (65 MG ELEMENTAL FE) 325 MG: 325 (65 FE) TAB at 10:13

## 2018-10-03 RX ADMIN — CALCIUM GLUCONATE: 94 INJECTION, SOLUTION INTRAVENOUS at 17:10

## 2018-10-03 RX ADMIN — Medication 10 ML: at 19:55

## 2018-10-03 RX ADMIN — PANTOPRAZOLE SODIUM 40 MG: 40 INJECTION, POWDER, FOR SOLUTION INTRAVENOUS at 10:13

## 2018-10-03 RX ADMIN — DRONABINOL 2.5 MG: 2.5 CAPSULE ORAL at 10:13

## 2018-10-03 RX ADMIN — DRONABINOL 2.5 MG: 2.5 CAPSULE ORAL at 19:54

## 2018-10-03 RX ADMIN — ACETAMINOPHEN 650 MG: 325 TABLET ORAL at 19:54

## 2018-10-03 ASSESSMENT — PAIN DESCRIPTION - ORIENTATION
ORIENTATION: LOWER;MID
ORIENTATION: LOWER

## 2018-10-03 ASSESSMENT — PAIN SCALES - GENERAL
PAINLEVEL_OUTOF10: 2
PAINLEVEL_OUTOF10: 5
PAINLEVEL_OUTOF10: 0
PAINLEVEL_OUTOF10: 3
PAINLEVEL_OUTOF10: 0
PAINLEVEL_OUTOF10: 5

## 2018-10-03 ASSESSMENT — PAIN DESCRIPTION - PROGRESSION: CLINICAL_PROGRESSION: NOT CHANGED

## 2018-10-03 ASSESSMENT — PAIN DESCRIPTION - PAIN TYPE
TYPE: ACUTE PAIN

## 2018-10-03 ASSESSMENT — PAIN DESCRIPTION - LOCATION
LOCATION: ABDOMEN

## 2018-10-03 ASSESSMENT — PAIN DESCRIPTION - DESCRIPTORS
DESCRIPTORS: ACHING
DESCRIPTORS: ACHING

## 2018-10-03 ASSESSMENT — PAIN DESCRIPTION - FREQUENCY
FREQUENCY: CONTINUOUS
FREQUENCY: CONTINUOUS

## 2018-10-03 NOTE — PROGRESS NOTES
REMOVAL    HYSTERECTOMY  1975 ? [facility]    LUMBAR LAMINECTOMY  4/15/2013    OTHER SURGICAL HISTORY  7/8/2013    evacation of seroma-back     [place] OFFICE/OUTPT VISIT,PROCEDURE ONLY N/A 6/25/2018    ABDOMINAL EXPLORATION AND ESTENDED LEFT COLON RESECTION, LYSIS OF COMPLEX ADHESIONS performed by Chasity Mitchell MD at [address]  08/11/2017    Dr Major Heredia Left 9/28/2018    EGD BIOPSY performed by Chasity Mitchell MD at [facility]       Restrictions/Precautions:  General Precautions, Fall Risk                            Prior Level of Function:  ADL Assistance: Independent  Homemaking Assistance: Needs assistance (daughters have been helping more frequently d/t not feeling well)  Ambulation Assistance: Independent (uses SPC or RW; SPC primarily)  Transfer Assistance: Independent  Additional Comments: Pt was very IND at PLOF however recently her daughters have been providing increasing assist with IADLs around home. Walks with cane or RW. Subjective:     Subjective: Pt pleasant and cooperative. Motivated.      Pain:   .      4/10- general     Social/Functional:  Lives With: Daughter (9 cats and 1 dog)  Type of Home: House  Home Layout: One level  Home Access: Stairs to enter without rails  Entrance Stairs - Number of Steps: 1  Home Equipment: Rolling walker, Cane (with tray table)     Objective:       Transfers  Sit to Stand: Contact guard assistance  Stand to sit: Contact guard assistance       Ambulation 1  Surface: level tile  Device: Small Sadi Colby  Assistance: Contact guard assistance  Quality of Gait: short choppy shuffeling steps; hesitant, very slow moving, forward head and downward gaze, cues for overall posture ; easilly fatigued/SOB   Distance: approx 80 feet x 1 and 10 feet x 1          Balance  Comments: toileting with cane; pt cleaned self on toilet, and washed at sink; close SBA x 1 Exercises:  Exercises  Comments: seated LAQ, marches, AP, hip ABD/ADD all x 10 reps each ; slow moving, low ROM ; easilly fatigued; done to improve functional endurance          Activity Tolerance:  Activity Tolerance: Patient Tolerated treatment well;Patient limited by fatigue    Assessment: Body structures, Functions, Activity limitations: Decreased functional mobility , Decreased ROM, Decreased endurance, Decreased strength  Assessment: rec cont skilled therapy for improved functional mobility and endurance   Prognosis: Excellent     REQUIRES PT FOLLOW UP: Yes    Discharge Recommendations:  Discharge Recommendations: Continue to assess pending progress, Patient would benefit from continued therapy after discharge, 24 hour supervision or assist, Home with Home health PT    Patient Education:  Patient Education: posture     Equipment Recommendations:  Equipment Needed: No    Safety:  Type of devices:  All fall risk precautions in place, Left in chair, Call light within reach, Chair alarm in place    Plan:  Times per week: 5x GM  Times per day: Daily  Specific instructions for Next Treatment: ther ex, ther act, gait trng,  Current Treatment Recommendations: Strengthening, Functional Mobility Training, Transfer Training, Balance Training, Endurance Training, Stair training, Gait Training, Neuromuscular Re-education, Home Exercise Program, Safety Education & Training, Patient/Caregiver Education & Training    Goals:  Patient goals : go home    Short term goals  Time Frame for Short term goals: 4 days  Short term goal 1: Pt to be Mod I for supine <> sit to get in/out of bed  Short term goal 2: Pt to be Mod I for sit <> stand to get up to ambulate  Short term goal 3: Pt to ambulate > 150 ft with quad cane with Supervision for household distances  Short term goal 4: Pt to negotiate 1 step with no rail with cane with SBA for home access    Long term goals  Time Frame for Long term goals : not set due to short ELOS

## 2018-10-03 NOTE — PROGRESS NOTES
Current Outpatient Prescriptions on File Prior to Encounter   Medication Sig Dispense Refill    acetaminophen (TYLENOL) 325 MG tablet Take 650 mg by mouth every 6 hours as needed for Pain      ibuprofen (ADVIL;MOTRIN) 200 MG tablet Take 200 mg by mouth every 6 hours as needed for Pain      Omega-3 Fatty Acids (FISH OIL) 1000 MG CAPS Take 1,000 mg by mouth daily      Multiple Vitamins-Minerals (PRESERVISION AREDS 2 PO) Take 1 tablet by mouth daily         Allergies:  Corticosteroids and Pregabalin    Social History:    reports that she has never smoked. She has never used smokeless tobacco. She reports that she does not drink alcohol or use drugs. Family History:   Family History   Problem Relation Age of Onset    Arthritis Mother     Asthma Mother     Arthritis Father     Cancer Father     High Blood Pressure Father     Diabetes Brother     Heart Disease Brother     COPD Brother     High Blood Pressure Brother      Vitals:   Vitals:    10/03/18 0500   BP: 131/61   Pulse: 75   Resp: 18   Temp: 97.6 °F (36.4 °C)   SpO2: 100%      BMI: Body mass index is 24.27 kg/m².     Exam:  Physical Examination: General appearance - oriented to person, place, and time and chronically ill appearing  Mental status - alert, oriented to person, place, and time  Chest - clear to auscultation, no wheezes, rales or rhonchi, symmetric air entry  Heart - normal rate and regular rhythm  Abdomen - distended but soft, non tender, + bowel sounds  Neurological - alert, oriented, normal speech, no focal findings or movement disorder noted  Musculoskeletal - no joint tenderness, deformity or swelling  Extremities - peripheral pulses normal, no pedal edema, no clubbing or cyanosis  Skin - normal coloration and turgor, no rashes, no suspicious skin lesions noted    Review of Labs and Diagnostic Testing:    Recent Results (from the past 24 hour(s))   Calcium, Ionized    Collection Time: 10/02/18 10:14 AM   Result Value Ref Range scarring in the lung bases. Liver, spleen and adrenal glands are unremarkable. Gallbladder is not seen. Partially atrophic changes involving the pancreas. Fullness of the biliary ducts, likely postsurgical. Kidneys are symmetric without hydronephrosis. Postsurgical changes lumbar spine. Degenerative changes lumbar spine. SI joints are symmetric. Normal caliber abdominal aorta with atherosclerotic changes. No free intraperitoneal air. Postsurgical changes sigmoid colon. The colon is relatively decompressed. There are multiple air-fluid levels within the distal small bowel loops with enhancement of the mucosa and areas of narrowing concerning for obstruction. There is mesenteric edema and likely reactive central mesenteric lymph nodes. Small amount of abdominal and pelvic ascites. There is oral contrast in the stomach and proximal small bowel bowel loops. Findings are concerning for obstruction which may be related to stricture versus adhesion high grade obstruction is suspected given extensive mesenteric edema. Correlation with symptoms and appropriate follow-up advised. Hysterectomy. There are multiple air-fluid levels within the distal small bowel loops with enhancement of the mucosa and areas of narrowing concerning for obstruction. There is mesenteric edema and likely reactive central mesenteric lymph nodes. Small amount of abdominal and pelvic ascites. There is oral contrast in the stomach and proximal small bowel bowel loops. Findings are concerning for obstruction which may be related to stricture versus adhesion. High grade obstruction cannot be excluded given extensive mesenteric edema. Correlation  with symptoms and appropriate follow-up advised. These results were communicated directly with Nacho Durán PA-C on 9/24/2018 3:06 PM,  [ ] **This report has been created using voice recognition software. It may contain minor errors which are inherent in voice recognition technology. ** Final report distention, upper abd pain, .       COMPARISON: KUB dated 10/1/2018       TECHNIQUE: A supine AP view of the abdomen was obtained.       FINDINGS:        Lower chest: unremarkable.       Lines/tubes: None       Bowel gas pattern: There are again moderately distended air-filled bowel loops in the abdomen and pelvis which may represent an ileus or obstruction.        Free intraperitoneal air: none visualized       Miscellaneous: No suspicious abdominal pelvic calcifications. Surgical clips again noted in the left lower quadrant and anastomotic staple line again noted in the left pelvis.       Regional skeleton: Stable postoperative changes of the lumbar spine with multilevel pedicle screw fusion. 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           Impression       Stable moderately distended air-filled bowel loops in the abdomen and pelvis which may represent an ileus or obstruction.           **This report has been created using voice recognition software. It may contain minor errors which are inherent in voice recognition technology. **       Final report electronically signed by Dr. Lyndon Duenas on 10/2/2018 6:54 AM         Assessment:  1. Stricture vs adhesion   2. Possible small bowel obstruction   3. Abdominal distention   4. Watery explosive Diarrhea   5. Chronic anemia   6. Hypokalemia resolved  7. Barium enema - negative for stricture   8. Pre-albumin - sever malnutrition   9. Overweight, BMI 25.33  10. CT Enterogrphy - decompressed large bowel, suggest SBO, mesentery edema   11. KUB ileus vs obstruction    Active Problems:    SBO (small bowel obstruction) (HCC)    Small bowel obstruction (HCC)    Severe malnutrition (HCC)  Resolved Problems:    * No resolved hospital problems. *    Plan:    1. Barium enema completed- negative for stricture  2. NPO  3. IV hydration and TPN continued   4. Analgesia and antiemetics as needed  5. Monitor labs   6.  DVT prophylaxis - SCD's   7. GI prophylaxis - EGD

## 2018-10-03 NOTE — PROGRESS NOTES
Pt Name: Kaylan Best  MRN: [id number]  [id number]  YOB: 1940  Admit Date: 9/24/2018 10:36 AM  Date of evaluation: 10/3/2018  Primary Care Physician: Jamaal Morales MD   [id number]   Dictating for Pattricia People  Pt sitting up in chair. Feels same as yesterday., Abd distention and fullness still present. Denies BM or passing gas today. She is unable to tell me results of enemas or if she has had them. Discussed with staff    O  BP (!) 124/59   Pulse 69   Temp 96.3 °F (35.7 °C) (Oral)   Resp 18   Ht 5' 3\" (1.6 m)   Wt 137 lb (62.1 kg)   SpO2 99%   BMI 24.27 kg/m²   VSS, afebrile  Alert and oriented  Resp; CTAB  Chest: RRR  Abd: soft, non-tender, +distende with rare BS's noted  Ext: +lower ext edema    PROCEDURE: XR ABDOMEN (KUB) (SINGLE AP VIEW) 10-13-18       CLINICAL INFORMATION: obstruction, bloating, ileus, .       COMPARISON: KUB dated 10/2/2018       TECHNIQUE: A supine AP view of the abdomen was obtained.       FINDINGS:        Bowel gas pattern: There is no change in the moderately dilated air-filled bowel loops in the lower abdomen and pelvis.       Free intraperitoneal air: none visualized       Miscellaneous: No suspicious abdominal pelvic calcifications. Clips are again noted in the left lower quadrant and anastomotic staple line is noted in the left pelvis.       Regional skeleton: No change in the multilevel lumbar pedicle screw fusion. There is again right hip joint DJD.           Impression       Stable moderately dilated air-filled bowel loops in the lower abdomen and pelvis.           **This report has been created using voice recognition software. It may contain minor errors which are inherent in voice recognition technology. **       Final report electronically signed by Dr. Garland Belcher on 10/3/2018 7:28 AM         Assessment:  1. Abdominal distention, same  2. Upper abdominal pain, \"fullness\", same  3. Poor appetite, same  4.  Weight loss 10 lbs in last

## 2018-10-03 NOTE — PROGRESS NOTES
[facility]  INPATIENT OCCUPATIONAL THERAPY  [facility] SURGICAL 5E  DAILY NOTE    Time:  Time In: 1120  Time Out: 1146  Timed Code Treatment Minutes: 26 Minutes  Minutes: 26          Date: 10/3/2018  Patient Name: Raeann Garnett,   Gender: female      Room: [facility]54/054-A  MRN: [id number]  : 1940  (66 y.o.)  Referring Practitioner: Kandi Roberson MD  Diagnosis: SBO  Additional Pertinent Hx: Per ED note 18, pt is a 66 y.o. female who has history of Pylori and cholecystectomy. Patient presents to the Emergency Department for the evaluation of abdominal pain that has been ongoing. Patient reports that she is experiencing abdominal distention, melena, decreased appetite, fear of eating secondary to abdominal pain, and small bowel movements. Patient was admitted to hospital on Sep. 2nd due to small bowel obstruction. She was cared for by Dr. Yamila Fletcher (general surgeon). Per daughter, patient has been experiencing abdominal complications since left colon resection in . While admitted patient was managed with nasogastric decompression, bowel rest, analgesics for pain control, IV fluid hydration, GI and DVT prophylaxis.      Past Medical History:   Diagnosis Date    Arthritis     Cellulitis     Difficult intubation     GERD (gastroesophageal reflux disease)     H pylori ulcer     Hx of blood clots     RIGHT LEG    Nausea & vomiting     Pneumonia     S/P partial resection of colon 2018    Shingles     Torus mandibularis 2006     Past Surgical History:   Procedure Laterality Date    ABDOMEN SURGERY      ABDOMINAL ADHESION SURGERY      Dr Jj Ross  2013    Lysis of Adhesions-Dr. Yamila Fletcher     BACK SURGERY  //2006/2013    X3    [facility]LE[facility]Y      [facility]    COLON SURGERY      COLONOSCOPY  over 10 yrs ago    COLONOSCOPY  2017    Dr Garrett Black, COLON, [facility] Bilateral  CATARACT REMOVAL    HYSTERECTOMY  1975 ? [facility]    LUMBAR LAMINECTOMY  4/15/2013    OTHER SURGICAL HISTORY  7/8/2013    evacation of seroma-back     UT OFFICE/OUTPT VISIT,PROCEDURE ONLY N/A 6/25/2018    ABDOMINAL EXPLORATION AND ESTENDED LEFT COLON RESECTION, LYSIS OF COMPLEX ADHESIONS performed by Elmira Cota MD at [address]  08/11/2017    Dr Hoang Folds Left 9/28/2018    EGD BIOPSY performed by Elmira Cota MD at [facility]       Restrictions/Precautions:  General Precautions, Fall Risk                            Prior Level of Function:  ADL Assistance: Independent  Homemaking Assistance: Needs assistance (daughters have been helping more frequently d/t not feeling well)  Ambulation Assistance: Independent (uses SPC or RW; SPC primarily)  Transfer Assistance: Independent  Additional Comments: Pt was very IND at [facility] [address] [facility] however recently her daughters have been providing increasing assist with IADLs around home. Walks with cane or RW. Subjective       Subjective: Pt reclined in chair upon arrival, agreeable to OT session with minimal encouragement. Pt reports slight apprehension/anxiety due to pending surgery although is hopeful \"this will take care of things then I can go forward from here. \"   Comments: RN ok'd OT. Pt scheduled for exploratory lap on 10/4. Overall Orientation Status: Within Functional Limits         Pain:  Pain Assessment  Patient Currently in Pain: Yes  Pain Assessment: 0-10  Pain Level:  (did not quantify)  Pain Type: Acute pain  Pain Location: Abdomen  Pain Orientation: Lower  Pain Descriptors: Aching  Pain Frequency: Continuous  Clinical Progression: Not changed  Pain Intervention(s): Repositioned; Emotional support; Ambulation/Increased activity; Rest  Pre Treatment Pain Screening  Intervention List: Patient able to continue with treatment    Objective  Overall Cognitive Status: Activity Tolerance:  Activity Tolerance: Patient Tolerated treatment well;Patient limited by fatigue  Activity Tolerance: Reinforced importance of walking in halls minimum of 3x/day with pt reporting has been unable to walk as far as she had during intial few days of hospital stay. Pt vebalized understanding and agreeable to walking with nursing staff later in day. Assessment:     Performance deficits / Impairments: Decreased functional mobility , Decreased endurance, Decreased ADL status, Decreased balance, Decreased strength, Decreased safe awareness, Decreased high-level IADLs  Prognosis: Fair, Good    Discharge Recommendations:  Discharge Recommendations: Continue to assess pending progress, Patient would benefit from continued therapy after discharge, Home with Home health OT    Patient Education:  Patient Education: importance of increasing activity/walking in halls    Equipment Recommendations: Other: monitor for LHAE    Safety:  Safety Devices in place: Yes  Type of devices: All fall risk precautions in place, Left in chair, Call light within reach, Nurse notified, Chair alarm in place, Gait belt, Patient at risk for falls    Plan:  Times per week: 3-5x  Current Treatment Recommendations: Strengthening, Balance Training, Functional Mobility Training, Endurance Training, Safety Education & Training, Self-Care / ADL, Patient/Caregiver Education & Training, Home Management Training, Equipment Evaluation, Education, & procurement    Goals:  Patient goals :  To go

## 2018-10-04 ENCOUNTER — APPOINTMENT (OUTPATIENT)
Dept: GENERAL RADIOLOGY | Age: 78
DRG: 329 | End: 2018-10-04
Payer: MEDICARE

## 2018-10-04 ENCOUNTER — ANESTHESIA (OUTPATIENT)
Dept: OPERATING ROOM | Age: 78
DRG: 329 | End: 2018-10-04
Payer: MEDICARE

## 2018-10-04 ENCOUNTER — ANESTHESIA EVENT (OUTPATIENT)
Dept: OPERATING ROOM | Age: 78
DRG: 329 | End: 2018-10-04
Payer: MEDICARE

## 2018-10-04 VITALS
DIASTOLIC BLOOD PRESSURE: 41 MMHG | TEMPERATURE: 98.6 F | RESPIRATION RATE: 10 BRPM | SYSTOLIC BLOOD PRESSURE: 68 MMHG | OXYGEN SATURATION: 100 %

## 2018-10-04 LAB
ABO: NORMAL
ANION GAP SERPL CALCULATED.3IONS-SCNC: 10 MEQ/L (ref 8–16)
ANTIBODY SCREEN: NORMAL
BASE EXCESS (CALCULATED): -3.2 MMOL/L (ref -2.5–2.5)
BASE EXCESS (CALCULATED): -8.4 MMOL/L (ref -2.5–2.5)
BUN BLDV-MCNC: 20 MG/DL (ref 7–22)
CALCIUM IONIZED SERUM: 1.08 MMOL/L (ref 1.12–1.32)
CALCIUM IONIZED SERUM: 1.41 MMOL/L (ref 1.12–1.32)
CALCIUM SERPL-MCNC: 8.5 MG/DL (ref 8.5–10.5)
CHLORIDE BLD-SCNC: 102 MEQ/L (ref 98–111)
CO2: 24 MEQ/L (ref 23–33)
COLLECTED BY:: ABNORMAL
COLLECTED BY:: ABNORMAL
CREAT SERPL-MCNC: 0.2 MG/DL (ref 0.4–1.2)
ERYTHROCYTE [DISTWIDTH] IN BLOOD BY AUTOMATED COUNT: 16.4 % (ref 11.5–14.5)
ERYTHROCYTE [DISTWIDTH] IN BLOOD BY AUTOMATED COUNT: 48.6 FL (ref 35–45)
GFR SERPL CREATININE-BSD FRML MDRD: > 90 ML/MIN/1.73M2
GLUCOSE BLD-MCNC: 104 MG/DL (ref 70–108)
GLUCOSE BLD-MCNC: 114 MG/DL (ref 70–108)
GLUCOSE, WHOLE BLOOD: 147 MG/DL (ref 70–108)
GLUCOSE, WHOLE BLOOD: 218 MG/DL (ref 70–108)
GLUCOSE, WHOLE BLOOD: 225 MG/DL (ref 70–108)
HCO3: 19 MMOL/L (ref 23–28)
HCO3: 23 MMOL/L (ref 23–28)
HCT VFR BLD CALC: 28.4 % (ref 37–47)
HEMOGLOBIN FINGERSTICK, POC: 11.2 G/DL (ref 12–16)
HEMOGLOBIN FINGERSTICK, POC: 8.5 G/DL (ref 12–16)
HEMOGLOBIN: 8.7 GM/DL (ref 12–16)
HEMOGLOBIN: 9.8 GM/DL (ref 12–16)
MCH RBC QN AUTO: 25.1 PG (ref 26–33)
MCHC RBC AUTO-ENTMCNC: 30.6 GM/DL (ref 32.2–35.5)
MCV RBC AUTO: 82.1 FL (ref 81–99)
MRSA SCREEN RT-PCR: NEGATIVE
O2 SATURATION: 100 %
O2 SATURATION: 100 %
PCO2: 44 MMHG (ref 35–45)
PCO2: 46 MMHG (ref 35–45)
PH BLOOD GAS: 7.24 (ref 7.35–7.45)
PH BLOOD GAS: 7.31 (ref 7.35–7.45)
PLATELET # BLD: 182 THOU/MM3 (ref 130–400)
PMV BLD AUTO: 9.1 FL (ref 9.4–12.4)
PO2: 293 MMHG (ref 71–104)
PO2: 541 MMHG (ref 71–104)
POTASSIUM SERPL-SCNC: 4.5 MEQ/L (ref 3.5–5.2)
POTASSIUM, WHOLE BLOOD: 3.2 MEQ/L (ref 3.5–4.9)
POTASSIUM, WHOLE BLOOD: 3.7 MEQ/L (ref 3.5–4.9)
RBC # BLD: 3.46 MILL/MM3 (ref 4.2–5.4)
RH FACTOR: NORMAL
SODIUM BLD-SCNC: 136 MEQ/L (ref 135–145)
SODIUM, WHOLE BLOOD: 139 MEQ/L (ref 138–146)
SODIUM, WHOLE BLOOD: 142 MEQ/L (ref 138–146)
SOURCE, BLOOD GAS: ABNORMAL
WBC # BLD: 6.9 THOU/MM3 (ref 4.8–10.8)

## 2018-10-04 PROCEDURE — 0DB80ZZ EXCISION OF SMALL INTESTINE, OPEN APPROACH: ICD-10-PCS | Performed by: SURGERY

## 2018-10-04 PROCEDURE — 88307 TISSUE EXAM BY PATHOLOGIST: CPT

## 2018-10-04 PROCEDURE — 6360000002 HC RX W HCPCS: Performed by: SURGERY

## 2018-10-04 PROCEDURE — 94002 VENT MGMT INPAT INIT DAY: CPT

## 2018-10-04 PROCEDURE — 82948 REAGENT STRIP/BLOOD GLUCOSE: CPT

## 2018-10-04 PROCEDURE — 97116 GAIT TRAINING THERAPY: CPT

## 2018-10-04 PROCEDURE — 44120 REMOVAL OF SMALL INTESTINE: CPT | Performed by: SURGERY

## 2018-10-04 PROCEDURE — 2709999900 HC NON-CHARGEABLE SUPPLY: Performed by: SURGERY

## 2018-10-04 PROCEDURE — 84132 ASSAY OF SERUM POTASSIUM: CPT

## 2018-10-04 PROCEDURE — 2000000000 HC ICU R&B

## 2018-10-04 PROCEDURE — 2580000003 HC RX 258: Performed by: NURSE PRACTITIONER

## 2018-10-04 PROCEDURE — 86923 COMPATIBILITY TEST ELECTRIC: CPT

## 2018-10-04 PROCEDURE — 0DN80ZZ RELEASE SMALL INTESTINE, OPEN APPROACH: ICD-10-PCS | Performed by: SURGERY

## 2018-10-04 PROCEDURE — P9045 ALBUMIN (HUMAN), 5%, 250 ML: HCPCS | Performed by: NURSE ANESTHETIST, CERTIFIED REGISTERED

## 2018-10-04 PROCEDURE — 87086 URINE CULTURE/COLONY COUNT: CPT

## 2018-10-04 PROCEDURE — 2720000010 HC SURG SUPPLY STERILE: Performed by: SURGERY

## 2018-10-04 PROCEDURE — 2500000003 HC RX 250 WO HCPCS

## 2018-10-04 PROCEDURE — C9113 INJ PANTOPRAZOLE SODIUM, VIA: HCPCS | Performed by: STUDENT IN AN ORGANIZED HEALTH CARE EDUCATION/TRAINING PROGRAM

## 2018-10-04 PROCEDURE — 2580000003 HC RX 258: Performed by: SURGERY

## 2018-10-04 PROCEDURE — 94770 HC ETCO2 MONITOR DAILY: CPT

## 2018-10-04 PROCEDURE — 82947 ASSAY GLUCOSE BLOOD QUANT: CPT

## 2018-10-04 PROCEDURE — 85027 COMPLETE CBC AUTOMATED: CPT

## 2018-10-04 PROCEDURE — 2709999900 HC NON-CHARGEABLE SUPPLY

## 2018-10-04 PROCEDURE — 82803 BLOOD GASES ANY COMBINATION: CPT

## 2018-10-04 PROCEDURE — 82330 ASSAY OF CALCIUM: CPT

## 2018-10-04 PROCEDURE — 3700000000 HC ANESTHESIA ATTENDED CARE: Performed by: SURGERY

## 2018-10-04 PROCEDURE — P9016 RBC LEUKOCYTES REDUCED: HCPCS

## 2018-10-04 PROCEDURE — 0DBB0ZZ EXCISION OF ILEUM, OPEN APPROACH: ICD-10-PCS | Performed by: SURGERY

## 2018-10-04 PROCEDURE — 2500000003 HC RX 250 WO HCPCS: Performed by: SURGERY

## 2018-10-04 PROCEDURE — 80048 BASIC METABOLIC PNL TOTAL CA: CPT

## 2018-10-04 PROCEDURE — 2580000003 HC RX 258: Performed by: INTERNAL MEDICINE

## 2018-10-04 PROCEDURE — 6360000002 HC RX W HCPCS: Performed by: NURSE ANESTHETIST, CERTIFIED REGISTERED

## 2018-10-04 PROCEDURE — 6360000002 HC RX W HCPCS

## 2018-10-04 PROCEDURE — 86850 RBC ANTIBODY SCREEN: CPT

## 2018-10-04 PROCEDURE — 6360000002 HC RX W HCPCS: Performed by: STUDENT IN AN ORGANIZED HEALTH CARE EDUCATION/TRAINING PROGRAM

## 2018-10-04 PROCEDURE — 0D1B0Z4 BYPASS ILEUM TO CUTANEOUS, OPEN APPROACH: ICD-10-PCS | Performed by: SURGERY

## 2018-10-04 PROCEDURE — 3600000014 HC SURGERY LEVEL 4 ADDTL 15MIN: Performed by: SURGERY

## 2018-10-04 PROCEDURE — 2580000003 HC RX 258: Performed by: NURSE ANESTHETIST, CERTIFIED REGISTERED

## 2018-10-04 PROCEDURE — 44121 REMOVAL OF SMALL INTESTINE: CPT | Performed by: SURGERY

## 2018-10-04 PROCEDURE — 84295 ASSAY OF SERUM SODIUM: CPT

## 2018-10-04 PROCEDURE — 2500000003 HC RX 250 WO HCPCS: Performed by: NURSE ANESTHETIST, CERTIFIED REGISTERED

## 2018-10-04 PROCEDURE — 6370000000 HC RX 637 (ALT 250 FOR IP)

## 2018-10-04 PROCEDURE — 37799 UNLISTED PX VASCULAR SURGERY: CPT

## 2018-10-04 PROCEDURE — 5A1945Z RESPIRATORY VENTILATION, 24-96 CONSECUTIVE HOURS: ICD-10-PCS | Performed by: SURGERY

## 2018-10-04 PROCEDURE — C9113 INJ PANTOPRAZOLE SODIUM, VIA: HCPCS | Performed by: SURGERY

## 2018-10-04 PROCEDURE — 71045 X-RAY EXAM CHEST 1 VIEW: CPT

## 2018-10-04 PROCEDURE — 44310 ILEOSTOMY/JEJUNOSTOMY: CPT | Performed by: SURGERY

## 2018-10-04 PROCEDURE — 6370000000 HC RX 637 (ALT 250 FOR IP): Performed by: INTERNAL MEDICINE

## 2018-10-04 PROCEDURE — 3700000001 HC ADD 15 MINUTES (ANESTHESIA): Performed by: SURGERY

## 2018-10-04 PROCEDURE — 0BH18EZ INSERTION OF ENDOTRACHEAL AIRWAY INTO TRACHEA, VIA NATURAL OR ARTIFICIAL OPENING ENDOSCOPIC: ICD-10-PCS | Performed by: SURGERY

## 2018-10-04 PROCEDURE — 2780000010 HC IMPLANT OTHER: Performed by: SURGERY

## 2018-10-04 PROCEDURE — 2700000000 HC OXYGEN THERAPY PER DAY

## 2018-10-04 PROCEDURE — 85018 HEMOGLOBIN: CPT

## 2018-10-04 PROCEDURE — 6360000002 HC RX W HCPCS: Performed by: INTERNAL MEDICINE

## 2018-10-04 PROCEDURE — 97110 THERAPEUTIC EXERCISES: CPT

## 2018-10-04 PROCEDURE — 3600000004 HC SURGERY LEVEL 4 BASE: Performed by: SURGERY

## 2018-10-04 PROCEDURE — 87641 MR-STAPH DNA AMP PROBE: CPT

## 2018-10-04 PROCEDURE — 86900 BLOOD TYPING SEROLOGIC ABO: CPT

## 2018-10-04 PROCEDURE — 86901 BLOOD TYPING SEROLOGIC RH(D): CPT

## 2018-10-04 PROCEDURE — 87081 CULTURE SCREEN ONLY: CPT

## 2018-10-04 DEVICE — SEALANT HEMSTAT 5ML HUM FIBRIN THROM 2 VI APPL DEV EVICEL: Type: IMPLANTABLE DEVICE | Site: ABDOMEN | Status: FUNCTIONAL

## 2018-10-04 RX ORDER — MIDAZOLAM HYDROCHLORIDE 1 MG/ML
2 INJECTION INTRAMUSCULAR; INTRAVENOUS EVERY 30 MIN PRN
Status: DISCONTINUED | OUTPATIENT
Start: 2018-10-04 | End: 2018-10-05

## 2018-10-04 RX ORDER — PIPERACILLIN SODIUM, TAZOBACTAM SODIUM 3; .375 G/15ML; G/15ML
INJECTION, POWDER, LYOPHILIZED, FOR SOLUTION INTRAVENOUS PRN
Status: DISCONTINUED | OUTPATIENT
Start: 2018-10-04 | End: 2018-10-04 | Stop reason: SDUPTHER

## 2018-10-04 RX ORDER — CALCIUM CHLORIDE 100 MG/ML
INJECTION INTRAVENOUS; INTRAVENTRICULAR PRN
Status: DISCONTINUED | OUTPATIENT
Start: 2018-10-04 | End: 2018-10-04 | Stop reason: SDUPTHER

## 2018-10-04 RX ORDER — FENTANYL CITRATE 50 UG/ML
25 INJECTION, SOLUTION INTRAMUSCULAR; INTRAVENOUS
Status: DISCONTINUED | OUTPATIENT
Start: 2018-10-04 | End: 2018-10-05

## 2018-10-04 RX ORDER — LIDOCAINE HYDROCHLORIDE 20 MG/ML
INJECTION, SOLUTION INFILTRATION; PERINEURAL PRN
Status: DISCONTINUED | OUTPATIENT
Start: 2018-10-04 | End: 2018-10-04 | Stop reason: SDUPTHER

## 2018-10-04 RX ORDER — SUCCINYLCHOLINE CHLORIDE 20 MG/ML
INJECTION INTRAMUSCULAR; INTRAVENOUS PRN
Status: DISCONTINUED | OUTPATIENT
Start: 2018-10-04 | End: 2018-10-04 | Stop reason: SDUPTHER

## 2018-10-04 RX ORDER — 0.9 % SODIUM CHLORIDE 0.9 %
10 VIAL (ML) INJECTION DAILY
Status: DISCONTINUED | OUTPATIENT
Start: 2018-10-04 | End: 2018-10-16 | Stop reason: HOSPADM

## 2018-10-04 RX ORDER — ACETAMINOPHEN 325 MG/1
650 TABLET ORAL EVERY 4 HOURS PRN
Status: DISCONTINUED | OUTPATIENT
Start: 2018-10-04 | End: 2018-10-05

## 2018-10-04 RX ORDER — ONDANSETRON 2 MG/ML
4 INJECTION INTRAMUSCULAR; INTRAVENOUS EVERY 6 HOURS PRN
Status: DISCONTINUED | OUTPATIENT
Start: 2018-10-04 | End: 2018-10-16 | Stop reason: HOSPADM

## 2018-10-04 RX ORDER — ESMOLOL HYDROCHLORIDE 10 MG/ML
INJECTION INTRAVENOUS PRN
Status: DISCONTINUED | OUTPATIENT
Start: 2018-10-04 | End: 2018-10-04 | Stop reason: SDUPTHER

## 2018-10-04 RX ORDER — PHENTOLAMINE MESYLATE 5 MG/1
5 INJECTION INTRAMUSCULAR; INTRAVENOUS ONCE
Status: COMPLETED | OUTPATIENT
Start: 2018-10-04 | End: 2018-10-04

## 2018-10-04 RX ORDER — SODIUM CHLORIDE 9 MG/ML
INJECTION, SOLUTION INTRAVENOUS CONTINUOUS
Status: DISCONTINUED | OUTPATIENT
Start: 2018-10-04 | End: 2018-10-05

## 2018-10-04 RX ORDER — CHLORHEXIDINE GLUCONATE 0.12 MG/ML
15 RINSE ORAL 2 TIMES DAILY
Status: DISCONTINUED | OUTPATIENT
Start: 2018-10-04 | End: 2018-10-05

## 2018-10-04 RX ORDER — PROPOFOL 10 MG/ML
10 INJECTION, EMULSION INTRAVENOUS
Status: DISCONTINUED | OUTPATIENT
Start: 2018-10-04 | End: 2018-10-07

## 2018-10-04 RX ORDER — PROPOFOL 10 MG/ML
INJECTION, EMULSION INTRAVENOUS
Status: COMPLETED
Start: 2018-10-04 | End: 2018-10-04

## 2018-10-04 RX ORDER — FAMOTIDINE 20 MG/1
20 TABLET, FILM COATED ORAL 2 TIMES DAILY
Status: DISCONTINUED | OUTPATIENT
Start: 2018-10-04 | End: 2018-10-04

## 2018-10-04 RX ORDER — SODIUM CHLORIDE 0.9 % (FLUSH) 0.9 %
10 SYRINGE (ML) INJECTION EVERY 12 HOURS SCHEDULED
Status: DISCONTINUED | OUTPATIENT
Start: 2018-10-04 | End: 2018-10-16 | Stop reason: HOSPADM

## 2018-10-04 RX ORDER — MIDAZOLAM HYDROCHLORIDE 1 MG/ML
INJECTION INTRAMUSCULAR; INTRAVENOUS PRN
Status: DISCONTINUED | OUTPATIENT
Start: 2018-10-04 | End: 2018-10-04 | Stop reason: SDUPTHER

## 2018-10-04 RX ORDER — ONDANSETRON 2 MG/ML
INJECTION INTRAMUSCULAR; INTRAVENOUS PRN
Status: DISCONTINUED | OUTPATIENT
Start: 2018-10-04 | End: 2018-10-04 | Stop reason: SDUPTHER

## 2018-10-04 RX ORDER — PROPOFOL 10 MG/ML
INJECTION, EMULSION INTRAVENOUS PRN
Status: DISCONTINUED | OUTPATIENT
Start: 2018-10-04 | End: 2018-10-04 | Stop reason: SDUPTHER

## 2018-10-04 RX ORDER — EPHEDRINE SULFATE 50 MG/ML
INJECTION INTRAVENOUS PRN
Status: DISCONTINUED | OUTPATIENT
Start: 2018-10-04 | End: 2018-10-04 | Stop reason: SDUPTHER

## 2018-10-04 RX ORDER — SODIUM CHLORIDE 0.9 % (FLUSH) 0.9 %
10 SYRINGE (ML) INJECTION PRN
Status: DISCONTINUED | OUTPATIENT
Start: 2018-10-04 | End: 2018-10-16 | Stop reason: HOSPADM

## 2018-10-04 RX ORDER — SODIUM CHLORIDE, SODIUM LACTATE, POTASSIUM CHLORIDE, CALCIUM CHLORIDE 600; 310; 30; 20 MG/100ML; MG/100ML; MG/100ML; MG/100ML
INJECTION, SOLUTION INTRAVENOUS CONTINUOUS PRN
Status: DISCONTINUED | OUTPATIENT
Start: 2018-10-04 | End: 2018-10-04 | Stop reason: SDUPTHER

## 2018-10-04 RX ORDER — ALBUMIN, HUMAN INJ 5% 5 %
SOLUTION INTRAVENOUS PRN
Status: DISCONTINUED | OUTPATIENT
Start: 2018-10-04 | End: 2018-10-04 | Stop reason: SDUPTHER

## 2018-10-04 RX ORDER — CEFTRIAXONE 1 G/1
INJECTION, POWDER, FOR SOLUTION INTRAMUSCULAR; INTRAVENOUS PRN
Status: DISCONTINUED | OUTPATIENT
Start: 2018-10-04 | End: 2018-10-04 | Stop reason: SDUPTHER

## 2018-10-04 RX ORDER — PANTOPRAZOLE SODIUM 40 MG/10ML
40 INJECTION, POWDER, LYOPHILIZED, FOR SOLUTION INTRAVENOUS DAILY
Status: DISCONTINUED | OUTPATIENT
Start: 2018-10-04 | End: 2018-10-16 | Stop reason: HOSPADM

## 2018-10-04 RX ORDER — ROCURONIUM BROMIDE 10 MG/ML
INJECTION, SOLUTION INTRAVENOUS PRN
Status: DISCONTINUED | OUTPATIENT
Start: 2018-10-04 | End: 2018-10-04 | Stop reason: SDUPTHER

## 2018-10-04 RX ORDER — FENTANYL CITRATE 50 UG/ML
INJECTION, SOLUTION INTRAMUSCULAR; INTRAVENOUS PRN
Status: DISCONTINUED | OUTPATIENT
Start: 2018-10-04 | End: 2018-10-04 | Stop reason: SDUPTHER

## 2018-10-04 RX ORDER — VASOPRESSIN 20 U/ML
INJECTION PARENTERAL PRN
Status: DISCONTINUED | OUTPATIENT
Start: 2018-10-04 | End: 2018-10-04 | Stop reason: SDUPTHER

## 2018-10-04 RX ADMIN — SODIUM CHLORIDE, POTASSIUM CHLORIDE, SODIUM LACTATE AND CALCIUM CHLORIDE: 600; 310; 30; 20 INJECTION, SOLUTION INTRAVENOUS at 13:23

## 2018-10-04 RX ADMIN — PROPOFOL 10 MCG/KG/MIN: 10 INJECTION, EMULSION INTRAVENOUS at 18:23

## 2018-10-04 RX ADMIN — SODIUM CHLORIDE: 9 INJECTION, SOLUTION INTRAVENOUS at 14:15

## 2018-10-04 RX ADMIN — EPHEDRINE SULFATE 15 MG: 50 INJECTION, SOLUTION INTRAVENOUS at 15:08

## 2018-10-04 RX ADMIN — PHENYLEPHRINE HYDROCHLORIDE 300 MCG: 10 INJECTION INTRAVENOUS at 15:00

## 2018-10-04 RX ADMIN — PHENYLEPHRINE HYDROCHLORIDE 200 MCG: 10 INJECTION INTRAVENOUS at 14:50

## 2018-10-04 RX ADMIN — ROCURONIUM BROMIDE 10 MG: 10 INJECTION INTRAVENOUS at 13:03

## 2018-10-04 RX ADMIN — TAZOBACTAM SODIUM AND PIPERACILLIN SODIUM 3.38 G: .375; 3 INJECTION, POWDER, LYOPHILIZED, FOR SOLUTION INTRAVENOUS at 14:04

## 2018-10-04 RX ADMIN — PROPOFOL 100 MG: 10 INJECTION, EMULSION INTRAVENOUS at 12:09

## 2018-10-04 RX ADMIN — CALCIUM CHLORIDE 0.5 G: 100 INJECTION INTRAVENOUS; INTRAVENTRICULAR at 16:20

## 2018-10-04 RX ADMIN — ROCURONIUM BROMIDE 10 MG: 10 INJECTION INTRAVENOUS at 15:55

## 2018-10-04 RX ADMIN — Medication 15 ML: at 20:53

## 2018-10-04 RX ADMIN — SUCCINYLCHOLINE CHLORIDE 100 MG: 20 INJECTION, SOLUTION INTRAMUSCULAR; INTRAVENOUS at 12:09

## 2018-10-04 RX ADMIN — ROCURONIUM BROMIDE 15 MG: 10 INJECTION INTRAVENOUS at 12:18

## 2018-10-04 RX ADMIN — PHENYLEPHRINE HYDROCHLORIDE 200 MCG: 10 INJECTION INTRAVENOUS at 14:58

## 2018-10-04 RX ADMIN — ALBUMIN (HUMAN) 250 ML: 12.5 SOLUTION INTRAVENOUS at 16:00

## 2018-10-04 RX ADMIN — FENTANYL CITRATE 25 MCG: 50 INJECTION INTRAMUSCULAR; INTRAVENOUS at 13:30

## 2018-10-04 RX ADMIN — PANTOPRAZOLE SODIUM 40 MG: 40 INJECTION, POWDER, FOR SOLUTION INTRAVENOUS at 09:20

## 2018-10-04 RX ADMIN — ONDANSETRON HYDROCHLORIDE 4 MG: 4 INJECTION, SOLUTION INTRAMUSCULAR; INTRAVENOUS at 17:10

## 2018-10-04 RX ADMIN — MIDAZOLAM HYDROCHLORIDE 2 MG: 1 INJECTION, SOLUTION INTRAMUSCULAR; INTRAVENOUS at 17:10

## 2018-10-04 RX ADMIN — ALBUMIN (HUMAN) 250 ML: 12.5 SOLUTION INTRAVENOUS at 16:15

## 2018-10-04 RX ADMIN — SODIUM CHLORIDE: 9 INJECTION, SOLUTION INTRAVENOUS at 00:27

## 2018-10-04 RX ADMIN — PHENYLEPHRINE HYDROCHLORIDE 100 MCG: 10 INJECTION INTRAVENOUS at 14:37

## 2018-10-04 RX ADMIN — FENTANYL CITRATE 25 MCG: 50 INJECTION INTRAMUSCULAR; INTRAVENOUS at 23:51

## 2018-10-04 RX ADMIN — CALCIUM GLUCONATE: 94 INJECTION, SOLUTION INTRAVENOUS at 18:44

## 2018-10-04 RX ADMIN — PHENTOLAMINE MESYLATE 5 MG: 5 INJECTION, POWDER, FOR SOLUTION INTRAMUSCULAR; INTRAVENOUS at 17:10

## 2018-10-04 RX ADMIN — VASOPRESSIN 2 UNITS: 20 INJECTION INTRAVENOUS at 17:09

## 2018-10-04 RX ADMIN — SODIUM BICARBONATE 50 MEQ: 84 INJECTION, SOLUTION INTRAVENOUS at 16:20

## 2018-10-04 RX ADMIN — Medication 10 ML: at 20:56

## 2018-10-04 RX ADMIN — PHENYLEPHRINE HYDROCHLORIDE 100 MCG: 10 INJECTION INTRAVENOUS at 14:29

## 2018-10-04 RX ADMIN — Medication 10 ML: at 09:20

## 2018-10-04 RX ADMIN — FENTANYL CITRATE 50 MCG: 50 INJECTION INTRAMUSCULAR; INTRAVENOUS at 12:09

## 2018-10-04 RX ADMIN — PHENYLEPHRINE HYDROCHLORIDE 300 MCG: 10 INJECTION INTRAVENOUS at 15:23

## 2018-10-04 RX ADMIN — ROCURONIUM BROMIDE 10 MG: 10 INJECTION INTRAVENOUS at 12:34

## 2018-10-04 RX ADMIN — ROCURONIUM BROMIDE 10 MG: 10 INJECTION INTRAVENOUS at 14:36

## 2018-10-04 RX ADMIN — MIDAZOLAM 1 MG/HR: 5 INJECTION INTRAMUSCULAR; INTRAVENOUS at 19:41

## 2018-10-04 RX ADMIN — Medication 10 ML: at 20:53

## 2018-10-04 RX ADMIN — LIDOCAINE HYDROCHLORIDE 80 MG: 20 INJECTION, SOLUTION INFILTRATION; PERINEURAL at 12:09

## 2018-10-04 RX ADMIN — PHENYLEPHRINE HYDROCHLORIDE 100 MCG: 10 INJECTION INTRAVENOUS at 14:34

## 2018-10-04 RX ADMIN — ROCURONIUM BROMIDE 10 MG: 10 INJECTION INTRAVENOUS at 15:04

## 2018-10-04 RX ADMIN — SODIUM BICARBONATE 50 MEQ: 84 INJECTION, SOLUTION INTRAVENOUS at 15:50

## 2018-10-04 RX ADMIN — Medication 5 MCG/MIN: at 16:18

## 2018-10-04 RX ADMIN — FENTANYL CITRATE 25 MCG: 50 INJECTION INTRAMUSCULAR; INTRAVENOUS at 12:49

## 2018-10-04 RX ADMIN — PHENYLEPHRINE HYDROCHLORIDE 300 MCG: 10 INJECTION INTRAVENOUS at 15:08

## 2018-10-04 RX ADMIN — SODIUM CHLORIDE: 9 INJECTION, SOLUTION INTRAVENOUS at 19:47

## 2018-10-04 RX ADMIN — PHENYLEPHRINE HYDROCHLORIDE 300 MCG: 10 INJECTION INTRAVENOUS at 15:16

## 2018-10-04 RX ADMIN — SODIUM CHLORIDE: 9 INJECTION, SOLUTION INTRAVENOUS at 15:32

## 2018-10-04 RX ADMIN — PANTOPRAZOLE SODIUM 40 MG: 40 INJECTION, POWDER, FOR SOLUTION INTRAVENOUS at 20:54

## 2018-10-04 RX ADMIN — SODIUM CHLORIDE, POTASSIUM CHLORIDE, SODIUM LACTATE AND CALCIUM CHLORIDE: 600; 310; 30; 20 INJECTION, SOLUTION INTRAVENOUS at 14:45

## 2018-10-04 RX ADMIN — ROCURONIUM BROMIDE 10 MG: 10 INJECTION INTRAVENOUS at 13:42

## 2018-10-04 RX ADMIN — ESMOLOL HYDROCHLORIDE 20 MG: 10 INJECTION, SOLUTION INTRAVENOUS at 13:55

## 2018-10-04 RX ADMIN — CEFTRIAXONE SODIUM 1 G: 1 INJECTION, POWDER, FOR SOLUTION INTRAMUSCULAR; INTRAVENOUS at 12:21

## 2018-10-04 RX ADMIN — Medication 4 UNITS: at 21:55

## 2018-10-04 RX ADMIN — VASOPRESSIN 1 UNITS: 20 INJECTION INTRAVENOUS at 15:17

## 2018-10-04 RX ADMIN — MORPHINE SULFATE 2 MG: 2 INJECTION, SOLUTION INTRAMUSCULAR; INTRAVENOUS at 20:11

## 2018-10-04 RX ADMIN — VASOPRESSIN 2 UNITS: 20 INJECTION INTRAVENOUS at 15:56

## 2018-10-04 RX ADMIN — PIPERACILLIN SODIUM,TAZOBACTAM SODIUM 3.38 G: 3; .375 INJECTION, POWDER, FOR SOLUTION INTRAVENOUS at 21:43

## 2018-10-04 RX ADMIN — VASOPRESSIN 2 UNITS: 20 INJECTION INTRAVENOUS at 16:00

## 2018-10-04 RX ADMIN — CALCIUM CHLORIDE 0.5 G: 100 INJECTION INTRAVENOUS; INTRAVENTRICULAR at 15:55

## 2018-10-04 RX ADMIN — PHENYLEPHRINE HYDROCHLORIDE 200 MCG: 10 INJECTION INTRAVENOUS at 14:39

## 2018-10-04 RX ADMIN — METRONIDAZOLE 500 MG: 500 INJECTION, SOLUTION INTRAVENOUS at 21:51

## 2018-10-04 RX ADMIN — PHENYLEPHRINE HYDROCHLORIDE 100 MCG: 10 INJECTION INTRAVENOUS at 15:38

## 2018-10-04 RX ADMIN — PHENYLEPHRINE HYDROCHLORIDE 200 MCG: 10 INJECTION INTRAVENOUS at 15:04

## 2018-10-04 RX ADMIN — PHENYLEPHRINE HYDROCHLORIDE 200 MCG: 10 INJECTION INTRAVENOUS at 14:46

## 2018-10-04 ASSESSMENT — PULMONARY FUNCTION TESTS
PIF_VALUE: 19
PIF_VALUE: 19
PIF_VALUE: 20
PIF_VALUE: 19
PIF_VALUE: 17
PIF_VALUE: 18
PIF_VALUE: 17
PIF_VALUE: 20
PIF_VALUE: 18
PIF_VALUE: 17
PIF_VALUE: 19
PIF_VALUE: 19
PIF_VALUE: 17
PIF_VALUE: 19
PIF_VALUE: 18
PIF_VALUE: 15
PIF_VALUE: 8
PIF_VALUE: 18
PIF_VALUE: 18
PIF_VALUE: 17
PIF_VALUE: 16
PIF_VALUE: 16
PIF_VALUE: 8
PIF_VALUE: 18
PIF_VALUE: 17
PIF_VALUE: 18
PIF_VALUE: 19
PIF_VALUE: 16
PIF_VALUE: 14
PIF_VALUE: 17
PIF_VALUE: 19
PIF_VALUE: 16
PIF_VALUE: 18
PIF_VALUE: 20
PIF_VALUE: 19
PIF_VALUE: 19
PIF_VALUE: 17
PIF_VALUE: 18
PIF_VALUE: 18
PIF_VALUE: 19
PIF_VALUE: 17
PIF_VALUE: 16
PIF_VALUE: 20
PIF_VALUE: 18
PIF_VALUE: 17
PIF_VALUE: 15
PIF_VALUE: 17
PIF_VALUE: 20
PIF_VALUE: 19
PIF_VALUE: 17
PIF_VALUE: 18
PIF_VALUE: 19
PIF_VALUE: 18
PIF_VALUE: 16
PIF_VALUE: 20
PIF_VALUE: 18
PIF_VALUE: 18
PIF_VALUE: 16
PIF_VALUE: 20
PIF_VALUE: 18
PIF_VALUE: 15
PIF_VALUE: 18
PIF_VALUE: 17
PIF_VALUE: 19
PIF_VALUE: 1
PIF_VALUE: 18
PIF_VALUE: 18
PIF_VALUE: 17
PIF_VALUE: 19
PIF_VALUE: 6
PIF_VALUE: 16
PIF_VALUE: 18
PIF_VALUE: 18
PIF_VALUE: 16
PIF_VALUE: 18
PIF_VALUE: 16
PIF_VALUE: 17
PIF_VALUE: 18
PIF_VALUE: 19
PIF_VALUE: 18
PIF_VALUE: 18
PIF_VALUE: 19
PIF_VALUE: 19
PIF_VALUE: 17
PIF_VALUE: 18
PIF_VALUE: 18
PIF_VALUE: 19
PIF_VALUE: 17
PIF_VALUE: 16
PIF_VALUE: 18
PIF_VALUE: 18
PIF_VALUE: 17
PIF_VALUE: 18
PIF_VALUE: 18
PIF_VALUE: 17
PIF_VALUE: 18
PIF_VALUE: 19
PIF_VALUE: 18
PIF_VALUE: 19
PIF_VALUE: 20
PIF_VALUE: 18
PIF_VALUE: 17
PIF_VALUE: 18
PIF_VALUE: 18
PIF_VALUE: 16
PIF_VALUE: 19
PIF_VALUE: 17
PIF_VALUE: 17
PIF_VALUE: 18
PIF_VALUE: 19
PIF_VALUE: 16
PIF_VALUE: 18
PIF_VALUE: 18
PIF_VALUE: 16
PIF_VALUE: 17
PIF_VALUE: 18
PIF_VALUE: 18
PIF_VALUE: 19
PIF_VALUE: 19
PIF_VALUE: 16
PIF_VALUE: 19
PIF_VALUE: 18
PIF_VALUE: 1
PIF_VALUE: 20
PIF_VALUE: 18
PIF_VALUE: 18
PIF_VALUE: 17
PIF_VALUE: 19
PIF_VALUE: 1
PIF_VALUE: 18
PIF_VALUE: 17
PIF_VALUE: 18
PIF_VALUE: 0
PIF_VALUE: 18
PIF_VALUE: 19
PIF_VALUE: 20
PIF_VALUE: 17
PIF_VALUE: 18
PIF_VALUE: 17
PIF_VALUE: 17
PIF_VALUE: 19
PIF_VALUE: 18
PIF_VALUE: 19
PIF_VALUE: 20
PIF_VALUE: 18
PIF_VALUE: 17
PIF_VALUE: 19
PIF_VALUE: 17
PIF_VALUE: 18
PIF_VALUE: 16
PIF_VALUE: 18
PIF_VALUE: 18
PIF_VALUE: 0
PIF_VALUE: 18
PIF_VALUE: 19
PIF_VALUE: 19
PIF_VALUE: 18
PIF_VALUE: 17
PIF_VALUE: 1
PIF_VALUE: 18
PIF_VALUE: 20
PIF_VALUE: 18
PIF_VALUE: 16
PIF_VALUE: 19
PIF_VALUE: 19
PIF_VALUE: 18
PIF_VALUE: 18
PIF_VALUE: 21
PIF_VALUE: 19
PIF_VALUE: 18
PIF_VALUE: 19
PIF_VALUE: 18
PIF_VALUE: 19
PIF_VALUE: 18
PIF_VALUE: 18
PIF_VALUE: 19
PIF_VALUE: 18
PIF_VALUE: 19
PIF_VALUE: 16
PIF_VALUE: 17
PIF_VALUE: 16
PIF_VALUE: 7
PIF_VALUE: 17
PIF_VALUE: 17
PIF_VALUE: 18
PIF_VALUE: 20
PIF_VALUE: 19
PIF_VALUE: 18
PIF_VALUE: 18
PIF_VALUE: 17
PIF_VALUE: 17
PIF_VALUE: 18
PIF_VALUE: 19
PIF_VALUE: 19
PIF_VALUE: 18
PIF_VALUE: 18
PIF_VALUE: 17
PIF_VALUE: 19
PIF_VALUE: 18
PIF_VALUE: 16
PIF_VALUE: 18
PIF_VALUE: 17
PIF_VALUE: 7
PIF_VALUE: 16
PIF_VALUE: 23
PIF_VALUE: 17
PIF_VALUE: 18
PIF_VALUE: 18
PIF_VALUE: 11
PIF_VALUE: 15
PIF_VALUE: 18
PIF_VALUE: 19
PIF_VALUE: 21
PIF_VALUE: 17
PIF_VALUE: 18
PIF_VALUE: 18
PIF_VALUE: 19
PIF_VALUE: 17
PIF_VALUE: 18
PIF_VALUE: 17
PIF_VALUE: 18
PIF_VALUE: 19
PIF_VALUE: 17
PIF_VALUE: 16
PIF_VALUE: 18
PIF_VALUE: 18
PIF_VALUE: 19
PIF_VALUE: 2
PIF_VALUE: 19
PIF_VALUE: 19
PIF_VALUE: 18
PIF_VALUE: 18
PIF_VALUE: 17
PIF_VALUE: 18
PIF_VALUE: 18
PIF_VALUE: 17
PIF_VALUE: 18
PIF_VALUE: 19
PIF_VALUE: 11
PIF_VALUE: 18
PIF_VALUE: 16
PIF_VALUE: 18
PIF_VALUE: 17
PIF_VALUE: 18
PIF_VALUE: 18
PIF_VALUE: 19
PIF_VALUE: 2
PIF_VALUE: 17
PIF_VALUE: 18
PIF_VALUE: 19
PIF_VALUE: 16
PIF_VALUE: 18
PIF_VALUE: 20
PIF_VALUE: 19
PIF_VALUE: 14
PIF_VALUE: 17
PIF_VALUE: 17
PIF_VALUE: 19
PIF_VALUE: 18
PIF_VALUE: 18
PIF_VALUE: 17
PIF_VALUE: 20
PIF_VALUE: 16
PIF_VALUE: 3
PIF_VALUE: 19
PIF_VALUE: 18
PIF_VALUE: 17
PIF_VALUE: 18
PIF_VALUE: 17
PIF_VALUE: 17
PIF_VALUE: 19
PIF_VALUE: 14
PIF_VALUE: 17
PIF_VALUE: 16
PIF_VALUE: 17
PIF_VALUE: 19
PIF_VALUE: 17
PIF_VALUE: 18
PIF_VALUE: 17
PIF_VALUE: 17
PIF_VALUE: 8
PIF_VALUE: 17
PIF_VALUE: 19
PIF_VALUE: 14
PIF_VALUE: 19
PIF_VALUE: 2

## 2018-10-04 ASSESSMENT — PAIN SCALES - GENERAL
PAINLEVEL_OUTOF10: 3
PAINLEVEL_OUTOF10: 0

## 2018-10-04 NOTE — ANESTHESIA PROCEDURE NOTES
Arterial Line:    An arterial line was placed using surface landmarks, in the OR for the following indication(s): continuous blood pressure monitoring and blood sampling needed. A 22 gauge (size), 1 and 3/8 inch (length), Arrow (type) catheter was placed, Seldinger technique used, into the right radial artery, secured by suture, tape and Tegaderm. Anesthesia type: General    Events:  patient tolerated procedure well with no complications.   Resident/CRNA: Patti Myrick  Preanesthetic Checklist  Completed: patient identified, site marked, surgical consent, pre-op evaluation, timeout performed, IV checked, risks and benefits discussed, monitors and equipment checked, anesthesia consent given, oxygen available and patient being monitored

## 2018-10-04 NOTE — PLAN OF CARE
Problem: SAFETY  Goal: Free from accidental physical injury  Outcome: Ongoing  Patient free from accidental injury throughout shift. Problem: DAILY CARE  Goal: Daily care needs are met  Outcome: Ongoing  Patient assisted with daily cares as needed throughout shift. Problem: PAIN  Goal: Patient's pain/discomfort is manageable  Outcome: Ongoing  Patient continues to have intermittent abdominal pain throughout shift. Patient to have exploratory lap tomorrow with Dr. Claire Eugene. Problem: DISCHARGE BARRIERS  Goal: Patient's continuum of care needs are met  Outcome: Ongoing  Discharge planing continues at this time. Problem: Falls - Risk of:  Goal: Will remain free from falls  Will remain free from falls   Outcome: Ongoing  Patient free of falls throughout shift. Patient utilizes her call light effectively. Patients bed alarm active throughout shift. Problem: Nutrition  Goal: Optimal nutrition therapy  Outcome: Ongoing  Patient currently has TPN running at 100. Pharmacy is dosing. Problem: GI  Goal: No bowel complications  Outcome: Ongoing  Patient continues to be unable to have a bowel movement. Bowel sounds are hypoactive at best. Patient receiving tap water enemas q12 but patient would not allow RN to give her the enema tonight. State she did not want to be woken up to get the enema. Problem: Infection - Central Venous Catheter-Associated Bloodstream Infection:  Goal: Will show no infection signs and symptoms  Will show no infection signs and symptoms   Outcome: Ongoing  Strict aseptic technique practiced when accessing the PICC line. Comments: Care plan reviewed with patient. Patient verbalizes understanding of plan of care.

## 2018-10-04 NOTE — ANESTHESIA PRE PROCEDURE
Department of Anesthesiology  Preprocedure Note       Name:  Joshua Yancey   Age:  66 y.o.  :  1940                                          MRN:  [id number]         Date:  10/4/2018      Surgeon: Cecily Thomas):  Cheyanne Guidry MD    Procedure: Procedure(s):  LAPAROTOMY EXPLORATORY, POSS BOWEL RESECTION    Medications prior to admission:   Prior to Admission medications    Medication Sig Start Date End Date Taking?  Authorizing Provider   lactulose (CHRONULAC) 10 GM/15ML solution Take 20 g by mouth 2 times daily   Yes Historical Provider, MD   ranitidine (RANITIDINE ACID REDUCER) 75 MG tablet Take 75 mg by mouth 2 times daily   Yes Historical Provider, MD   ferrous sulfate 325 (65 Fe) MG tablet Take 325 mg by mouth daily (with breakfast)   Yes Historical Provider, MD   Simethicone (GAS-X EXTRA STRENGTH) 125 MG CAPS Take by mouth   Yes Historical Provider, MD   Alpha-D-Galactosidase (BEANO PO) Take by mouth   Yes Historical Provider, MD   acetaminophen (TYLENOL) 325 MG tablet Take 650 mg by mouth every 6 hours as needed for Pain   Yes Historical Provider, MD   ibuprofen (ADVIL;MOTRIN) 200 MG tablet Take 200 mg by mouth every 6 hours as needed for Pain   Yes Historical Provider, MD   Omega-3 Fatty Acids (FISH OIL) 1000 MG CAPS Take 1,000 mg by mouth daily    Historical Provider, MD   Multiple Vitamins-Minerals (PRESERVISION AREDS 2 PO) Take 1 tablet by mouth daily    Historical Provider, MD       Current medications:    Current Facility-Administered Medications   Medication Dose Route Frequency Provider Last Rate Last Dose    PN-Adult  3 IN 1 Central Line (Custom)   Intravenous Continuous TPN Liliya [place], RPH        PN-Adult  3 IN 1 Central Line (Custom)   Intravenous Continuous TPN Liliya [place],  mL/hr at 10/03/18 1710      insulin lispro (HUMALOG) injection vial 0-12 Units  0-12 Units Subcutaneous E47L Liliya [place], RPH        dronabinol (MARINOL) capsule 2.5 mg  2.5 mg Oral BID

## 2018-10-04 NOTE — PLAN OF CARE
Problem: SAFETY  Goal: Free from accidental physical injury  Outcome: Met This Shift  Patient free from injury/harm this shift. Complying well with medical devices and following safety precautions. Fall risk continues to be assessed. Fall precautions in place, 5 P's used to provide safe environment. Bed in low and locked position, call light in reach, side rails upx2-3. Needs being met. Problem: DAILY CARE  Goal: Daily care needs are met  Outcome: Met This Shift  Patient  assisted to accomplish ADLs. Patient voicing needs appropriately. Encouraging and promoting as much  independence as possible per patient ability and assisting with ADLS and hygiene needs as needed. Skin and mucous membranes appropriate. Problem: PAIN  Goal: Patient's pain/discomfort is manageable  Outcome: Met This Shift  Denies pain    Problem: DISCHARGE BARRIERS  Goal: Patient's continuum of care needs are met  Outcome: Met This Shift  Patient speaking openly about discharge plan. Patient up in room as tolerated, ambulating and tolerating well, preforming ADLs. Pt tolerating oral medications. Nutritional status and needs discussed, tolerating PO well. Patient participating in plan of care, discussing options, needs and concerns. Pt will be discharged home with family support. Problem: Falls - Risk of:  Goal: Will remain free from falls  Will remain free from falls   Outcome: Met This Shift  Call light, overbed table, and belongings within reach. Bed/ chair alarm activated. Up with 1 assist. Patient included in hourly rounds. Problem: Nutrition  Goal: Optimal nutrition therapy  Outcome: Met This Shift  Cont on tpn    Problem: GI  Goal: No bowel complications  Outcome: Met This Shift  Bowel sounds active x4. No c/o nausea, vomiting. No distention observed upon assessment. Bowel movement in the last 24hrs. Continuing to monitor.        Comments: Pt aware of plan of care, continuing to update and work with patient to address

## 2018-10-04 NOTE — PROGRESS NOTES
· Current Body Wt: 137 lb (62.1 kg) (10/3, trace edema)  · Admission Body Wt: 143 lb (64.9 kg) (9/24, no edema)  · Usual Body Wt:  (per EMR (6/25/18): 154# 12.8oz)  · Ideal Body Wt: 115 lb (52.2 kg),   · BMI Classification: BMI 18.5 - 24.9 Normal Weight  · Comparative Standards (Estimated Nutrition Needs):  · Estimated Daily Total Kcal: 1623-1947kcals (25-30kcals/kgm admit wt. 64.9kgm)  · Estimated Daily Protein (g):  grams (1.5-2 grams protein/kgm ideal wt 52kgm)    Estimated Intake vs Estimated Needs: Intake Meets Needs (with TPN at goal)    Nutrition Risk Level: High    Nutrition Interventions:   Continue NPO, Continue Parenteral Nutrition  Continued Inpatient Monitoring, Coordination of Care, Education Initiated (explained TPN meeting nutritional needs to patient)    Nutrition Evaluation:   · Evaluation: Progressing toward goals   · Goals: Pt. will tolerate TPN to meet 75% or more of nutritional needs until able to transition to po diet. · Monitoring: NPO Status, PN Intake, PN Tolerance, Ascites/Edema, Weight, Pertinent Labs, Nausea or Vomiting, Constipation, Patient/Family Education    See Adult Nutrition Doc Flowsheet for more detail.      Electronically signed by Geronimo Arita RD, LD on 10/4/18 at 10:05 AM    Contact Number: [phone]

## 2018-10-05 LAB
ANION GAP SERPL CALCULATED.3IONS-SCNC: 13 MEQ/L (ref 8–16)
BUN BLDV-MCNC: 31 MG/DL (ref 7–22)
CALCIUM IONIZED: 1.25 MMOL/L (ref 1.12–1.32)
CALCIUM SERPL-MCNC: 8.3 MG/DL (ref 8.5–10.5)
CHLORIDE BLD-SCNC: 110 MEQ/L (ref 98–111)
CO2: 16 MEQ/L (ref 23–33)
CREAT SERPL-MCNC: 0.6 MG/DL (ref 0.4–1.2)
GFR SERPL CREATININE-BSD FRML MDRD: > 90 ML/MIN/1.73M2
GLUCOSE BLD-MCNC: 104 MG/DL (ref 70–108)
GLUCOSE BLD-MCNC: 114 MG/DL (ref 70–108)
GLUCOSE BLD-MCNC: 151 MG/DL (ref 70–108)
GLUCOSE BLD-MCNC: 163 MG/DL (ref 70–108)
GLUCOSE BLD-MCNC: 255 MG/DL (ref 70–108)
HEMOGLOBIN: 10.5 GM/DL (ref 12–16)
HEMOGLOBIN: 8.2 GM/DL (ref 12–16)
HEMOGLOBIN: 8.2 GM/DL (ref 12–16)
MAGNESIUM: 1.6 MG/DL (ref 1.6–2.4)
PHOSPHORUS: 2.4 MG/DL (ref 2.4–4.7)
POTASSIUM REFLEX MAGNESIUM: 3.9 MEQ/L (ref 3.5–5.2)
POTASSIUM SERPL-SCNC: 3.9 MEQ/L (ref 3.5–5.2)
SODIUM BLD-SCNC: 139 MEQ/L (ref 135–145)

## 2018-10-05 PROCEDURE — 6360000002 HC RX W HCPCS: Performed by: SURGERY

## 2018-10-05 PROCEDURE — 36592 COLLECT BLOOD FROM PICC: CPT

## 2018-10-05 PROCEDURE — 99024 POSTOP FOLLOW-UP VISIT: CPT | Performed by: SURGERY

## 2018-10-05 PROCEDURE — 6370000000 HC RX 637 (ALT 250 FOR IP)

## 2018-10-05 PROCEDURE — 36415 COLL VENOUS BLD VENIPUNCTURE: CPT

## 2018-10-05 PROCEDURE — 2000000000 HC ICU R&B

## 2018-10-05 PROCEDURE — 2709999900 HC NON-CHARGEABLE SUPPLY

## 2018-10-05 PROCEDURE — 2580000003 HC RX 258: Performed by: SURGERY

## 2018-10-05 PROCEDURE — 82948 REAGENT STRIP/BLOOD GLUCOSE: CPT

## 2018-10-05 PROCEDURE — 99291 CRITICAL CARE FIRST HOUR: CPT | Performed by: INTERNAL MEDICINE

## 2018-10-05 PROCEDURE — 2500000003 HC RX 250 WO HCPCS: Performed by: SURGERY

## 2018-10-05 PROCEDURE — 6360000002 HC RX W HCPCS: Performed by: INTERNAL MEDICINE

## 2018-10-05 PROCEDURE — 94770 HC ETCO2 MONITOR DAILY: CPT

## 2018-10-05 PROCEDURE — 85018 HEMOGLOBIN: CPT

## 2018-10-05 PROCEDURE — 83735 ASSAY OF MAGNESIUM: CPT

## 2018-10-05 PROCEDURE — C9113 INJ PANTOPRAZOLE SODIUM, VIA: HCPCS | Performed by: SURGERY

## 2018-10-05 PROCEDURE — 6360000002 HC RX W HCPCS

## 2018-10-05 PROCEDURE — 82330 ASSAY OF CALCIUM: CPT

## 2018-10-05 PROCEDURE — 6370000000 HC RX 637 (ALT 250 FOR IP): Performed by: INTERNAL MEDICINE

## 2018-10-05 PROCEDURE — 2580000003 HC RX 258: Performed by: INTERNAL MEDICINE

## 2018-10-05 PROCEDURE — 80048 BASIC METABOLIC PNL TOTAL CA: CPT

## 2018-10-05 PROCEDURE — 84100 ASSAY OF PHOSPHORUS: CPT

## 2018-10-05 PROCEDURE — 94003 VENT MGMT INPAT SUBQ DAY: CPT

## 2018-10-05 RX ORDER — FLUCONAZOLE 2 MG/ML
400 INJECTION, SOLUTION INTRAVENOUS EVERY 24 HOURS
Status: DISCONTINUED | OUTPATIENT
Start: 2018-10-05 | End: 2018-10-11

## 2018-10-05 RX ORDER — 0.9 % SODIUM CHLORIDE 0.9 %
1000 INTRAVENOUS SOLUTION INTRAVENOUS ONCE
Status: COMPLETED | OUTPATIENT
Start: 2018-10-05 | End: 2018-10-05

## 2018-10-05 RX ORDER — ACETAMINOPHEN 650 MG/1
650 SUPPOSITORY RECTAL EVERY 4 HOURS PRN
Status: DISCONTINUED | OUTPATIENT
Start: 2018-10-05 | End: 2018-10-16 | Stop reason: HOSPADM

## 2018-10-05 RX ORDER — MAGNESIUM SULFATE IN WATER 40 MG/ML
2 INJECTION, SOLUTION INTRAVENOUS ONCE
Status: COMPLETED | OUTPATIENT
Start: 2018-10-05 | End: 2018-10-05

## 2018-10-05 RX ORDER — ACETAMINOPHEN 650 MG/1
SUPPOSITORY RECTAL
Status: COMPLETED
Start: 2018-10-05 | End: 2018-10-05

## 2018-10-05 RX ORDER — SODIUM CHLORIDE, SODIUM LACTATE, POTASSIUM CHLORIDE, CALCIUM CHLORIDE 600; 310; 30; 20 MG/100ML; MG/100ML; MG/100ML; MG/100ML
INJECTION, SOLUTION INTRAVENOUS CONTINUOUS
Status: DISCONTINUED | OUTPATIENT
Start: 2018-10-05 | End: 2018-10-07

## 2018-10-05 RX ADMIN — ACETAMINOPHEN 650 MG: 650 SUPPOSITORY RECTAL at 20:33

## 2018-10-05 RX ADMIN — MORPHINE SULFATE 2 MG: 2 INJECTION, SOLUTION INTRAMUSCULAR; INTRAVENOUS at 04:39

## 2018-10-05 RX ADMIN — Medication 10 ML: at 06:25

## 2018-10-05 RX ADMIN — ACETAMINOPHEN 650 MG: 650 SUPPOSITORY RECTAL at 08:58

## 2018-10-05 RX ADMIN — Medication 15 UNITS: at 12:36

## 2018-10-05 RX ADMIN — MORPHINE SULFATE 2 MG: 2 INJECTION, SOLUTION INTRAMUSCULAR; INTRAVENOUS at 14:39

## 2018-10-05 RX ADMIN — MORPHINE SULFATE 2 MG: 2 INJECTION, SOLUTION INTRAMUSCULAR; INTRAVENOUS at 11:59

## 2018-10-05 RX ADMIN — SODIUM CHLORIDE 1000 ML: 9 INJECTION, SOLUTION INTRAVENOUS at 06:27

## 2018-10-05 RX ADMIN — Medication 6 UNITS: at 06:30

## 2018-10-05 RX ADMIN — SODIUM CHLORIDE: 9 INJECTION, SOLUTION INTRAVENOUS at 06:21

## 2018-10-05 RX ADMIN — SODIUM CHLORIDE, POTASSIUM CHLORIDE, SODIUM LACTATE AND CALCIUM CHLORIDE: 600; 310; 30; 20 INJECTION, SOLUTION INTRAVENOUS at 16:51

## 2018-10-05 RX ADMIN — CALCIUM GLUCONATE: 94 INJECTION, SOLUTION INTRAVENOUS at 17:41

## 2018-10-05 RX ADMIN — Medication 15 ML: at 08:56

## 2018-10-05 RX ADMIN — MORPHINE SULFATE 2 MG: 2 INJECTION, SOLUTION INTRAMUSCULAR; INTRAVENOUS at 08:56

## 2018-10-05 RX ADMIN — MORPHINE SULFATE 2 MG: 2 INJECTION, SOLUTION INTRAMUSCULAR; INTRAVENOUS at 19:06

## 2018-10-05 RX ADMIN — ACETAMINOPHEN 650 MG: 650 SUPPOSITORY RECTAL at 14:23

## 2018-10-05 RX ADMIN — PIPERACILLIN SODIUM,TAZOBACTAM SODIUM 3.38 G: 3; .375 INJECTION, POWDER, FOR SOLUTION INTRAVENOUS at 06:29

## 2018-10-05 RX ADMIN — PIPERACILLIN SODIUM,TAZOBACTAM SODIUM 3.38 G: 3; .375 INJECTION, POWDER, FOR SOLUTION INTRAVENOUS at 14:18

## 2018-10-05 RX ADMIN — PIPERACILLIN SODIUM,TAZOBACTAM SODIUM 3.38 G: 3; .375 INJECTION, POWDER, FOR SOLUTION INTRAVENOUS at 22:25

## 2018-10-05 RX ADMIN — METRONIDAZOLE 500 MG: 500 INJECTION, SOLUTION INTRAVENOUS at 22:25

## 2018-10-05 RX ADMIN — METRONIDAZOLE 500 MG: 500 INJECTION, SOLUTION INTRAVENOUS at 14:18

## 2018-10-05 RX ADMIN — PANTOPRAZOLE SODIUM 40 MG: 40 INJECTION, POWDER, FOR SOLUTION INTRAVENOUS at 06:28

## 2018-10-05 RX ADMIN — METRONIDAZOLE 500 MG: 500 INJECTION, SOLUTION INTRAVENOUS at 06:22

## 2018-10-05 RX ADMIN — FLUCONAZOLE 400 MG: 400 INJECTION, SOLUTION INTRAVENOUS at 17:43

## 2018-10-05 RX ADMIN — MAGNESIUM SULFATE HEPTAHYDRATE 2 G: 40 INJECTION, SOLUTION INTRAVENOUS at 12:10

## 2018-10-05 ASSESSMENT — PULMONARY FUNCTION TESTS
PIF_VALUE: 19
PIF_VALUE: 17

## 2018-10-05 ASSESSMENT — PAIN SCALES - GENERAL
PAINLEVEL_OUTOF10: 0
PAINLEVEL_OUTOF10: 7
PAINLEVEL_OUTOF10: 4

## 2018-10-05 NOTE — PROGRESS NOTES
Nutrition Assessment    Type and Reason for Visit: Initial, Consult (for TF via vent protocol. Pt on TPN at this time d/t SBO)    Nutrition Recommendations:  Continue TPN macronutrients same on next bag ( at goal). If pt remains intubated, when able to use gut, recommend Vital 1.2 ( elemental TF ) start at 10 ml/hr. Malnutrition Assessment:  · Malnutrition Status: Meets the criteria for severe malnutrition  · Context: Chronic illness  · Findings of the 6 clinical characteristics of malnutrition (Minimum of 2 out of 6 clinical characteristics is required to make the diagnosis of moderate or severe Protein Calorie Malnutrition based on AND/ASPEN Guidelines):  1. Energy Intake-Less than or equal to 75%, greater than or equal to 1 month    2. Weight Loss-10% loss or greater (-11.5%), in 3 months (3.5 months)  3. Fat Loss-Severe subcutaneous fat loss, Orbital  4. Muscle Loss-Severe muscle mass loss, Clavicles (pectoralis and deltoids), Scapula (trapezius), Temples (temporalis muscle)    Nutrition Diagnosis:   · Problem: Severe malnutrition, in context of chronic illness  · Etiology: related to Alteration in GI function     Signs and symptoms:  as evidenced by Diet history of poor intake, Weight loss, Severe loss of subcutaneous fat, Severe muscle loss    Nutrition Assessment:  · Subjective Assessment: Pt admitted with SBO s/p small bowel resection with end ileostomy intubated, receiving versed & TPN infusing. 35 ml stool documented last shift. Glucose 255. Dr Thien Bowser adjusting insulin & pt discussed in rounds. meds also includes flagyl & fentanyl. Pt  NPO.    · Wound Type: Surgical Wound (10/4 small bowel resection with end ileostomy)  · Current Nutrition Therapies:  · Oral Diet Orders: NPO   · Oral Diet intake: NPO  · Oral Nutrition Supplement (ONS) Orders: None  · ONS intake: NPO  · Parenteral Nutrition Orders:  · Type and Formula: 3-in-1 Custom (dosing weight 65 kgm, 30 kcal/kgm, 1.7 grams protein/kgm, 30% lipid

## 2018-10-05 NOTE — PROGRESS NOTES
Pt Name: Jason Tavarez  MRN: [id number]  [id number]  YOB: 1940  Admit Date: 9/24/2018 10:36 AM  Date of evaluation: 10/5/2018  Primary Care Physician: Driss Bernal MD   [id number]-03/[id number]-A   Dictating for Dr Neysa Saint sedated on vent. Daughter at bedside. O  BP (!) 144/67   Pulse 123   Temp 101.7 °F (38.7 °C) (Rectal)   Resp 23   Ht 5' 3\" (1.6 m)   Wt 147 lb 0.8 oz (66.7 kg)   SpO2 96%   BMI 26.05 kg/m²   VSS,+fevers  Sedated on ventilator  Resp: CTAB  Chest: RRR  Abd; soft, non-distended with absent BS's. Ileostomy  Ext: +mild lower ext edema        Abdominal Exploration  DATE OF PROCEDURE:  10/04/2018   PREOPERATIVE DIAGNOSIS:  Small bowel obstruction. POSTOPERATIVE DIAGNOSIS:  Small bowel obstruction with severe adhesions. PROCEDURE:  1. Abdominal exploration. 2.  Lysis of severe adhesions (complex case). 3.  Segmental small bowel resection x3 with reanastomosis x3.  4.  Formation of end ileostomy. Assessment:    1. SBO, s/p 10-4-18 abdominal exploration with lysis of adhesions, segmental small bowel resection times 3 with reanastomosis times 3, and formation of end ileostomy  2. Hypotension, on pressors  3. Abdominal distention, improved since surgery  4. Resp failure on vent  5. Weight loss 10 lbs in last month per daughter.     6. Anemia, 10.5, Iron sat 25% while on ferrous sulfate daily    7. Recent SBO that resolved with conservative treatment 9-4-18  8. S/p abdominal exploration with lysis of adhesions and extended left colon resection 6-25-18 for sigmoid stricture with atonic colon and probable adhesions  9. Hx H pylori, test of eradication completed following treatment and negative for H pylori     Plan:  1. Pt is critically ill in ICU  2. MELISA will sign off as there are no GI interventions needed at this time. Please call as needed.         Assessment and plan discussed with Dr. Kishore Shields, APRN, CNP, 10/5/2018, 9:18 AM  .

## 2018-10-05 NOTE — OP NOTE
were three areas of  the small bowel that had to removed, one the distal ileum and it should be  noted that the cecum with rotation to the prior anastomosis was basically  in the right upper quadrant and then the ileum came down more at the left  gutter. We fired MELISA's on each side of the part of the ileum. We were  going to take out, then took the mesentery down and removed that specimen. We continued doing this in other areas we had gotten into inadvertently and  we basically did three different segmental resections. We then did  side-to-side anastomosis with the MELISA and the TA stapler. It was my  feeling that we should bring out an ostomy and not attempt to have this  stool go through the colon as I was very unsure if I made any entrance into  the rectum, although I could not determine that I had. We then irrigated  the abdomen with 3 liters of fluid. There were some bleeders, had to be  suture ligatured and it was estimated we had lost 750 mL of blood. Throughout the procedure, I did irrigate with IrriSept and conclusion of  the irrigation with the saline. We irrigated with IrriSept, 500 mL. We  left it in the abdomen for two minutes and then we suctioned it out. There  was no evidence of any further bleeding and then we made an opening in the  abdominal wall and brought the ileum out of an ileostomy. We then closed  the fascia with #1 Novafil sutures, and it should be noted prior to closing  the skin, we irrigated the subcutaneous with IrriSept.  We then took  Betadine-soaked Telfa olga, inserted them into the subcutaneous tissue and  loosely closed the skin with staples. We then opened the ileostomy and  matured it to the skin with 3-0 Vicryl sutures. The ostomy was pink at the  conclusion of the procedure. The patient did again manifest some shock  with low blood pressure during the procedure that I felt was likely sepsis. She was maintained on some pressors throughout the operation.   She will go  to ICU intubated and we will fully support. EDDIE QUINTERO [facility], MD    D: 10/04/2018 18:12:59       T: 10/05/2018 3:10:37     [email or web address]  Job#: [id number]     Doc#: [id number]    CC:

## 2018-10-05 NOTE — PLAN OF CARE
Problem: SAFETY  Goal: Free from accidental physical injury  Outcome: Met This Shift  Patient is currently free of accidental injury. Problem: DAILY CARE  Goal: Daily care needs are met  Outcome: Met This Shift  Daily care needs are met at this time. Problem: PAIN  Goal: Patient's pain/discomfort is manageable  Outcome: Met This Shift  Pain managed with cpot scale. Patient repositioned. Problem: KNOWLEDGE DEFICIT  Goal: Patient/S.O. demonstrates understanding of disease process, treatment plan, medications, and discharge instructions. Outcome: Ongoing  Patient is currently on he ventilator, she will need further education prior to discharge. Problem: DISCHARGE BARRIERS  Goal: Patient's continuum of care needs are met  Outcome: Met This Shift  Patients care needs are met at this time. Problem: Falls - Risk of:  Goal: Will remain free from falls  Will remain free from falls   Outcome: Met This Shift  She is currently free of falls. Goal: Absence of physical injury  Absence of physical injury   Outcome: Met This Shift  She is free of physical injury. Problem: Nutrition  Goal: Optimal nutrition therapy  Outcome: Met This Shift  Patient is currently NPO. Goal: Understanding of nutritional guidelines  Outcome: Met This Shift      Problem: GI  Goal: No bowel complications  Outcome: Met This Shift  No bowel complications at this time. Goal: Bowel movement at least every other day  Outcome: Met This Shift  An ileostomy was placed today. Problem: Infection - Central Venous Catheter-Associated Bloodstream Infection:  Goal: Will show no infection signs and symptoms  Will show no infection signs and symptoms   Outcome: Met This Shift  She is currently afebrile.     Problem: Restraint Use - Nonviolent/Non-Self-Destructive Behavior:  Goal: Absence of restraint indications  Absence of restraint indications   Outcome: Not Met This Shift  Restraints were initiated due to patient attempting to pull her lines

## 2018-10-05 NOTE — PLAN OF CARE
Problem: Impaired respiratory status  Goal: Normal spontaneous ventilation  Outcome: Ongoing  Pt is on 18/6 (Pcontrol 12, PEEP 6) and 21% FiO2. Was planning on going to CPAP this morning, but HR is consistantly at 130 on 9mcg Levo and 3mg Versed. Pt is not candidate for weaning at this time. Will continue to monitor for readiness to wean. RN spoke with Dr. Kojo Little, who said no weaning right now.

## 2018-10-06 ENCOUNTER — APPOINTMENT (OUTPATIENT)
Dept: GENERAL RADIOLOGY | Age: 78
DRG: 329 | End: 2018-10-06
Payer: MEDICARE

## 2018-10-06 LAB
ALBUMIN SERPL-MCNC: 1.6 G/DL (ref 3.5–5.1)
ALP BLD-CCNC: 38 U/L (ref 38–126)
ALT SERPL-CCNC: 11 U/L (ref 11–66)
ANION GAP SERPL CALCULATED.3IONS-SCNC: 7 MEQ/L (ref 8–16)
AST SERPL-CCNC: 11 U/L (ref 5–40)
BASOPHILS # BLD: 0.1 %
BASOPHILS ABSOLUTE: 0 THOU/MM3 (ref 0–0.1)
BILIRUB SERPL-MCNC: 0.4 MG/DL (ref 0.3–1.2)
BUN BLDV-MCNC: 26 MG/DL (ref 7–22)
CALCIUM IONIZED: 1.35 MMOL/L (ref 1.12–1.32)
CALCIUM SERPL-MCNC: 8.5 MG/DL (ref 8.5–10.5)
CHLORIDE BLD-SCNC: 113 MEQ/L (ref 98–111)
CO2: 22 MEQ/L (ref 23–33)
CREAT SERPL-MCNC: 0.4 MG/DL (ref 0.4–1.2)
EOSINOPHIL # BLD: 0 %
EOSINOPHILS ABSOLUTE: 0 THOU/MM3 (ref 0–0.4)
ERYTHROCYTE [DISTWIDTH] IN BLOOD BY AUTOMATED COUNT: 16.2 % (ref 11.5–14.5)
ERYTHROCYTE [DISTWIDTH] IN BLOOD BY AUTOMATED COUNT: 49.3 FL (ref 35–45)
GFR SERPL CREATININE-BSD FRML MDRD: > 90 ML/MIN/1.73M2
GLUCOSE BLD-MCNC: 101 MG/DL (ref 70–108)
GLUCOSE BLD-MCNC: 112 MG/DL (ref 70–108)
GLUCOSE BLD-MCNC: 115 MG/DL (ref 70–108)
GLUCOSE BLD-MCNC: 116 MG/DL (ref 70–108)
GLUCOSE BLD-MCNC: 120 MG/DL (ref 70–108)
GLUCOSE BLD-MCNC: 124 MG/DL (ref 70–108)
GLUCOSE BLD-MCNC: 143 MG/DL (ref 70–108)
HCT VFR BLD CALC: 24.9 % (ref 37–47)
HEMOGLOBIN: 6.7 GM/DL (ref 12–16)
HEMOGLOBIN: 7.4 GM/DL (ref 12–16)
HEMOGLOBIN: 7.8 GM/DL (ref 12–16)
HEMOGLOBIN: 7.9 GM/DL (ref 12–16)
HEMOGLOBIN: 8 GM/DL (ref 12–16)
IMMATURE GRANS (ABS): 0.13 THOU/MM3 (ref 0–0.07)
IMMATURE GRANULOCYTES: 0.8 %
LYMPHOCYTES # BLD: 11.3 %
LYMPHOCYTES ABSOLUTE: 1.9 THOU/MM3 (ref 1–4.8)
MAGNESIUM: 1.9 MG/DL (ref 1.6–2.4)
MCH RBC QN AUTO: 26.9 PG (ref 26–33)
MCHC RBC AUTO-ENTMCNC: 31.7 GM/DL (ref 32.2–35.5)
MCV RBC AUTO: 84.7 FL (ref 81–99)
MONOCYTES # BLD: 4.4 %
MONOCYTES ABSOLUTE: 0.7 THOU/MM3 (ref 0.4–1.3)
MRSA SCREEN: NORMAL
NUCLEATED RED BLOOD CELLS: 0 /100 WBC
PHOSPHORUS: 2.5 MG/DL (ref 2.4–4.7)
PLATELET # BLD: 103 THOU/MM3 (ref 130–400)
PLATELET ESTIMATE: ABNORMAL
PMV BLD AUTO: 10.4 FL (ref 9.4–12.4)
POIKILOCYTES: ABNORMAL
POTASSIUM SERPL-SCNC: 4.1 MEQ/L (ref 3.5–5.2)
RBC # BLD: 2.94 MILL/MM3 (ref 4.2–5.4)
SCAN OF BLOOD SMEAR: NORMAL
SEG NEUTROPHILS: 83.4 %
SEGMENTED NEUTROPHILS ABSOLUTE COUNT: 14.2 THOU/MM3 (ref 1.8–7.7)
SODIUM BLD-SCNC: 142 MEQ/L (ref 135–145)
TOTAL PROTEIN: 4 G/DL (ref 6.1–8)
TOXIC GRANULATION: PRESENT
URINE CULTURE, ROUTINE: NORMAL
VRE CULTURE: NORMAL
WBC # BLD: 17 THOU/MM3 (ref 4.8–10.8)

## 2018-10-06 PROCEDURE — 99024 POSTOP FOLLOW-UP VISIT: CPT | Performed by: NURSE PRACTITIONER

## 2018-10-06 PROCEDURE — 2500000003 HC RX 250 WO HCPCS: Performed by: SURGERY

## 2018-10-06 PROCEDURE — 2709999900 HC NON-CHARGEABLE SUPPLY

## 2018-10-06 PROCEDURE — 6360000002 HC RX W HCPCS: Performed by: SURGERY

## 2018-10-06 PROCEDURE — 80053 COMPREHEN METABOLIC PANEL: CPT

## 2018-10-06 PROCEDURE — 84100 ASSAY OF PHOSPHORUS: CPT

## 2018-10-06 PROCEDURE — 6360000002 HC RX W HCPCS: Performed by: INTERNAL MEDICINE

## 2018-10-06 PROCEDURE — 82948 REAGENT STRIP/BLOOD GLUCOSE: CPT

## 2018-10-06 PROCEDURE — 85018 HEMOGLOBIN: CPT

## 2018-10-06 PROCEDURE — 2060000000 HC ICU INTERMEDIATE R&B

## 2018-10-06 PROCEDURE — 36592 COLLECT BLOOD FROM PICC: CPT

## 2018-10-06 PROCEDURE — C9113 INJ PANTOPRAZOLE SODIUM, VIA: HCPCS | Performed by: SURGERY

## 2018-10-06 PROCEDURE — 85025 COMPLETE CBC W/AUTO DIFF WBC: CPT

## 2018-10-06 PROCEDURE — 71045 X-RAY EXAM CHEST 1 VIEW: CPT

## 2018-10-06 PROCEDURE — APPSS30 APP SPLIT SHARED TIME 16-30 MINUTES: Performed by: NURSE PRACTITIONER

## 2018-10-06 PROCEDURE — 83735 ASSAY OF MAGNESIUM: CPT

## 2018-10-06 PROCEDURE — 2580000003 HC RX 258: Performed by: INTERNAL MEDICINE

## 2018-10-06 PROCEDURE — 2580000003 HC RX 258: Performed by: SURGERY

## 2018-10-06 PROCEDURE — 36430 TRANSFUSION BLD/BLD COMPNT: CPT

## 2018-10-06 PROCEDURE — 82330 ASSAY OF CALCIUM: CPT

## 2018-10-06 RX ORDER — 0.9 % SODIUM CHLORIDE 0.9 %
250 INTRAVENOUS SOLUTION INTRAVENOUS ONCE
Status: DISCONTINUED | OUTPATIENT
Start: 2018-10-06 | End: 2018-10-07

## 2018-10-06 RX ADMIN — Medication 10 ML: at 21:17

## 2018-10-06 RX ADMIN — PIPERACILLIN SODIUM,TAZOBACTAM SODIUM 3.38 G: 3; .375 INJECTION, POWDER, FOR SOLUTION INTRAVENOUS at 21:35

## 2018-10-06 RX ADMIN — SODIUM CHLORIDE, POTASSIUM CHLORIDE, SODIUM LACTATE AND CALCIUM CHLORIDE: 600; 310; 30; 20 INJECTION, SOLUTION INTRAVENOUS at 23:13

## 2018-10-06 RX ADMIN — MORPHINE SULFATE 2 MG: 2 INJECTION, SOLUTION INTRAMUSCULAR; INTRAVENOUS at 06:47

## 2018-10-06 RX ADMIN — METRONIDAZOLE 500 MG: 500 INJECTION, SOLUTION INTRAVENOUS at 21:35

## 2018-10-06 RX ADMIN — PIPERACILLIN SODIUM,TAZOBACTAM SODIUM 3.38 G: 3; .375 INJECTION, POWDER, FOR SOLUTION INTRAVENOUS at 14:21

## 2018-10-06 RX ADMIN — METRONIDAZOLE 500 MG: 500 INJECTION, SOLUTION INTRAVENOUS at 05:08

## 2018-10-06 RX ADMIN — MORPHINE SULFATE 2 MG: 2 INJECTION, SOLUTION INTRAMUSCULAR; INTRAVENOUS at 19:55

## 2018-10-06 RX ADMIN — METRONIDAZOLE 500 MG: 500 INJECTION, SOLUTION INTRAVENOUS at 14:21

## 2018-10-06 RX ADMIN — MORPHINE SULFATE 2 MG: 2 INJECTION, SOLUTION INTRAMUSCULAR; INTRAVENOUS at 09:14

## 2018-10-06 RX ADMIN — PIPERACILLIN SODIUM,TAZOBACTAM SODIUM 3.38 G: 3; .375 INJECTION, POWDER, FOR SOLUTION INTRAVENOUS at 05:08

## 2018-10-06 RX ADMIN — Medication 15 UNITS: at 21:19

## 2018-10-06 RX ADMIN — MORPHINE SULFATE 2 MG: 2 INJECTION, SOLUTION INTRAMUSCULAR; INTRAVENOUS at 15:17

## 2018-10-06 RX ADMIN — MORPHINE SULFATE 2 MG: 2 INJECTION, SOLUTION INTRAMUSCULAR; INTRAVENOUS at 23:12

## 2018-10-06 RX ADMIN — PANTOPRAZOLE SODIUM 40 MG: 40 INJECTION, POWDER, FOR SOLUTION INTRAVENOUS at 06:47

## 2018-10-06 RX ADMIN — SODIUM CHLORIDE: 234 INJECTION INTRAMUSCULAR; INTRAVENOUS; SUBCUTANEOUS at 18:28

## 2018-10-06 RX ADMIN — MORPHINE SULFATE 2 MG: 2 INJECTION, SOLUTION INTRAMUSCULAR; INTRAVENOUS at 04:08

## 2018-10-06 RX ADMIN — FLUCONAZOLE 400 MG: 400 INJECTION, SOLUTION INTRAVENOUS at 18:28

## 2018-10-06 RX ADMIN — SODIUM CHLORIDE, POTASSIUM CHLORIDE, SODIUM LACTATE AND CALCIUM CHLORIDE: 600; 310; 30; 20 INJECTION, SOLUTION INTRAVENOUS at 15:17

## 2018-10-06 RX ADMIN — SODIUM CHLORIDE, POTASSIUM CHLORIDE, SODIUM LACTATE AND CALCIUM CHLORIDE: 600; 310; 30; 20 INJECTION, SOLUTION INTRAVENOUS at 06:47

## 2018-10-06 ASSESSMENT — PAIN SCALES - GENERAL
PAINLEVEL_OUTOF10: 8
PAINLEVEL_OUTOF10: 6
PAINLEVEL_OUTOF10: 6
PAINLEVEL_OUTOF10: 0
PAINLEVEL_OUTOF10: 7
PAINLEVEL_OUTOF10: 0
PAINLEVEL_OUTOF10: 7
PAINLEVEL_OUTOF10: 0
PAINLEVEL_OUTOF10: 8

## 2018-10-06 ASSESSMENT — PAIN DESCRIPTION - DESCRIPTORS: DESCRIPTORS: CONSTANT

## 2018-10-06 ASSESSMENT — PAIN DESCRIPTION - LOCATION: LOCATION: FACE;NECK

## 2018-10-06 ASSESSMENT — PAIN DESCRIPTION - FREQUENCY: FREQUENCY: CONTINUOUS

## 2018-10-06 ASSESSMENT — PAIN DESCRIPTION - PAIN TYPE: TYPE: ACUTE PAIN

## 2018-10-06 NOTE — PROGRESS NOTES
0147: Dr. William Johnson notified of critical value. Results for Iona Boland (MRN [id number]) as of 10/6/2018 01:46   Ref. Range 10/6/2018 00:20   Hemoglobin Quant Latest Ref Range: 12.0 - 16.0 gm/dl 6.7 (LL)     0148: Orders to transfuse 1 unit PRBC and repeat hgb 1 hour after transfusion complete.

## 2018-10-07 LAB
ALBUMIN SERPL-MCNC: 1.5 G/DL (ref 3.5–5.1)
ALP BLD-CCNC: 44 U/L (ref 38–126)
ALT SERPL-CCNC: 11 U/L (ref 11–66)
ANION GAP SERPL CALCULATED.3IONS-SCNC: 8 MEQ/L (ref 8–16)
AST SERPL-CCNC: 11 U/L (ref 5–40)
BASOPHILIA: ABNORMAL
BASOPHILS # BLD: 0.1 %
BASOPHILS ABSOLUTE: 0 THOU/MM3 (ref 0–0.1)
BILIRUB SERPL-MCNC: 0.4 MG/DL (ref 0.3–1.2)
BUN BLDV-MCNC: 14 MG/DL (ref 7–22)
CALCIUM IONIZED: 1.27 MMOL/L (ref 1.12–1.32)
CALCIUM SERPL-MCNC: 8.1 MG/DL (ref 8.5–10.5)
CHLORIDE BLD-SCNC: 108 MEQ/L (ref 98–111)
CO2: 23 MEQ/L (ref 23–33)
CREAT SERPL-MCNC: 0.3 MG/DL (ref 0.4–1.2)
EOSINOPHIL # BLD: 0.2 %
EOSINOPHILS ABSOLUTE: 0 THOU/MM3 (ref 0–0.4)
ERYTHROCYTE [DISTWIDTH] IN BLOOD BY AUTOMATED COUNT: 16.6 % (ref 11.5–14.5)
ERYTHROCYTE [DISTWIDTH] IN BLOOD BY AUTOMATED COUNT: 49.4 FL (ref 35–45)
GFR SERPL CREATININE-BSD FRML MDRD: > 90 ML/MIN/1.73M2
GLUCOSE BLD-MCNC: 105 MG/DL (ref 70–108)
GLUCOSE BLD-MCNC: 113 MG/DL (ref 70–108)
GLUCOSE BLD-MCNC: 113 MG/DL (ref 70–108)
GLUCOSE BLD-MCNC: 89 MG/DL (ref 70–108)
GLUCOSE BLD-MCNC: 93 MG/DL (ref 70–108)
HCT VFR BLD CALC: 26.9 % (ref 37–47)
HEMOGLOBIN: 6.9 GM/DL (ref 12–16)
HEMOGLOBIN: 8.4 GM/DL (ref 12–16)
HEMOGLOBIN: 8.9 GM/DL (ref 12–16)
IMMATURE GRANS (ABS): 0.21 THOU/MM3 (ref 0–0.07)
IMMATURE GRANULOCYTES: 1.3 %
LYMPHOCYTES # BLD: 12.9 %
LYMPHOCYTES ABSOLUTE: 2.2 THOU/MM3 (ref 1–4.8)
MAGNESIUM: 1.8 MG/DL (ref 1.6–2.4)
MCH RBC QN AUTO: 27.1 PG (ref 26–33)
MCHC RBC AUTO-ENTMCNC: 33.1 GM/DL (ref 32.2–35.5)
MCV RBC AUTO: 82 FL (ref 81–99)
MONOCYTES # BLD: 2.3 %
MONOCYTES ABSOLUTE: 0.4 THOU/MM3 (ref 0.4–1.3)
NUCLEATED RED BLOOD CELLS: 0 /100 WBC
PHOSPHORUS: 1.9 MG/DL (ref 2.4–4.7)
PLATELET # BLD: 92 THOU/MM3 (ref 130–400)
PLATELET ESTIMATE: ABNORMAL
PMV BLD AUTO: 10.6 FL (ref 9.4–12.4)
POTASSIUM SERPL-SCNC: 3.6 MEQ/L (ref 3.5–5.2)
RBC # BLD: 3.28 MILL/MM3 (ref 4.2–5.4)
SCAN OF BLOOD SMEAR: NORMAL
SEG NEUTROPHILS: 83.2 %
SEGMENTED NEUTROPHILS ABSOLUTE COUNT: 13.9 THOU/MM3 (ref 1.8–7.7)
SODIUM BLD-SCNC: 139 MEQ/L (ref 135–145)
TOTAL PROTEIN: 4.3 G/DL (ref 6.1–8)
WBC # BLD: 16.7 THOU/MM3 (ref 4.8–10.8)

## 2018-10-07 PROCEDURE — 2500000003 HC RX 250 WO HCPCS: Performed by: SURGERY

## 2018-10-07 PROCEDURE — 36415 COLL VENOUS BLD VENIPUNCTURE: CPT

## 2018-10-07 PROCEDURE — 82330 ASSAY OF CALCIUM: CPT

## 2018-10-07 PROCEDURE — 80053 COMPREHEN METABOLIC PANEL: CPT

## 2018-10-07 PROCEDURE — 85025 COMPLETE CBC W/AUTO DIFF WBC: CPT

## 2018-10-07 PROCEDURE — 6360000002 HC RX W HCPCS: Performed by: SURGERY

## 2018-10-07 PROCEDURE — 2709999900 HC NON-CHARGEABLE SUPPLY

## 2018-10-07 PROCEDURE — 36592 COLLECT BLOOD FROM PICC: CPT

## 2018-10-07 PROCEDURE — 36430 TRANSFUSION BLD/BLD COMPNT: CPT

## 2018-10-07 PROCEDURE — 84100 ASSAY OF PHOSPHORUS: CPT

## 2018-10-07 PROCEDURE — 2580000003 HC RX 258: Performed by: SURGERY

## 2018-10-07 PROCEDURE — 82948 REAGENT STRIP/BLOOD GLUCOSE: CPT

## 2018-10-07 PROCEDURE — 6360000002 HC RX W HCPCS: Performed by: INTERNAL MEDICINE

## 2018-10-07 PROCEDURE — 83735 ASSAY OF MAGNESIUM: CPT

## 2018-10-07 PROCEDURE — 99024 POSTOP FOLLOW-UP VISIT: CPT | Performed by: SURGERY

## 2018-10-07 PROCEDURE — 2060000000 HC ICU INTERMEDIATE R&B

## 2018-10-07 PROCEDURE — 85018 HEMOGLOBIN: CPT

## 2018-10-07 PROCEDURE — C9113 INJ PANTOPRAZOLE SODIUM, VIA: HCPCS | Performed by: SURGERY

## 2018-10-07 RX ORDER — 0.9 % SODIUM CHLORIDE 0.9 %
250 INTRAVENOUS SOLUTION INTRAVENOUS ONCE
Status: COMPLETED | OUTPATIENT
Start: 2018-10-07 | End: 2018-10-07

## 2018-10-07 RX ADMIN — PIPERACILLIN SODIUM,TAZOBACTAM SODIUM 3.38 G: 3; .375 INJECTION, POWDER, FOR SOLUTION INTRAVENOUS at 21:30

## 2018-10-07 RX ADMIN — Medication 10 ML: at 15:41

## 2018-10-07 RX ADMIN — PIPERACILLIN SODIUM,TAZOBACTAM SODIUM 3.38 G: 3; .375 INJECTION, POWDER, FOR SOLUTION INTRAVENOUS at 05:59

## 2018-10-07 RX ADMIN — Medication 10 ML: at 10:06

## 2018-10-07 RX ADMIN — PANTOPRAZOLE SODIUM 40 MG: 40 INJECTION, POWDER, FOR SOLUTION INTRAVENOUS at 06:02

## 2018-10-07 RX ADMIN — Medication 10 ML: at 14:34

## 2018-10-07 RX ADMIN — Medication 10 ML: at 12:30

## 2018-10-07 RX ADMIN — Medication 15 UNITS: at 23:57

## 2018-10-07 RX ADMIN — Medication 10 ML: at 21:30

## 2018-10-07 RX ADMIN — POTASSIUM PHOSPHATE, MONOBASIC AND POTASSIUM PHOSPHATE, DIBASIC 10 MMOL: 224; 236 INJECTION, SOLUTION, CONCENTRATE INTRAVENOUS at 08:14

## 2018-10-07 RX ADMIN — MORPHINE SULFATE 2 MG: 2 INJECTION, SOLUTION INTRAMUSCULAR; INTRAVENOUS at 07:57

## 2018-10-07 RX ADMIN — Medication: at 18:34

## 2018-10-07 RX ADMIN — METRONIDAZOLE 500 MG: 500 INJECTION, SOLUTION INTRAVENOUS at 14:35

## 2018-10-07 RX ADMIN — SODIUM CHLORIDE 250 ML: 9 INJECTION, SOLUTION INTRAVENOUS at 01:05

## 2018-10-07 RX ADMIN — MORPHINE SULFATE 2 MG: 2 INJECTION, SOLUTION INTRAMUSCULAR; INTRAVENOUS at 03:06

## 2018-10-07 RX ADMIN — PIPERACILLIN SODIUM,TAZOBACTAM SODIUM 3.38 G: 3; .375 INJECTION, POWDER, FOR SOLUTION INTRAVENOUS at 14:32

## 2018-10-07 RX ADMIN — METRONIDAZOLE 500 MG: 500 INJECTION, SOLUTION INTRAVENOUS at 21:30

## 2018-10-07 RX ADMIN — FLUCONAZOLE 400 MG: 400 INJECTION, SOLUTION INTRAVENOUS at 18:38

## 2018-10-07 RX ADMIN — METRONIDAZOLE 500 MG: 500 INJECTION, SOLUTION INTRAVENOUS at 05:58

## 2018-10-07 ASSESSMENT — PAIN SCALES - GENERAL
PAINLEVEL_OUTOF10: 7
PAINLEVEL_OUTOF10: 0
PAINLEVEL_OUTOF10: 5
PAINLEVEL_OUTOF10: 0
PAINLEVEL_OUTOF10: 0

## 2018-10-07 NOTE — PROGRESS NOTES
[address] [address]    covering for Dr. Shanita Nicole  Daily Progress Note  Pt Name: Jocelyn Yin Record Number: [id number]  Date of Birth 1940   Today's Date: 10/7/2018    POD# 3    CHIEF COMPLAINT: SBO    SUBJECTIVE  Patient remains extubated and off pressors. She is alert and talking. NG tube in place - minimal output. Following commands. VS noted. Afebrile. Pain controlled with Morphine. No nausea or vomiting. Being transferred to stepdown unit. Ostomy viable with liquid black stool. Midline incision with multiple penrose drains - drainage dark. No erythema or purulence. Stearns in place. Generalized swelling. Suprapubic drain with bilious output as well. OBJECTIVE  CURRENT VITALS BP (!) 120/57   Pulse 88   Temp 97.7 °F (36.5 °C) (Oral)   Resp 20   Ht 5' 3\" (1.6 m)   Wt 152 lb (68.9 kg)   SpO2 96%   BMI 26.93 kg/m²   GENERAL: Resting comfortably. No acute distress. Alert and oriented. LUNGS: Lungs clear   ABDOMEN: soft dressing dry, nondistended, hypoactive bowel sounds, expected tenderness, TIANNA drain now bilious  WOUNDS: has betadine olga  SKIN: Edema    24 HR INTAKE/OUTPUT :     Intake/Output Summary (Last 24 hours) at 10/07/18 1028  Last data filed at 10/07/18 1006   Gross per 24 hour   Intake          6500.66 ml   Output             5585 ml   Net           915.66 ml   I/O last 3 completed shifts: In: 6490.7 [I.V.:5079.7;  Blood:333.3; NG/GT:20]  Out: 5585 [Urine:5050; Emesis/NG output:50; Drains:210; Stool:275]  DRAIN/TUBE OUTPUT : NG/OG Tube Nasogastric 18 fr Right nostril-Output (mL): 25 ml    LABS  CBC :   Lab Results   Component Value Date    WBC 16.7 10/07/2018    HGB 8.9 10/07/2018    HCT 26.9 10/07/2018    PLT 92 10/07/2018     BMP:   Lab Results   Component Value Date     10/07/2018    K 3.6 10/07/2018    K 3.9 10/05/2018     10/07/2018    CO2 23 10/07/2018    BUN 14 10/07/2018    CREATININE 0.3 10/07/2018    MG 1.8 10/07/2018    PHOS 1.9 10/07/2018       ASSESSMENT  1. SBO - POD #3 Status post lysis of adhesions, segmental SBR x3 with reanastomosis x3 and formation of end ileostomy   2. Acute respiratory failure - status post extubation 10/5/18 resolved  3. Sepsis   4. Severe protein calorie malnutrition  5. Acute blood loss anemia transfuse 18 unit packed red cells yesterday with more than appropriate rise in hemoglobin. 6. Tachycardia     PLAN  1. Remains extubated Doing well. Continue pulmonary toileting. Stable for transfer to stepdown unit. 2. Keep NPO with NG tube decompression  3. TPN  4. Pain and nausea control  5. Routine wound and ostomy care - ostomy viable. 6. Off pressors  7. Antibiotic therapy   8. Continue serial H&Hs. Hemoglobin 8.0 this morning after 1 unit PRBC. Hold anticoagulants at this time. Continue to monitor closely and transfuse as needed. 9. PPI and diflucan  10. Continue labs   11. May be up in chair  12. Monitor serial hemoglobins, May decrease frequency of lab draws.   Electronically signed by Sandra Wilson MD     Electronically signed by Sadnra Wilson MD on 10/7/2018 at 10:28 AM

## 2018-10-07 NOTE — PLAN OF CARE
accessing ports. Problem: Risk for Impaired Skin Integrity  Goal: Tissue integrity - skin and mucous membranes  Structural intactness and normal physiological function of skin and  mucous membranes. Outcome: Ongoing  No signs of new skin breakdown with each assessment. Skin remains warm, dry, intact. Mucous membranes pink & moist. Patient is Q2 hour and prn turn. Comments: Care plan reviewed with patient and family. Patient and family verbalize understanding of the plan of care and contribute to goal setting.

## 2018-10-08 LAB
ALBUMIN SERPL-MCNC: 1.8 G/DL (ref 3.5–5.1)
ALP BLD-CCNC: 50 U/L (ref 38–126)
ALT SERPL-CCNC: 10 U/L (ref 11–66)
ANION GAP SERPL CALCULATED.3IONS-SCNC: 10 MEQ/L (ref 8–16)
AST SERPL-CCNC: 10 U/L (ref 5–40)
BASOPHILS # BLD: 0.1 %
BASOPHILS ABSOLUTE: 0 THOU/MM3 (ref 0–0.1)
BILIRUB SERPL-MCNC: 0.5 MG/DL (ref 0.3–1.2)
BUN BLDV-MCNC: 13 MG/DL (ref 7–22)
CALCIUM IONIZED: 1.22 MMOL/L (ref 1.12–1.32)
CALCIUM SERPL-MCNC: 8.2 MG/DL (ref 8.5–10.5)
CHLORIDE BLD-SCNC: 102 MEQ/L (ref 98–111)
CO2: 23 MEQ/L (ref 23–33)
CREAT SERPL-MCNC: 0.2 MG/DL (ref 0.4–1.2)
EOSINOPHIL # BLD: 0.9 %
EOSINOPHILS ABSOLUTE: 0.1 THOU/MM3 (ref 0–0.4)
ERYTHROCYTE [DISTWIDTH] IN BLOOD BY AUTOMATED COUNT: 16.7 % (ref 11.5–14.5)
ERYTHROCYTE [DISTWIDTH] IN BLOOD BY AUTOMATED COUNT: 49.7 FL (ref 35–45)
GFR SERPL CREATININE-BSD FRML MDRD: > 90 ML/MIN/1.73M2
GLUCOSE BLD-MCNC: 113 MG/DL (ref 70–108)
GLUCOSE BLD-MCNC: 116 MG/DL (ref 70–108)
GLUCOSE BLD-MCNC: 118 MG/DL (ref 70–108)
GLUCOSE BLD-MCNC: 119 MG/DL (ref 70–108)
GLUCOSE BLD-MCNC: 120 MG/DL (ref 70–108)
GLUCOSE BLD-MCNC: 131 MG/DL (ref 70–108)
HCT VFR BLD CALC: 25.6 % (ref 37–47)
HCT VFR BLD CALC: 26.1 % (ref 37–47)
HEMOGLOBIN: 8.5 GM/DL (ref 12–16)
HEMOGLOBIN: 8.6 GM/DL (ref 12–16)
IMMATURE GRANS (ABS): 0.08 THOU/MM3 (ref 0–0.07)
IMMATURE GRANULOCYTES: 0.7 %
LYMPHOCYTES # BLD: 15.2 %
LYMPHOCYTES ABSOLUTE: 1.7 THOU/MM3 (ref 1–4.8)
MAGNESIUM: 1.7 MG/DL (ref 1.6–2.4)
MCH RBC QN AUTO: 27.2 PG (ref 26–33)
MCHC RBC AUTO-ENTMCNC: 33.2 GM/DL (ref 32.2–35.5)
MCV RBC AUTO: 81.8 FL (ref 81–99)
MONOCYTES # BLD: 4.1 %
MONOCYTES ABSOLUTE: 0.4 THOU/MM3 (ref 0.4–1.3)
NUCLEATED RED BLOOD CELLS: 0 /100 WBC
PHOSPHORUS: 2.8 MG/DL (ref 2.4–4.7)
PLATELET # BLD: 117 THOU/MM3 (ref 130–400)
PMV BLD AUTO: 10.2 FL (ref 9.4–12.4)
POTASSIUM SERPL-SCNC: 3.5 MEQ/L (ref 3.5–5.2)
RBC # BLD: 3.13 MILL/MM3 (ref 4.2–5.4)
SEG NEUTROPHILS: 79 %
SEGMENTED NEUTROPHILS ABSOLUTE COUNT: 8.6 THOU/MM3 (ref 1.8–7.7)
SODIUM BLD-SCNC: 135 MEQ/L (ref 135–145)
TOTAL PROTEIN: 4.7 G/DL (ref 6.1–8)
WBC # BLD: 10.9 THOU/MM3 (ref 4.8–10.8)

## 2018-10-08 PROCEDURE — 2709999900 HC NON-CHARGEABLE SUPPLY

## 2018-10-08 PROCEDURE — 6360000002 HC RX W HCPCS: Performed by: INTERNAL MEDICINE

## 2018-10-08 PROCEDURE — 2060000000 HC ICU INTERMEDIATE R&B

## 2018-10-08 PROCEDURE — 85014 HEMATOCRIT: CPT

## 2018-10-08 PROCEDURE — 85025 COMPLETE CBC W/AUTO DIFF WBC: CPT

## 2018-10-08 PROCEDURE — 2500000003 HC RX 250 WO HCPCS: Performed by: SURGERY

## 2018-10-08 PROCEDURE — 2580000003 HC RX 258: Performed by: SURGERY

## 2018-10-08 PROCEDURE — 80053 COMPREHEN METABOLIC PANEL: CPT

## 2018-10-08 PROCEDURE — 6360000002 HC RX W HCPCS: Performed by: SURGERY

## 2018-10-08 PROCEDURE — 99024 POSTOP FOLLOW-UP VISIT: CPT | Performed by: NURSE PRACTITIONER

## 2018-10-08 PROCEDURE — 82948 REAGENT STRIP/BLOOD GLUCOSE: CPT

## 2018-10-08 PROCEDURE — 84100 ASSAY OF PHOSPHORUS: CPT

## 2018-10-08 PROCEDURE — C9113 INJ PANTOPRAZOLE SODIUM, VIA: HCPCS | Performed by: SURGERY

## 2018-10-08 PROCEDURE — 82330 ASSAY OF CALCIUM: CPT

## 2018-10-08 PROCEDURE — 85018 HEMOGLOBIN: CPT

## 2018-10-08 PROCEDURE — APPSS30 APP SPLIT SHARED TIME 16-30 MINUTES: Performed by: NURSE PRACTITIONER

## 2018-10-08 PROCEDURE — 36415 COLL VENOUS BLD VENIPUNCTURE: CPT

## 2018-10-08 PROCEDURE — 83735 ASSAY OF MAGNESIUM: CPT

## 2018-10-08 PROCEDURE — 2500000003 HC RX 250 WO HCPCS

## 2018-10-08 RX ADMIN — MORPHINE SULFATE 2 MG: 2 INJECTION, SOLUTION INTRAMUSCULAR; INTRAVENOUS at 14:51

## 2018-10-08 RX ADMIN — PIPERACILLIN SODIUM,TAZOBACTAM SODIUM 3.38 G: 3; .375 INJECTION, POWDER, FOR SOLUTION INTRAVENOUS at 14:51

## 2018-10-08 RX ADMIN — PIPERACILLIN SODIUM,TAZOBACTAM SODIUM 3.38 G: 3; .375 INJECTION, POWDER, FOR SOLUTION INTRAVENOUS at 21:37

## 2018-10-08 RX ADMIN — METRONIDAZOLE 500 MG: 500 INJECTION, SOLUTION INTRAVENOUS at 21:38

## 2018-10-08 RX ADMIN — PANTOPRAZOLE SODIUM 40 MG: 40 INJECTION, POWDER, FOR SOLUTION INTRAVENOUS at 09:11

## 2018-10-08 RX ADMIN — MORPHINE SULFATE 2 MG: 2 INJECTION, SOLUTION INTRAMUSCULAR; INTRAVENOUS at 21:56

## 2018-10-08 RX ADMIN — MORPHINE SULFATE 2 MG: 2 INJECTION, SOLUTION INTRAMUSCULAR; INTRAVENOUS at 00:55

## 2018-10-08 RX ADMIN — Medication 15 UNITS: at 21:51

## 2018-10-08 RX ADMIN — FLUCONAZOLE 400 MG: 400 INJECTION, SOLUTION INTRAVENOUS at 18:55

## 2018-10-08 RX ADMIN — Medication 10 ML: at 21:43

## 2018-10-08 RX ADMIN — METRONIDAZOLE 500 MG: 500 INJECTION, SOLUTION INTRAVENOUS at 06:32

## 2018-10-08 RX ADMIN — Medication 10 ML: at 09:12

## 2018-10-08 RX ADMIN — Medication: at 19:00

## 2018-10-08 RX ADMIN — MORPHINE SULFATE 2 MG: 2 INJECTION, SOLUTION INTRAMUSCULAR; INTRAVENOUS at 09:11

## 2018-10-08 RX ADMIN — PIPERACILLIN SODIUM,TAZOBACTAM SODIUM 3.38 G: 3; .375 INJECTION, POWDER, FOR SOLUTION INTRAVENOUS at 06:31

## 2018-10-08 RX ADMIN — METRONIDAZOLE 500 MG: 500 INJECTION, SOLUTION INTRAVENOUS at 14:51

## 2018-10-08 ASSESSMENT — PAIN SCALES - GENERAL
PAINLEVEL_OUTOF10: 7
PAINLEVEL_OUTOF10: 7
PAINLEVEL_OUTOF10: 5
PAINLEVEL_OUTOF10: 0
PAINLEVEL_OUTOF10: 5
PAINLEVEL_OUTOF10: 5

## 2018-10-08 ASSESSMENT — PAIN DESCRIPTION - DESCRIPTORS: DESCRIPTORS: HEADACHE

## 2018-10-08 ASSESSMENT — PAIN DESCRIPTION - ONSET: ONSET: GRADUAL

## 2018-10-08 ASSESSMENT — PAIN DESCRIPTION - PAIN TYPE: TYPE: ACUTE PAIN

## 2018-10-08 ASSESSMENT — PAIN DESCRIPTION - FREQUENCY: FREQUENCY: INTERMITTENT

## 2018-10-08 ASSESSMENT — PAIN DESCRIPTION - LOCATION: LOCATION: HEAD

## 2018-10-08 NOTE — PLAN OF CARE
Problem: Nutrition  Goal: Optimal nutrition therapy  Outcome: Ongoing  Nutrition Problem: Severe malnutrition, in context of chronic illness  Intervention: Food and/or Nutrient Delivery: Continue NPO, Continue Parenteral Nutrition  Nutritional Goals: Pt. will tolerate TPN to meet 75% or more of nutritional needs until able to transition to po diet.

## 2018-10-08 NOTE — PLAN OF CARE
Problem: SAFETY  Goal: Free from accidental physical injury  Outcome: Ongoing  Patient free of accidental injury. Patient alert and oriented x4. Bed alarmed armed. Bed wheels locked. Bedside table in reach. Patient on strict bedrest at this time. Patient verbalizes and demonstrates the use of the call light. Hourly rounding being completed. Problem: DAILY CARE  Goal: Daily care needs are met  Outcome: Ongoing  Patient turns with help every two hours. Patient A&O X4 this shift. Will continue to monitor. Problem: PAIN  Goal: Patient's pain/discomfort is manageable  Outcome: Ongoing  Patient complains of pain in her surgical incisions. Patients current pain level is a 7/10. Patients pain goal is a 3/10. Patient is receiving PRN morphine for pain at this time. Will continue to assess with hourly rounding. Problem: KNOWLEDGE DEFICIT  Goal: Patient/S.O. demonstrates understanding of disease process, treatment plan, medications, and discharge instructions. Outcome: Ongoing  Patient verbalizes understanding of treatment for this shift. Will continue to assess. Problem: DISCHARGE BARRIERS  Goal: Patient's continuum of care needs are met  Outcome: Ongoing  Patient plan is to go to [place] upon discharge. Will continue to assess for additional needs. Problem: Falls - Risk of:  Goal: Will remain free from falls  Will remain free from falls   Outcome: Ongoing  Patient alert and oriented x4. Bed alarmed armed. Bed wheels locked. Bedside table in reach. Patient on strict bedrest at this time. Patient verbalizes and demonstrates the use of the call light. Hourly rounding being completed. Problem: Nutrition  Goal: Optimal nutrition therapy  Outcome: Ongoing  Patient NPO at this time except for ice chips. TPN in place. Will continue to monitor. Problem: GI  Goal: No bowel complications  Outcome: Ongoing  No bowel complications noted.      Problem: Infection - Central Venous Catheter-Associated

## 2018-10-08 NOTE — PROGRESS NOTES
Nutrition Assessment    Type and Reason for Visit: Reassess    Nutrition Recommendations: Continue current TPN macronutrients. Recommend diet initiation when GI function allows. Malnutrition Assessment:  · Malnutrition Status: Meets the criteria for severe malnutrition  · Context: Chronic illness  · Findings of the 6 clinical characteristics of malnutrition (Minimum of 2 out of 6 clinical characteristics is required to make the diagnosis of moderate or severe Protein Calorie Malnutrition based on AND/ASPEN Guidelines):  1. Energy Intake-Less than or equal to 75%, greater than or equal to 1 month    2. Weight Loss-10% loss or greater (-11.5%), in 3 months (3.5 months)  3. Fat Loss-Severe subcutaneous fat loss, Orbital  4. Muscle Loss-Severe muscle mass loss, Clavicles (pectoralis and deltoids), Scapula (trapezius), Temples (temporalis muscle)    Nutrition Diagnosis:   · Problem: Severe malnutrition, in context of chronic illness  · Etiology: related to Alteration in GI function     Signs and symptoms:  as evidenced by Diet history of poor intake, Weight loss, Severe loss of subcutaneous fat, Severe muscle loss    Nutrition Assessment:  · Subjective Assessment: Pt. seen. NGT with 200 ml output noted past 24 hours. 635 ml ileostomy output recorded past 24 hours. Rx includes Zofran, ATB, Insulin. 10/7: Potassium 3.6, Glucose 116, BUN 13, Cr 0.2, Magnesium 1.7, Phosphorus 2.8. Pt. denies any nausea at present.     · Wound Type: Surgical Wound (10/4 small bowel resection with end ileostomy)  · Current Nutrition Therapies:  · Oral Diet Orders: NPO   · Parenteral Nutrition Orders:  · Type and Formula: 3-in-1 Custom (dosing weight 65 kgm, 30 kcal/kgm, 1.7 grams protein/kgm, 30% lipid kcals)   · Lipids: Daily  · Additives: per Rph  · Rate/Volume: 100 ml/hr via PICC line  · Duration: Continuous 24 hrs  · Current PN Order Provides: at goal  · Goal PN Orders Provides: 1950 kcals, 111 gm protein and 271.5 gm

## 2018-10-08 NOTE — PLAN OF CARE
Problem: SAFETY  Goal: Free from accidental physical injury  Outcome: Met This Shift  No injury this shift. Patient on strict bedrest. Patient not pulling at lines. Problem: DAILY CARE  Goal: Daily care needs are met  Outcome: Met This Shift  Patient turned every 2 hours. Patient asleep most of shift. Problem: PAIN  Goal: Patient's pain/discomfort is manageable  Outcome: Met This Shift  Patient complains of pain this morning and received morphine. Patient denied pain rest of shift. Problem: KNOWLEDGE DEFICIT  Goal: Patient/S.O. demonstrates understanding of disease process, treatment plan, medications, and discharge instructions. Outcome: Ongoing  Patient able to understand some of treatment. Patient confused at times. Problem: DISCHARGE BARRIERS  Goal: Patient's continuum of care needs are met    Intervention: IDENTIFY POTENTIAL DISCHARGE BARRIERS ON ADMISSION  Plan is for patient to go to [place] at this time. Problem: Falls - Risk of:  Goal: Will remain free from falls  Will remain free from falls   Outcome: Met This Shift  Patient on strict bed rest. Bed alarm on patient. Problem: Nutrition  Goal: Optimal nutrition therapy  Outcome: Ongoing  Patient is NPO. Patient has TPN running for nutrition. Problem: GI  Goal: No bowel complications  Outcome: Met This Shift      Problem: Infection - Central Venous Catheter-Associated Bloodstream Infection:  Goal: Will show no infection signs and symptoms  Will show no infection signs and symptoms   Outcome: Met This Shift      Problem: Risk for Impaired Skin Integrity  Goal: Tissue integrity - skin and mucous membranes  Structural intactness and normal physiological function of skin and  mucous membranes. Outcome: Met This Shift  No new skin breakdown. Patient is being turned every two hours. Patient incision is unable to be assessed. No drainage noticed. Dressing is clean, dry, and intact.     Comments: Care plan reviewed with patient and

## 2018-10-08 NOTE — PROGRESS NOTES
versus continued conservative/nonoperative treatment. I think take back is going to be very high risk at this point. Continue nothing by mouth. IV fluid hydration.  TPN. NG tube decompression. IV antibiotics. PPI. Diflucan. Therapy. Serial abdominal examinations. Repeat labs in a.m. Patient and daughter are in agreement to hold on any surgery at this time.     Electronically signed by Attila Scott MD on 10/8/2018 at 1:26 PM

## 2018-10-08 NOTE — PROGRESS NOTES
Consult received. Chart reviewed. Await therapy notes. At this time patient is not a TCU candidate due to being on TPN and having a NG tube will monitor.

## 2018-10-09 LAB
ALBUMIN SERPL-MCNC: 1.6 G/DL (ref 3.5–5.1)
ALP BLD-CCNC: 71 U/L (ref 38–126)
ALT SERPL-CCNC: 9 U/L (ref 11–66)
ANION GAP SERPL CALCULATED.3IONS-SCNC: 12 MEQ/L (ref 8–16)
AST SERPL-CCNC: 11 U/L (ref 5–40)
BASOPHILIA: ABNORMAL
BASOPHILS # BLD: 0.1 %
BASOPHILS ABSOLUTE: 0 THOU/MM3 (ref 0–0.1)
BILIRUB SERPL-MCNC: 0.6 MG/DL (ref 0.3–1.2)
BUN BLDV-MCNC: 15 MG/DL (ref 7–22)
CALCIUM IONIZED: 1.18 MMOL/L (ref 1.12–1.32)
CALCIUM SERPL-MCNC: 8.2 MG/DL (ref 8.5–10.5)
CHLORIDE BLD-SCNC: 102 MEQ/L (ref 98–111)
CO2: 21 MEQ/L (ref 23–33)
CREAT SERPL-MCNC: < 0.2 MG/DL (ref 0.4–1.2)
EOSINOPHIL # BLD: 1.5 %
EOSINOPHILS ABSOLUTE: 0.1 THOU/MM3 (ref 0–0.4)
ERYTHROCYTE [DISTWIDTH] IN BLOOD BY AUTOMATED COUNT: 16.4 % (ref 11.5–14.5)
ERYTHROCYTE [DISTWIDTH] IN BLOOD BY AUTOMATED COUNT: 49.5 FL (ref 35–45)
GFR SERPL CREATININE-BSD FRML MDRD: > 90 ML/MIN/1.73M2
GLUCOSE BLD-MCNC: 106 MG/DL (ref 70–108)
GLUCOSE BLD-MCNC: 113 MG/DL (ref 70–108)
GLUCOSE BLD-MCNC: 113 MG/DL (ref 70–108)
GLUCOSE BLD-MCNC: 115 MG/DL (ref 70–108)
GLUCOSE BLD-MCNC: 117 MG/DL (ref 70–108)
GLUCOSE BLD-MCNC: 125 MG/DL (ref 70–108)
GLUCOSE BLD-MCNC: 126 MG/DL (ref 70–108)
HCT VFR BLD CALC: 26.5 % (ref 37–47)
HCT VFR BLD CALC: 26.6 % (ref 37–47)
HCT VFR BLD CALC: 27 % (ref 37–47)
HEMOGLOBIN: 8.6 GM/DL (ref 12–16)
HEMOGLOBIN: 8.6 GM/DL (ref 12–16)
HEMOGLOBIN: 8.9 GM/DL (ref 12–16)
IMMATURE GRANS (ABS): 0.07 THOU/MM3 (ref 0–0.07)
IMMATURE GRANULOCYTES: 0.8 %
LYMPHOCYTES # BLD: 14.8 %
LYMPHOCYTES ABSOLUTE: 1.4 THOU/MM3 (ref 1–4.8)
MAGNESIUM: 1.8 MG/DL (ref 1.6–2.4)
MCH RBC QN AUTO: 26.6 PG (ref 26–33)
MCHC RBC AUTO-ENTMCNC: 32.3 GM/DL (ref 32.2–35.5)
MCV RBC AUTO: 82.4 FL (ref 81–99)
MONOCYTES # BLD: 6.1 %
MONOCYTES ABSOLUTE: 0.6 THOU/MM3 (ref 0.4–1.3)
NUCLEATED RED BLOOD CELLS: 0 /100 WBC
PHOSPHORUS: 3.2 MG/DL (ref 2.4–4.7)
PLATELET # BLD: 133 THOU/MM3 (ref 130–400)
PLATELET ESTIMATE: ADEQUATE
PMV BLD AUTO: 10.5 FL (ref 9.4–12.4)
POIKILOCYTES: ABNORMAL
POTASSIUM SERPL-SCNC: 3.7 MEQ/L (ref 3.5–5.2)
RBC # BLD: 3.23 MILL/MM3 (ref 4.2–5.4)
SCAN OF BLOOD SMEAR: NORMAL
SEG NEUTROPHILS: 76.7 %
SEGMENTED NEUTROPHILS ABSOLUTE COUNT: 7.1 THOU/MM3 (ref 1.8–7.7)
SODIUM BLD-SCNC: 135 MEQ/L (ref 135–145)
TOTAL PROTEIN: 4.7 G/DL (ref 6.1–8)
TOXIC GRANULATION: PRESENT
WBC # BLD: 9.2 THOU/MM3 (ref 4.8–10.8)

## 2018-10-09 PROCEDURE — 97164 PT RE-EVAL EST PLAN CARE: CPT

## 2018-10-09 PROCEDURE — C9113 INJ PANTOPRAZOLE SODIUM, VIA: HCPCS | Performed by: SURGERY

## 2018-10-09 PROCEDURE — 80053 COMPREHEN METABOLIC PANEL: CPT

## 2018-10-09 PROCEDURE — 85014 HEMATOCRIT: CPT

## 2018-10-09 PROCEDURE — 97110 THERAPEUTIC EXERCISES: CPT

## 2018-10-09 PROCEDURE — 2060000000 HC ICU INTERMEDIATE R&B

## 2018-10-09 PROCEDURE — 82330 ASSAY OF CALCIUM: CPT

## 2018-10-09 PROCEDURE — 6370000000 HC RX 637 (ALT 250 FOR IP): Performed by: INTERNAL MEDICINE

## 2018-10-09 PROCEDURE — 83735 ASSAY OF MAGNESIUM: CPT

## 2018-10-09 PROCEDURE — 99024 POSTOP FOLLOW-UP VISIT: CPT | Performed by: SURGERY

## 2018-10-09 PROCEDURE — 84100 ASSAY OF PHOSPHORUS: CPT

## 2018-10-09 PROCEDURE — 2500000003 HC RX 250 WO HCPCS

## 2018-10-09 PROCEDURE — 36415 COLL VENOUS BLD VENIPUNCTURE: CPT

## 2018-10-09 PROCEDURE — 85025 COMPLETE CBC W/AUTO DIFF WBC: CPT

## 2018-10-09 PROCEDURE — 2580000003 HC RX 258: Performed by: SURGERY

## 2018-10-09 PROCEDURE — 85018 HEMOGLOBIN: CPT

## 2018-10-09 PROCEDURE — APPSS30 APP SPLIT SHARED TIME 16-30 MINUTES: Performed by: NURSE PRACTITIONER

## 2018-10-09 PROCEDURE — 6360000002 HC RX W HCPCS: Performed by: SURGERY

## 2018-10-09 PROCEDURE — 2500000003 HC RX 250 WO HCPCS: Performed by: SURGERY

## 2018-10-09 PROCEDURE — 99024 POSTOP FOLLOW-UP VISIT: CPT | Performed by: NURSE PRACTITIONER

## 2018-10-09 PROCEDURE — 6360000002 HC RX W HCPCS: Performed by: INTERNAL MEDICINE

## 2018-10-09 PROCEDURE — 82948 REAGENT STRIP/BLOOD GLUCOSE: CPT

## 2018-10-09 PROCEDURE — 2709999900 HC NON-CHARGEABLE SUPPLY

## 2018-10-09 RX ADMIN — METRONIDAZOLE 500 MG: 500 INJECTION, SOLUTION INTRAVENOUS at 21:33

## 2018-10-09 RX ADMIN — METRONIDAZOLE 500 MG: 500 INJECTION, SOLUTION INTRAVENOUS at 14:01

## 2018-10-09 RX ADMIN — Medication 10 ML: at 20:20

## 2018-10-09 RX ADMIN — MORPHINE SULFATE 2 MG: 2 INJECTION, SOLUTION INTRAMUSCULAR; INTRAVENOUS at 20:21

## 2018-10-09 RX ADMIN — MORPHINE SULFATE 2 MG: 2 INJECTION, SOLUTION INTRAMUSCULAR; INTRAVENOUS at 09:26

## 2018-10-09 RX ADMIN — Medication: at 18:18

## 2018-10-09 RX ADMIN — Medication 10 ML: at 10:49

## 2018-10-09 RX ADMIN — ACETAMINOPHEN 650 MG: 650 SUPPOSITORY RECTAL at 14:28

## 2018-10-09 RX ADMIN — Medication 10 ML: at 06:26

## 2018-10-09 RX ADMIN — FLUCONAZOLE 400 MG: 400 INJECTION, SOLUTION INTRAVENOUS at 18:23

## 2018-10-09 RX ADMIN — PANTOPRAZOLE SODIUM 40 MG: 40 INJECTION, POWDER, FOR SOLUTION INTRAVENOUS at 06:26

## 2018-10-09 RX ADMIN — PIPERACILLIN SODIUM,TAZOBACTAM SODIUM 3.38 G: 3; .375 INJECTION, POWDER, FOR SOLUTION INTRAVENOUS at 21:32

## 2018-10-09 RX ADMIN — PIPERACILLIN SODIUM,TAZOBACTAM SODIUM 3.38 G: 3; .375 INJECTION, POWDER, FOR SOLUTION INTRAVENOUS at 14:01

## 2018-10-09 RX ADMIN — Medication 15 UNITS: at 21:50

## 2018-10-09 RX ADMIN — METRONIDAZOLE 500 MG: 500 INJECTION, SOLUTION INTRAVENOUS at 06:37

## 2018-10-09 RX ADMIN — PIPERACILLIN SODIUM,TAZOBACTAM SODIUM 3.38 G: 3; .375 INJECTION, POWDER, FOR SOLUTION INTRAVENOUS at 06:36

## 2018-10-09 ASSESSMENT — PAIN DESCRIPTION - PAIN TYPE
TYPE: ACUTE PAIN

## 2018-10-09 ASSESSMENT — PAIN DESCRIPTION - DESCRIPTORS
DESCRIPTORS: ACHING
DESCRIPTORS: ACHING
DESCRIPTORS: DISCOMFORT;HEADACHE
DESCRIPTORS: ACHING

## 2018-10-09 ASSESSMENT — PAIN SCALES - GENERAL
PAINLEVEL_OUTOF10: 5
PAINLEVEL_OUTOF10: 5
PAINLEVEL_OUTOF10: 4
PAINLEVEL_OUTOF10: 0
PAINLEVEL_OUTOF10: 5
PAINLEVEL_OUTOF10: 4
PAINLEVEL_OUTOF10: 4
PAINLEVEL_OUTOF10: 2

## 2018-10-09 ASSESSMENT — PAIN DESCRIPTION - LOCATION
LOCATION: ABDOMEN
LOCATION: HEAD;ABDOMEN
LOCATION: HEAD
LOCATION: ABDOMEN;HEAD
LOCATION: GENERALIZED
LOCATION: ABDOMEN
LOCATION: ABDOMEN

## 2018-10-09 ASSESSMENT — PAIN DESCRIPTION - PROGRESSION: CLINICAL_PROGRESSION: GRADUALLY IMPROVING

## 2018-10-09 ASSESSMENT — PAIN DESCRIPTION - FREQUENCY
FREQUENCY: INTERMITTENT
FREQUENCY: CONTINUOUS
FREQUENCY: INTERMITTENT
FREQUENCY: INTERMITTENT

## 2018-10-09 ASSESSMENT — PAIN DESCRIPTION - ONSET
ONSET: ON-GOING
ONSET: GRADUAL
ONSET: GRADUAL

## 2018-10-09 ASSESSMENT — ACTIVITIES OF DAILY LIVING (ADL): EFFECT OF PAIN ON DAILY ACTIVITIES: AMBULATION

## 2018-10-09 NOTE — PROGRESS NOTES
Nutrition Assessment    Type and Reason for Visit: Reassess    Nutrition Recommendations: Continue current TPN macronutrients (at goal). Recommend diet initiation when GI function allows. Consider check Triglyceride level. Malnutrition Assessment:  · Malnutrition Status: Meets the criteria for severe malnutrition  · Context: Chronic illness  · Findings of the 6 clinical characteristics of malnutrition (Minimum of 2 out of 6 clinical characteristics is required to make the diagnosis of moderate or severe Protein Calorie Malnutrition based on AND/ASPEN Guidelines):  1. Energy Intake-Less than or equal to 75%, greater than or equal to 1 month    2. Weight Loss-10% loss or greater (-11.5%), in 3 months (3.5 months)  3. Fat Loss-Severe subcutaneous fat loss, Orbital  4. Muscle Loss-Severe muscle mass loss, Clavicles (pectoralis and deltoids), Scapula (trapezius), Temples (temporalis muscle)    Nutrition Diagnosis:   · Problem: Severe malnutrition, in context of chronic illness  · Etiology: related to Alteration in GI function     Signs and symptoms:  as evidenced by Diet history of poor intake, Weight loss, Severe loss of subcutaneous fat, Severe muscle loss    Nutrition Assessment:  · Subjective Assessment: Pt. Seen. NGT in place - no output noted; RN reports low output, greenish color. 300 ml ostomy output noted past 24 hours (noted as bilious output). 10/9: Potassium 3.7, Glucose 115 mg/dl, BUN 15, Cr 0.2, Magnesium 1.8, Phosphorus 3.2. Rx includes ATB, Insulin, Zofran. Per surgeon notes - high risk to take back to surgery; plan nonoperative treatment at this time.   · Wound Type: Surgical Wound (10/4 small bowel resection with end ileostomy)  · Current Nutrition Therapies:  · Oral Diet Orders: NPO   · Parenteral Nutrition Orders:  · Type and Formula: 3-in-1 Custom (dosing weight 65 kgm, 30 kcal/kgm, 1.7 grams protein/kgm, 30% lipid kcals)   · Lipids: Daily  · Additives: per Rp  · Rate/Volume: 100 ml/hr

## 2018-10-09 NOTE — PROGRESS NOTES
CATARACT REMOVAL    HYSTERECTOMY  1975 ? [place]    LUMBAR LAMINECTOMY  4/15/2013    OTHER SURGICAL HISTORY  7/8/2013    evacation of seroma-back     [facility] EXPLORATORY OF ABDOMEN N/A 10/4/2018    LAPAROTOMY EXPLORATORY, SMALL BOWEL RESECTION X 3, EXTENSIVE LYSIS OF ADHESIONS, ILEOSTOMY FORMATION performed by Ajay Hendricks MD at [address] [facility] OFFICE/OUTPT VISIT,PROCEDURE ONLY N/A 6/25/2018    ABDOMINAL EXPLORATION AND ESTENDED LEFT COLON RESECTION, LYSIS OF COMPLEX ADHESIONS performed by Ajay Hendricks MD at [address]  08/11/2017    Dr God[facility] Left 9/28/2018    EGD BIOPSY performed by Ajay Hendricks MD at [address] [facility]       Restrictions/Precautions:  General Precautions, Fall Risk                    Other position/activity restrictions: abd incision, sx 9-24 SBO repair and ileostomy       Subjective:  Chart Reviewed: Yes  Patient assessed for rehabilitation services?: Yes  Comments: sx 9-24 for SBO -has ileostomy . 10-4 sx . Abd Exploration  2. Lysis Severe Adhesions  3. Segmental SB resection x 3 4. Formation End Ileostomy  Subjective: New orders received to resume PT . Pt had another abd sx 10-4-18. RN approved session and up to chair . Pt motivated and agreeable. General:  Overall Orientation Status: Within Functional Limits  Follows Commands: Within Functional Limits    Vision: Impaired  Vision Exceptions: Wears glasses for reading    Hearing: Within functional limits         Pain:  Yes.   Pain Assessment  Pain Assessment: 0-10  Pain Level: 4  Pain Type: Acute pain  Pain Location: Abdomen       Social/Functional History:    Lives With: Daughter  Type of Home: House  Home Layout: Two level, Able to Live on Main level with bedroom/bathroom  Home Access: Stairs to enter without rails  Entrance Stairs - Number of Steps: 1  Home Equipment: Rolling walker, Cane     Bathroom Shower/Tub: Walk-in shower  Bathroom Toilet: Bedside

## 2018-10-09 NOTE — PROGRESS NOTES
bowel including ileum, partial resections:   Acute and chronic serositis with dense fibrous adhesions and foreign  body      giant cell reaction.   Features consistent with early mucosal ischemia. ASSESSMENT  1. SBO - POD #5 Status post lysis of adhesions, segmental SBR x3 with reanastomosis x3 and formation of end ileostomy   2. Acute respiratory failure - status post extubation 10/5/18 resolved  3. Sepsis   4. Severe protein calorie malnutrition  5. Acute blood loss anemia - stable. One unit PRBC transfused on 10/5/18. 6. Bilious output from wound/TIANNA drain have improved    PLAN  1. Continue pulmonary toileting   2. Keep NPO with NG tube decompression  3. TPN  4. Pain and nausea control  5. Routine wound and ostomy care - ostomy viable  6. Antibiotic therapy   7. Hemoglobin stable at this time. Continue to monitor. 8. PPI and diflucan  9. Continue labs   10. Consult PT/OT   11. Pathology reviewed. Findings of early ischemia, but otherwise benign. 12. Serial abdominal examinations   13.  bilious drainage from TIANNA drain has slowed down and appears to be more serous today, continue bowel rest and TPN. May remove NG tube tomorrow depending on what drainage looks like overnight. Will continue to watch patient closely. Electronically signed by SULEIMAN Shaikh CNP on 10/9/2018 at 2:06 PM Patient seen and examined independently by me. Above discussed and I agree with CNP. Labs, cultures, and radiographs where available were reviewed. See orders for the updated patient care plan. Alma Patel MD, events weekend noted. Patient extubated and on before 4K report was patient had bile leaking to her incision and in her TIANNA. However today there is no signs of bile leak the Telfa olga in the subcutaneous were removed and did appear bile stained but otherwise no evidence of bile in the subcutaneous tissue. Garrett Fridge is a clear yellow drainage.   Ostomy is working well we'll keep the NG tube in overnight

## 2018-10-10 LAB
ANION GAP SERPL CALCULATED.3IONS-SCNC: 10 MEQ/L (ref 8–16)
BASOPHILS # BLD: 0.2 %
BASOPHILS ABSOLUTE: 0 THOU/MM3 (ref 0–0.1)
BUN BLDV-MCNC: 16 MG/DL (ref 7–22)
CALCIUM IONIZED: 1.01 MMOL/L (ref 1.12–1.32)
CALCIUM SERPL-MCNC: 7.9 MG/DL (ref 8.5–10.5)
CHLORIDE BLD-SCNC: 105 MEQ/L (ref 98–111)
CO2: 21 MEQ/L (ref 23–33)
CREAT SERPL-MCNC: < 0.2 MG/DL (ref 0.4–1.2)
EOSINOPHIL # BLD: 1.6 %
EOSINOPHILS ABSOLUTE: 0.2 THOU/MM3 (ref 0–0.4)
ERYTHROCYTE [DISTWIDTH] IN BLOOD BY AUTOMATED COUNT: 16.2 % (ref 11.5–14.5)
ERYTHROCYTE [DISTWIDTH] IN BLOOD BY AUTOMATED COUNT: 50.6 FL (ref 35–45)
GFR SERPL CREATININE-BSD FRML MDRD: > 90 ML/MIN/1.73M2
GLUCOSE BLD-MCNC: 106 MG/DL (ref 70–108)
GLUCOSE BLD-MCNC: 107 MG/DL (ref 70–108)
GLUCOSE BLD-MCNC: 110 MG/DL (ref 70–108)
GLUCOSE BLD-MCNC: 114 MG/DL (ref 70–108)
GLUCOSE BLD-MCNC: 116 MG/DL (ref 70–108)
GLUCOSE BLD-MCNC: 98 MG/DL (ref 70–108)
HCT VFR BLD CALC: 26.6 % (ref 37–47)
HCT VFR BLD CALC: 27.2 % (ref 37–47)
HEMOGLOBIN: 8.6 GM/DL (ref 12–16)
HEMOGLOBIN: 8.8 GM/DL (ref 12–16)
IMMATURE GRANS (ABS): 0.13 THOU/MM3 (ref 0–0.07)
IMMATURE GRANULOCYTES: 1.1 %
LYMPHOCYTES # BLD: 12.3 %
LYMPHOCYTES ABSOLUTE: 1.5 THOU/MM3 (ref 1–4.8)
MAGNESIUM: 1.9 MG/DL (ref 1.6–2.4)
MCH RBC QN AUTO: 26.9 PG (ref 26–33)
MCHC RBC AUTO-ENTMCNC: 31.6 GM/DL (ref 32.2–35.5)
MCV RBC AUTO: 85 FL (ref 81–99)
MONOCYTES # BLD: 5.7 %
MONOCYTES ABSOLUTE: 0.7 THOU/MM3 (ref 0.4–1.3)
NUCLEATED RED BLOOD CELLS: 0 /100 WBC
PHOSPHORUS: 3.4 MG/DL (ref 2.4–4.7)
PLATELET # BLD: 184 THOU/MM3 (ref 130–400)
PMV BLD AUTO: 10.3 FL (ref 9.4–12.4)
POTASSIUM SERPL-SCNC: 4.4 MEQ/L (ref 3.5–5.2)
RBC # BLD: 3.2 MILL/MM3 (ref 4.2–5.4)
SEG NEUTROPHILS: 79.1 %
SEGMENTED NEUTROPHILS ABSOLUTE COUNT: 9.8 THOU/MM3 (ref 1.8–7.7)
SODIUM BLD-SCNC: 136 MEQ/L (ref 135–145)
TRIGL SERPL-MCNC: 68 MG/DL (ref 0–199)
WBC # BLD: 12.4 THOU/MM3 (ref 4.8–10.8)

## 2018-10-10 PROCEDURE — 6360000002 HC RX W HCPCS: Performed by: SURGERY

## 2018-10-10 PROCEDURE — 85014 HEMATOCRIT: CPT

## 2018-10-10 PROCEDURE — C9113 INJ PANTOPRAZOLE SODIUM, VIA: HCPCS | Performed by: SURGERY

## 2018-10-10 PROCEDURE — 97110 THERAPEUTIC EXERCISES: CPT

## 2018-10-10 PROCEDURE — 2500000003 HC RX 250 WO HCPCS: Performed by: SURGERY

## 2018-10-10 PROCEDURE — APPSS30 APP SPLIT SHARED TIME 16-30 MINUTES: Performed by: NURSE PRACTITIONER

## 2018-10-10 PROCEDURE — 80048 BASIC METABOLIC PNL TOTAL CA: CPT

## 2018-10-10 PROCEDURE — 99024 POSTOP FOLLOW-UP VISIT: CPT | Performed by: NURSE PRACTITIONER

## 2018-10-10 PROCEDURE — 36415 COLL VENOUS BLD VENIPUNCTURE: CPT

## 2018-10-10 PROCEDURE — 6360000002 HC RX W HCPCS: Performed by: INTERNAL MEDICINE

## 2018-10-10 PROCEDURE — 84100 ASSAY OF PHOSPHORUS: CPT

## 2018-10-10 PROCEDURE — 85018 HEMOGLOBIN: CPT

## 2018-10-10 PROCEDURE — 2709999900 HC NON-CHARGEABLE SUPPLY

## 2018-10-10 PROCEDURE — 97535 SELF CARE MNGMENT TRAINING: CPT

## 2018-10-10 PROCEDURE — 82948 REAGENT STRIP/BLOOD GLUCOSE: CPT

## 2018-10-10 PROCEDURE — 85025 COMPLETE CBC W/AUTO DIFF WBC: CPT

## 2018-10-10 PROCEDURE — 84478 ASSAY OF TRIGLYCERIDES: CPT

## 2018-10-10 PROCEDURE — 2500000003 HC RX 250 WO HCPCS

## 2018-10-10 PROCEDURE — 2580000003 HC RX 258: Performed by: SURGERY

## 2018-10-10 PROCEDURE — 83735 ASSAY OF MAGNESIUM: CPT

## 2018-10-10 PROCEDURE — 2060000000 HC ICU INTERMEDIATE R&B

## 2018-10-10 PROCEDURE — 82330 ASSAY OF CALCIUM: CPT

## 2018-10-10 PROCEDURE — 99024 POSTOP FOLLOW-UP VISIT: CPT | Performed by: SURGERY

## 2018-10-10 RX ADMIN — Medication: at 18:27

## 2018-10-10 RX ADMIN — MORPHINE SULFATE 2 MG: 2 INJECTION, SOLUTION INTRAMUSCULAR; INTRAVENOUS at 05:04

## 2018-10-10 RX ADMIN — METRONIDAZOLE 500 MG: 500 INJECTION, SOLUTION INTRAVENOUS at 21:53

## 2018-10-10 RX ADMIN — Medication 10 ML: at 21:55

## 2018-10-10 RX ADMIN — FLUCONAZOLE 400 MG: 400 INJECTION, SOLUTION INTRAVENOUS at 18:27

## 2018-10-10 RX ADMIN — PIPERACILLIN SODIUM,TAZOBACTAM SODIUM 3.38 G: 3; .375 INJECTION, POWDER, FOR SOLUTION INTRAVENOUS at 06:17

## 2018-10-10 RX ADMIN — Medication 10 ML: at 10:31

## 2018-10-10 RX ADMIN — METRONIDAZOLE 500 MG: 500 INJECTION, SOLUTION INTRAVENOUS at 06:18

## 2018-10-10 RX ADMIN — PIPERACILLIN SODIUM,TAZOBACTAM SODIUM 3.38 G: 3; .375 INJECTION, POWDER, FOR SOLUTION INTRAVENOUS at 14:41

## 2018-10-10 RX ADMIN — MORPHINE SULFATE 2 MG: 2 INJECTION, SOLUTION INTRAMUSCULAR; INTRAVENOUS at 13:18

## 2018-10-10 RX ADMIN — METRONIDAZOLE 500 MG: 500 INJECTION, SOLUTION INTRAVENOUS at 14:41

## 2018-10-10 RX ADMIN — PANTOPRAZOLE SODIUM 40 MG: 40 INJECTION, POWDER, FOR SOLUTION INTRAVENOUS at 06:13

## 2018-10-10 RX ADMIN — PIPERACILLIN SODIUM,TAZOBACTAM SODIUM 3.38 G: 3; .375 INJECTION, POWDER, FOR SOLUTION INTRAVENOUS at 21:51

## 2018-10-10 ASSESSMENT — PAIN SCALES - GENERAL
PAINLEVEL_OUTOF10: 2
PAINLEVEL_OUTOF10: 6
PAINLEVEL_OUTOF10: 5
PAINLEVEL_OUTOF10: 0
PAINLEVEL_OUTOF10: 3
PAINLEVEL_OUTOF10: 4

## 2018-10-10 ASSESSMENT — PAIN DESCRIPTION - PAIN TYPE
TYPE: ACUTE PAIN
TYPE: ACUTE PAIN

## 2018-10-10 ASSESSMENT — PAIN DESCRIPTION - DESCRIPTORS: DESCRIPTORS: ACHING

## 2018-10-10 ASSESSMENT — PAIN DESCRIPTION - FREQUENCY: FREQUENCY: CONTINUOUS

## 2018-10-10 ASSESSMENT — PAIN DESCRIPTION - LOCATION
LOCATION: GENERALIZED
LOCATION: GENERALIZED

## 2018-10-10 ASSESSMENT — PAIN DESCRIPTION - ONSET: ONSET: GRADUAL

## 2018-10-10 NOTE — PROGRESS NOTES
[address] 60  INPATIENT OCCUPATIONAL THERAPY  [facility] ICU STEPDOWN TELEMETRY 4K  REASSESS NOTE    Time:  Time In: 7458  Time Out: 1327  Timed Code Treatment Minutes: 34 Minutes  Minutes: 34          Date: 10/10/2018  Patient Name: Azael Rojas,   Gender: female      Room: [facility]26/026-A  MRN: [id number]  : 1940  (66 y.o.)  Referring Practitioner: Arnoldo Gutiérrez MD  Diagnosis: SBO  Additional Pertinent Hx: Per ED note 18, pt is a 66 y.o. female who has history of Pylori and cholecystectomy. Patient presents to the Emergency Department for the evaluation of abdominal pain that has been ongoing. Patient reports that she is experiencing abdominal distention, melena, decreased appetite, fear of eating secondary to abdominal pain, and small bowel movements. Patient was admitted to hospital on Sep. 2nd due to small bowel obstruction. She was cared for by Dr. Greg Davila (general surgeon). Per daughter, patient has been experiencing abdominal complications since left colon resection in . While admitted patient was managed with nasogastric decompression, bowel rest, analgesics for pain control, IV fluid hydration, GI and DVT prophylaxis.      Past Medical History:   Diagnosis Date    Arthritis     Cellulitis     Difficult intubation     GERD (gastroesophageal reflux disease)     H pylori ulcer     Hx of blood clots     RIGHT LEG    Nausea & vomiting     Pneumonia     S/P partial resection of colon 2018    Shingles     Torus mandibularis      Past Surgical History:   Procedure Laterality Date    ABDOMEN SURGERY      ABDOMINAL ADHESION SURGERY  's    Dr Ayala Szymanski  2013    Lysis of Adhesions-Dr. Greg Davila     BACK SURGERY  //2006/2013    X3    CHOLECYSTECTOMY      [facility]    COLON SURGERY      COLONOSCOPY  over 10 yrs ago    COLONOSCOPY  2017    Dr Ronald Arriola, COLON, [facility] Bilateral 1999    CATARACT REMOVAL    HYSTERECTOMY  1975 ? [address]    LUMBAR LAMINECTOMY  4/15/2013    OTHER SURGICAL HISTORY  7/8/2013    evacation of seroma-back     [place] EXPLORATORY OF ABDOMEN N/A 10/4/2018    LAPAROTOMY EXPLORATORY, SMALL BOWEL RESECTION X 3, EXTENSIVE LYSIS OF ADHESIONS, ILEOSTOMY FORMATION performed by Shawna Gonsalves MD at [address] [facility] OFFICE/OUTPT [address] N/A 6/25/2018    ABDOMINAL EXPLORATION AND ESTENDED LEFT COLON RESECTION, LYSIS OF COMPLEX ADHESIONS performed by Shawna Gonsalves MD at [address] [facility]  08/11/2017    Dr Raji Ayon Left 9/28/2018    EGD BIOPSY performed by Shawna Gonsalves MD at [facility]       Restrictions/Precautions:  General Precautions, Fall Risk      Other position/activity restrictions: abd incision, sx 9-24 SBO repair and ileostomy       Prior Level of Function:  ADL Assistance: Independent  Homemaking Assistance: Needs assistance  Ambulation Assistance: Independent  Transfer Assistance: Independent  Additional Comments: Pt was very IND at [facility] however recently her daughters have been providing increasing assist with IADLs around home. Walks with cane or RW. Subjective   Subjective: Pt hesistant but agreeable  Comments: New orders received to resume OT . Pt had another abd sx 10-4-18. RN approved session and up to chair . Pain:  Pain Assessment  Patient Currently in Pain: Yes (abdomen-no number given)       Objective  Overall Cognitive Status: WFL    ADL  Grooming:  Moderate assistance (for washing hair with shower cap & combing hair while seated in recliner; completed oral care with s/u)  LE Dressing: Dependent/Total (for adjusting slipper socks)          Bed mobility  Supine to Sit: Moderate assistance (HOB elevated)    Transfers  Sit to stand: Minimal assistance (from EOB)  Stand to sit: Contact guard assistance (to recliner)       Balance  Standing Balance:

## 2018-10-10 NOTE — PROGRESS NOTES
Nutrition Assessment    Type and Reason for Visit: Reassess (TPN monitoring)    Nutrition Recommendations: No macronutrient changes for next bag of TPN - at goal. Diet initiation per MD as GI function allows. Malnutrition Assessment:  · Malnutrition Status: Meets the criteria for severe malnutrition  · Context: Chronic illness  · Findings of the 6 clinical characteristics of malnutrition (Minimum of 2 out of 6 clinical characteristics is required to make the diagnosis of moderate or severe Protein Calorie Malnutrition based on AND/ASPEN Guidelines):  1. Energy Intake-Less than or equal to 75%, greater than or equal to 1 month    2. Weight Loss-10% loss or greater (-11.5%), in 3 months (3.5 months)  3. Fat Loss-Severe subcutaneous fat loss, Orbital  4.  Muscle Loss-Severe muscle mass loss, Clavicles (pectoralis and deltoids), Scapula (trapezius), Temples (temporalis muscle)    Nutrition Diagnosis:   · Problem: Severe malnutrition, in context of chronic illness  · Etiology: related to Alteration in GI function     Signs and symptoms:  as evidenced by Diet history of poor intake, Weight loss, Severe loss of subcutaneous fat, Severe muscle loss    Nutrition Assessment:  · Subjective Assessment: Pt. seen; NGT removed; TPN at goal; ileostomy output past 24h 1055ml; meds include flagyl,ATB,NPH; glucose 106, BUN 16, creatinine less than 0.2; triglycerides 68, phosphorus 3.4;  · Wound Type: Surgical Wound (10/4 small bowel resection with end ileostomy)  · Current Nutrition Therapies:  · Oral Diet Orders: NPO   · Parenteral Nutrition Orders:  · Type and Formula: 3-in-1 Custom (dosing weight 65 kgm, 30 kcal/kgm, 1.7 grams protein/kgm, 30% lipid kcals)   · Lipids: Daily  · Additives: per Rph  · Rate/Volume: 100 ml/hr via PICC line  · Duration: Continuous 24 hrs  · Current PN Order Provides: at goal  · Goal PN Orders Provides: 1950 kcals, 111 gm protein and 271.5 gm CHO/day  · Anthropometric Measures:  · Ht: 5' 3\" (160

## 2018-10-11 LAB
ANION GAP SERPL CALCULATED.3IONS-SCNC: 11 MEQ/L (ref 8–16)
BASOPHILS # BLD: 0.2 %
BASOPHILS ABSOLUTE: 0 THOU/MM3 (ref 0–0.1)
BUN BLDV-MCNC: 16 MG/DL (ref 7–22)
CALCIUM IONIZED: 1.1 MMOL/L (ref 1.12–1.32)
CALCIUM SERPL-MCNC: 8.4 MG/DL (ref 8.5–10.5)
CHLORIDE BLD-SCNC: 104 MEQ/L (ref 98–111)
CO2: 22 MEQ/L (ref 23–33)
CREAT SERPL-MCNC: 0.2 MG/DL (ref 0.4–1.2)
EOSINOPHIL # BLD: 2 %
EOSINOPHILS ABSOLUTE: 0.2 THOU/MM3 (ref 0–0.4)
ERYTHROCYTE [DISTWIDTH] IN BLOOD BY AUTOMATED COUNT: 16.2 % (ref 11.5–14.5)
ERYTHROCYTE [DISTWIDTH] IN BLOOD BY AUTOMATED COUNT: 48.8 FL (ref 35–45)
GFR SERPL CREATININE-BSD FRML MDRD: > 90 ML/MIN/1.73M2
GLUCOSE BLD-MCNC: 108 MG/DL (ref 70–108)
GLUCOSE BLD-MCNC: 109 MG/DL (ref 70–108)
GLUCOSE BLD-MCNC: 114 MG/DL (ref 70–108)
GLUCOSE BLD-MCNC: 115 MG/DL (ref 70–108)
GLUCOSE BLD-MCNC: 131 MG/DL (ref 70–108)
HCT VFR BLD CALC: 28.5 % (ref 37–47)
HEMOGLOBIN: 9.2 GM/DL (ref 12–16)
IMMATURE GRANS (ABS): 0.12 THOU/MM3 (ref 0–0.07)
IMMATURE GRANULOCYTES: 1.1 %
LYMPHOCYTES # BLD: 17.6 %
LYMPHOCYTES ABSOLUTE: 1.9 THOU/MM3 (ref 1–4.8)
MAGNESIUM: 2.1 MG/DL (ref 1.6–2.4)
MCH RBC QN AUTO: 26.6 PG (ref 26–33)
MCHC RBC AUTO-ENTMCNC: 32.3 GM/DL (ref 32.2–35.5)
MCV RBC AUTO: 82.4 FL (ref 81–99)
MONOCYTES # BLD: 7.4 %
MONOCYTES ABSOLUTE: 0.8 THOU/MM3 (ref 0.4–1.3)
NUCLEATED RED BLOOD CELLS: 0 /100 WBC
PHOSPHORUS: 3.3 MG/DL (ref 2.4–4.7)
PLATELET # BLD: 241 THOU/MM3 (ref 130–400)
PMV BLD AUTO: 10.4 FL (ref 9.4–12.4)
POTASSIUM SERPL-SCNC: 3.8 MEQ/L (ref 3.5–5.2)
RBC # BLD: 3.46 MILL/MM3 (ref 4.2–5.4)
SEG NEUTROPHILS: 71.7 %
SEGMENTED NEUTROPHILS ABSOLUTE COUNT: 7.6 THOU/MM3 (ref 1.8–7.7)
SODIUM BLD-SCNC: 137 MEQ/L (ref 135–145)
WBC # BLD: 10.6 THOU/MM3 (ref 4.8–10.8)

## 2018-10-11 PROCEDURE — 84100 ASSAY OF PHOSPHORUS: CPT

## 2018-10-11 PROCEDURE — 36415 COLL VENOUS BLD VENIPUNCTURE: CPT

## 2018-10-11 PROCEDURE — 99024 POSTOP FOLLOW-UP VISIT: CPT | Performed by: NURSE PRACTITIONER

## 2018-10-11 PROCEDURE — 85025 COMPLETE CBC W/AUTO DIFF WBC: CPT

## 2018-10-11 PROCEDURE — 6360000002 HC RX W HCPCS: Performed by: SURGERY

## 2018-10-11 PROCEDURE — 2060000000 HC ICU INTERMEDIATE R&B

## 2018-10-11 PROCEDURE — APPSS30 APP SPLIT SHARED TIME 16-30 MINUTES: Performed by: NURSE PRACTITIONER

## 2018-10-11 PROCEDURE — 80048 BASIC METABOLIC PNL TOTAL CA: CPT

## 2018-10-11 PROCEDURE — C9113 INJ PANTOPRAZOLE SODIUM, VIA: HCPCS | Performed by: SURGERY

## 2018-10-11 PROCEDURE — 83735 ASSAY OF MAGNESIUM: CPT

## 2018-10-11 PROCEDURE — 99024 POSTOP FOLLOW-UP VISIT: CPT | Performed by: SURGERY

## 2018-10-11 PROCEDURE — 82330 ASSAY OF CALCIUM: CPT

## 2018-10-11 PROCEDURE — 82948 REAGENT STRIP/BLOOD GLUCOSE: CPT

## 2018-10-11 PROCEDURE — 6370000000 HC RX 637 (ALT 250 FOR IP): Performed by: NURSE PRACTITIONER

## 2018-10-11 PROCEDURE — 97116 GAIT TRAINING THERAPY: CPT

## 2018-10-11 PROCEDURE — 2580000003 HC RX 258: Performed by: SURGERY

## 2018-10-11 PROCEDURE — 97530 THERAPEUTIC ACTIVITIES: CPT

## 2018-10-11 PROCEDURE — 2500000003 HC RX 250 WO HCPCS: Performed by: SURGERY

## 2018-10-11 RX ORDER — OXYCODONE HYDROCHLORIDE AND ACETAMINOPHEN 5; 325 MG/1; MG/1
2 TABLET ORAL EVERY 6 HOURS PRN
Status: DISCONTINUED | OUTPATIENT
Start: 2018-10-11 | End: 2018-10-16 | Stop reason: HOSPADM

## 2018-10-11 RX ORDER — OXYCODONE HYDROCHLORIDE AND ACETAMINOPHEN 5; 325 MG/1; MG/1
1 TABLET ORAL EVERY 6 HOURS PRN
Status: DISCONTINUED | OUTPATIENT
Start: 2018-10-11 | End: 2018-10-16 | Stop reason: HOSPADM

## 2018-10-11 RX ADMIN — NYSTATIN 500000 UNITS: 500000 SUSPENSION ORAL at 13:56

## 2018-10-11 RX ADMIN — NYSTATIN 500000 UNITS: 500000 SUSPENSION ORAL at 17:46

## 2018-10-11 RX ADMIN — Medication 10 ML: at 09:29

## 2018-10-11 RX ADMIN — METRONIDAZOLE 500 MG: 500 INJECTION, SOLUTION INTRAVENOUS at 06:15

## 2018-10-11 RX ADMIN — NYSTATIN 500000 UNITS: 500000 SUSPENSION ORAL at 20:58

## 2018-10-11 RX ADMIN — MORPHINE SULFATE 2 MG: 2 INJECTION, SOLUTION INTRAMUSCULAR; INTRAVENOUS at 09:05

## 2018-10-11 RX ADMIN — PANTOPRAZOLE SODIUM 40 MG: 40 INJECTION, POWDER, FOR SOLUTION INTRAVENOUS at 06:09

## 2018-10-11 RX ADMIN — Medication 10 ML: at 06:09

## 2018-10-11 RX ADMIN — PIPERACILLIN SODIUM,TAZOBACTAM SODIUM 3.38 G: 3; .375 INJECTION, POWDER, FOR SOLUTION INTRAVENOUS at 06:14

## 2018-10-11 RX ADMIN — Medication 10 ML: at 20:59

## 2018-10-11 RX ADMIN — Medication: at 17:39

## 2018-10-11 ASSESSMENT — PAIN DESCRIPTION - LOCATION: LOCATION: ABDOMEN

## 2018-10-11 ASSESSMENT — PAIN SCALES - GENERAL
PAINLEVEL_OUTOF10: 0
PAINLEVEL_OUTOF10: 0
PAINLEVEL_OUTOF10: 5
PAINLEVEL_OUTOF10: 5

## 2018-10-11 ASSESSMENT — PAIN DESCRIPTION - ORIENTATION: ORIENTATION: LOWER

## 2018-10-11 ASSESSMENT — PAIN DESCRIPTION - DESCRIPTORS: DESCRIPTORS: ACHING

## 2018-10-11 ASSESSMENT — PAIN DESCRIPTION - ONSET: ONSET: ON-GOING

## 2018-10-11 ASSESSMENT — PAIN DESCRIPTION - FREQUENCY: FREQUENCY: INTERMITTENT

## 2018-10-11 ASSESSMENT — PAIN DESCRIPTION - PAIN TYPE: TYPE: SURGICAL PAIN

## 2018-10-11 NOTE — PLAN OF CARE
Problem: SAFETY  Goal: Free from accidental physical injury  Outcome: Ongoing  Pt up with assist from staff, bed alarm on. No falls this shift    Problem: DAILY CARE  Goal: Daily care needs are met  Outcome: Ongoing  Pt assisted with daily care needs as necessary    Problem: PAIN  Goal: Patient's pain/discomfort is manageable  Outcome: Ongoing  Pt denies pain so far this shift. Had morphine PRN for pain control    Problem: DISCHARGE BARRIERS  Goal: Patient's continuum of care needs are met  Outcome: Ongoing  Possible discharge to [place] or TCU    Problem: Nutrition  Goal: Optimal nutrition therapy  Outcome: Ongoing  Pt NPO with ice chips. Receiving TPN. Plan to advance diet to clear liquids tomorrow    Problem: GI  Goal: No bowel complications  Outcome: Ongoing  Pt has new ostomy with good output. Bowel sounds active    Problem: Infection - Central Venous Catheter-Associated Bloodstream Infection:  Goal: Will show no infection signs and symptoms  Will show no infection signs and symptoms   Outcome: Ongoing  Site clean, dry, intact, with no redness. No signs of infection    Problem: Risk for Impaired Skin Integrity  Goal: Tissue integrity - skin and mucous membranes  Structural intactness and normal physiological function of skin and  mucous membranes. Outcome: Ongoing  Turning patient every two hours, no new skin breakdown    Comments: Care plan reviewed with patient. Patient verbalizes understanding of the plan of care and contribute to goal setting.

## 2018-10-11 NOTE — PROGRESS NOTES
No change from first assessment. Pt progressed to clear liquid diet. Pt up in chair. No further concerns at this time.     SN [facility]

## 2018-10-11 NOTE — PROGRESS NOTES
partial resections:   Acute and chronic serositis with dense fibrous adhesions and foreign  body      giant cell reaction.   Features consistent with early mucosal ischemia. ASSESSMENT  1. SBO - POD #7 Status post lysis of adhesions, segmental SBR x3 with reanastomosis x3 and formation of end ileostomy   2. Acute respiratory failure - status post extubation 10/5/18 resolved  3. Sepsis - leukocytosis   4. Severe protein calorie malnutrition  5. Acute blood loss anemia - stable. One unit PRBC transfused on 10/5/18. 6. Bilious output from wound/TIANNA drain have improved  7. Deconditioned     PLAN  1. Continue pulmonary toileting   2. Start clear liquids   3. TPN continued   4. Pain and nausea control  5. Routine wound and ostomy care - ostomy viable  6. Antibiotic therapy discontinued   7. Hemoglobin stable at this time. Continue to monitor. 8. PPI and diflucan  9. Continue labs   10. Consult PT/OT   11. Pathology reviewed. Findings of early ischemia, but otherwise benign. 12. Serial abdominal examinations   13. TIANNA drain has slowed down and is serous in color. continue bowel rest and TPN. May remove NG tube tomorrow depending on what drainage looks like overnight. Will continue to watch patient closely. 14.  Rehab consult     Electronically signed by SULEIMAN Fonseca CNP on 10/11/2018 at 2:00 PM Patient seen and examined independently by me. Above discussed and I agree with CNP. Labs, cultures, and radiographs where available were reviewed. See orders for the updated patient care plan.     Ethlyn Cranker MD, no evidence of bile leak at this time we'll begin clear liquids  10/11/2018   5:10 PM

## 2018-10-11 NOTE — PROGRESS NOTES
3-5x GM  Times per day: Daily  Specific instructions for Next Treatment: ther ex, ther act, gait trng,balance   Current Treatment Recommendations: Strengthening, Functional Mobility Training, Transfer Training, Balance Training, Endurance Training, Stair training, Gait Training, Neuromuscular Re-education, Home Exercise Program, Safety Education & Training, Patient/Caregiver Education & Training    Goals:  Patient goals : Get stronger before going home    Short term goals  Time Frame for Short term goals: 1 week  Short term goal 1: Pt to be CGA for supine <> sit to get in/out of bed  Short term goal 2: Pt to be CGA for sit <> stand to get up to ambulate  Short term goal 3: Pt to ambulate > 150 ft with RW with CGA for household distances  Short term goal 4: Pt to negotiate 1 step with no rail with cane or RW  with CGA for home access    Long term goals  Time Frame for Long term goals : not set due to short ELOS            AM-PAC Inpatient Mobility without Stair Climbing Raw Score : 14  AM-PAC Inpatient without Stair Climbing T-Scale Score : 40.85  Mobility Inpatient CMS 0-100% Score: 53.33  Mobility Inpatient without Stair CMS G-Code Modifier : CK

## 2018-10-11 NOTE — PLAN OF CARE
Problem: Nutrition  Goal: Optimal nutrition therapy  Outcome: Ongoing  Nutrition Problem: Severe malnutrition, in context of chronic illness  Intervention: Food and/or Nutrient Delivery: Continue current diet, Continue Parenteral Nutrition  Nutritional Goals: Pt. will tolerate TPN to meet 75% or more of nutritional needs until able to transition to po diet.

## 2018-10-11 NOTE — PROGRESS NOTES
1230: Pt up in chair. No family at bedside. No further needs voiced at this time.     A SN Antwan Matta

## 2018-10-12 ENCOUNTER — APPOINTMENT (OUTPATIENT)
Dept: CT IMAGING | Age: 78
DRG: 329 | End: 2018-10-12
Payer: MEDICARE

## 2018-10-12 ENCOUNTER — APPOINTMENT (OUTPATIENT)
Dept: GENERAL RADIOLOGY | Age: 78
DRG: 329 | End: 2018-10-12
Payer: MEDICARE

## 2018-10-12 ENCOUNTER — APPOINTMENT (OUTPATIENT)
Dept: MRI IMAGING | Age: 78
DRG: 329 | End: 2018-10-12
Payer: MEDICARE

## 2018-10-12 LAB
AVERAGE GLUCOSE: 84 MG/DL (ref 70–126)
BASOPHILS # BLD: 0.1 %
BASOPHILS ABSOLUTE: 0 THOU/MM3 (ref 0–0.1)
CHOLESTEROL, TOTAL: 46 MG/DL (ref 100–199)
EOSINOPHIL # BLD: 2.5 %
EOSINOPHILS ABSOLUTE: 0.2 THOU/MM3 (ref 0–0.4)
ERYTHROCYTE [DISTWIDTH] IN BLOOD BY AUTOMATED COUNT: 16.1 % (ref 11.5–14.5)
ERYTHROCYTE [DISTWIDTH] IN BLOOD BY AUTOMATED COUNT: 50.9 FL (ref 35–45)
GLUCOSE BLD-MCNC: 107 MG/DL (ref 70–108)
GLUCOSE BLD-MCNC: 116 MG/DL (ref 70–108)
HBA1C MFR BLD: 4.8 % (ref 4.4–6.4)
HCT VFR BLD CALC: 26.6 % (ref 37–47)
HDLC SERPL-MCNC: 17 MG/DL
HEMOGLOBIN: 8.3 GM/DL (ref 12–16)
IMMATURE GRANS (ABS): 0.07 THOU/MM3 (ref 0–0.07)
IMMATURE GRANULOCYTES: 0.8 %
LDL CHOLESTEROL CALCULATED: 12 MG/DL
LYMPHOCYTES # BLD: 18.2 %
LYMPHOCYTES ABSOLUTE: 1.7 THOU/MM3 (ref 1–4.8)
MCH RBC QN AUTO: 26.9 PG (ref 26–33)
MCHC RBC AUTO-ENTMCNC: 31.2 GM/DL (ref 32.2–35.5)
MCV RBC AUTO: 86.1 FL (ref 81–99)
MONOCYTES # BLD: 8.9 %
MONOCYTES ABSOLUTE: 0.8 THOU/MM3 (ref 0.4–1.3)
NUCLEATED RED BLOOD CELLS: 0 /100 WBC
PLATELET # BLD: 229 THOU/MM3 (ref 130–400)
PMV BLD AUTO: 10.3 FL (ref 9.4–12.4)
RBC # BLD: 3.09 MILL/MM3 (ref 4.2–5.4)
SEG NEUTROPHILS: 69.5 %
SEGMENTED NEUTROPHILS ABSOLUTE COUNT: 6.4 THOU/MM3 (ref 1.8–7.7)
TRIGL SERPL-MCNC: 86 MG/DL (ref 0–199)
WBC # BLD: 9.2 THOU/MM3 (ref 4.8–10.8)

## 2018-10-12 PROCEDURE — 6370000000 HC RX 637 (ALT 250 FOR IP): Performed by: PSYCHIATRY & NEUROLOGY

## 2018-10-12 PROCEDURE — 97110 THERAPEUTIC EXERCISES: CPT

## 2018-10-12 PROCEDURE — 97116 GAIT TRAINING THERAPY: CPT

## 2018-10-12 PROCEDURE — 6370000000 HC RX 637 (ALT 250 FOR IP): Performed by: NURSE PRACTITIONER

## 2018-10-12 PROCEDURE — 83036 HEMOGLOBIN GLYCOSYLATED A1C: CPT

## 2018-10-12 PROCEDURE — APPSS30 APP SPLIT SHARED TIME 16-30 MINUTES: Performed by: NURSE PRACTITIONER

## 2018-10-12 PROCEDURE — 2060000000 HC ICU INTERMEDIATE R&B

## 2018-10-12 PROCEDURE — 74018 RADEX ABDOMEN 1 VIEW: CPT

## 2018-10-12 PROCEDURE — 36591 DRAW BLOOD OFF VENOUS DEVICE: CPT

## 2018-10-12 PROCEDURE — 70551 MRI BRAIN STEM W/O DYE: CPT

## 2018-10-12 PROCEDURE — 2580000003 HC RX 258: Performed by: SURGERY

## 2018-10-12 PROCEDURE — 2709999900 HC NON-CHARGEABLE SUPPLY

## 2018-10-12 PROCEDURE — 99222 1ST HOSP IP/OBS MODERATE 55: CPT | Performed by: PSYCHIATRY & NEUROLOGY

## 2018-10-12 PROCEDURE — 6360000002 HC RX W HCPCS: Performed by: SURGERY

## 2018-10-12 PROCEDURE — 82948 REAGENT STRIP/BLOOD GLUCOSE: CPT

## 2018-10-12 PROCEDURE — 99024 POSTOP FOLLOW-UP VISIT: CPT | Performed by: NURSE PRACTITIONER

## 2018-10-12 PROCEDURE — 70547 MR ANGIOGRAPHY NECK W/O DYE: CPT

## 2018-10-12 PROCEDURE — 97530 THERAPEUTIC ACTIVITIES: CPT

## 2018-10-12 PROCEDURE — 99024 POSTOP FOLLOW-UP VISIT: CPT | Performed by: SURGERY

## 2018-10-12 PROCEDURE — 6370000000 HC RX 637 (ALT 250 FOR IP): Performed by: INTERNAL MEDICINE

## 2018-10-12 PROCEDURE — 80061 LIPID PANEL: CPT

## 2018-10-12 PROCEDURE — 70450 CT HEAD/BRAIN W/O DYE: CPT

## 2018-10-12 PROCEDURE — 70544 MR ANGIOGRAPHY HEAD W/O DYE: CPT

## 2018-10-12 PROCEDURE — C9113 INJ PANTOPRAZOLE SODIUM, VIA: HCPCS | Performed by: SURGERY

## 2018-10-12 PROCEDURE — 85025 COMPLETE CBC W/AUTO DIFF WBC: CPT

## 2018-10-12 PROCEDURE — 2500000003 HC RX 250 WO HCPCS

## 2018-10-12 RX ORDER — ACETAMINOPHEN 325 MG/1
650 TABLET ORAL EVERY 4 HOURS PRN
Status: DISCONTINUED | OUTPATIENT
Start: 2018-10-12 | End: 2018-10-16 | Stop reason: HOSPADM

## 2018-10-12 RX ORDER — ASPIRIN 81 MG/1
81 TABLET, CHEWABLE ORAL DAILY
Status: DISCONTINUED | OUTPATIENT
Start: 2018-10-12 | End: 2018-10-16 | Stop reason: HOSPADM

## 2018-10-12 RX ORDER — TOPIRAMATE 25 MG/1
25 TABLET ORAL 2 TIMES DAILY
Status: DISCONTINUED | OUTPATIENT
Start: 2018-10-12 | End: 2018-10-15

## 2018-10-12 RX ADMIN — Medication 10 ML: at 09:21

## 2018-10-12 RX ADMIN — Medication 10 ML: at 09:23

## 2018-10-12 RX ADMIN — ACETAMINOPHEN 650 MG: 325 TABLET ORAL at 14:23

## 2018-10-12 RX ADMIN — ASPIRIN 81 MG CHEWABLE TABLET 81 MG: 81 TABLET CHEWABLE at 09:24

## 2018-10-12 RX ADMIN — NYSTATIN 500000 UNITS: 500000 SUSPENSION ORAL at 14:23

## 2018-10-12 RX ADMIN — TOPIRAMATE 25 MG: 25 TABLET, FILM COATED ORAL at 21:43

## 2018-10-12 RX ADMIN — Medication: at 17:33

## 2018-10-12 RX ADMIN — ACETAMINOPHEN 650 MG: 650 SUPPOSITORY RECTAL at 03:37

## 2018-10-12 RX ADMIN — NYSTATIN 500000 UNITS: 500000 SUSPENSION ORAL at 21:43

## 2018-10-12 RX ADMIN — PANTOPRAZOLE SODIUM 40 MG: 40 INJECTION, POWDER, FOR SOLUTION INTRAVENOUS at 06:13

## 2018-10-12 RX ADMIN — NYSTATIN 500000 UNITS: 500000 SUSPENSION ORAL at 09:24

## 2018-10-12 RX ADMIN — NYSTATIN 500000 UNITS: 500000 SUSPENSION ORAL at 17:43

## 2018-10-12 RX ADMIN — OXYCODONE HYDROCHLORIDE AND ACETAMINOPHEN 1 TABLET: 5; 325 TABLET ORAL at 18:59

## 2018-10-12 ASSESSMENT — PAIN SCALES - GENERAL
PAINLEVEL_OUTOF10: 5
PAINLEVEL_OUTOF10: 2
PAINLEVEL_OUTOF10: 3
PAINLEVEL_OUTOF10: 3
PAINLEVEL_OUTOF10: 4
PAINLEVEL_OUTOF10: 5

## 2018-10-12 ASSESSMENT — PAIN DESCRIPTION - LOCATION: LOCATION: ABDOMEN

## 2018-10-12 ASSESSMENT — PAIN DESCRIPTION - PAIN TYPE: TYPE: SURGICAL PAIN

## 2018-10-12 NOTE — FLOWSHEET NOTE
10/06/18 1012   Encounter Summary   Services provided to: Patient and family together   Referral/Consult From: [facility];Family members   Continue Visiting Yes  (10/6/18)   Complexity of Encounter Moderate   Length of Encounter 15 minutes   Spiritual/Hoahaoism   Type Spiritual support   Assessment Approachable   Intervention Nurtured hope   Outcome Did not respond   S:  The patient was Unable to respond but the pts. family was actively                       present. I wanted to be with the family and ask if there were any Spiritual   needs. O:   The patient (wasnt able to respond) and the family was actively present. The patients family stated that they would like prayer. A: I offered emotional support, nurtured hope, provided words of encouragement and a prayer of healing/comfort to the pt. The patients family stated that they were grateful for the support. P:           Continued support would be beneficial to the patient and their family.
10/12/18 0319   Family Notification   Reason Code Stroke   Name Aquiles    Method Call   Relationship Child   Response Verified understanding   This RN called patient's daughter Zenobia Land, who is listed as one of her emergency contacts to notify of Code Stroke. This RN make daughter aware that the Neurologist was on the case and that we would be doing testing today. All of the Ros's questions were answered and Zenobia Land had thanked this RN for the update. This RN asked the patient if any other family members needed called at this time. Patient stated that her daughter can speak with other family members in the morning.
Pt transferred to 4K26 by bed, stable condition.
Pt's daughter was in the room with her. Pt is in ICU and is intubated. She did not respond. I offered prayer, and words of comfort. 10/05/18 1040   Encounter Summary   Services provided to: Patient and family together   Referral/Consult From: [address] [facility]. Completed   Continue Visiting Yes  (10/5)   Complexity of Encounter Moderate   Length of Encounter 15 minutes   Spiritual/Christianity   Type Spiritual support   Assessment Approachable; Unable to respond   Intervention Nurtured hope;Prayer   Outcome Did not respond   Spiritual care card with [facility], prayer or Prayer for peace was given. It has our information of service, hours and phone number on it.
Intervention Active listening;Explored feelings, thoughts, concerns;Sustaining presence/ Ministry of presence;Prayer   Outcome Receptive; Expressed feelings/needs/concerns;Engaged in conversation;Expressed gratitude

## 2018-10-12 NOTE — PROGRESS NOTES
This RN called [facility] RN to come assess patient due to decreased orientation. Monica stated someone will be down.

## 2018-10-12 NOTE — CONSULTS
Georgina Ballard MD on 10/12/2018 at 1:01 PM    Florentino Burnett MD  Attending Neurologist/Neurointensivist

## 2018-10-12 NOTE — PROGRESS NOTES
Bilateral 1999    CATARACT REMOVAL    HYSTERECTOMY  1975 ? [address]    LUMBAR LAMINECTOMY  4/15/2013    OTHER SURGICAL HISTORY  7/8/2013    evacation of seroma-back     ND EXPLORATORY OF ABDOMEN N/A 10/4/2018    LAPAROTOMY EXPLORATORY, SMALL BOWEL RESECTION X 3, EXTENSIVE LYSIS OF ADHESIONS, ILEOSTOMY FORMATION performed by Ethlyn Cranker, MD at [facility] OFFICE/OUTPT [address] N/A 6/25/2018    ABDOMINAL EXPLORATION AND ESTENDED LEFT COLON RESECTION, LYSIS OF COMPLEX ADHESIONS performed by Ethlyn Cranker, MD at [facility]  08/11/2017    Dr Felice Parsons Left 9/28/2018    EGD BIOPSY performed by Ethlyn Cranker, MD at [address] [facility]       Restrictions/Precautions:  General Precautions, Fall Risk                    Other position/activity restrictions: abd incision, sx 9-24 SBO repair and ileostomy       Prior Level of Function:  ADL Assistance: Independent  Homemaking Assistance: Needs assistance  Ambulation Assistance: Independent  Transfer Assistance: Independent  Additional Comments: Pt was very IND at [facility] however recently her daughters have been providing increasing assist with IADLs around home. Walks with cane or RW.     Subjective       Subjective: pt lying in bed with daughter present and agreeable to OT   Comments: Rn okayed therapy sessionn      Pain:  Pain Assessment  Patient Currently in Pain: Yes  Pain Level: 4  Pain Type: Surgical pain  Pain Location: Abdomen       Objective        ADL  Grooming: Minimal assistance (combing hair sitting EOB )  LE Dressing: Maximum assistance (to adjust slipper socks )          Bed mobility  Supine to Sit: Minimal assistance (for log rolling technique )  Sit to Supine: Minimal assistance (LE's in bed )    Transfers  Sit to stand: Minimal assistance (cues for hand placement has poor carryover )  Stand to sit: Minimal assistance (to EOB with cues to step to head of bed ) Balance  Sitting Balance: Supervision  Standing Balance: Contact guard assistance     Time: x1 min x2  Activity: prep for mobility and standing at w/c to go with transport     Functional Mobility  Functional - Mobility Device: Rolling Walker  Activity: Other  Assist Level: Contact guard assistance  Functional Mobility Comments: pt ambulated into hallay and down vivas approx 40 feet and back to room. no LOB noted. Pt did ambulate to w/c in room for transport from EOB         Activity Tolerance:  Activity Tolerance: Patient limited by fatigue;Patient limited by pain  Activity Tolerance: pt moving slowly and takes extended time to complete task     Assessment:     Performance deficits / Impairments: Decreased functional mobility , Decreased endurance, Decreased ADL status, Decreased balance, Decreased strength, Decreased safe awareness, Decreased high-level IADLs  Prognosis: Fair, Good    Discharge Recommendations:  Discharge Recommendations: [address], Patient would benefit from continued therapy after discharge (TCU)    Patient Education:  Patient Education: safety wtih transfers     Equipment Recommendations: Other: monitor for LHAE    Safety:  Safety Devices in place: Yes  Type of devices: All fall risk precautions in place, Nurse notified (left in w/c with transport )    Plan:  Times per week: 3-5x  Current Treatment Recommendations: Strengthening, Balance Training, Functional Mobility Training, Endurance Training, Safety Education & Training, Self-Care / ADL, Patient/Caregiver Education & Training, Home Management Training, Equipment Evaluation, Education, & procurement    Goals:  Patient goals : To go home    Short term goals  Time Frame for Short term goals:  Two weeks  Short term goal 1: Pt to complete functional mobility to/from bathroom with CGA, RW, and min vcs for safety  Short term goal 2: Pt to tolerate standing greater than 3 mins with 1 hand release with CGA in preparation for returned to preffered IADL tasks within home including pet care  Short term goal 3: Pt to complete various functional transfers with CGA with 0 cues for safety for inc ind with toileting  Short term goal 4: Pt to complete LE dressing with min A & LH AE prn  Long term goals  Time Frame for Long term goals : NA d/t RAHAT

## 2018-10-12 NOTE — PROGRESS NOTES
TPN Follow Up Note    Assessment: Patient started on full liquid diet. In rounds they discussed weaning the TPN as patient tolerates full liquid. Spoke with Felicia Stephenson RN and she said the TPN will be on till Monday per Dr. Calile Feliciano.      Electrolyte Replacement: none    TPN changes for (today) at 1800: none    Re-check BMP, Mg, PO4, iCa -already ordered for 84/94/4117    Nikolas Elam PharmD, [address]  10/12/2018 12:06 PM

## 2018-10-12 NOTE — PROGRESS NOTES
This RN attended code stroke. Rapid RN Bonny obtained NIH. See documentation. NIH: 8 Blood sugar 116. LKW: 2100. Stroke robot obtained from ED and at patient's bedside. [facility] called and stated they would place phone call to Dr. Kayla Heredia (neurointerventionalist on call). Primary RN's phone number given to [facility]. STAT CT of the head ordered and patient taken to CT scan.

## 2018-10-13 LAB
ANION GAP SERPL CALCULATED.3IONS-SCNC: 9 MEQ/L (ref 8–16)
BASOPHILS # BLD: 0.1 %
BASOPHILS ABSOLUTE: 0 THOU/MM3 (ref 0–0.1)
BUN BLDV-MCNC: 15 MG/DL (ref 7–22)
CALCIUM IONIZED: 1.27 MMOL/L (ref 1.12–1.32)
CALCIUM SERPL-MCNC: 8.2 MG/DL (ref 8.5–10.5)
CHLORIDE BLD-SCNC: 101 MEQ/L (ref 98–111)
CO2: 24 MEQ/L (ref 23–33)
CREAT SERPL-MCNC: 0.4 MG/DL (ref 0.4–1.2)
EOSINOPHIL # BLD: 2.3 %
EOSINOPHILS ABSOLUTE: 0.2 THOU/MM3 (ref 0–0.4)
ERYTHROCYTE [DISTWIDTH] IN BLOOD BY AUTOMATED COUNT: 16.1 % (ref 11.5–14.5)
ERYTHROCYTE [DISTWIDTH] IN BLOOD BY AUTOMATED COUNT: 49.9 FL (ref 35–45)
GFR SERPL CREATININE-BSD FRML MDRD: > 90 ML/MIN/1.73M2
GLUCOSE BLD-MCNC: 104 MG/DL (ref 70–108)
GLUCOSE BLD-MCNC: 107 MG/DL (ref 70–108)
GLUCOSE BLD-MCNC: 111 MG/DL (ref 70–108)
HCT VFR BLD CALC: 24.5 % (ref 37–47)
HEMOGLOBIN: 7.6 GM/DL (ref 12–16)
IMMATURE GRANS (ABS): 0.05 THOU/MM3 (ref 0–0.07)
IMMATURE GRANULOCYTES: 0.5 %
LYMPHOCYTES # BLD: 19.6 %
LYMPHOCYTES ABSOLUTE: 1.8 THOU/MM3 (ref 1–4.8)
MAGNESIUM: 2 MG/DL (ref 1.6–2.4)
MCH RBC QN AUTO: 26.1 PG (ref 26–33)
MCHC RBC AUTO-ENTMCNC: 31 GM/DL (ref 32.2–35.5)
MCV RBC AUTO: 84.2 FL (ref 81–99)
MONOCYTES # BLD: 8.7 %
MONOCYTES ABSOLUTE: 0.8 THOU/MM3 (ref 0.4–1.3)
NUCLEATED RED BLOOD CELLS: 0 /100 WBC
PHOSPHORUS: 3.7 MG/DL (ref 2.4–4.7)
PLATELET # BLD: 300 THOU/MM3 (ref 130–400)
PMV BLD AUTO: 9.9 FL (ref 9.4–12.4)
POTASSIUM SERPL-SCNC: 4.3 MEQ/L (ref 3.5–5.2)
RBC # BLD: 2.91 MILL/MM3 (ref 4.2–5.4)
SEG NEUTROPHILS: 68.8 %
SEGMENTED NEUTROPHILS ABSOLUTE COUNT: 6.5 THOU/MM3 (ref 1.8–7.7)
SODIUM BLD-SCNC: 134 MEQ/L (ref 135–145)
WBC # BLD: 9.4 THOU/MM3 (ref 4.8–10.8)

## 2018-10-13 PROCEDURE — 36592 COLLECT BLOOD FROM PICC: CPT

## 2018-10-13 PROCEDURE — 85025 COMPLETE CBC W/AUTO DIFF WBC: CPT

## 2018-10-13 PROCEDURE — C9113 INJ PANTOPRAZOLE SODIUM, VIA: HCPCS | Performed by: SURGERY

## 2018-10-13 PROCEDURE — 2580000003 HC RX 258: Performed by: SURGERY

## 2018-10-13 PROCEDURE — 6370000000 HC RX 637 (ALT 250 FOR IP): Performed by: NURSE PRACTITIONER

## 2018-10-13 PROCEDURE — 82948 REAGENT STRIP/BLOOD GLUCOSE: CPT

## 2018-10-13 PROCEDURE — 2500000003 HC RX 250 WO HCPCS: Performed by: PHARMACIST

## 2018-10-13 PROCEDURE — 6370000000 HC RX 637 (ALT 250 FOR IP): Performed by: PSYCHIATRY & NEUROLOGY

## 2018-10-13 PROCEDURE — 82330 ASSAY OF CALCIUM: CPT

## 2018-10-13 PROCEDURE — 84100 ASSAY OF PHOSPHORUS: CPT

## 2018-10-13 PROCEDURE — 99024 POSTOP FOLLOW-UP VISIT: CPT | Performed by: SURGERY

## 2018-10-13 PROCEDURE — 6360000002 HC RX W HCPCS: Performed by: SURGERY

## 2018-10-13 PROCEDURE — 2060000000 HC ICU INTERMEDIATE R&B

## 2018-10-13 PROCEDURE — 83735 ASSAY OF MAGNESIUM: CPT

## 2018-10-13 PROCEDURE — 80048 BASIC METABOLIC PNL TOTAL CA: CPT

## 2018-10-13 RX ADMIN — PANTOPRAZOLE SODIUM 40 MG: 40 INJECTION, POWDER, FOR SOLUTION INTRAVENOUS at 05:27

## 2018-10-13 RX ADMIN — TOPIRAMATE 25 MG: 25 TABLET, FILM COATED ORAL at 08:28

## 2018-10-13 RX ADMIN — NYSTATIN 500000 UNITS: 500000 SUSPENSION ORAL at 08:28

## 2018-10-13 RX ADMIN — OXYCODONE HYDROCHLORIDE AND ACETAMINOPHEN 1 TABLET: 5; 325 TABLET ORAL at 08:30

## 2018-10-13 RX ADMIN — NYSTATIN 500000 UNITS: 500000 SUSPENSION ORAL at 14:47

## 2018-10-13 RX ADMIN — Medication: at 18:30

## 2018-10-13 RX ADMIN — NYSTATIN 500000 UNITS: 500000 SUSPENSION ORAL at 18:30

## 2018-10-13 RX ADMIN — NYSTATIN 500000 UNITS: 500000 SUSPENSION ORAL at 20:35

## 2018-10-13 RX ADMIN — Medication 10 ML: at 08:30

## 2018-10-13 RX ADMIN — OXYCODONE HYDROCHLORIDE AND ACETAMINOPHEN 1 TABLET: 5; 325 TABLET ORAL at 14:47

## 2018-10-13 RX ADMIN — Medication 10 ML: at 08:28

## 2018-10-13 RX ADMIN — Medication 10 ML: at 20:35

## 2018-10-13 RX ADMIN — ASPIRIN 81 MG CHEWABLE TABLET 81 MG: 81 TABLET CHEWABLE at 08:28

## 2018-10-13 RX ADMIN — OXYCODONE HYDROCHLORIDE AND ACETAMINOPHEN 1 TABLET: 5; 325 TABLET ORAL at 01:14

## 2018-10-13 RX ADMIN — TOPIRAMATE 25 MG: 25 TABLET, FILM COATED ORAL at 20:35

## 2018-10-13 ASSESSMENT — PAIN DESCRIPTION - PROGRESSION: CLINICAL_PROGRESSION: GRADUALLY IMPROVING

## 2018-10-13 ASSESSMENT — PAIN DESCRIPTION - ONSET: ONSET: ON-GOING

## 2018-10-13 ASSESSMENT — PAIN DESCRIPTION - FREQUENCY: FREQUENCY: INTERMITTENT

## 2018-10-13 ASSESSMENT — PAIN SCALES - GENERAL
PAINLEVEL_OUTOF10: 5
PAINLEVEL_OUTOF10: 4
PAINLEVEL_OUTOF10: 4
PAINLEVEL_OUTOF10: 0
PAINLEVEL_OUTOF10: 4

## 2018-10-13 ASSESSMENT — PAIN DESCRIPTION - PAIN TYPE: TYPE: ACUTE PAIN

## 2018-10-13 ASSESSMENT — PAIN DESCRIPTION - DESCRIPTORS: DESCRIPTORS: ACHING;DISCOMFORT

## 2018-10-13 ASSESSMENT — PAIN DESCRIPTION - LOCATION: LOCATION: ABDOMEN

## 2018-10-13 ASSESSMENT — PAIN DESCRIPTION - ORIENTATION: ORIENTATION: MID

## 2018-10-13 NOTE — CONSULTS
Family History:    Medical:   Past Medical History:   Diagnosis Date    Arthritis     Cellulitis     Difficult intubation 2006    GERD (gastroesophageal reflux disease)     H pylori ulcer     [facility] of [facility]     Ileostomy in place ([facility] 10/04/2018    Nausea & vomiting     Pneumonia     S/P partial resection of colon 7/6/2018    Shinteresita Perez mandibularis 2006     Surgical:   Past Surgical History:   Procedure Laterality Date    ABDOMEN SURGERY      ABDOMINAL ADHESION SURGERY  1990's    Dr Ronni Gardner  09/09/2013    Lysis of Adhesions-Dr. Dov Maddox     BACK SURGERY  1979/1980/2006/2013    X3    [facility]  [facility]    COLON SURGERY      COLONOSCOPY  over 10 yrs ago    COLONOSCOPY  08/11/2017    Dr Taisha Coelho, COLON, DIAGNOSTIC     [address] ? [facility]    LUMBAR LAMINECTOMY  4/15/2013    OTHER SURGICAL HISTORY  7/8/2013    evacation of seroma-back     [facility] EXPLORATORY OF ABDOMEN N/A 10/4/2018    LAPAROTOMY EXPLORATORY, SMALL BOWEL RESECTION X 3, EXTENSIVE LYSIS OF ADHESIONS, ILEOSTOMY FORMATION performed by Molly Kennedy MD at [address] [facility] OFFICE/OUTPT VISIT,PROCEDURE ONLY N/A 6/25/2018    ABDOMINAL EXPLORATION AND ESTENDED LEFT COLON RESECTION, LYSIS OF COMPLEX ADHESIONS performed by Molly Kennedy MD at [address] [facility] [address]  08/11/2017    Dr Zander Palemr Left 9/28/2018    EGD BIOPSY performed by Molly Kennedy MD at [facility]     Family:   Family History   Problem Relation Age of Onset    Arthritis Mother     Asthma Mother     Arthritis Father     Cancer Father     High Blood Pressure Father     Diabetes Brother     Heart Disease Brother     COPD Brother     High Blood Pressure Brother        Social History:   reports that she has never smoked.  She has never used smokeless Collection Time: 10/13/18  3:47 AM   Result Value Ref Range    Calcium, Ion 1.27 1.12 - 1.32 mmol/L   Phosphorus    Collection Time: 10/13/18  3:47 AM   Result Value Ref Range    Phosphorus 3.7 2.4 - 4.7 mg/dL   Magnesium    Collection Time: 10/13/18  3:47 AM   Result Value Ref Range    Magnesium 2.0 1.6 - 2.4 mg/dL   Anion Gap    Collection Time: 10/13/18  3:47 AM   Result Value Ref Range    Anion Gap 9.0 8.0 - 16.0 meq/L   Glomerular Filtration Rate, Estimated    Collection Time: 10/13/18  3:47 AM   Result Value Ref Range    Est, Glom Filt Rate >90 ml/min/1.73m2   POCT glucose    Collection Time: 10/13/18  8:42 AM   Result Value Ref Range    POC Glucose 104 70 - 108 mg/dl     Lab Results   Component Value Date    LABA1C 4.8 10/12/2018     No results found for: EAG  Recent Labs      10/13/18   0347  10/12/18   0327  10/11/18   0452   WBC  9.4  9.2  10.6   HGB  7.6*  8.3*  9.2*   HCT  24.5*  26.6*  28.5*   MCV  84.2  86.1  82.4   PLT  300  229  241       Imaging  Xr Abdomen (kub) (single Ap View)    Result Date: 10/12/2018  PROCEDURE: XR ABDOMEN (KUB) (SINGLE AP VIEW) CLINICAL INFORMATION: Abdominal pain. COMPARISON: 10/3/2018 TECHNIQUE: 2 supine images of the abdomen were obtained. FINDINGS: There is mild gaseous distention of a few bowel loops in the mid/lower abdomen and pelvis, consistent with mild ileus. Appearance slightly improved from prior. Kidneys are somewhat obscured. No definite renal or ureteral calculi are seen. Multiple skin staples are present from recent surgery. Vascular clips are present in the left lower quadrant and there are multiple suture lines in the pelvis from prior surgery. A Stearns catheter is present. There also appears be a surgical drainage tube projects over  the left lower quadrant. Findings of mild postoperative ileus. Appearance improved from prior study. **This report has been created using voice recognition software.   It may contain minor errors which are inherent in voice dose to as low as reasonably achievable. FINDINGS: There are no fracture changes. There is persistent moderate cerebral atrophy. The exam is negative for hemorrhage or obvious large vessel infarct. Paranasal sinuses remain clear. Stable cerebral atrophy changes. **This report has been created using voice recognition software. It may contain minor errors which are inherent in voice recognition technology. ** Final report electronically signed by Dr. Amada Argueta on 10/12/2018 1:44 AM    Mra Head Wo Contrast    Result Date: 10/12/2018  PROCEDURE: MRA NECK WO CONTRAST, MRA HEAD WO CONTRAST CLINICAL INFORMATION: headache. COMPARISON: MRI 10/12/2018 CT 11/3/2013 TECHNIQUE: 2-D and 3-D time-of-flight images were obtained through the carotid bifurcations and the brain. Multiplanar reconstructions, including MIP images were obtained. FINDINGS: Neck: Right: There is no stenosis of the distal common carotid artery. The carotid bulb is normal. There is no stenosis at the origin of the internal carotid artery. Left: There is no stenosis of the distal common carotid artery. The carotid bulb is normal. There is no stenosis at the origin of the internal carotid artery. Vertebral arteries: There is antegrade flow in both vertebral arteries. The left vertebral artery is dominant. Mild narrowing of the proximal right vertebral. ANTERIOR CIRCULATION: Internal carotid arteries:     Normal Anterior cerebral arteries:   Normal Middle cerebral arteries:     Normal POSTERIOR CIRCULATION: Distal vertebral arteries:     Normal Basilar artery:                   Normal Posterior cerebral arteries:  Normal Posterior comm. arteries:    Normal No segmental occlusion, aneurysmal dilation, vascular malformation, or other vascular abnormalities are seen. No high-grade stenosis within the head or neck. **This report has been created using voice recognition software.  It may contain minor errors which are inherent in voice recognition reticular opacities are seen at the bilateral lung bases. Degenerative and surgical changes in the thoracolumbar spine are poorly visualized. Soft tissues are unremarkable. Bibasilar atelectasis/infiltrate. **This report has been created using voice recognition software. It may contain minor errors which are inherent in voice recognition technology. ** Final report electronically signed by Dr. Emmanuel Aiken on 9/24/2018 11:49 AM    Ct Enterography W Contrast    Result Date: 9/26/2018  CT ABDOMEN AND PELVIS WITH CONTRAST/(ENTEROGRAPHY STUDY): CLINICAL INFORMATION: 51-year-old female with generalized abdominal bloating and pain. TECHNIQUE: Multiple axial 5 mm images of the abdomen, pelvis, lung bases were obtained following the administration of Enterovue oral contrast material and intravenous contrast material (ISOVUE). Computer generated coronal images of the abdomen and pelvis were reconstructed. ALL CT SCANS AT THIS FACILITY use dose modulation, iterative reconstruction, and/or weight-based dosing when appropriate to reduce radiation dose to as low as reasonably achievable. FINDINGS: There is some atelectasis/scarring at the left lung base. No pleural effusions or pneumothorax. The heart is normal in size. There is no pericardial effusion. The liver and spleen both have smooth contours and are normal in size. The pancreas and adrenal glands are within normal limits. The gallbladder is not visualized. There is prominence of the intrahepatic biliary ducts, likely compensatory in nature. Kidneys are symmetric in size, shape and degree of enhancement. No hydronephrosis. Atherosclerotic calcifications seen throughout the aorta. No aneurysmal dilatation. The IVC has a normal caliber. Multiple air-fluid levels are again seen within the small bowel there is questionable gas within the wall of the stomach which could represent pneumatosis. The majority of the colon is decompressed.  There is some edema throughout the mesentery, slightly decreased when compared to the previous study. There is no free intraperitoneal air. Redemonstration of multiple air-fluid levels throughout the small bowel with enhancement of the mucosa. There is near complete decompression of the large intestine. These findings are highly concerning for small bowel obstruction. There is persistence of  edema throughout the mesentery. There are tiny foci of gas at the nondependent portion of the stomach for which pneumatosis cannot be entirely excluded. Findings discussed by Dr. Raciel Marquez with nurse Antonia Pallas 7:10 PM 9/26/2018 via telephone. **This report has been created using voice recognition software. It may contain minor errors which are inherent in voice recognition technology. ** Final report electronically signed by Dr Michaelle Simms on 9/26/2018 7:13 PM    Mra Neck Wo Contrast    Result Date: 10/12/2018  PROCEDURE: MRA NECK WO CONTRAST, MRA HEAD WO CONTRAST CLINICAL INFORMATION: headache. COMPARISON: MRI 10/12/2018 CT 11/3/2013 TECHNIQUE: 2-D and 3-D time-of-flight images were obtained through the carotid bifurcations and the brain. Multiplanar reconstructions, including MIP images were obtained. FINDINGS: Neck: Right: There is no stenosis of the distal common carotid artery. The carotid bulb is normal. There is no stenosis at the origin of the internal carotid artery. Left: There is no stenosis of the distal common carotid artery. The carotid bulb is normal. There is no stenosis at the origin of the internal carotid artery. Vertebral arteries: There is antegrade flow in both vertebral arteries. The left vertebral artery is dominant.  Mild narrowing of the proximal right vertebral. ANTERIOR CIRCULATION: Internal carotid arteries:     Normal Anterior cerebral arteries:   Normal Middle cerebral arteries:     Normal POSTERIOR CIRCULATION: Distal vertebral arteries:     Normal Basilar artery:                   Normal Posterior cerebral arteries:  Normal Posterior comm. arteries:    Normal No segmental occlusion, aneurysmal dilation, vascular malformation, or other vascular abnormalities are seen. No high-grade stenosis within the head or neck. **This report has been created using voice recognition software. It may contain minor errors which are inherent in voice recognition technology. ** Final report electronically signed by Dr. Rex Morris on 10/12/2018 3:41 PM    Mri Brain Wo Contrast    Result Date: 10/12/2018  PROCEDURE: MRI BRAIN WO CONTRAST CLINICAL INFORMATION TIA, . Headaches, facial droop COMPARISON: CT 10/12/2018, MRI 11/1/2013 TECHNIQUE: Multiplanar and multiple spin echo MRI images were obtained of the brain without contrast. FINDINGS: Ventricles and extra-axial spaces: Mild atrophy. Brain: There is no diffusion restriction to suggest ischemia. Mild T2 hyperintensities bilaterally are similar to previous. Chronic microangiopathic foci are favored. No definite mass or edema. Punctate area signal loss on gradient echo left frontal lobe is similar to previous. Brainstem: Normal Cerebellum: Normal Other:  The vascular flow voids are intact. Mild ethmoid mucosal thickening. Mild right mastoid inflammation. The sella is unremarkable. The calvarium is unremarkable. There is no mass or ischemia. Mild nasal sinus disease **This report has been created using voice recognition software. It may contain minor errors which are inherent in voice recognition technology. ** Final report electronically signed by Dr. Rex Morris on 10/12/2018 3:29 PM    Fl Barium Enema    Result Date: 9/25/2018  PROCEDURE: FL BARIUM ENEMA CLINICAL INFORMATION: History of left colon resection, suspected anastomotic stricture. TECHNIQUE: A preliminary abdominal radiograph was obtained. Gastrografin contrast was instilled into the colon through a rectal tube and filled the colon and a retrograde fashion. A total of 11 spot images and radiographs were obtained.  Total

## 2018-10-13 NOTE — CONSULTS
Patient seen and examined for being  S/p an abdominal exploration; lysis of severe adhesions; segmental small bowel resection x3 with reanastomosis x3; and formation of end ileostomy with physical deconditioning from extended hospitalization. Note to follow. Patient may be a candidate for rehab admission if she is no longer TPN dependent and she and daughter are agreeable to do so. Of note patient has been on 7E before. Thank you for the consult.     Thu Rivas MD

## 2018-10-14 LAB
BASOPHILS # BLD: 0.2 %
BASOPHILS ABSOLUTE: 0 THOU/MM3 (ref 0–0.1)
EOSINOPHIL # BLD: 1.4 %
EOSINOPHILS ABSOLUTE: 0.2 THOU/MM3 (ref 0–0.4)
ERYTHROCYTE [DISTWIDTH] IN BLOOD BY AUTOMATED COUNT: 16 % (ref 11.5–14.5)
ERYTHROCYTE [DISTWIDTH] IN BLOOD BY AUTOMATED COUNT: 50.4 FL (ref 35–45)
GLUCOSE BLD-MCNC: 116 MG/DL (ref 70–108)
GLUCOSE BLD-MCNC: 99 MG/DL (ref 70–108)
HCT VFR BLD CALC: 26.4 % (ref 37–47)
HEMOGLOBIN: 8.2 GM/DL (ref 12–16)
IMMATURE GRANS (ABS): 0.06 THOU/MM3 (ref 0–0.07)
IMMATURE GRANULOCYTES: 0.4 %
LYMPHOCYTES # BLD: 20.4 %
LYMPHOCYTES ABSOLUTE: 2.8 THOU/MM3 (ref 1–4.8)
MCH RBC QN AUTO: 26.5 PG (ref 26–33)
MCHC RBC AUTO-ENTMCNC: 31.1 GM/DL (ref 32.2–35.5)
MCV RBC AUTO: 85.4 FL (ref 81–99)
MONOCYTES # BLD: 7 %
MONOCYTES ABSOLUTE: 0.9 THOU/MM3 (ref 0.4–1.3)
NUCLEATED RED BLOOD CELLS: 0 /100 WBC
PLATELET # BLD: 433 THOU/MM3 (ref 130–400)
PMV BLD AUTO: 10 FL (ref 9.4–12.4)
RBC # BLD: 3.09 MILL/MM3 (ref 4.2–5.4)
SEG NEUTROPHILS: 70.6 %
SEGMENTED NEUTROPHILS ABSOLUTE COUNT: 9.5 THOU/MM3 (ref 1.8–7.7)
WBC # BLD: 13.5 THOU/MM3 (ref 4.8–10.8)

## 2018-10-14 PROCEDURE — 6360000002 HC RX W HCPCS: Performed by: SURGERY

## 2018-10-14 PROCEDURE — 82948 REAGENT STRIP/BLOOD GLUCOSE: CPT

## 2018-10-14 PROCEDURE — 2580000003 HC RX 258: Performed by: SURGERY

## 2018-10-14 PROCEDURE — C9113 INJ PANTOPRAZOLE SODIUM, VIA: HCPCS | Performed by: SURGERY

## 2018-10-14 PROCEDURE — 2500000003 HC RX 250 WO HCPCS: Performed by: PHARMACIST

## 2018-10-14 PROCEDURE — 6370000000 HC RX 637 (ALT 250 FOR IP): Performed by: NURSE PRACTITIONER

## 2018-10-14 PROCEDURE — 6370000000 HC RX 637 (ALT 250 FOR IP): Performed by: PSYCHIATRY & NEUROLOGY

## 2018-10-14 PROCEDURE — 2060000000 HC ICU INTERMEDIATE R&B

## 2018-10-14 PROCEDURE — 99024 POSTOP FOLLOW-UP VISIT: CPT | Performed by: SURGERY

## 2018-10-14 PROCEDURE — 2709999900 HC NON-CHARGEABLE SUPPLY

## 2018-10-14 PROCEDURE — 85025 COMPLETE CBC W/AUTO DIFF WBC: CPT

## 2018-10-14 RX ADMIN — Medication: at 18:26

## 2018-10-14 RX ADMIN — Medication 10 ML: at 09:16

## 2018-10-14 RX ADMIN — ASPIRIN 81 MG CHEWABLE TABLET 81 MG: 81 TABLET CHEWABLE at 09:13

## 2018-10-14 RX ADMIN — NYSTATIN 500000 UNITS: 500000 SUSPENSION ORAL at 16:37

## 2018-10-14 RX ADMIN — TOPIRAMATE 25 MG: 25 TABLET, FILM COATED ORAL at 20:05

## 2018-10-14 RX ADMIN — PANTOPRAZOLE SODIUM 40 MG: 40 INJECTION, POWDER, FOR SOLUTION INTRAVENOUS at 06:51

## 2018-10-14 RX ADMIN — Medication 10 ML: at 20:05

## 2018-10-14 RX ADMIN — TOPIRAMATE 25 MG: 25 TABLET, FILM COATED ORAL at 09:13

## 2018-10-14 RX ADMIN — OXYCODONE HYDROCHLORIDE AND ACETAMINOPHEN 1 TABLET: 5; 325 TABLET ORAL at 16:36

## 2018-10-14 RX ADMIN — NYSTATIN 500000 UNITS: 500000 SUSPENSION ORAL at 09:13

## 2018-10-14 RX ADMIN — NYSTATIN 500000 UNITS: 500000 SUSPENSION ORAL at 12:43

## 2018-10-14 RX ADMIN — NYSTATIN 500000 UNITS: 500000 SUSPENSION ORAL at 20:05

## 2018-10-14 ASSESSMENT — PAIN DESCRIPTION - ORIENTATION: ORIENTATION: MID

## 2018-10-14 ASSESSMENT — PAIN SCALES - GENERAL
PAINLEVEL_OUTOF10: 0
PAINLEVEL_OUTOF10: 2
PAINLEVEL_OUTOF10: 5

## 2018-10-14 ASSESSMENT — PAIN DESCRIPTION - DESCRIPTORS: DESCRIPTORS: ACHING;DISCOMFORT

## 2018-10-14 ASSESSMENT — PAIN DESCRIPTION - PAIN TYPE: TYPE: ACUTE PAIN

## 2018-10-14 ASSESSMENT — PAIN DESCRIPTION - FREQUENCY: FREQUENCY: INTERMITTENT

## 2018-10-14 ASSESSMENT — PAIN DESCRIPTION - ONSET: ONSET: ON-GOING

## 2018-10-14 ASSESSMENT — PAIN DESCRIPTION - PROGRESSION: CLINICAL_PROGRESSION: GRADUALLY IMPROVING

## 2018-10-14 ASSESSMENT — PAIN DESCRIPTION - LOCATION: LOCATION: ABDOMEN

## 2018-10-15 LAB
ABO: NORMAL
ANTIBODY SCREEN: NORMAL
BASOPHILS # BLD: 0.2 %
BASOPHILS ABSOLUTE: 0 THOU/MM3 (ref 0–0.1)
EOSINOPHIL # BLD: 1.9 %
EOSINOPHILS ABSOLUTE: 0.2 THOU/MM3 (ref 0–0.4)
ERYTHROCYTE [DISTWIDTH] IN BLOOD BY AUTOMATED COUNT: 15.9 % (ref 11.5–14.5)
ERYTHROCYTE [DISTWIDTH] IN BLOOD BY AUTOMATED COUNT: 48.3 FL (ref 35–45)
GLUCOSE BLD-MCNC: 107 MG/DL (ref 70–108)
GLUCOSE BLD-MCNC: 116 MG/DL (ref 70–108)
HCT VFR BLD CALC: 22.8 % (ref 37–47)
HEMOGLOBIN: 7.1 GM/DL (ref 12–16)
IMMATURE GRANS (ABS): 0.05 THOU/MM3 (ref 0–0.07)
IMMATURE GRANULOCYTES: 0.6 %
LYMPHOCYTES # BLD: 19.2 %
LYMPHOCYTES ABSOLUTE: 1.7 THOU/MM3 (ref 1–4.8)
MCH RBC QN AUTO: 26.2 PG (ref 26–33)
MCHC RBC AUTO-ENTMCNC: 31.1 GM/DL (ref 32.2–35.5)
MCV RBC AUTO: 84.1 FL (ref 81–99)
MONOCYTES # BLD: 8.3 %
MONOCYTES ABSOLUTE: 0.7 THOU/MM3 (ref 0.4–1.3)
NUCLEATED RED BLOOD CELLS: 0 /100 WBC
PLATELET # BLD: 446 THOU/MM3 (ref 130–400)
PMV BLD AUTO: 9.7 FL (ref 9.4–12.4)
RBC # BLD: 2.71 MILL/MM3 (ref 4.2–5.4)
RH FACTOR: NORMAL
SEG NEUTROPHILS: 69.8 %
SEGMENTED NEUTROPHILS ABSOLUTE COUNT: 6.1 THOU/MM3 (ref 1.8–7.7)
WBC # BLD: 8.8 THOU/MM3 (ref 4.8–10.8)

## 2018-10-15 PROCEDURE — 82948 REAGENT STRIP/BLOOD GLUCOSE: CPT

## 2018-10-15 PROCEDURE — 97116 GAIT TRAINING THERAPY: CPT

## 2018-10-15 PROCEDURE — APPSS30 APP SPLIT SHARED TIME 16-30 MINUTES: Performed by: NURSE PRACTITIONER

## 2018-10-15 PROCEDURE — 86923 COMPATIBILITY TEST ELECTRIC: CPT

## 2018-10-15 PROCEDURE — 85025 COMPLETE CBC W/AUTO DIFF WBC: CPT

## 2018-10-15 PROCEDURE — 6370000000 HC RX 637 (ALT 250 FOR IP): Performed by: PSYCHIATRY & NEUROLOGY

## 2018-10-15 PROCEDURE — 36415 COLL VENOUS BLD VENIPUNCTURE: CPT

## 2018-10-15 PROCEDURE — P9016 RBC LEUKOCYTES REDUCED: HCPCS

## 2018-10-15 PROCEDURE — 2709999900 HC NON-CHARGEABLE SUPPLY

## 2018-10-15 PROCEDURE — 99024 POSTOP FOLLOW-UP VISIT: CPT | Performed by: SURGERY

## 2018-10-15 PROCEDURE — 2060000000 HC ICU INTERMEDIATE R&B

## 2018-10-15 PROCEDURE — 2580000003 HC RX 258: Performed by: NURSE PRACTITIONER

## 2018-10-15 PROCEDURE — 97110 THERAPEUTIC EXERCISES: CPT

## 2018-10-15 PROCEDURE — 2580000003 HC RX 258: Performed by: SURGERY

## 2018-10-15 PROCEDURE — 86850 RBC ANTIBODY SCREEN: CPT

## 2018-10-15 PROCEDURE — 6360000002 HC RX W HCPCS: Performed by: SURGERY

## 2018-10-15 PROCEDURE — 86900 BLOOD TYPING SEROLOGIC ABO: CPT

## 2018-10-15 PROCEDURE — C9113 INJ PANTOPRAZOLE SODIUM, VIA: HCPCS | Performed by: SURGERY

## 2018-10-15 PROCEDURE — 99024 POSTOP FOLLOW-UP VISIT: CPT | Performed by: NURSE PRACTITIONER

## 2018-10-15 PROCEDURE — 86901 BLOOD TYPING SEROLOGIC RH(D): CPT

## 2018-10-15 PROCEDURE — 36592 COLLECT BLOOD FROM PICC: CPT

## 2018-10-15 PROCEDURE — 6370000000 HC RX 637 (ALT 250 FOR IP): Performed by: NURSE PRACTITIONER

## 2018-10-15 PROCEDURE — 36430 TRANSFUSION BLD/BLD COMPNT: CPT

## 2018-10-15 RX ORDER — 0.9 % SODIUM CHLORIDE 0.9 %
250 INTRAVENOUS SOLUTION INTRAVENOUS ONCE
Status: COMPLETED | OUTPATIENT
Start: 2018-10-15 | End: 2018-10-16

## 2018-10-15 RX ADMIN — TOPIRAMATE 25 MG: 25 TABLET, FILM COATED ORAL at 09:22

## 2018-10-15 RX ADMIN — NYSTATIN 500000 UNITS: 500000 SUSPENSION ORAL at 22:02

## 2018-10-15 RX ADMIN — PANTOPRAZOLE SODIUM 40 MG: 40 INJECTION, POWDER, FOR SOLUTION INTRAVENOUS at 05:31

## 2018-10-15 RX ADMIN — NYSTATIN 500000 UNITS: 500000 SUSPENSION ORAL at 13:48

## 2018-10-15 RX ADMIN — Medication 10 ML: at 09:21

## 2018-10-15 RX ADMIN — Medication 10 ML: at 22:06

## 2018-10-15 RX ADMIN — NYSTATIN 500000 UNITS: 500000 SUSPENSION ORAL at 17:34

## 2018-10-15 RX ADMIN — OXYCODONE HYDROCHLORIDE AND ACETAMINOPHEN 1 TABLET: 5; 325 TABLET ORAL at 05:31

## 2018-10-15 RX ADMIN — ASPIRIN 81 MG CHEWABLE TABLET 81 MG: 81 TABLET CHEWABLE at 09:22

## 2018-10-15 RX ADMIN — NYSTATIN 500000 UNITS: 500000 SUSPENSION ORAL at 09:22

## 2018-10-15 RX ADMIN — SODIUM CHLORIDE 250 ML: 9 INJECTION, SOLUTION INTRAVENOUS at 15:18

## 2018-10-15 RX ADMIN — SODIUM CHLORIDE 250 ML: 9 INJECTION, SOLUTION INTRAVENOUS at 19:38

## 2018-10-15 RX ADMIN — OXYCODONE HYDROCHLORIDE AND ACETAMINOPHEN 2 TABLET: 5; 325 TABLET ORAL at 19:37

## 2018-10-15 ASSESSMENT — PAIN DESCRIPTION - LOCATION
LOCATION: ABDOMEN
LOCATION: ABDOMEN

## 2018-10-15 ASSESSMENT — PAIN SCALES - GENERAL
PAINLEVEL_OUTOF10: 0
PAINLEVEL_OUTOF10: 10
PAINLEVEL_OUTOF10: 7
PAINLEVEL_OUTOF10: 0
PAINLEVEL_OUTOF10: 4
PAINLEVEL_OUTOF10: 8

## 2018-10-15 ASSESSMENT — PAIN DESCRIPTION - DESCRIPTORS: DESCRIPTORS: DISCOMFORT;ACHING

## 2018-10-15 ASSESSMENT — PAIN DESCRIPTION - ORIENTATION: ORIENTATION: MID

## 2018-10-15 ASSESSMENT — PAIN DESCRIPTION - PAIN TYPE
TYPE: ACUTE PAIN
TYPE: ACUTE PAIN

## 2018-10-15 ASSESSMENT — PAIN DESCRIPTION - FREQUENCY: FREQUENCY: INTERMITTENT

## 2018-10-15 NOTE — PROGRESS NOTES
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   Features consistent with early mucosal ischemia. ASSESSMENT  1. SBO - POD #11 Status post lysis of adhesions, segmental SBR x3 with reanastomosis x3 and formation of end ileostomy   2. Acute respiratory failure - status post extubation 10/5/18 resolved  3. Sepsis - leukocytosis resolved  4. Severe protein calorie malnutrition  5. Acute blood loss anemia - stable. One unit PRBC transfused on 10/5/18.   hgb 7.1, transfuse 2 units today  6. Bilious output from wound/TIANNA drain have improved  7. Deconditioned   8. Abdomen slightly distended and states she feels pressure persistent       PLAN  1. Continue pulmonary toileting   2. General diet  3. TPN discontinue today   4. Pain and nausea control  5. Routine wound and ostomy care - ostomy viable  6. Antibiotic therapy discontinued   7. Hemoglobin trend down, transfuse today   8. PPI   9. Continue labs   10. Consult PT/OT   11. Pathology reviewed. Findings of early ischemia, but otherwise benign. 12. TIANNA drain remove today   13. MRI today s/p code stroke - CT negative   14. I&O   15. Transfuse 2 units of PRBC today  16. Hold rehab today       Electronically signed by SULEIMAN Wan CNP on 10/15/2018 at 8:41 AM Patient seen and examined independently by me. Above discussed and I agree with CNP. Labs, cultures, and radiographs where available were reviewed. See orders for the updated patient care plan.     Glenna Castleman MD, hgb7.1  Will trnafuse 2 u PRBC,s pt weak  Will help with rehab  10/15/2018   2:33 PM

## 2018-10-15 NOTE — PLAN OF CARE
Comments: Care plan reviewed with patient. Patient verbalize understanding of the plan of care and contribute to goal setting.

## 2018-10-15 NOTE — PROGRESS NOTES
CATARACT REMOVAL    HYSTERECTOMY  1975 ? [facility]    LUMBAR LAMINECTOMY  4/15/2013    OTHER SURGICAL HISTORY  7/8/2013    evacation of seroma-back     DE EXPLORATORY OF ABDOMEN N/A 10/4/2018    LAPAROTOMY EXPLORATORY, SMALL BOWEL RESECTION X 3, EXTENSIVE LYSIS OF ADHESIONS, ILEOSTOMY FORMATION performed by Madhavi Styles MD at [address] OFFICE/OUTPT [address] N/A 6/25/2018    ABDOMINAL EXPLORATION AND ESTENDED LEFT COLON RESECTION, LYSIS OF COMPLEX ADHESIONS performed by Madhavi Styles MD at [address]  08/11/2017    Dr Ashely Francisco Left 9/28/2018    EGD BIOPSY performed by Madhavi Styles MD at [email or web address] [facility]       Restrictions/Precautions:  General Precautions, Fall Risk                    Other position/activity restrictions: abd incision, sx 9-24 SBO repair and ileostomy       Prior Level of Function:  ADL Assistance: Independent  Homemaking Assistance: Needs assistance  Ambulation Assistance: Independent  Transfer Assistance: Independent  Additional Comments: Pt was very IND at [facility] however recently her daughters have been providing increasing assist with IADLs around home. Walks with cane or RW. Subjective:     Subjective: RN approved session. pt in bed upon arrival and agrees to therapy. pt incontinent of urine, cues needed to keep eyes open, c/o fatigue and abdomen pain    Pain:  Yes (did not quantify).           Social/Functional:  Lives With: Daughter  Type of Home: House  Home Layout: Two level, Able to Live on Main level with bedroom/bathroom  Home Access: Stairs to enter without rails  Entrance Stairs - Number of Steps: 1  Home Equipment: Rolling walker, Cane     Objective:  Rolling to Right: Minimal assistance (hand over hand to reach for rail)  Supine to Sit: Minimal assistance (HOB eleated slightly)  Scooting: Contact guard assistance (toward EOB)    Transfers  Sit to Stand: Minimal Assistance;Contact

## 2018-10-15 NOTE — PROGRESS NOTES
(has several cartons at bedside but reports acceptance)  · Parenteral Nutrition Orders:  · Type and Formula: 3-in-1 Custom (dosing weight 65 kgm, 30 kcal/kgm, 1.7 grams protein/kgm, 30% lipid kcals)   · Lipids: Daily  · Additives: per Rph  · Rate/Volume: decreased to 50 ml/hr  · Duration: Continuous 24 hrs  · Goal PN Orders Provides: 975 kcals, 56 gm protein, 164 gm CHO/day  · Anthropometric Measures:  · Ht: 5' 3\" (160 cm)   · Current Body Wt: 148 lb 9.6 oz (67.4 kg) (10/15, +1, +2 edema)  · Admission Body Wt: 143 lb (64.9 kg) (9/24, no edema)  · Usual Body Wt:  (per EMR (6/25/18): 154# 12.8oz)  · % Weight Change: -11.5%,  3.5 months  · Ideal Body Wt: 115 lb (52.2 kg),   · BMI Classification: BMI 25.0 - 29.9 Overweight  · Comparative Standards (Estimated Nutrition Needs):  · Estimated Daily Total Kcal: 1623-1947kcals (25-30kcals/kgm admit wt. 64.9kgm)  · Estimated Daily Protein (g):  grams (1.5-2 grams protein/kgm ideal wt 52kgm)    Estimated Intake vs Estimated Needs: Intake Improving    Nutrition Risk Level: High    Nutrition Interventions:   Continue current diet, Continue current ONS, Continue Parenteral Nutrition  Continued Inpatient Monitoring, Education Initiated, Coordination of Care    Nutrition Evaluation:   · Evaluation: Progressing toward goals   · Goals: Pt. will consume and tolerate 75% or more of meals once TPN is weaned off. · Monitoring: Meal Intake, Supplement Intake, PN Intake, Diet Tolerance, Wound Healing, Ascites/Edema, Weight, Pertinent Labs, Nausea or Vomiting, Diarrhea, Constipation, Patient/Family Education    See Adult Nutrition Doc Flowsheet for more detail.      Electronically signed by Luis Carlos Jarrett RD, DIGNA on 10/15/18 at 10:08 AM    Contact Number: [phone]

## 2018-10-15 NOTE — PLAN OF CARE
Problem: Nutrition  Goal: Optimal nutrition therapy  Outcome: Ongoing  Nutrition Problem: Severe malnutrition, in context of chronic illness  Intervention: Food and/or Nutrient Delivery: Continue current diet, Continue current ONS, Continue Parenteral Nutrition  Nutritional Goals: Pt. will consume and tolerate 75% or more of meals once TPN is weaned off.

## 2018-10-15 NOTE — PROGRESS NOTES
1327: Started 1st unit of PRBC.  1342: Remained with patient for the first 15 minutes of infusion. Tolerated well. Vitals taken. Lungs are clear.

## 2018-10-16 ENCOUNTER — HOSPITAL ENCOUNTER (INPATIENT)
Age: 78
LOS: 24 days | Discharge: HOME | DRG: 393 | End: 2018-11-09
Attending: FAMILY MEDICINE | Admitting: FAMILY MEDICINE
Payer: MEDICARE

## 2018-10-16 VITALS
TEMPERATURE: 98.3 F | OXYGEN SATURATION: 96 % | HEIGHT: 63 IN | BODY MASS INDEX: 25.37 KG/M2 | HEART RATE: 91 BPM | SYSTOLIC BLOOD PRESSURE: 99 MMHG | RESPIRATION RATE: 16 BRPM | DIASTOLIC BLOOD PRESSURE: 50 MMHG | WEIGHT: 143.2 LBS

## 2018-10-16 LAB
BASOPHILS # BLD: 0.1 %
BASOPHILS ABSOLUTE: 0 THOU/MM3 (ref 0–0.1)
EOSINOPHIL # BLD: 1.6 %
EOSINOPHILS ABSOLUTE: 0.2 THOU/MM3 (ref 0–0.4)
ERYTHROCYTE [DISTWIDTH] IN BLOOD BY AUTOMATED COUNT: 15.2 % (ref 11.5–14.5)
ERYTHROCYTE [DISTWIDTH] IN BLOOD BY AUTOMATED COUNT: 46.8 FL (ref 35–45)
GLUCOSE BLD-MCNC: 81 MG/DL (ref 70–108)
GLUCOSE BLD-MCNC: 82 MG/DL (ref 70–108)
HCT VFR BLD CALC: 30.9 % (ref 37–47)
HEMOGLOBIN: 10.1 GM/DL (ref 12–16)
IMMATURE GRANS (ABS): 0.04 THOU/MM3 (ref 0–0.07)
IMMATURE GRANULOCYTES: 0.4 %
LYMPHOCYTES # BLD: 13.9 %
LYMPHOCYTES ABSOLUTE: 1.4 THOU/MM3 (ref 1–4.8)
MCH RBC QN AUTO: 27.5 PG (ref 26–33)
MCHC RBC AUTO-ENTMCNC: 32.7 GM/DL (ref 32.2–35.5)
MCV RBC AUTO: 84.2 FL (ref 81–99)
MONOCYTES # BLD: 8.6 %
MONOCYTES ABSOLUTE: 0.9 THOU/MM3 (ref 0.4–1.3)
NUCLEATED RED BLOOD CELLS: 0 /100 WBC
PLATELET # BLD: 427 THOU/MM3 (ref 130–400)
PMV BLD AUTO: 9.2 FL (ref 9.4–12.4)
RBC # BLD: 3.67 MILL/MM3 (ref 4.2–5.4)
SEG NEUTROPHILS: 75.4 %
SEGMENTED NEUTROPHILS ABSOLUTE COUNT: 7.6 THOU/MM3 (ref 1.8–7.7)
WBC # BLD: 10.1 THOU/MM3 (ref 4.8–10.8)

## 2018-10-16 PROCEDURE — 85025 COMPLETE CBC W/AUTO DIFF WBC: CPT

## 2018-10-16 PROCEDURE — 97530 THERAPEUTIC ACTIVITIES: CPT

## 2018-10-16 PROCEDURE — 36592 COLLECT BLOOD FROM PICC: CPT

## 2018-10-16 PROCEDURE — 6370000000 HC RX 637 (ALT 250 FOR IP): Performed by: PSYCHIATRY & NEUROLOGY

## 2018-10-16 PROCEDURE — 82948 REAGENT STRIP/BLOOD GLUCOSE: CPT

## 2018-10-16 PROCEDURE — 6360000002 HC RX W HCPCS: Performed by: SURGERY

## 2018-10-16 PROCEDURE — 0220000000 HC SKILLED NURSING FACILITY

## 2018-10-16 PROCEDURE — 99024 POSTOP FOLLOW-UP VISIT: CPT | Performed by: SURGERY

## 2018-10-16 PROCEDURE — 2580000003 HC RX 258: Performed by: SURGERY

## 2018-10-16 PROCEDURE — 1290000000 HC SEMI PRIVATE OTHER R&B

## 2018-10-16 PROCEDURE — C9113 INJ PANTOPRAZOLE SODIUM, VIA: HCPCS | Performed by: SURGERY

## 2018-10-16 PROCEDURE — 6370000000 HC RX 637 (ALT 250 FOR IP): Performed by: SURGERY

## 2018-10-16 PROCEDURE — 97110 THERAPEUTIC EXERCISES: CPT

## 2018-10-16 PROCEDURE — 6370000000 HC RX 637 (ALT 250 FOR IP): Performed by: NURSE PRACTITIONER

## 2018-10-16 PROCEDURE — 97116 GAIT TRAINING THERAPY: CPT

## 2018-10-16 RX ORDER — ASPIRIN 81 MG/1
81 TABLET, CHEWABLE ORAL DAILY
Status: CANCELLED | OUTPATIENT
Start: 2018-10-17

## 2018-10-16 RX ORDER — ACETAMINOPHEN 325 MG/1
650 TABLET ORAL EVERY 4 HOURS PRN
Status: CANCELLED | OUTPATIENT
Start: 2018-10-16

## 2018-10-16 RX ORDER — ACETAMINOPHEN 325 MG/1
650 TABLET ORAL EVERY 4 HOURS PRN
Status: DISCONTINUED | OUTPATIENT
Start: 2018-10-16 | End: 2018-10-31 | Stop reason: SDUPTHER

## 2018-10-16 RX ORDER — ASPIRIN 81 MG/1
81 TABLET, CHEWABLE ORAL DAILY
Status: DISCONTINUED | OUTPATIENT
Start: 2018-10-17 | End: 2018-10-20

## 2018-10-16 RX ORDER — OXYCODONE HYDROCHLORIDE AND ACETAMINOPHEN 5; 325 MG/1; MG/1
2 TABLET ORAL EVERY 6 HOURS PRN
Status: CANCELLED | OUTPATIENT
Start: 2018-10-16

## 2018-10-16 RX ORDER — OXYCODONE HYDROCHLORIDE AND ACETAMINOPHEN 5; 325 MG/1; MG/1
1 TABLET ORAL EVERY 6 HOURS PRN
Status: DISCONTINUED | OUTPATIENT
Start: 2018-10-16 | End: 2018-10-21

## 2018-10-16 RX ORDER — OXYCODONE HYDROCHLORIDE AND ACETAMINOPHEN 5; 325 MG/1; MG/1
2 TABLET ORAL EVERY 6 HOURS PRN
Status: DISCONTINUED | OUTPATIENT
Start: 2018-10-16 | End: 2018-10-21

## 2018-10-16 RX ORDER — OXYCODONE HYDROCHLORIDE AND ACETAMINOPHEN 5; 325 MG/1; MG/1
1 TABLET ORAL EVERY 6 HOURS PRN
Status: CANCELLED | OUTPATIENT
Start: 2018-10-16

## 2018-10-16 RX ADMIN — NYSTATIN 500000 UNITS: 500000 SUSPENSION ORAL at 12:58

## 2018-10-16 RX ADMIN — OXYCODONE HYDROCHLORIDE AND ACETAMINOPHEN 2 TABLET: 5; 325 TABLET ORAL at 20:45

## 2018-10-16 RX ADMIN — OXYCODONE HYDROCHLORIDE AND ACETAMINOPHEN 2 TABLET: 5; 325 TABLET ORAL at 12:58

## 2018-10-16 RX ADMIN — Medication 10 ML: at 06:45

## 2018-10-16 RX ADMIN — PANTOPRAZOLE SODIUM 40 MG: 40 INJECTION, POWDER, FOR SOLUTION INTRAVENOUS at 06:44

## 2018-10-16 RX ADMIN — ASPIRIN 81 MG CHEWABLE TABLET 81 MG: 81 TABLET CHEWABLE at 09:43

## 2018-10-16 RX ADMIN — Medication 10 ML: at 09:44

## 2018-10-16 RX ADMIN — NYSTATIN 500000 UNITS: 500000 SUSPENSION ORAL at 17:39

## 2018-10-16 RX ADMIN — NYSTATIN 500000 UNITS: 500000 SUSPENSION ORAL at 09:44

## 2018-10-16 RX ADMIN — OXYCODONE HYDROCHLORIDE AND ACETAMINOPHEN 1 TABLET: 5; 325 TABLET ORAL at 06:44

## 2018-10-16 ASSESSMENT — PAIN DESCRIPTION - PAIN TYPE
TYPE: ACUTE PAIN

## 2018-10-16 ASSESSMENT — PAIN SCALES - GENERAL
PAINLEVEL_OUTOF10: 3
PAINLEVEL_OUTOF10: 7
PAINLEVEL_OUTOF10: 3
PAINLEVEL_OUTOF10: 3
PAINLEVEL_OUTOF10: 5
PAINLEVEL_OUTOF10: 0
PAINLEVEL_OUTOF10: 5
PAINLEVEL_OUTOF10: 6
PAINLEVEL_OUTOF10: 2
PAINLEVEL_OUTOF10: 4
PAINLEVEL_OUTOF10: 8

## 2018-10-16 ASSESSMENT — PAIN DESCRIPTION - DESCRIPTORS
DESCRIPTORS: DISCOMFORT
DESCRIPTORS: DISCOMFORT

## 2018-10-16 ASSESSMENT — PAIN DESCRIPTION - FREQUENCY
FREQUENCY: INTERMITTENT
FREQUENCY: INTERMITTENT

## 2018-10-16 ASSESSMENT — PAIN DESCRIPTION - LOCATION
LOCATION: ABDOMEN

## 2018-10-16 NOTE — PROGRESS NOTES
Discharge teaching and instructions for diagnosis/procedure of small bowel obstruction completed with patient using teachback method. AVS reviewed. Printed prescriptions given to patient. Patient voiced understanding regarding prescriptions, follow up appointments, and care of self at home. Discharged in a stretcher to [facility] per staff.

## 2018-10-16 NOTE — PROGRESS NOTES
of Steps: 1  Home Equipment: Rolling walker, Cane     Objective:  Transfers  Sit to Stand: Contact guard assistance  Stand to sit: Contact guard assistance  Comment: Patient required verbal cues on proper hand placement for increased safety with functional transfers. Ambulation 1  Surface: level tile  Device: Rolling Walker  Assistance: Contact guard assistance (to close SBA)  Quality of Gait: decreased jon and velocity, decreased B step length and step height, decreased B heel strike, downward gaze with verbal cues to look forward  Distance: 52'x1    Exercises:  Exercises  Comments: Patient completed BLE seated therapeutic exercises 10x each with the performance of LAQ, HS curls, glute sets, heel/toe raises, and hip abduction. Patient required mod verbal/tactile cues on proper technique for max contraction to facilitate increased BLE strength and ROM required for increased improvement with fucntional mobility. Patient requried increased time to complete exercises this date due to increased abdomen pain and decreased endurance. Activity Tolerance:  Activity Tolerance: Patient limited by pain; Patient limited by fatigue    Assessment: Body structures, Functions, Activity limitations: Decreased functional mobility , Decreased strength, Decreased endurance, Decreased balance  Assessment: Patient tolerated treatment session fair. Patient was limited by pain and increased fatigue. Patient required CGA with functional transfers and ambulation. Patient would benefit from continued therapy to increase strength, endurance, and fucntional mobility.    Prognosis: Good     REQUIRES PT FOLLOW UP: Yes    Discharge Recommendations:  Discharge Recommendations: Continue to assess pending progress, Patient would benefit from continued therapy after discharge, Subacute/Skilled Nursing Facility    Patient Education:  Patient Education: POC, therex, gait, transfers, safety with AD    Equipment

## 2018-10-16 NOTE — PLAN OF CARE
Problem: Risk for Impaired Skin Integrity  Goal: Tissue integrity - skin and mucous membranes  Structural intactness and normal physiological function of skin and  mucous membranes. Outcome: Ongoing  In to see patient to complete another ostomy lesson with patient and daughter. Patient is up in chair with daughter at bedside. Patient seems more alert today and participated some with the ileostomy pouch change. Daughter assembled supplies and asked good questions regarding how to order supplies at home and ways to set up patient's bathroom that would be more accommodating now that she has a ileostomy. Assisted daughter in removing the pouch and cleansing the area. Stoma noted to be red moist and round. With sutures to the intact mucocutaneous junction. Peristomal skin intact. Abdomen is less distended. Mid line incision is widely approximated with staples 24.5x1cm with dry scabbing-see photo. No drainage noted presently. Daughter measured the stoma at 38mm and cut the flange to slightly larger than stoma. She applied paste to the cut edge and applied the high output pouch. She then applied the system to the patient. High output pouch hooked to chaparro bag. Patient continues to have liquid brown to greenish stool. Will have dietician continue to see and to educate regarding diet for wound healing and ileostomy. Daughter did well and patient held her hand over her new pouch for 5-10 mins. Incisional line painted with betadine. Will plan another lesson on Friday between 10 and 11. Ostomy supplies are in the room along with the teaching material.     Care plan reviewed with patient and RN. Patient and RN verbalize understanding of the plan of care and contribute to goal setting.       Comments:

## 2018-10-16 NOTE — CARE COORDINATION
10/16/18, 1:22 PM  Plans TCU when medically cleared  Discharge plan discussed by  and . Discharge plan reviewed with patient/ family. Patient/ family verbalize understanding of discharge plan and are in agreement with plan. Understanding was demonstrated using the teach back method.        IMM Letter  IMM Letter given to Patient/Family/Significant other/Guardian/POA/by[de-identified]   IMM Letter date given[de-identified] 10/16/18  IMM Letter time given[de-identified] 9177
10/5/18, 10:48 AM    DISCHARGE BARRIERS      Patient had abdominal surgery yesterday. SW advised by CM not to talk to patient or family at this time. Family has been very upset about talking about discharge planning already when patient is not medically stable. Unsure of needs. SW will wait til Monday to talk to family and patient when clinical plan more clear. Patient remains on vent . Maggie at [address] is watching patient for possible [facility] admission at this time.
10/8/18, 7:52 AM    DISCHARGE BARRIERS        Patient transferred to 4 26. Report given to unit Ruth Ann Olivas, regarding discharge plan for this patient.
Plan of care: TPN/FL Diet continued.  MRI Brain pending (Code Stroke called earlier and right facial droop per notes); plans possible Rehab (Physiatry eval pending) if accepted and taking diet, medically cleared  10/12/18  10:59 AM
tobacco.   Influenza Vaccination Screening Completed: n/a  Pneumonia Vaccination Screening Completed: yes  PCP: Leandra Miller MD  Readmission: Yes  Patient has been readmitted within 20 days. Patient went to f/u appointment? Dr. Radha Lai on 9-11-18? She states she has seen him twice since previous hospitalization. If yes, was it within 7 days? Yes  Patient was able to fill prescriptions? Yes  Patient is taking medications as prescribed? Yes  Cause for readmission? SBO    Readmission Risk Score: 9%    Discharge Planning  Current Residence:  Private Residence  Living Arrangements:  Children   Support Systems:  Children  Current Services PTA:     Potential Assistance Needed:  N/A  Potential Assistance Purchasing Medications:  No  Does patient want to participate in local refill/ meds to beds program?  No  Type of Home Care Services:  None  Patient expects to be discharged to:  home  Expected Discharge date: Follow Up Appointment: Best Day/ Time: Monday AM    Discharge Plan: Met with pt today. From home where she lives with a disabled daughter. Other family members assist them. Pt has no current services. She does have a quad cane and a walker. She has a PCP, transportation and no issues getting meds. She is encouraged to consider some HH services at discharge as she is now readmitted with small bowel obstruction again. She will consider. SW consult placed.       Evaluation: yes

## 2018-10-16 NOTE — PROGRESS NOTES
Kristael[facility] Degrootangela 60  INPATIENT OCCUPATIONAL THERAPY  [facility] ICU STEPDOWN TELEMETRY 4K  DAILY NOTE    Time:  Time In: 4381  Time Out: 1508  Timed Code Treatment Minutes: 23 Minutes  Minutes: 23    Date: 10/16/2018  Patient Name: Yoly Echavarria,   Gender: female      Room: [facility]/026-A  MRN: [id number]  : 1940  (66 y.o.)  Referring Practitioner: Ham Rogers MD  Diagnosis: SBO  Additional Pertinent Hx: Per ED note 18, pt is a 66 y.o. female who has history of Pylori and cholecystectomy. Patient presents to the Emergency Department for the evaluation of abdominal pain that has been ongoing. Patient reports that she is experiencing abdominal distention, melena, decreased appetite, fear of eating secondary to abdominal pain, and small bowel movements. Patient was admitted to hospital on Sep. 2nd due to small bowel obstruction. She was cared for by Dr. Lobo Burgess (general surgeon). Per daughter, patient has been experiencing abdominal complications since left colon resection in . While admitted patient was managed with nasogastric decompression, bowel rest, analgesics for pain control, IV fluid hydration, GI and DVT prophylaxis.      Past Medical History:   Diagnosis Date    Arthritis     Cellulitis     Difficult intubation     GERD (gastroesophageal reflux disease)     H pylori ulcer     Hx of blood clots     RIGHT LEG    Nausea & vomiting     Pneumonia     S/P partial resection of colon 2018    Shingles     Torus mandibularis 2006     Past Surgical History:   Procedure Laterality Date    ABDOMEN SURGERY      ABDOMINAL ADHESION SURGERY  's    Dr Nathan Boone  2013    Lysis of Adhesions-Dr. Lobo Burgess     BACK SURGERY  //2006/2013    X3    [facility]LE[facility]OMY      [facility]    COLON SURGERY      COLONOSCOPY  over 10 yrs ago    COLONOSCOPY  2017    Dr Nessa Hull    ENDOSCOPY, COLON, DIAGNOSTIC      EYE SURGERY Bilateral

## 2018-10-17 PROCEDURE — 6370000000 HC RX 637 (ALT 250 FOR IP): Performed by: SURGERY

## 2018-10-17 PROCEDURE — 2500000003 HC RX 250 WO HCPCS: Performed by: SURGERY

## 2018-10-17 PROCEDURE — 2709999900 HC NON-CHARGEABLE SUPPLY

## 2018-10-17 PROCEDURE — 97166 OT EVAL MOD COMPLEX 45 MIN: CPT

## 2018-10-17 PROCEDURE — 97530 THERAPEUTIC ACTIVITIES: CPT

## 2018-10-17 PROCEDURE — 1290000000 HC SEMI PRIVATE OTHER R&B

## 2018-10-17 PROCEDURE — 6370000000 HC RX 637 (ALT 250 FOR IP): Performed by: FAMILY MEDICINE

## 2018-10-17 PROCEDURE — 97161 PT EVAL LOW COMPLEX 20 MIN: CPT

## 2018-10-17 PROCEDURE — 97110 THERAPEUTIC EXERCISES: CPT

## 2018-10-17 PROCEDURE — 97535 SELF CARE MNGMENT TRAINING: CPT

## 2018-10-17 RX ORDER — POLYETHYLENE GLYCOL 3350 17 G/17G
17 POWDER, FOR SOLUTION ORAL DAILY PRN
Status: DISCONTINUED | OUTPATIENT
Start: 2018-10-17 | End: 2018-11-09 | Stop reason: HOSPADM

## 2018-10-17 RX ORDER — FAMOTIDINE 20 MG/1
20 TABLET, FILM COATED ORAL DAILY
Status: DISCONTINUED | OUTPATIENT
Start: 2018-10-17 | End: 2018-10-20

## 2018-10-17 RX ORDER — OXYCODONE HYDROCHLORIDE AND ACETAMINOPHEN 5; 325 MG/1; MG/1
0.5 TABLET ORAL 4 TIMES DAILY
Status: DISCONTINUED | OUTPATIENT
Start: 2018-10-17 | End: 2018-10-18

## 2018-10-17 RX ORDER — DOCUSATE SODIUM 100 MG/1
100 CAPSULE, LIQUID FILLED ORAL DAILY PRN
Status: DISCONTINUED | OUTPATIENT
Start: 2018-10-17 | End: 2018-11-09 | Stop reason: HOSPADM

## 2018-10-17 RX ORDER — SENNA PLUS 8.6 MG/1
1 TABLET ORAL NIGHTLY PRN
Status: DISCONTINUED | OUTPATIENT
Start: 2018-10-17 | End: 2018-11-09 | Stop reason: HOSPADM

## 2018-10-17 RX ADMIN — OXYCODONE HYDROCHLORIDE AND ACETAMINOPHEN 1 TABLET: 5; 325 TABLET ORAL at 16:45

## 2018-10-17 RX ADMIN — ASPIRIN 81 MG CHEWABLE TABLET 81 MG: 81 TABLET CHEWABLE at 08:25

## 2018-10-17 RX ADMIN — OXYCODONE HYDROCHLORIDE AND ACETAMINOPHEN 0.5 TABLET: 5; 325 TABLET ORAL at 21:23

## 2018-10-17 RX ADMIN — Medication 500000 UNITS: at 17:13

## 2018-10-17 RX ADMIN — Medication 500000 UNITS: at 14:04

## 2018-10-17 RX ADMIN — Medication 5 UNITS: at 08:27

## 2018-10-17 RX ADMIN — Medication 500000 UNITS: at 08:29

## 2018-10-17 RX ADMIN — OXYCODONE HYDROCHLORIDE AND ACETAMINOPHEN 2 TABLET: 5; 325 TABLET ORAL at 05:32

## 2018-10-17 ASSESSMENT — PAIN SCALES - GENERAL
PAINLEVEL_OUTOF10: 7
PAINLEVEL_OUTOF10: 5
PAINLEVEL_OUTOF10: 5
PAINLEVEL_OUTOF10: 0
PAINLEVEL_OUTOF10: 3

## 2018-10-17 NOTE — PROGRESS NOTES
[facility]  Transitional Care Unit Preadmission Assessment    Patient Name: Yahir Cavanaugh        MRN: [id number]    [place]: [de-identified]    : 1940  (74 y.o.)  Gender: female     Admitted From:  [x][facility] []Outside Admission - Location:  Date of Admission to the hospital:10/16/2018  Date patient eligible for admission: 18  Primary Diagnosis S/ P bowel resection  Did patient have surgery? [x] Yes- Explain: Abdominal exploration. Lysis of severe adhesions (complex case). Segmental small bowel resection x3 with reanastomosis x3. Formation of end ileostomy.   [] No    Physicians:, , ,   Risk for clinical complications/co-morbidities:   Past Medical History:   Diagnosis Date    Arthritis     Cellulitis     Difficult intubation     GERD (gastroesophageal reflux disease)     H pylori ulcer     Hx of blood clots     RIGHT LEG    Nausea & vomiting     Pneumonia     S/P partial resection of colon 2018    Karan Hunt 2006       Financial Information  Primary insurance:  [x]Medicare [] Medicare HMO  []Commercial insurance    []Medicaid   []Workers Compensation      Secondary Insurance:  []Medicare [] Medicare HMO  [x]Commercial insurance    []Medicaid   []Workers Compensation []None    Precautions:   []Cardiac Precautions  []Total hip precautions    []Weight Bearing status:  [x]Safety Precautions/Concerns fall precautions  []Visually impaired   []Hard of Hearing     Isolation Precautions:         []Yes  [x]No  If Yes:  [] Droplet  []Contact []Airborne                   []VRE          []MRSA               []C-diff         [] TB  [] Other:       Skills:   [x]Physical therapy     [x]Occupational Therapy   []IV Antibiotics   [] IV meds   [] TPN     []PEG tube Feedings      []New Colostomy   [] Ileostomy care/teaching    [] Speech Therapy   []Wound Vac   []Complex Dressing Changes    []Terminal care    []Other       Has patient had Prior Skilled care in the Last 60 days:  []Yes  [x]NO   If Yes:   Skilled Facility:    How many skilled days were used?

## 2018-10-17 NOTE — PROGRESS NOTES
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 Walks with [employer]    Objective        Overall Cognitive Status: Exceptions  Arousal/Alertness: Inconsistent responses to stimuli  Following Commands: Follows one step commands with repetition, Follows one step commands with increased time  Attention Span: Attends with cues to redirect  Initiation: Requires cues for some  Sequencing: Requires cues for some  Cognition Comment: slow processing decreased problem solving and attention span         Sensation  Overall Sensation Status: WFL                           LUE AROM (degrees)  LUE AROM : WNL          RUE AROM (degrees)  RUE AROM : WNL       LUE Strength  LUE Strength Comment: BUE general deconditioning noted  Left Hand Strength -  (lbs)  Handle Setting 2: 9# avg                Right Hand Strength -  (lbs)  Handle Setting 2: 11# avg                  RUE Tone: Normotonic  LUE Tone: Normotonic    Movements Are Fluid And Coordinated:  Yes               ADL  Grooming: Contact guard assistance (standing at sink to complete oral care stood X 5 minutes min A to wash hair seated using shower cap)  UE Bathing: Verbal cueing, Increased time to complete, Stand by assistance  LE Bathing: Contact guard assistance (standing to wash bottom)  UE Dressing: Minimal assistance (to change hospital gown)  LE Dressing: Maximum assistance (socks)  Toileting: Increased time to complete, Minimal assistance (clothing management)  Additional Comments: min cue sfor completeness of tasks this date      Bed mobility  Sit to Supine: Minimal assistance  Scooting: Contact guard assistance    Transfers  Sit to stand: Contact guard assistance  Stand to sit: Contact guard assistance  Toilet Transfers  Equipment Used: Grab bars  Toilet Transfer: Minimal assistance  Toilet Transfers

## 2018-10-17 NOTE — PROGRESS NOTES
[facility]    ENDOSCOPY, COLON, DIAGNOSTIC      EYE SURGERY Bilateral 1999    CATARACT REMOVAL    HYSTERECTOMY  1975 ? [facility]    LUMBAR LAMINECTOMY  4/15/2013    OTHER SURGICAL HISTORY  7/8/2013    evacation of seroma-back     FL EXPLORATORY OF ABDOMEN N/A 10/4/2018    LAPAROTOMY EXPLORATORY, SMALL BOWEL RESECTION X 3, EXTENSIVE LYSIS OF ADHESIONS, ILEOSTOMY FORMATION performed by Molly Kennedy MD at [facility] OFFICE/OUTPT [address] N/A 6/25/2018    ABDOMINAL EXPLORATION AND ESTENDED LEFT COLON RESECTION, LYSIS OF COMPLEX ADHESIONS performed by Molly Kennedy MD at [address]  08/11/2017    Dr Zander Palmer Left 9/28/2018    EGD BIOPSY performed by Molly Kennedy MD at [facility]       Restrictions/Precautions:  General Precautions, Fall Risk         Other position/activity restrictions: abd incision, sx 9-24 SBO repair and ileostomy       Subjective:  Chart Reviewed: Yes  Patient assessed for rehabilitation services?: Yes  Family / Caregiver Present: No  Subjective: RN approved session, pt is supine in bed and agreeable. Pt confused, oriented to self and place. Pt does not remember having surgery, asked therapist several times why she is here, tearful at times as well. Pt moves slowly with all activity, multiple cues to keep on task and to initiate mobility. Cues to stay awake initially. General:  Overall Orientation Status: Impaired  Orientation Level: Oriented to place, Oriented to person, Disoriented to time, Disoriented to situation  Follows Commands: Within Functional Limits    Vision: Impaired  Vision Exceptions: Wears glasses for reading    Hearing: Within functional limits       Pain:   .      Pre Treatment Pain Screening  Pain at present: 3  Scale Used: Numeric Score  Intervention List: Patient able to continue with treatment  Comments / Details: Headache    Social/Functional History:    Lives With:

## 2018-10-17 NOTE — PROGRESS NOTES
[facility]  Re[facility]ational Therapy  Daily Note  [facility] TCU 8E    Time Spent with Patient: 10 minutes    Date:  10/17/2018       Patient Name: Kaylan Best      MRN: [id number]      YOB: 1940 (74 y.o.)       Gender: female  Diagnosis: SBO s/p bowel resection  Referring Practitioner: Nat Asif MD ( Ordering) Dr. Bridget Chamorro ( Attending)     Patient enjoyed spending time with our pet therapy dogs.  Pt and her family enjoyed petting our pet therapy dog May this afternoon-affect bright and social-pt has a dog at home but too hyper to come in and visit     Electronically signed by: Elsie López, CTRS  Date: 10/17/2018

## 2018-10-18 LAB
ALBUMIN SERPL-MCNC: 2.4 G/DL (ref 3.5–5.1)
ALP BLD-CCNC: 242 U/L (ref 38–126)
ALT SERPL-CCNC: 12 U/L (ref 11–66)
ANION GAP SERPL CALCULATED.3IONS-SCNC: 11 MEQ/L (ref 8–16)
AST SERPL-CCNC: 10 U/L (ref 5–40)
BASOPHILS # BLD: 0.3 %
BASOPHILS ABSOLUTE: 0 THOU/MM3 (ref 0–0.1)
BILIRUB SERPL-MCNC: 0.4 MG/DL (ref 0.3–1.2)
BUN BLDV-MCNC: 9 MG/DL (ref 7–22)
CALCIUM SERPL-MCNC: 9 MG/DL (ref 8.5–10.5)
CHLORIDE BLD-SCNC: 96 MEQ/L (ref 98–111)
CO2: 26 MEQ/L (ref 23–33)
CREAT SERPL-MCNC: 0.4 MG/DL (ref 0.4–1.2)
EOSINOPHIL # BLD: 1.2 %
EOSINOPHILS ABSOLUTE: 0.1 THOU/MM3 (ref 0–0.4)
ERYTHROCYTE [DISTWIDTH] IN BLOOD BY AUTOMATED COUNT: 14.8 % (ref 11.5–14.5)
ERYTHROCYTE [DISTWIDTH] IN BLOOD BY AUTOMATED COUNT: 46 FL (ref 35–45)
GFR SERPL CREATININE-BSD FRML MDRD: > 90 ML/MIN/1.73M2
GLUCOSE BLD-MCNC: 97 MG/DL (ref 70–108)
HCT VFR BLD CALC: 29.8 % (ref 37–47)
HEMOGLOBIN: 9.8 GM/DL (ref 12–16)
IMMATURE GRANS (ABS): 0.02 THOU/MM3 (ref 0–0.07)
IMMATURE GRANULOCYTES: 0.3 %
LYMPHOCYTES # BLD: 22.9 %
LYMPHOCYTES ABSOLUTE: 1.6 THOU/MM3 (ref 1–4.8)
MCH RBC QN AUTO: 27.8 PG (ref 26–33)
MCHC RBC AUTO-ENTMCNC: 32.9 GM/DL (ref 32.2–35.5)
MCV RBC AUTO: 84.7 FL (ref 81–99)
MONOCYTES # BLD: 11 %
MONOCYTES ABSOLUTE: 0.8 THOU/MM3 (ref 0.4–1.3)
NUCLEATED RED BLOOD CELLS: 0 /100 WBC
PLATELET # BLD: 556 THOU/MM3 (ref 130–400)
PMV BLD AUTO: 9.1 FL (ref 9.4–12.4)
POTASSIUM SERPL-SCNC: 3.2 MEQ/L (ref 3.5–5.2)
RBC # BLD: 3.52 MILL/MM3 (ref 4.2–5.4)
SEG NEUTROPHILS: 64.3 %
SEGMENTED NEUTROPHILS ABSOLUTE COUNT: 4.6 THOU/MM3 (ref 1.8–7.7)
SODIUM BLD-SCNC: 133 MEQ/L (ref 135–145)
TOTAL PROTEIN: 6 G/DL (ref 6.1–8)
WBC # BLD: 7.2 THOU/MM3 (ref 4.8–10.8)

## 2018-10-18 PROCEDURE — 97530 THERAPEUTIC ACTIVITIES: CPT

## 2018-10-18 PROCEDURE — 97110 THERAPEUTIC EXERCISES: CPT

## 2018-10-18 PROCEDURE — 85025 COMPLETE CBC W/AUTO DIFF WBC: CPT

## 2018-10-18 PROCEDURE — 1290000000 HC SEMI PRIVATE OTHER R&B

## 2018-10-18 PROCEDURE — 6370000000 HC RX 637 (ALT 250 FOR IP): Performed by: SURGERY

## 2018-10-18 PROCEDURE — 97116 GAIT TRAINING THERAPY: CPT

## 2018-10-18 PROCEDURE — 6360000002 HC RX W HCPCS: Performed by: FAMILY MEDICINE

## 2018-10-18 PROCEDURE — 6370000000 HC RX 637 (ALT 250 FOR IP): Performed by: FAMILY MEDICINE

## 2018-10-18 PROCEDURE — 97535 SELF CARE MNGMENT TRAINING: CPT

## 2018-10-18 PROCEDURE — 36415 COLL VENOUS BLD VENIPUNCTURE: CPT

## 2018-10-18 PROCEDURE — 2709999900 HC NON-CHARGEABLE SUPPLY

## 2018-10-18 PROCEDURE — 80053 COMPREHEN METABOLIC PANEL: CPT

## 2018-10-18 PROCEDURE — 92523 SPEECH SOUND LANG COMPREHEN: CPT

## 2018-10-18 RX ORDER — POTASSIUM CHLORIDE 750 MG/1
40 TABLET, FILM COATED, EXTENDED RELEASE ORAL ONCE
Status: COMPLETED | OUTPATIENT
Start: 2018-10-18 | End: 2018-10-18

## 2018-10-18 RX ORDER — OXYCODONE HYDROCHLORIDE AND ACETAMINOPHEN 5; 325 MG/1; MG/1
1 TABLET ORAL 4 TIMES DAILY
Status: DISCONTINUED | OUTPATIENT
Start: 2018-10-18 | End: 2018-10-21

## 2018-10-18 RX ORDER — DRONABINOL 2.5 MG/1
2.5 CAPSULE ORAL 2 TIMES DAILY
Status: DISCONTINUED | OUTPATIENT
Start: 2018-10-18 | End: 2018-10-20

## 2018-10-18 RX ADMIN — DRONABINOL 2.5 MG: 2.5 CAPSULE ORAL at 20:40

## 2018-10-18 RX ADMIN — ASPIRIN 81 MG CHEWABLE TABLET 81 MG: 81 TABLET CHEWABLE at 09:22

## 2018-10-18 RX ADMIN — FAMOTIDINE 20 MG: 20 TABLET ORAL at 09:23

## 2018-10-18 RX ADMIN — OXYCODONE HYDROCHLORIDE AND ACETAMINOPHEN 0.5 TABLET: 5; 325 TABLET ORAL at 14:05

## 2018-10-18 RX ADMIN — OXYCODONE HYDROCHLORIDE AND ACETAMINOPHEN 0.5 TABLET: 5; 325 TABLET ORAL at 17:52

## 2018-10-18 RX ADMIN — POTASSIUM CHLORIDE 40 MEQ: 750 TABLET, FILM COATED, EXTENDED RELEASE ORAL at 09:23

## 2018-10-18 RX ADMIN — OXYCODONE HYDROCHLORIDE AND ACETAMINOPHEN 1 TABLET: 5; 325 TABLET ORAL at 20:40

## 2018-10-18 RX ADMIN — OXYCODONE HYDROCHLORIDE AND ACETAMINOPHEN 0.5 TABLET: 5; 325 TABLET ORAL at 09:23

## 2018-10-18 ASSESSMENT — PAIN DESCRIPTION - PAIN TYPE: TYPE: SURGICAL PAIN

## 2018-10-18 ASSESSMENT — PAIN SCALES - GENERAL
PAINLEVEL_OUTOF10: 5
PAINLEVEL_OUTOF10: 8
PAINLEVEL_OUTOF10: 6
PAINLEVEL_OUTOF10: 6
PAINLEVEL_OUTOF10: 2

## 2018-10-18 ASSESSMENT — PAIN DESCRIPTION - LOCATION: LOCATION: ABDOMEN

## 2018-10-18 NOTE — PROGRESS NOTES
[facility]  SPEECH THERAPY  [facility]  Speech - Language - Cognitive Evaluation    SLP Individual Minutes  Time In: 1324  Time Out: 1100  Minutes: 28  Timed Code Treatment Minutes: 0 Minutes  *therapy minutes unable to be provided due time course required to complete evaluation and constraint on ST schedule    Date: 10/18/2018  Patient Name: Mikie Grande      MRN: [id number]    : 1940  (66 y.o.)  Gender: female   Referring Physician:  Ester Morris MD  Diagnosis: SBO s/p bowel resection   Secondary Diagnosis: Cognitive deficit  Diet:  Regular with thin liquids    History of Present Illness/Injury: Pt admitted to [facility] with above med dx; please refer to physician H&P for full details. ST consulted to assess cognitive functioning given increased confusion since transfer to TCU from acute floors, impacting participation in additional therapies. Past Medical History:   Diagnosis Date    Arthritis     Cellulitis     Difficult intubation     GERD (gastroesophageal reflux disease)     H pylori ulcer     Hx of blood clots     RIGHT LEG    Nausea & vomiting     Pneumonia     S/P partial resection of colon 2018    Shingles     Torus mandibularis 2006       Precautions: Fall risk  Pain:  None reported    Subjective:  Pt alert and pleasant, resting in bed upon arrival. Cooperative with clinician and POC for completion of evaluation. SOCIAL HISTORY: Pt resides in [place] with daughter, Janna Chamorro; 2 additional daughters in the immediate area to provide support as necessary. Pt with prior independence for completion of ADLs and IADLs, including cooking, cleaning, organization/balancing of checkbook, finances, and management of medications (pillbox); no prior driving. Wears reading glasses, no hearing aids. Enjoys spending time outdoors, visiting with neighbors, and cooking, cleaning, and baking.      ORAL MOTOR:  Labial / Facial:  WFL  Lingual: WFL  Soft Palate: DNT  Sensation: DNT  Dentition: WFL    SPEECH / VOICE:  Speech: adequate articulatory precision with speech intelligibility approximating 95% across all contexts  Voice: adequate vocal intensity; no aphonia or dysphonia    LANGUAGE:  Receptive:  1 step commands: 3/3  2 step commands: 3/3  Simple yes/no: 3/3  Complex yes/no: 2/3    Expressive:  Automatic speech: WFL numerical counting 1-10  Sentence Completion (automatic): 3/3  Confrontational naming: 3/3  Responsive namin/2  Divergent naming (concrete): 5 items named within 1 minute time frame (target=11)  Sentence Formulation: Intact for basic wants/needs  Conversational speech: Impaired thought organization   Paraphasias: None evidecned  Repetition: 2/2 phrase level    COGNITION: [place] Cognitive Assessment (MOCA) version 7.1 completed. Pt scored 17/25. Normal is greater than or equal to 26/30. *adjusted score given unable to complete written subtests of visuospatial/executive function   Orientation: 4/6  Immediate recall: 4/5  Short term recall: 0/5  Problem solvin/3  Reasonin/2 verbal  Sequencin/  Thought organization: Impaired during structured conversations  Insight: Adequate   Attention: Adequate sustained and selective attention; suspect higher level attention deficits  Numerical relations: 2/5 serial subtraction    SWALLOWING:  Nursing and pt report safe toleration for current diet level of regular solids and thin liquids. Will monitor swallow function as needed. IMPRESSIONS: Pt presents with a mild-moderate cognitive deficit given impaired temporal orientation, immediate and delayed recall, working memory/mental manipulation, problem solving, reasoning, thought organization, executive function, mental math, processing speed, and mental flexibility. Expressive and receptive language grossly intact with no communicative breakdowns apparent; hyperverbal speech tendencies noted, however, pt successfully re-directed given min cues. with 70% accuracy with min cues/naming 11+items in 1 minute to improve processing speed and ability to manage daily schedule. Goal 4: Pt will complete alternating, divided, and complex attention tasks with no more than 2 errors within 3-5 minute time span to permit potential return to cooking, cleaning, and housework. LONG TERM GOALS:  Long-term Goals  Timeframe for Long-term Goals: 3 weeks  Goal 1: Pt will improve cognitive functioning to a supervision level or higher to promote improved independence for ADL and IADL completion for re-integration into home setting.        Gershon Frankel, M.A., [address] [place] [address]

## 2018-10-18 NOTE — PROGRESS NOTES
TCU BALANCE TEST:    Balance during to/from sit to stand PT: Contact guard assistance (From EOB and w/c several trials, multiple cues to initiate at times)   OT: Contact guard assistance    Balance during walking PT: Contact guard assistance   OT: Contact guard assistance    Balance during turn-around and while walking PT: Contact guard assistance   OT: Contact guard assistance    Balance moving on and off toilet Minimal assistance    Balance during surface to/from surface transfers     Independent = 0  Modified Independent, Supervision, SBA = 1  CGA, Min Assist, Mod Assist, Max Assist, Dependent = 2  Not Tested/No Response = 8

## 2018-10-18 NOTE — PROGRESS NOTES
labs: Sodium: 133, Potassium: 3.2. · Wound Type: Surgical Wound  · Current Nutrition Therapies:  · Oral Diet Orders: General   · Oral Diet intake: 1-25%, 0%, 26-50%  · Oral Nutrition Supplement (ONS) Orders: Clear Liquid Oral Supplement (ensure clear TID)  · ONS intake: 26-50%  · Anthropometric Measures:  · Ht: 5' 3\" (160 cm)   · Current Body Wt: 142 lb (64.4 kg) (10/18/18, no weight source, +1 BUE edema, +2 BLE edema)  · Admission Body Wt: 142 lb (64.4 kg) (10/18/18, no weight source, +1 BUE edema, +2 BLE edema)  · Usual Body Wt: 154 lb (69.9 kg) (EMR, 6/25/18, actual)  · % Weight Change: 12# lost (8% of body weight),  4 months  · Ideal Body Wt: 115 lb (52.2 kg), % Ideal Body 123%  · BMI Classification:  (25.4)  · Comparative Standards (Estimated Nutrition Needs):  · Estimated Daily Total Kcal: 2474-8574 (25-30 kcals/kg current weight of 65 kg on 10/18/18)  · Estimated Daily Protein (g): 78+ (1.2+ grams/kg current weight of 65 kg on 10/18/18)    Estimated Intake vs Estimated Needs: Intake Less Than Needs    Nutrition Risk Level: High    Nutrition Interventions:   Continue current diet, Continue current ONS  Continued Inpatient Monitoring, Education Completed (10/18 - ileostomy diet education completed. Encouraged smalle, frequent meals. Encouraged to decrease milk/dairy intake. Encouraged ONS use between meals as desired. Encouraged po at best efforts.)    Nutrition Evaluation:   · Evaluation: Goals set   · Goals: Patient will consume 75% or greater of most meals and ONS during LOS    · Monitoring: Meal Intake, Supplement Intake, Diet Tolerance, Wound Healing, Ascites/Edema, Weight, Comparative Standards, Pertinent Labs, Nausea or Vomiting, Diarrhea, Constipation, Patient/Family Education    See Adult Nutrition Doc Flowsheet for more detail.      Electronically signed by Krystal Benito RD, LD on 10/18/18 at 10:28 AM    Contact Number: [phone]

## 2018-10-18 NOTE — H&P
program on the transitional care unit. Rehabilitation services will include PT and OT/RT in order to improve functional status prior to discharge. Family education and training will be completed. Equipment evaluations and recommendations will be completed as appropriate. · Nursing will be involved for bowel, bladder, skin, and pain management. Nursing will also provide education and training to patient and family. · Prophylaxis:  DVT: none. GI: pepcid. · Pain: percocet and tylenol  · Nutrition:  Consultation to dietician for nutritional counseling and recommendations.     · Bladder: continent  · Bowel: mom, colace, senokot, dulcolax, glycolax  ·  and case management consultations for coordination of care and discharge planning    Codi Baugh MD

## 2018-10-19 ENCOUNTER — APPOINTMENT (OUTPATIENT)
Dept: GENERAL RADIOLOGY | Age: 78
DRG: 393 | End: 2018-10-19
Attending: FAMILY MEDICINE
Payer: MEDICARE

## 2018-10-19 LAB
ALBUMIN SERPL-MCNC: 3.2 G/DL (ref 3.5–5.1)
ALP BLD-CCNC: 232 U/L (ref 38–126)
ALT SERPL-CCNC: 11 U/L (ref 11–66)
ANION GAP SERPL CALCULATED.3IONS-SCNC: 16 MEQ/L (ref 8–16)
AST SERPL-CCNC: 12 U/L (ref 5–40)
BILIRUB SERPL-MCNC: 0.4 MG/DL (ref 0.3–1.2)
BUN BLDV-MCNC: 10 MG/DL (ref 7–22)
CALCIUM SERPL-MCNC: 9.8 MG/DL (ref 8.5–10.5)
CHLORIDE BLD-SCNC: 94 MEQ/L (ref 98–111)
CO2: 24 MEQ/L (ref 23–33)
CREAT SERPL-MCNC: 0.4 MG/DL (ref 0.4–1.2)
ERYTHROCYTE [DISTWIDTH] IN BLOOD BY AUTOMATED COUNT: 15 % (ref 11.5–14.5)
ERYTHROCYTE [DISTWIDTH] IN BLOOD BY AUTOMATED COUNT: 46.5 FL (ref 35–45)
GFR SERPL CREATININE-BSD FRML MDRD: > 90 ML/MIN/1.73M2
GLUCOSE BLD-MCNC: 126 MG/DL (ref 70–108)
GLUCOSE BLD-MCNC: 91 MG/DL (ref 70–108)
HCT VFR BLD CALC: 34.1 % (ref 37–47)
HEMOGLOBIN: 11.1 GM/DL (ref 12–16)
LIPASE: 36.2 U/L (ref 5.6–51.3)
MAGNESIUM: 1.9 MG/DL (ref 1.6–2.4)
MCH RBC QN AUTO: 27.6 PG (ref 26–33)
MCHC RBC AUTO-ENTMCNC: 32.6 GM/DL (ref 32.2–35.5)
MCV RBC AUTO: 84.8 FL (ref 81–99)
PHOSPHORUS: 3.3 MG/DL (ref 2.4–4.7)
PLATELET # BLD: 576 THOU/MM3 (ref 130–400)
PMV BLD AUTO: 9.2 FL (ref 9.4–12.4)
POTASSIUM SERPL-SCNC: 4 MEQ/L (ref 3.5–5.2)
POTASSIUM SERPL-SCNC: 4 MEQ/L (ref 3.5–5.2)
RBC # BLD: 4.02 MILL/MM3 (ref 4.2–5.4)
SODIUM BLD-SCNC: 134 MEQ/L (ref 135–145)
TOTAL PROTEIN: 7.1 G/DL (ref 6.1–8)
WBC # BLD: 7.5 THOU/MM3 (ref 4.8–10.8)

## 2018-10-19 PROCEDURE — 80053 COMPREHEN METABOLIC PANEL: CPT

## 2018-10-19 PROCEDURE — 97127 HC SP THER IVNTJ W/FOCUS COG FUNCJ: CPT

## 2018-10-19 PROCEDURE — 2709999900 HC NON-CHARGEABLE SUPPLY

## 2018-10-19 PROCEDURE — 97110 THERAPEUTIC EXERCISES: CPT

## 2018-10-19 PROCEDURE — 1290000000 HC SEMI PRIVATE OTHER R&B

## 2018-10-19 PROCEDURE — 83690 ASSAY OF LIPASE: CPT

## 2018-10-19 PROCEDURE — 83735 ASSAY OF MAGNESIUM: CPT

## 2018-10-19 PROCEDURE — 84132 ASSAY OF SERUM POTASSIUM: CPT

## 2018-10-19 PROCEDURE — 82948 REAGENT STRIP/BLOOD GLUCOSE: CPT

## 2018-10-19 PROCEDURE — 74022 RADEX COMPL AQT ABD SERIES: CPT

## 2018-10-19 PROCEDURE — 85027 COMPLETE CBC AUTOMATED: CPT

## 2018-10-19 PROCEDURE — 36415 COLL VENOUS BLD VENIPUNCTURE: CPT

## 2018-10-19 PROCEDURE — 97530 THERAPEUTIC ACTIVITIES: CPT

## 2018-10-19 PROCEDURE — 6370000000 HC RX 637 (ALT 250 FOR IP): Performed by: SURGERY

## 2018-10-19 PROCEDURE — 6360000002 HC RX W HCPCS: Performed by: FAMILY MEDICINE

## 2018-10-19 PROCEDURE — 84100 ASSAY OF PHOSPHORUS: CPT

## 2018-10-19 PROCEDURE — 97535 SELF CARE MNGMENT TRAINING: CPT

## 2018-10-19 PROCEDURE — 6370000000 HC RX 637 (ALT 250 FOR IP): Performed by: FAMILY MEDICINE

## 2018-10-19 PROCEDURE — 2500000003 HC RX 250 WO HCPCS: Performed by: SURGERY

## 2018-10-19 PROCEDURE — 6360000002 HC RX W HCPCS: Performed by: SURGERY

## 2018-10-19 PROCEDURE — 97116 GAIT TRAINING THERAPY: CPT

## 2018-10-19 RX ORDER — ONDANSETRON 4 MG/1
4 TABLET, ORALLY DISINTEGRATING ORAL EVERY 8 HOURS PRN
Status: DISCONTINUED | OUTPATIENT
Start: 2018-10-19 | End: 2018-10-19

## 2018-10-19 RX ORDER — ONDANSETRON 4 MG/1
4 TABLET, ORALLY DISINTEGRATING ORAL EVERY 6 HOURS PRN
Status: DISCONTINUED | OUTPATIENT
Start: 2018-10-19 | End: 2018-11-02

## 2018-10-19 RX ORDER — ONDANSETRON 4 MG/1
4 TABLET, ORALLY DISINTEGRATING ORAL EVERY 6 HOURS
Status: DISCONTINUED | OUTPATIENT
Start: 2018-10-19 | End: 2018-10-19

## 2018-10-19 RX ADMIN — OXYCODONE HYDROCHLORIDE AND ACETAMINOPHEN 2 TABLET: 5; 325 TABLET ORAL at 01:20

## 2018-10-19 RX ADMIN — ONDANSETRON 4 MG: 4 TABLET, ORALLY DISINTEGRATING ORAL at 22:03

## 2018-10-19 RX ADMIN — OXYCODONE HYDROCHLORIDE AND ACETAMINOPHEN 1 TABLET: 5; 325 TABLET ORAL at 11:45

## 2018-10-19 RX ADMIN — ASPIRIN 81 MG CHEWABLE TABLET 81 MG: 81 TABLET CHEWABLE at 11:45

## 2018-10-19 RX ADMIN — DRONABINOL 2.5 MG: 2.5 CAPSULE ORAL at 22:03

## 2018-10-19 RX ADMIN — OXYCODONE HYDROCHLORIDE AND ACETAMINOPHEN 2 TABLET: 5; 325 TABLET ORAL at 22:03

## 2018-10-19 RX ADMIN — DRONABINOL 2.5 MG: 2.5 CAPSULE ORAL at 11:46

## 2018-10-19 RX ADMIN — LEUCINE, PHENYLALANINE, LYSINE, METHIONINE, ISOLEUCINE, VALINE, HISTIDINE, THREONINE, TRYPTOPHAN, ALANINE, GLYCINE, ARGININE, PROLINE, SERINE, TYROSINE, DEXTROSE: 311; 238; 247; 170; 255; 247; 204; 179; 77; 880; 438; 489; 289; 213; 17; 5 INJECTION INTRAVENOUS at 22:14

## 2018-10-19 RX ADMIN — ONDANSETRON 4 MG: 4 TABLET, ORALLY DISINTEGRATING ORAL at 10:12

## 2018-10-19 RX ADMIN — FAMOTIDINE 20 MG: 20 TABLET ORAL at 11:46

## 2018-10-19 ASSESSMENT — PAIN DESCRIPTION - DESCRIPTORS: DESCRIPTORS: ACHING

## 2018-10-19 ASSESSMENT — PAIN SCALES - GENERAL
PAINLEVEL_OUTOF10: 0
PAINLEVEL_OUTOF10: 6
PAINLEVEL_OUTOF10: 5
PAINLEVEL_OUTOF10: 6
PAINLEVEL_OUTOF10: 7
PAINLEVEL_OUTOF10: 7
PAINLEVEL_OUTOF10: 6

## 2018-10-19 ASSESSMENT — PAIN DESCRIPTION - PROGRESSION: CLINICAL_PROGRESSION: NOT CHANGED

## 2018-10-19 ASSESSMENT — PAIN DESCRIPTION - PAIN TYPE
TYPE: SURGICAL PAIN
TYPE: SURGICAL PAIN

## 2018-10-19 ASSESSMENT — PAIN DESCRIPTION - LOCATION
LOCATION: ABDOMEN
LOCATION: ABDOMEN

## 2018-10-19 ASSESSMENT — PAIN DESCRIPTION - ONSET: ONSET: GRADUAL

## 2018-10-19 ASSESSMENT — PAIN DESCRIPTION - FREQUENCY: FREQUENCY: INTERMITTENT

## 2018-10-19 ASSESSMENT — PAIN DESCRIPTION - ORIENTATION: ORIENTATION: LOWER

## 2018-10-19 NOTE — PROGRESS NOTES
attention tasks with no more than 2 errors within 3-5 minute time span to permit potential return to cooking, cleaning, and housework. INTERVENTIONS: Did not address d/t focus on other goals. ASSESSMENT:  Assessment: [x]Progressing towards goals          []Not Progressing towards goals       Patient Tolerance of Treatment:   [x]Tolerated well []Tolerated fair []Required rest breaks []Fatigued  Plan for Next Session: Complex attention, problem solving   Education:  Learner:  [x]Patient          []Significant Other          [x]Other: daughter present   Education provided regarding:  [x]Goals and POC   []Diet and swallowing precautions    []Home Exercise Program  [x]Progress and/or discharge information  Method of Education:  [x]Discussion          []Demonstration          []Handout          []Other  Evaluation of Education:   [x]Verbalized understanding   []Demonstrates without assistance  []Demonstrates with assistance  [x]Needs further instruction     []No evidence of learning                  []No family present      Plan: [x]Continue with current plan of care    []Modify current plan of care as follows:    []Discharge patient    Discharge Location:    Services/Supervision Recommended:     [x]Patient continues to require treatment by a licensed therapist to address functional deficits as outlined in the established plan of care.     Elizabeth Kumari M.A.-SLP [id number]

## 2018-10-19 NOTE — PROGRESS NOTES
Attempted to see patient for ostomy lesson. Patient being taken down to radiology due to vomiting episode this am.  Jeanette Ang notified. Will check back on patient later. Informed staff that daughter would most likely be arriving soon in patient room for scheduled ostomy lesson.

## 2018-10-19 NOTE — PROGRESS NOTES
[facility]  INPATIENT OCCUPATIONAL THERAPY  [facility] TCU 8E  DAILY NOTE    Time:  Time In: 1030  Time Out: 1110  Timed Code Treatment Minutes: 40 Minutes  Minutes: 40          Date: 10/19/2018  Patient Name: Azael Rojas,   Gender: female      Room: [facility]/065-A  MRN: [id number]  : 1940  (66 y.o.)  Referring Practitioner: Arnoldo Gutiérrez MD ( Ordering) Dr. Clyde Guzmán ( Attending)   Diagnosis: SBO s/p bowel resection  Additional Pertinent Hx: Per ED note 18, pt is a 66 y.o. female who has history of Pylori and cholecystectomy. Patient presents to the Emergency Department for the evaluation of abdominal pain that has been ongoing. Patient reports that she is experiencing abdominal distention, melena, decreased appetite, fear of eating secondary to abdominal pain, and small bowel movements. Patient was admitted to hospital on Sep. 2nd due to small bowel obstruction. She was cared for by Dr. Greg Davila (general surgeon). Per daughter, patient has been experiencing abdominal complications since left colon resection in . While admitted patient was managed with nasogastric decompression, bowel rest, analgesics for pain control, IV fluid hydration, GI and DVT prophylaxis.      Past Medical History:   Diagnosis Date    Arthritis     Cellulitis     Difficult intubation     GERD (gastroesophageal reflux disease)     H pylori ulcer     Hx of blood clots     RIGHT LEG    Nausea & vomiting     Pneumonia     S/P partial resection of colon 2018    Shingles     Torus mandibularis      Past Surgical History:   Procedure Laterality Date    ABDOMEN SURGERY      ABDOMINAL ADHESION SURGERY  's    Dr Ayala Szymanski  2013    Lysis of Adhesions-Dr. Greg Davila     BACK SURGERY  //2006/2013    X3    [facility]OMY      [facility]    COLON SURGERY      COLONOSCOPY  over 10 yrs ago    COLONOSCOPY  2017    Dr Tracy Chang ENDOSCOPY, COLON, DIAGNOSTIC      EYE SURGERY Bilateral 1999    CATARACT REMOVAL    HYSTERECTOMY  1975 ? [facility]    LUMBAR LAMINECTOMY  4/15/2013    OTHER SURGICAL HISTORY  7/8/2013    evacation of seroma-back     [place] EXPLORATORY OF ABDOMEN N/A 10/4/2018    LAPAROTOMY EXPLORATORY, SMALL BOWEL RESECTION X 3, EXTENSIVE LYSIS OF ADHESIONS, ILEOSTOMY FORMATION performed by Glenna Castleman, MD at [facility] OFFICE/OUTPT VISIT,PROCEDURE ONLY N/A 6/25/2018    ABDOMINAL EXPLORATION AND ESTENDED LEFT COLON RESECTION, LYSIS OF COMPLEX ADHESIONS performed by Glenna Castleman, MD at [address]  08/11/2017    Dr Alexi Pedraza Left 9/28/2018    EGD BIOPSY performed by Glenna Castleman, MD at [facility]       Restrictions/Precautions:  General Precautions, Fall Risk                    Other position/activity restrictions: abd incision, sx 9-24 SBO repair and ileostomy       Prior Level of Function:  ADL Assistance: Independent  Homemaking Assistance: Needs assistance (shares role with daughter)  Ambulation Assistance: Independent  Transfer Assistance: Independent  Additional Comments: Pt was very IND at [facility] however recently her daughters have been providing increasing assist with IADLs around home. Walks with [facility]    Subjective       Subjective: pt sitting in recliner when arrived with daughters present. Pt agreeable to OT but stated was sick this am during PT session      Pain:  Pain Assessment  Patient Currently in Pain: Yes  Pain Level: 6  Pain Type: Surgical pain  Pain Location: Abdomen       Objective  Overall Cognitive Status: Exceptions  Following Commands: Follows one step commands with repetition; Follows one step commands with increased time  Attention Span: Attends with cues to redirect  Cognition Comment: daughters like to do alot for pt and pt needs to continue to try and do things herself.       ADL  Grooming: Contact guard assistance

## 2018-10-20 PROBLEM — E83.39 HYPOPHOSPHATEMIA: Status: ACTIVE | Noted: 2018-10-20

## 2018-10-20 LAB
ANION GAP SERPL CALCULATED.3IONS-SCNC: 14 MEQ/L (ref 8–16)
ANION GAP SERPL CALCULATED.3IONS-SCNC: 14 MEQ/L (ref 8–16)
BUN BLDV-MCNC: 17 MG/DL (ref 7–22)
BUN BLDV-MCNC: 20 MG/DL (ref 7–22)
CALCIUM IONIZED: 1.23 MMOL/L (ref 1.12–1.32)
CALCIUM SERPL-MCNC: 9 MG/DL (ref 8.5–10.5)
CALCIUM SERPL-MCNC: 9.2 MG/DL (ref 8.5–10.5)
CHLORIDE BLD-SCNC: 87 MEQ/L (ref 98–111)
CHLORIDE BLD-SCNC: 88 MEQ/L (ref 98–111)
CO2: 25 MEQ/L (ref 23–33)
CO2: 26 MEQ/L (ref 23–33)
CREAT SERPL-MCNC: 0.3 MG/DL (ref 0.4–1.2)
CREAT SERPL-MCNC: 0.4 MG/DL (ref 0.4–1.2)
GFR SERPL CREATININE-BSD FRML MDRD: > 90 ML/MIN/1.73M2
GFR SERPL CREATININE-BSD FRML MDRD: > 90 ML/MIN/1.73M2
GLUCOSE BLD-MCNC: 106 MG/DL (ref 70–108)
GLUCOSE BLD-MCNC: 113 MG/DL (ref 70–108)
GLUCOSE BLD-MCNC: 129 MG/DL (ref 70–108)
GLUCOSE BLD-MCNC: 131 MG/DL (ref 70–108)
GLUCOSE BLD-MCNC: 131 MG/DL (ref 70–108)
MAGNESIUM: 1.7 MG/DL (ref 1.6–2.4)
PHOSPHORUS: 1.9 MG/DL (ref 2.4–4.7)
PHOSPHORUS: 2.6 MG/DL (ref 2.4–4.7)
POTASSIUM SERPL-SCNC: 3.5 MEQ/L (ref 3.5–5.2)
POTASSIUM SERPL-SCNC: 3.8 MEQ/L (ref 3.5–5.2)
SODIUM BLD-SCNC: 127 MEQ/L (ref 135–145)
SODIUM BLD-SCNC: 127 MEQ/L (ref 135–145)

## 2018-10-20 PROCEDURE — 84100 ASSAY OF PHOSPHORUS: CPT

## 2018-10-20 PROCEDURE — 6360000002 HC RX W HCPCS: Performed by: FAMILY MEDICINE

## 2018-10-20 PROCEDURE — 6370000000 HC RX 637 (ALT 250 FOR IP): Performed by: FAMILY MEDICINE

## 2018-10-20 PROCEDURE — 2580000003 HC RX 258: Performed by: PHARMACIST

## 2018-10-20 PROCEDURE — 2500000003 HC RX 250 WO HCPCS: Performed by: PHARMACIST

## 2018-10-20 PROCEDURE — 1290000000 HC SEMI PRIVATE OTHER R&B

## 2018-10-20 PROCEDURE — 82948 REAGENT STRIP/BLOOD GLUCOSE: CPT

## 2018-10-20 PROCEDURE — 80048 BASIC METABOLIC PNL TOTAL CA: CPT

## 2018-10-20 PROCEDURE — 2709999900 HC NON-CHARGEABLE SUPPLY

## 2018-10-20 PROCEDURE — 6360000002 HC RX W HCPCS: Performed by: PHARMACIST

## 2018-10-20 PROCEDURE — 82330 ASSAY OF CALCIUM: CPT

## 2018-10-20 PROCEDURE — 36415 COLL VENOUS BLD VENIPUNCTURE: CPT

## 2018-10-20 PROCEDURE — 83735 ASSAY OF MAGNESIUM: CPT

## 2018-10-20 PROCEDURE — 6370000000 HC RX 637 (ALT 250 FOR IP): Performed by: SURGERY

## 2018-10-20 RX ORDER — PANTOPRAZOLE SODIUM 40 MG/10ML
40 INJECTION, POWDER, LYOPHILIZED, FOR SOLUTION INTRAVENOUS DAILY
Status: DISCONTINUED | OUTPATIENT
Start: 2018-10-21 | End: 2018-11-03

## 2018-10-20 RX ORDER — MAGNESIUM SULFATE IN WATER 40 MG/ML
2 INJECTION, SOLUTION INTRAVENOUS ONCE
Status: COMPLETED | OUTPATIENT
Start: 2018-10-20 | End: 2018-10-20

## 2018-10-20 RX ADMIN — SODIUM PHOSPHATE, MONOBASIC, MONOHYDRATE 10 MMOL: 276; 142 INJECTION, SOLUTION INTRAVENOUS at 13:56

## 2018-10-20 RX ADMIN — DRONABINOL 2.5 MG: 2.5 CAPSULE ORAL at 09:45

## 2018-10-20 RX ADMIN — MAGNESIUM SULFATE HEPTAHYDRATE 2 G: 40 INJECTION, SOLUTION INTRAVENOUS at 16:26

## 2018-10-20 RX ADMIN — ASPIRIN 81 MG CHEWABLE TABLET 81 MG: 81 TABLET CHEWABLE at 09:45

## 2018-10-20 RX ADMIN — OXYCODONE HYDROCHLORIDE AND ACETAMINOPHEN 1 TABLET: 5; 325 TABLET ORAL at 09:45

## 2018-10-20 RX ADMIN — OXYCODONE HYDROCHLORIDE AND ACETAMINOPHEN 1 TABLET: 5; 325 TABLET ORAL at 22:41

## 2018-10-20 RX ADMIN — OXYCODONE HYDROCHLORIDE AND ACETAMINOPHEN 2 TABLET: 5; 325 TABLET ORAL at 15:36

## 2018-10-20 RX ADMIN — FAMOTIDINE 20 MG: 20 TABLET ORAL at 09:45

## 2018-10-20 RX ADMIN — LEUCINE, PHENYLALANINE, LYSINE, METHIONINE, ISOLEUCINE, VALINE, HISTIDINE, THREONINE, TRYPTOPHAN, ALANINE, GLYCINE, ARGININE, PROLINE, SERINE, TYROSINE, SODIUM ACETATE, DIBASIC POTASSIUM PHOSPHATE, MAGNESIUM CHLORIDE, SODIUM CHLORIDE, CALCIUM CHLORIDE, DEXTROSE
365; 280; 290; 200; 300; 290; 240; 210; 90; 1035; 515; 575; 340; 250; 20; 340; 261; 51; 59; 33; 15 INJECTION INTRAVENOUS at 18:19

## 2018-10-20 RX ADMIN — DRONABINOL 2.5 MG: 2.5 CAPSULE ORAL at 21:01

## 2018-10-20 ASSESSMENT — PAIN SCALES - GENERAL
PAINLEVEL_OUTOF10: 1
PAINLEVEL_OUTOF10: 3
PAINLEVEL_OUTOF10: 0
PAINLEVEL_OUTOF10: 4
PAINLEVEL_OUTOF10: 0
PAINLEVEL_OUTOF10: 3
PAINLEVEL_OUTOF10: 3
PAINLEVEL_OUTOF10: 7

## 2018-10-20 ASSESSMENT — PAIN DESCRIPTION - PROGRESSION: CLINICAL_PROGRESSION: NOT CHANGED

## 2018-10-20 ASSESSMENT — PAIN DESCRIPTION - ONSET: ONSET: ON-GOING

## 2018-10-20 ASSESSMENT — PAIN DESCRIPTION - DESCRIPTORS: DESCRIPTORS: ACHING

## 2018-10-20 ASSESSMENT — PAIN DESCRIPTION - FREQUENCY: FREQUENCY: INTERMITTENT

## 2018-10-20 ASSESSMENT — PAIN DESCRIPTION - PAIN TYPE: TYPE: ACUTE PAIN

## 2018-10-20 ASSESSMENT — PAIN DESCRIPTION - LOCATION: LOCATION: ABDOMEN

## 2018-10-20 ASSESSMENT — PAIN DESCRIPTION - ORIENTATION: ORIENTATION: RIGHT;LEFT

## 2018-10-21 PROBLEM — Z90.49 S/P PARTIAL RESECTION OF COLON: Status: RESOLVED | Noted: 2018-07-06 | Resolved: 2018-10-21

## 2018-10-21 PROBLEM — E43 SEVERE MALNUTRITION (HCC): Status: RESOLVED | Noted: 2018-07-02 | Resolved: 2018-10-21

## 2018-10-21 LAB
ANION GAP SERPL CALCULATED.3IONS-SCNC: 11 MEQ/L (ref 8–16)
BUN BLDV-MCNC: 17 MG/DL (ref 7–22)
CALCIUM IONIZED: 1.27 MMOL/L (ref 1.12–1.32)
CALCIUM SERPL-MCNC: 9.1 MG/DL (ref 8.5–10.5)
CHLORIDE BLD-SCNC: 91 MEQ/L (ref 98–111)
CO2: 28 MEQ/L (ref 23–33)
CREAT SERPL-MCNC: 0.3 MG/DL (ref 0.4–1.2)
GFR SERPL CREATININE-BSD FRML MDRD: > 90 ML/MIN/1.73M2
GLUCOSE BLD-MCNC: 121 MG/DL (ref 70–108)
GLUCOSE BLD-MCNC: 126 MG/DL (ref 70–108)
MAGNESIUM: 2.1 MG/DL (ref 1.6–2.4)
PHOSPHORUS: 2.6 MG/DL (ref 2.4–4.7)
POTASSIUM SERPL-SCNC: 3.5 MEQ/L (ref 3.5–5.2)
SODIUM BLD-SCNC: 130 MEQ/L (ref 135–145)

## 2018-10-21 PROCEDURE — 82948 REAGENT STRIP/BLOOD GLUCOSE: CPT

## 2018-10-21 PROCEDURE — 83735 ASSAY OF MAGNESIUM: CPT

## 2018-10-21 PROCEDURE — 6360000002 HC RX W HCPCS: Performed by: PHARMACIST

## 2018-10-21 PROCEDURE — 97530 THERAPEUTIC ACTIVITIES: CPT

## 2018-10-21 PROCEDURE — 84100 ASSAY OF PHOSPHORUS: CPT

## 2018-10-21 PROCEDURE — 97110 THERAPEUTIC EXERCISES: CPT

## 2018-10-21 PROCEDURE — 36415 COLL VENOUS BLD VENIPUNCTURE: CPT

## 2018-10-21 PROCEDURE — 6370000000 HC RX 637 (ALT 250 FOR IP): Performed by: FAMILY MEDICINE

## 2018-10-21 PROCEDURE — 6360000002 HC RX W HCPCS: Performed by: SURGERY

## 2018-10-21 PROCEDURE — 2500000003 HC RX 250 WO HCPCS: Performed by: PHARMACIST

## 2018-10-21 PROCEDURE — 82330 ASSAY OF CALCIUM: CPT

## 2018-10-21 PROCEDURE — 1290000000 HC SEMI PRIVATE OTHER R&B

## 2018-10-21 PROCEDURE — 2709999900 HC NON-CHARGEABLE SUPPLY

## 2018-10-21 PROCEDURE — 6360000002 HC RX W HCPCS: Performed by: FAMILY MEDICINE

## 2018-10-21 PROCEDURE — 97116 GAIT TRAINING THERAPY: CPT

## 2018-10-21 PROCEDURE — 80048 BASIC METABOLIC PNL TOTAL CA: CPT

## 2018-10-21 PROCEDURE — 97535 SELF CARE MNGMENT TRAINING: CPT

## 2018-10-21 PROCEDURE — C9113 INJ PANTOPRAZOLE SODIUM, VIA: HCPCS | Performed by: FAMILY MEDICINE

## 2018-10-21 RX ORDER — POTASSIUM CHLORIDE 29.8 MG/ML
20 INJECTION INTRAVENOUS
Status: COMPLETED | OUTPATIENT
Start: 2018-10-21 | End: 2018-10-21

## 2018-10-21 RX ADMIN — POTASSIUM CHLORIDE 20 MEQ: 400 INJECTION, SOLUTION INTRAVENOUS at 13:30

## 2018-10-21 RX ADMIN — ONDANSETRON 4 MG: 4 TABLET, ORALLY DISINTEGRATING ORAL at 17:28

## 2018-10-21 RX ADMIN — OXYCODONE HYDROCHLORIDE AND ACETAMINOPHEN 1 TABLET: 5; 325 TABLET ORAL at 08:38

## 2018-10-21 RX ADMIN — PANTOPRAZOLE SODIUM 40 MG: 40 INJECTION, POWDER, FOR SOLUTION INTRAVENOUS at 08:47

## 2018-10-21 RX ADMIN — HYDROMORPHONE HYDROCHLORIDE 0.25 MG: 1 INJECTION, SOLUTION INTRAMUSCULAR; INTRAVENOUS; SUBCUTANEOUS at 22:14

## 2018-10-21 RX ADMIN — LEUCINE, PHENYLALANINE, LYSINE, METHIONINE, ISOLEUCINE, VALINE, HISTIDINE, THREONINE, TRYPTOPHAN, ALANINE, GLYCINE, ARGININE, PROLINE, SERINE, TYROSINE, SODIUM ACETATE, DIBASIC POTASSIUM PHOSPHATE, MAGNESIUM CHLORIDE, SODIUM CHLORIDE, CALCIUM CHLORIDE, DEXTROSE
365; 280; 290; 200; 300; 290; 240; 210; 90; 1035; 515; 575; 340; 250; 20; 340; 261; 51; 59; 33; 15 INJECTION INTRAVENOUS at 17:33

## 2018-10-21 RX ADMIN — ONDANSETRON 4 MG: 4 TABLET, ORALLY DISINTEGRATING ORAL at 12:26

## 2018-10-21 RX ADMIN — OXYCODONE HYDROCHLORIDE AND ACETAMINOPHEN 1 TABLET: 5; 325 TABLET ORAL at 12:19

## 2018-10-21 RX ADMIN — POTASSIUM CHLORIDE 20 MEQ: 400 INJECTION, SOLUTION INTRAVENOUS at 11:11

## 2018-10-21 RX ADMIN — HYDROMORPHONE HYDROCHLORIDE 0.25 MG: 1 INJECTION, SOLUTION INTRAMUSCULAR; INTRAVENOUS; SUBCUTANEOUS at 17:32

## 2018-10-21 ASSESSMENT — PAIN DESCRIPTION - LOCATION
LOCATION: ABDOMEN;BACK
LOCATION: ABDOMEN;HEAD
LOCATION: ABDOMEN

## 2018-10-21 ASSESSMENT — PAIN DESCRIPTION - FREQUENCY: FREQUENCY: INTERMITTENT

## 2018-10-21 ASSESSMENT — PAIN DESCRIPTION - PAIN TYPE
TYPE: ACUTE PAIN
TYPE: ACUTE PAIN

## 2018-10-21 ASSESSMENT — PAIN DESCRIPTION - ORIENTATION: ORIENTATION: MID

## 2018-10-21 ASSESSMENT — PAIN SCALES - GENERAL
PAINLEVEL_OUTOF10: 3
PAINLEVEL_OUTOF10: 5
PAINLEVEL_OUTOF10: 5
PAINLEVEL_OUTOF10: 4
PAINLEVEL_OUTOF10: 3
PAINLEVEL_OUTOF10: 4
PAINLEVEL_OUTOF10: 7
PAINLEVEL_OUTOF10: 0

## 2018-10-21 ASSESSMENT — PAIN DESCRIPTION - ONSET: ONSET: ON-GOING

## 2018-10-21 ASSESSMENT — PAIN DESCRIPTION - DESCRIPTORS
DESCRIPTORS: ACHING
DESCRIPTORS: HEADACHE;SHARP

## 2018-10-21 NOTE — PROGRESS NOTES
Spoke with daughter about pt's plan of care. Daughter requests to speak with spiritual care about changing code status. Consult placed.

## 2018-10-21 NOTE — FLOWSHEET NOTE
10/21/18 1400   Provider Notification   Reason for Communication Review case  (Difficulty with PO medications)   Provider Name Dr. Surendra Babb   Provider Notification Physician   Method of Communication Secure Message   Response See orders   Notification Time 1400     Patient noted to be having difficulty swallowing PO medications, at one point noted to be chewing pills instead of swallowing them. New orders received, family updated.

## 2018-10-21 NOTE — PROGRESS NOTES
Walker  Activity: To/from bathroom  Assist Level: Contact guard assistance  Functional Mobility Comments: slow pace, cues for technique, approach, min unsteadiness throughout          Type of ROM/Therapeutic Exercise: AROM  Comment: BUE AROM in all planes and joints available x10 reps seated in recliner. Cues for technique and rest breaks required, demo'd mod fatiguing. Pt completed to increase strength and endurance for ADLS and transfers           Activity Tolerance:  Activity Tolerance: Patient limited by fatigue;Treatment limited secondary to decreased cognition    Assessment:     Performance deficits / Impairments: Decreased functional mobility , Decreased endurance, Decreased ADL status, Decreased balance, Decreased strength, Decreased safe awareness, Decreased high-level IADLs  Prognosis: Fair, Good    Discharge Recommendations:  Discharge Recommendations: Continue to assess pending progress    Patient Education:  Patient Education: t/f training, HEP, ADLS, reorientation    Equipment Recommendations: Other: monitor for LHAE    Safety:  Safety Devices in place: Yes  Type of devices: All fall risk precautions in place, Call light within reach, Gait belt, Left in bed, Bed alarm in place, Nurse notified, Patient at risk for falls    Plan:  Times per week: 6x  Current Treatment Recommendations: Strengthening, Balance Training, Functional Mobility Training, Endurance Training, Safety Education & Training, Self-Care / ADL, Patient/Caregiver Education & Training, Home Management Training, Equipment Evaluation, Education, & procurement    Goals:  Patient goals :  To go home    Short term goals  Time Frame for Short term goals: 1 weeks  Short term goal 1: Pt to complete functional mobility to/from bathroom with SBA, RW, and min vcs for safety  Short term goal 2: Pt to tolerate standing greater than 3 mins with 1 hand release with SBA in preparation for returned to preffered IADL tasks within home including pet

## 2018-10-21 NOTE — PROGRESS NOTES
within reach, Left in chair, Chair alarm in place, Telesitter in use, Gait belt, Nurse notified  Restraints  Initially in place: No    Plan:  Times per week: 6x  Times per day: Daily  Specific instructions for Next Treatment: B LE strengthening, transfer training, gait training, standing balance  Current Treatment Recommendations: Strengthening, Functional Mobility Training, Transfer Training, Balance Training, Endurance Training, Stair training, Gait Training, Neuromuscular Re-education, Home Exercise Program, Safety Education & Training, Patient/Caregiver Education & Training, Equipment Evaluation, Education, & procurement    Goals:  Patient goals : To go home. Short term goals  Time Frame for Short term goals: 2 weeks  Short term goal 1: Pt to transfer supine <--> sit SBA to enable pt to get in/out of bed. Short term goal 2: Pt to transfer sit <--> stand SBA for increased functional mobility. Short term goal 3: Pt to ambulate >100 feet with RW SBA for household ambulation. Short term goal 4: Pt to improve dynamic standing balance to fair+ to minimize fall risk. Long term goals  Time Frame for Long term goals : 4 weeks  Long term goal 1: Pt to ambulate >200 feet with SBQC SBA for household ambulation. Long term goal 2: Pt to ascend/descend 4 steps without HR SBA for home entry. Long term goal 3: Pt to perform car transfer SBA to enable pt to get in/out of the car.

## 2018-10-21 NOTE — PROGRESS NOTES
TPN Follow Up Note    Assessment: chems WNL, poor oral intake persists  Electrolyte Replacement: Potassium Chloride 40 meq IV     TPN changes for (today) at 1800:   No change, continue Clinimix E 5/15 at 55 ml/hour  Plan to transition to 3-in-1 10/22/18    Re-check BMP, Mg, PO4, iCa 10/22/18     Silvina Bullard PharmBECCA 10/21/2018 10:12 AM

## 2018-10-22 LAB
ANION GAP SERPL CALCULATED.3IONS-SCNC: 11 MEQ/L (ref 8–16)
BUN BLDV-MCNC: 13 MG/DL (ref 7–22)
CALCIUM IONIZED: 1.24 MMOL/L (ref 1.12–1.32)
CALCIUM SERPL-MCNC: 9.1 MG/DL (ref 8.5–10.5)
CHLORIDE BLD-SCNC: 92 MEQ/L (ref 98–111)
CO2: 28 MEQ/L (ref 23–33)
CREAT SERPL-MCNC: 0.3 MG/DL (ref 0.4–1.2)
GFR SERPL CREATININE-BSD FRML MDRD: > 90 ML/MIN/1.73M2
GLUCOSE BLD-MCNC: 104 MG/DL (ref 70–108)
GLUCOSE BLD-MCNC: 109 MG/DL (ref 70–108)
GLUCOSE BLD-MCNC: 111 MG/DL (ref 70–108)
GLUCOSE BLD-MCNC: 111 MG/DL (ref 70–108)
GLUCOSE BLD-MCNC: 113 MG/DL (ref 70–108)
MAGNESIUM: 2 MG/DL (ref 1.6–2.4)
PHOSPHORUS: 3.2 MG/DL (ref 2.4–4.7)
POTASSIUM SERPL-SCNC: 4.3 MEQ/L (ref 3.5–5.2)
SODIUM BLD-SCNC: 131 MEQ/L (ref 135–145)

## 2018-10-22 PROCEDURE — C9113 INJ PANTOPRAZOLE SODIUM, VIA: HCPCS | Performed by: FAMILY MEDICINE

## 2018-10-22 PROCEDURE — 97535 SELF CARE MNGMENT TRAINING: CPT

## 2018-10-22 PROCEDURE — 6360000002 HC RX W HCPCS: Performed by: FAMILY MEDICINE

## 2018-10-22 PROCEDURE — 6360000002 HC RX W HCPCS: Performed by: SURGERY

## 2018-10-22 PROCEDURE — 97127 HC SP THER IVNTJ W/FOCUS COG FUNCJ: CPT

## 2018-10-22 PROCEDURE — 1290000000 HC SEMI PRIVATE OTHER R&B

## 2018-10-22 PROCEDURE — 82330 ASSAY OF CALCIUM: CPT

## 2018-10-22 PROCEDURE — 97110 THERAPEUTIC EXERCISES: CPT

## 2018-10-22 PROCEDURE — 84100 ASSAY OF PHOSPHORUS: CPT

## 2018-10-22 PROCEDURE — 2709999900 HC NON-CHARGEABLE SUPPLY

## 2018-10-22 PROCEDURE — 82948 REAGENT STRIP/BLOOD GLUCOSE: CPT

## 2018-10-22 PROCEDURE — 83735 ASSAY OF MAGNESIUM: CPT

## 2018-10-22 PROCEDURE — 97116 GAIT TRAINING THERAPY: CPT

## 2018-10-22 PROCEDURE — 6370000000 HC RX 637 (ALT 250 FOR IP): Performed by: SURGERY

## 2018-10-22 PROCEDURE — 2500000003 HC RX 250 WO HCPCS

## 2018-10-22 PROCEDURE — 97530 THERAPEUTIC ACTIVITIES: CPT

## 2018-10-22 PROCEDURE — 36592 COLLECT BLOOD FROM PICC: CPT

## 2018-10-22 PROCEDURE — 80048 BASIC METABOLIC PNL TOTAL CA: CPT

## 2018-10-22 PROCEDURE — 36415 COLL VENOUS BLD VENIPUNCTURE: CPT

## 2018-10-22 RX ADMIN — PANTOPRAZOLE SODIUM 40 MG: 40 INJECTION, POWDER, FOR SOLUTION INTRAVENOUS at 09:27

## 2018-10-22 RX ADMIN — HYDROMORPHONE HYDROCHLORIDE 0.25 MG: 1 INJECTION, SOLUTION INTRAMUSCULAR; INTRAVENOUS; SUBCUTANEOUS at 18:05

## 2018-10-22 RX ADMIN — HYDROMORPHONE HYDROCHLORIDE 0.25 MG: 1 INJECTION, SOLUTION INTRAMUSCULAR; INTRAVENOUS; SUBCUTANEOUS at 21:41

## 2018-10-22 RX ADMIN — CALCIUM GLUCONATE: 94 INJECTION, SOLUTION INTRAVENOUS at 18:06

## 2018-10-22 RX ADMIN — ONDANSETRON 4 MG: 4 TABLET, ORALLY DISINTEGRATING ORAL at 09:27

## 2018-10-22 RX ADMIN — HYDROMORPHONE HYDROCHLORIDE 0.25 MG: 1 INJECTION, SOLUTION INTRAMUSCULAR; INTRAVENOUS; SUBCUTANEOUS at 14:05

## 2018-10-22 RX ADMIN — HYDROMORPHONE HYDROCHLORIDE 0.25 MG: 1 INJECTION, SOLUTION INTRAMUSCULAR; INTRAVENOUS; SUBCUTANEOUS at 09:27

## 2018-10-22 RX ADMIN — ALTEPLASE 2 MG: 2.2 INJECTION, POWDER, LYOPHILIZED, FOR SOLUTION INTRAVENOUS at 17:49

## 2018-10-22 ASSESSMENT — PAIN DESCRIPTION - DESCRIPTORS: DESCRIPTORS: ACHING

## 2018-10-22 ASSESSMENT — PAIN DESCRIPTION - LOCATION
LOCATION: ABDOMEN

## 2018-10-22 ASSESSMENT — PAIN SCALES - GENERAL
PAINLEVEL_OUTOF10: 5
PAINLEVEL_OUTOF10: 5
PAINLEVEL_OUTOF10: 0
PAINLEVEL_OUTOF10: 5
PAINLEVEL_OUTOF10: 5
PAINLEVEL_OUTOF10: 0
PAINLEVEL_OUTOF10: 5
PAINLEVEL_OUTOF10: 4

## 2018-10-22 ASSESSMENT — PAIN DESCRIPTION - PROGRESSION: CLINICAL_PROGRESSION: NOT CHANGED

## 2018-10-22 ASSESSMENT — PAIN SCALES - WONG BAKER
WONGBAKER_NUMERICALRESPONSE: 4

## 2018-10-22 ASSESSMENT — PAIN DESCRIPTION - PAIN TYPE
TYPE: ACUTE PAIN
TYPE: ACUTE PAIN

## 2018-10-22 ASSESSMENT — PAIN DESCRIPTION - FREQUENCY: FREQUENCY: CONTINUOUS

## 2018-10-22 ASSESSMENT — PAIN DESCRIPTION - ONSET: ONSET: ON-GOING

## 2018-10-22 NOTE — PROGRESS NOTES
TID)  · ONS intake: 0%, 1-25%  · Parenteral Nutrition Orders:  · Type and Formula:  (Clinimix 5/15)   · Lipids: Daily, None  · Rate/Volume: 55ml/hr  · Current PN Order Provides: 1320ml,66 grams protein, 198 grams CHO, 937kcals Clinimix 5/15  · Goal PN Orders Provides: Suggest switch to 3 in 1 TPN when next bag is made  using dose wt. 57kgm, 15kcals/kgm, 30% kcals from lipids, 1.2 grams protein/kgm to yield ~ 855kcals, 68 grams protein, 30% kcals from lipids, 95 grams CHO. · Anthropometric Measures:  · Ht: 5' 3\" (160 cm)   · Current Body Wt: 125 lb 7.1 oz (56.9 kg)  · Admission Body Wt: 142 lb (64.4 kg) (10/18/18, no weight source, +1 BUE edema, +2 BLE edema)  · Usual Body Wt: 154 lb (69.9 kg) (EMR, 6/25/18, actual)  · % Weight Change: 18.9% wt. loss,  almost 4 months  · Ideal Body Wt: 115 lb (52.2 kg), % Ideal Body 109%  · BMI Classification: BMI 25.0 - 29.9 Overweight  · Comparative Standards (Estimated Nutrition Needs):  · Estimated Daily Total Kcal: 6324-4997 kcals (25-30kcals/kgm wt. of 57kgm 10/22)  · Estimated Daily Protein (g): 68+ grams (1.2 grams+ protein wt. of 57kgm 10/22/18)    Estimated Intake vs Estimated Needs: Intake Less Than Needs    Nutrition Risk Level: High    Nutrition Interventions:   Continue current diet, Modify current ONS  Continued Inpatient Monitoring (ileostomy diet education provided 10/18/18)    Nutrition Evaluation:   · Evaluation: No progress toward goals   · Goals: Pt. will meet atleast 75% estimated needs from TPN during LOS until diet tolerated  during LOS. · Monitoring: Meal Intake, Supplement Intake, PN Intake, PN Tolerance, Diet Tolerance, Skin Integrity, Wound Healing, Weight, Pertinent Labs, Chewing/Swallowing, Nausea or Vomiting, Diarrhea    See Adult Nutrition Doc Flowsheet for more detail.      Electronically signed by Everton Avendano RD, LD on 10/22/18 at 12:35 PM    Contact Number: [phone]

## 2018-10-22 NOTE — PROGRESS NOTES
[facility]  INPATIENT SPEECH THERAPY  [facility] TCU 8E    TIME   SLP Individual Minutes  Time In: 6053  Time Out:   Minutes: 23  Timed Code Treatment Minutes: 23 Minutes       []Daily Note  [x]Progress Note  []Discharge Note    Date: 10/22/2018  Patient Name: Mohan Blackburn        MRN: [id number]    : 1940  (66 y.o.)  Gender: female   Primary Provider: Annie Baker MD  Admitting Diagnosis:  SBO s/p bowel resection   Secondary Diagnosis: Cognitive deficits   Precautions: Fall risk   Swallowing Status/Diet: Regular with thin liquids   Swallowing Strategies: n/a  DATE of last MBS:  n/a  Pain:  None, however, reports being \"very tired\". Subjective: Pt pleasant, however, highly lethargic during today's session demonstrating lack of effort. 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 SHORT TERM GOAL #1:  Goal 1: Pt will complete immediate, delayed, and working memory tasks with 70% accuracy given mod cues to improve retention of novel and functional information.  - GOAL NOT CONSISTENTLY MET, CONTINUE  INTERVENTIONS: Orientation- 0/4 indep, required min cues to look to calendar and then able to state 4/4 aspects  Family member recall- 1/3 indep, 2/3 max cues from daughter who was present at end of session    PREVIOUS SESSION:  Orientation: - IND, needed min cues for name of place, and mod/max cues for current city    [address] [facility] [address] #2:  Goal 2: Pt will complete problem solving, reasoning, and executive function tasks (money, checkbook, medications) with 70% accuracy given mod cues to improve awareness for ADL and IADL completion. - GOAL NOT MET CONSISTENTLY, CONTINUE  INTERVENTIONS: Basic problem solvin/4 indep, 2/4 mod cues  Managing calendar events: 3/10 indep, 2/10 min cues, 2/10 max cues,  no response elicited  *Pt often stating \"I don't know\" when asked questions regarding calendar events.      Telling time given examples of clocks with robi numerals- 2/4 indep, 2/4 max cues    SHORT TERM GOAL #3:  Goal 3: Pt will complete thought organization and divergent naming tasks with 70% accuracy with min cues/naming 11+items in 1 minute to improve processing speed and ability to manage daily schedule. - GOAL NOT MET, CONTINUE  INTERVENTIONS: Did not address d/t focus on other STG's. PREVIOUS SESSION:  Divergent naming task:   [place] Naming Trial #1- 5 items named/ 1 minute, +2 additional objects with mod cueing and additional time. [place] Naming Trial #2 (with reminder to utilize learned strategy)- 7- IND    SHORT TERM GOAL #4:  Goal 4: Pt will complete alternating, divided, and complex attention tasks with no more than 2 errors within 3-5 minute time span to permit potential return to cooking, cleaning, and housework. - GOAL NOT MET, CONTINUE  INTERVENTIONS: Did not address directly d/t focus on other STG's, however, pt required frequent redirections throughout the session. No other previously recorded data on this goal d/t short length of stay thus far. Long-term Goals  Timeframe for Long-term Goals: 2 weeks  Goal 1: Pt will improve cognitive functioning to a supervision level or higher to promote improved independence for ADL and IADL completion for re-integration into home setting. ASSESSMENT:  Assessment: [x]Progressing towards goals          []Not Progressing towards goals  SUMMARY:  Pt has met 0/4 STG's this therapy period, potentially d/t short length of TCU stay thus far. Anticipate further progress with pt cooperation. Pt continues to present with a moderate cognitive deficit characterized by impaired temporal and spatial orientation, immediate and delayed recall, working memory, problem solving, reasoning, thought organization, executive function, processing speed, and mental flexibility. Expressive and receptive language grossly intact with no communicative breakdowns apparent.  Barriers to success foreseen at this

## 2018-10-23 LAB
ANION GAP SERPL CALCULATED.3IONS-SCNC: 12 MEQ/L (ref 8–16)
BUN BLDV-MCNC: 15 MG/DL (ref 7–22)
CALCIUM IONIZED: 1.05 MMOL/L (ref 1.12–1.32)
CALCIUM SERPL-MCNC: 9.9 MG/DL (ref 8.5–10.5)
CHLORIDE BLD-SCNC: 90 MEQ/L (ref 98–111)
CO2: 29 MEQ/L (ref 23–33)
CREAT SERPL-MCNC: 0.3 MG/DL (ref 0.4–1.2)
GFR SERPL CREATININE-BSD FRML MDRD: > 90 ML/MIN/1.73M2
GLUCOSE BLD-MCNC: 105 MG/DL (ref 70–108)
GLUCOSE BLD-MCNC: 105 MG/DL (ref 70–108)
GLUCOSE BLD-MCNC: 108 MG/DL (ref 70–108)
GLUCOSE BLD-MCNC: 112 MG/DL (ref 70–108)
GLUCOSE BLD-MCNC: 115 MG/DL (ref 70–108)
MAGNESIUM: 2.1 MG/DL (ref 1.6–2.4)
PHOSPHORUS: 4.4 MG/DL (ref 2.4–4.7)
POTASSIUM SERPL-SCNC: 4.3 MEQ/L (ref 3.5–5.2)
SODIUM BLD-SCNC: 131 MEQ/L (ref 135–145)

## 2018-10-23 PROCEDURE — 80048 BASIC METABOLIC PNL TOTAL CA: CPT

## 2018-10-23 PROCEDURE — 97127 HC SP THER IVNTJ W/FOCUS COG FUNCJ: CPT

## 2018-10-23 PROCEDURE — 36593 DECLOT VASCULAR DEVICE: CPT

## 2018-10-23 PROCEDURE — 84100 ASSAY OF PHOSPHORUS: CPT

## 2018-10-23 PROCEDURE — 2709999900 HC NON-CHARGEABLE SUPPLY

## 2018-10-23 PROCEDURE — 97110 THERAPEUTIC EXERCISES: CPT

## 2018-10-23 PROCEDURE — 6370000000 HC RX 637 (ALT 250 FOR IP): Performed by: SURGERY

## 2018-10-23 PROCEDURE — C9113 INJ PANTOPRAZOLE SODIUM, VIA: HCPCS | Performed by: FAMILY MEDICINE

## 2018-10-23 PROCEDURE — 83735 ASSAY OF MAGNESIUM: CPT

## 2018-10-23 PROCEDURE — 97116 GAIT TRAINING THERAPY: CPT

## 2018-10-23 PROCEDURE — 1290000000 HC SEMI PRIVATE OTHER R&B

## 2018-10-23 PROCEDURE — 36415 COLL VENOUS BLD VENIPUNCTURE: CPT

## 2018-10-23 PROCEDURE — 6360000002 HC RX W HCPCS: Performed by: FAMILY MEDICINE

## 2018-10-23 PROCEDURE — 82330 ASSAY OF CALCIUM: CPT

## 2018-10-23 PROCEDURE — 6360000002 HC RX W HCPCS: Performed by: SURGERY

## 2018-10-23 PROCEDURE — 2500000003 HC RX 250 WO HCPCS

## 2018-10-23 PROCEDURE — 99024 POSTOP FOLLOW-UP VISIT: CPT | Performed by: SURGERY

## 2018-10-23 PROCEDURE — 82948 REAGENT STRIP/BLOOD GLUCOSE: CPT

## 2018-10-23 PROCEDURE — 97530 THERAPEUTIC ACTIVITIES: CPT

## 2018-10-23 PROCEDURE — 0220000000 HC SKILLED NURSING FACILITY

## 2018-10-23 PROCEDURE — 97535 SELF CARE MNGMENT TRAINING: CPT

## 2018-10-23 RX ORDER — DEXTROSE MONOHYDRATE 25 G/50ML
12.5 INJECTION, SOLUTION INTRAVENOUS PRN
Status: DISCONTINUED | OUTPATIENT
Start: 2018-10-23 | End: 2018-10-27

## 2018-10-23 RX ORDER — DEXTROSE MONOHYDRATE 50 MG/ML
100 INJECTION, SOLUTION INTRAVENOUS PRN
Status: DISCONTINUED | OUTPATIENT
Start: 2018-10-23 | End: 2018-10-27

## 2018-10-23 RX ORDER — KETOROLAC TROMETHAMINE 30 MG/ML
15 INJECTION, SOLUTION INTRAMUSCULAR; INTRAVENOUS EVERY 6 HOURS PRN
Status: DISCONTINUED | OUTPATIENT
Start: 2018-10-23 | End: 2018-10-23

## 2018-10-23 RX ORDER — NICOTINE POLACRILEX 4 MG
15 LOZENGE BUCCAL PRN
Status: DISCONTINUED | OUTPATIENT
Start: 2018-10-23 | End: 2018-10-27

## 2018-10-23 RX ORDER — KETOROLAC TROMETHAMINE 30 MG/ML
15 INJECTION, SOLUTION INTRAMUSCULAR; INTRAVENOUS EVERY 6 HOURS
Status: DISCONTINUED | OUTPATIENT
Start: 2018-10-23 | End: 2018-10-27

## 2018-10-23 RX ADMIN — HYDROMORPHONE HYDROCHLORIDE 0.5 MG: 1 INJECTION, SOLUTION INTRAMUSCULAR; INTRAVENOUS; SUBCUTANEOUS at 08:48

## 2018-10-23 RX ADMIN — ALTEPLASE 1 MG: 2.2 INJECTION, POWDER, LYOPHILIZED, FOR SOLUTION INTRAVENOUS at 17:32

## 2018-10-23 RX ADMIN — PANTOPRAZOLE SODIUM 40 MG: 40 INJECTION, POWDER, FOR SOLUTION INTRAVENOUS at 08:48

## 2018-10-23 RX ADMIN — CALCIUM GLUCONATE: 94 INJECTION, SOLUTION INTRAVENOUS at 19:27

## 2018-10-23 RX ADMIN — KETOROLAC TROMETHAMINE 15 MG: 30 INJECTION, SOLUTION INTRAMUSCULAR at 17:32

## 2018-10-23 RX ADMIN — ACETAMINOPHEN 650 MG: 325 TABLET ORAL at 01:25

## 2018-10-23 ASSESSMENT — PAIN SCALES - GENERAL
PAINLEVEL_OUTOF10: 3
PAINLEVEL_OUTOF10: 0
PAINLEVEL_OUTOF10: 4
PAINLEVEL_OUTOF10: 5
PAINLEVEL_OUTOF10: 5
PAINLEVEL_OUTOF10: 8

## 2018-10-23 ASSESSMENT — PAIN SCALES - WONG BAKER
WONGBAKER_NUMERICALRESPONSE: 4
WONGBAKER_NUMERICALRESPONSE: 8

## 2018-10-23 ASSESSMENT — PAIN DESCRIPTION - LOCATION: LOCATION: ABDOMEN

## 2018-10-23 NOTE — PROGRESS NOTES
Physical Therapy   [address] [address]  INPATIENT PHYSICAL THERAPY  DAILYNOTE  [facility] TCU 8E - 8E-65/065-A    Time In: 7442  Time Out: 1400  Timed Code Treatment Minutes: 39 Minutes  Minutes: 45          Date: 10/23/2018  Patient Name: Kaylan Best,  Gender:  female        MRN: [id number]  : 1940  (66 y.o.)     Referring Practitioner: Ordering: Kirsten Nguyễn MD; Attending: Rosa Bauer MD  Diagnosis: SBO s/p bowel resection  Additional Pertinent Hx: Per ED note 18, pt is a 66 y.o. female who has history of Pylori and cholecystectomy. Patient presents to the Emergency Department for the evaluation of abdominal pain that has been ongoing. Patient reports that she is experiencing abdominal distention, melena, decreased appetite, fear of eating secondary to abdominal pain, and small bowel movements. Patient was admitted to hospital on Sep. 2nd due to small bowel obstruction. She was cared for by Dr. Santo Boast (general surgeon). Per daughter, patient has been experiencing abdominal complications since left colon resection in . Pt underwent Abd Exploration, Lysis Severe Adhesions, Segmental SB resection x 3, Formation End Ileostomy on 10/4. Pt was a code stroke on 10/12, MRI negative. To TCU 10/16.      Past Medical History:   Diagnosis Date    Arthritis     Cellulitis     Difficult intubation     GERD (gastroesophageal reflux disease)     H pylori ulcer     History of Right leg DVT ([address]     Ileostomy in place ([facility] [address] 10/04/2018    Nausea & vomiting     Pneumonia     S/P partial resection of colon 2018    Shingles     Torus mandibularis      Past Surgical History:   Procedure Laterality Date    ABDOMEN SURGERY      ABDOMINAL ADHESION SURGERY  's    Dr Vy Gonsavles  2013    Lysis of Adhesions-Dr. Santo Boast     BACK SURGERY  //2006/2013    X3    CHOLECYSTECTOMY      [address] [place] [address]    COLON SURGERY      COLONOSCOPY over 10 yrs ago    COLONOSCOPY  08/11/2017    Dr Dulce Mckeon, COLON, [place] Bilateral [address] ? [facility]    LUMBAR LAMINECTOMY  4/15/2013    OTHER SURGICAL HISTORY  7/8/2013    evacation of seroma-back     [place] EXPLORATORY OF ABDOMEN N/A 10/4/2018    LAPAROTOMY EXPLORATORY, SMALL BOWEL RESECTION X 3, EXTENSIVE LYSIS OF ADHESIONS, ILEOSTOMY FORMATION performed by Jesus Manuel Osmna MD at [address] OFFICE/OUTPT VISIT,PROCEDURE ONLY N/A 6/25/2018    ABDOMINAL EXPLORATION AND ESTENDED LEFT COLON RESECTION, LYSIS OF COMPLEX ADHESIONS performed by Jesus Manuel Osman MD at [address]  08/11/2017    Dr Zachary Morales Left 9/28/2018    EGD BIOPSY performed by Jesus Manuel Osman MD at [facility]       Restrictions/Precautions:  General Precautions, Fall Risk                    Other position/activity restrictions: abd incision, sx 9-24 SBO repair and ileostomy       Prior Level of Function:  ADL Assistance: Independent  Homemaking Assistance: Needs assistance (shares role with daughter)  Ambulation Assistance: Independent  Transfer Assistance: Independent  Additional Comments: Pt was very IND at [facility] however recently her daughters have been providing increasing assist with IADLs around home. Walks with SBQC    Subjective:  Chart Reviewed: Yes  Subjective: Patient resting in bed upon arrival and agreeable to therapy. Ney's daughter present during treatment session and encouraging. Ney requested to use bathroom at start of treatment session. Ney reported that she has stomach pain 5/10.      Pain:   .  Pain Assessment  Pain Level: 5       Social/Functional:  Lives With: Daughter (stays with pt )  Type of Home: House  Home Layout: One level  Home Access: Stairs to enter without rails  Entrance Stairs - Number of Steps: couple steps  Home Equipment: Rolling walker, Quad cane Objective:  Supine to Sit: Stand by assistance (Ney required SBA with HOB elevated and use of bed rail. )    Transfers  Sit to Stand: Stand by assistance;Minimal Assistance (Patient requried SBA from various surfaces and min A from toliet seat. )  Stand to sit: Stand by assistance (Patient requried SBA to sit at various surfaces. )  Comment: Patient requried verbal cues on proper hand placement with functional transfers for increased safety. Ambulation 1  Surface: level tile  Device: Rolling Walker  Other Apparatus: Wheelchair follow  Assistance: Stand by assistance  Quality of Gait: downward gaze, decreased B step length and step height, min forward flexed posture, decreased B heel strike. Distance: 15'x1, 90'x2  Comments: Paient requried verbal cues to maintain upright posture and to look ahead with ambulation. Balance  Comments: Ney completed standing balance training at  with bathroom use to thu/doff depends and standing at sink at conclusion of bathroom use to clean hands. Ney completed dynamic standing balance training at  with 1 UE at support and requiring SBA with the performance of single hand release activities challenging patient with forward/lateral/overhead reaching and crossing midline to facilitate increased standing balance, strength, endurance, and proprioception requried to decrease risk of falls. Exercises:  Exercises  Comments: Patient completed BLE seated therapeutic exercises 10x each with the performance of LAQ, glute sets, hip abduction, iso hip adduction, marches, HS curls, and heel/toe raises. Patient requried demonstration and min verbal/tactile cues on proper technique for max contraction to facilitate increased BLE strength and ROM requried for increased improvement with functional mobility. Ohtent requried increased time and rest breaks with exercises due to increased stomach pain with activity.      Activity Tolerance:  Activity Tolerance: bed.  Short term goal 2: Pt to transfer sit <--> stand SBA for increased functional mobility. Short term goal 3: Pt to ambulate >100 feet with RW SBA for household ambulation. Short term goal 4: Pt to improve dynamic standing balance to fair+ to minimize fall risk. Long term goals  Time Frame for Long term goals : 4 weeks  Long term goal 1: Pt to ambulate >200 feet with SBQC SBA for household ambulation. Long term goal 2: Pt to ascend/descend 4 steps without HR SBA for home entry. Long term goal 3: Pt to perform car transfer SBA to enable pt to get in/out of the car.

## 2018-10-23 NOTE — PROGRESS NOTES
COLONOSCOPY  08/11/2017    Dr Danay David, COLON, DIAGNOSTIC      EYE SURGERY Bilateral 1999    CATARACT REMOVAL   [address] ? [facility]    LUMBAR LAMINECTOMY  4/15/2013    OTHER SURGICAL HISTORY  7/8/2013    evacation of seroma-back     [place] EXPLORATORY OF ABDOMEN N/A 10/4/2018    LAPAROTOMY EXPLORATORY, SMALL BOWEL RESECTION X 3, EXTENSIVE LYSIS OF ADHESIONS, ILEOSTOMY FORMATION performed by Joaquin Santana MD at [address] OFFICE/OUTPT VISIT,PROCEDURE ONLY N/A 6/25/2018    ABDOMINAL EXPLORATION AND ESTENDED LEFT COLON RESECTION, LYSIS OF COMPLEX ADHESIONS performed by Joaquin Santana MD at [address] [facility]  08/11/2017    Dr Naz Livingston Left 9/28/2018    EGD BIOPSY performed by Joaquin Santana MD at [facility]       Restrictions/Precautions:  General Precautions, Fall Risk      Other position/activity restrictions: abd incision, sx 9-24 SBO repair and ileostomy     Prior Level of Function:  ADL Assistance: Independent  Homemaking Assistance: Needs assistance (shares role with daughter)  Ambulation Assistance: Independent  Transfer Assistance: Independent  Additional Comments: Pt was very IND at [facility] however recently her daughters have been providing increasing assist with IADLs around home. Walks with SBQC    Subjective   Subjective: Pt lying in bed and agreeable to OT treatment with min encouragement    Pain:  Pain Assessment  Patient Currently in Pain: Yes (Pt states that her stomach ache is \"different this time\")  Kidd-Rivas Pain Rating: Hurts little more  Pain Location: Abdomen     Objective  Overall Cognitive Status: Exceptions (confusion noted at times, decreased safety and insight, decreased awareness of deficits)    ADL  Grooming: Stand by assistance (seated in chair to comb hair after RAPP assisted with use of shampoo shower cap)  UE Bathing: Verbal cueing; Increased time to complete;Stand by assistance  LE Bathing: Minimal assistance; Increased time to complete;Verbal cueing (for throughness of washing bottom)  UE Dressing: Minimal assistance ([facility] gown)  LE Dressing: Contact guard assistance (while pulling up/down undergarment)  Toileting: Increased time to complete;Contact guard assistance  Additional Comments: Extra time, vcs and frequent rest breaks needed during completion of ADLs this date     Bed mobility  Supine to Sit: Stand by assistance (at slow pace with vcs for safety as pt attempting to sit up with between both bedrails in place)  Scooting: Contact guard assistance    Transfers  Sit to stand: Contact guard assistance  Stand to sit: Contact guard assistance  Transfer Comments: vcs needed for hand placement and safe tech  Toilet Transfers  Toilet - Technique: Ambulating  Equipment Used: Standard toilet  Toilet Transfer: Contact guard assistance  Toilet Transfers Comments: with cues for use of grab bar    Balance  Sitting Balance: Stand by assistance  Standing Balance: Contact guard assistance     Time: x 2 mins x 1 trial and 1 min x 3 trials with 1 hand release and brief 2 hand release  Activity: for completion of ADLs     Functional Mobility  Functional - Mobility Device: Rolling Walker  Activity: To/from bathroom  Assist Level: Contact guard assistance  Functional Mobility Comments: pt ambulated at slow pace with reports that LEs felt weak this morning.  Cues needed for walker safety as pt attempted to abandon walker after entering bathroom     Activity Tolerance:  Activity Tolerance: Patient limited by fatigue;Patient limited by pain    Assessment:     Performance deficits / Impairments: Decreased functional mobility , Decreased endurance, Decreased ADL status, Decreased balance, Decreased strength, Decreased safe awareness, Decreased high-level IADLs  Prognosis: Fair, Good    Discharge Recommendations:  Discharge Recommendations: Continue to assess pending progress, Patient would benefit from continued therapy after discharge    Patient Education:  Patient Education: safety with tranfsers and ambulation, ADLs, role of OT, ECTs    Equipment Recommendations: Other: monitor for LHAE    Safety:  Safety Devices in place: Yes  Type of devices: All fall risk precautions in place, Call light within reach, Gait belt, Chair alarm in place, Left in chair, Nurse notified, Patient at risk for falls (telesitter in place)    Plan:  Times per week: 6x  Current Treatment Recommendations: Strengthening, Balance Training, Functional Mobility Training, Endurance Training, Safety Education & Training, Self-Care / ADL, Patient/Caregiver Education & Training, Home Management Training, Equipment Evaluation, Education, & procurement    Goals:  Patient goals :  To go home    Short term goals  Time Frame for Short term goals: 1 weeks  Short term goal 1: Pt to complete functional mobility to/from bathroom with SBA, RW, and min vcs for safety  Short term goal 2: Pt to tolerate standing greater than 3 mins with 1 hand release with SBA in preparation for returned to preffered IADL tasks within home including pet care  Short term goal 3: Pt to complete various functional transfers with SBA with 0 cues for safety for inc ind with toileting  Short term goal 4: Pt to complete LE dressing with min A & LH AE prn  Short term goal 5: Pt will increased UB strength to 4/5 and  strength by 5# ( R 11# at eval and L 9#) to improve ease with functional trnasfers and ADL routine  Long term goals  Time Frame for Long term goals : 3 weeks  Long term goal 1: Pt will complete BADL routing with S and any adaptive technique needed wilbur ncrease indep with dressing routine  Long term goal 2: Pt will complete simple IADL task with S and no cues for safety toincrease indep with simple cooking tasks at home

## 2018-10-23 NOTE — PROGRESS NOTES
Patient Name: Cathie Mauricio        MRN: [id number]    : 1940  (74 y.o.)  Gender: female   Principal Problem: S/P ileostomy ([facility]    Section F - Preferences for Customary Routine Activities   . Should Interview for Daily and Activity Preferences be Conducted? - Attempt to interview all residents able to communicate. If resident is unable to complete, attempt to complete interview with family member or significant other. Enter Code    1 0. No (resident is rarely/never understood and family/significant other not available) à Skip to and complete , Staff Assessment of Daily and Activity Preferences  1. Yes à Continue to Tayler Blade for Daily Preferences   . Interview for Daily Preferences  Show resident the response options and say: While you are in this facility           [place] - Very Important  2 - Somewhat Important  3 - Not very important  4 - Not important at all  5 - Important, but cant do or no choice  6 - No response or non-responsive Enter Codes in Boxes    1 A. How important is it to you to choose what clothes to wear?    1 B. How important is it to you to take care of your personal belongings or things?    4 C. How important is it to you to choose between a tub bath, shower, bed bath or sponge bath?    4 D. How important is it to you to have snacks available between meals?    1 E. How important is it to you to choose your own bedtime?    1 F. How important is it to you to have your family or a close friend involved in discussions about your care?    1 G. How important is it to you to be able to use the phone in private?    4 H. How important is it to you to have a place to lock your things to keep them safe? .  Interview for Activity Preferences   Show resident the response options and say: While you are in this facility           [place] - Very Important  2 - Somewhat Important  3 - Not very important  4 - Not important at all  5 - Important, but cant do or no choice  6 - No response or non-responsive Enter Codes in Boxes    1 A. How important is it to you to have books, newspapers, and magazines to read?    1 B. How important is it to you to listen to music you like?    1 C. How important is it to you to be around animals such as pets?     2 D. How important is it to you to keep up with the news?    2 E. How important is it to you to do things with groups of people?     1 F. How important is it to you to do your favorite activities?     1 G. How important is it to you to go outside to get fresh air when the weather is good?     1 H. How important is it to you to participate in Mandaeism services or practices? .  Daily and Activity Preferences Primary Respondent   Enter Code    1 Indicate Primary respondent for Daily and Activity Preferences ( and )  1 - Resident  2 - Family or Significant other (close friend or other representative)  5 - Interview could not be completed by resident or family/significant other (no response to 3 or more items)

## 2018-10-23 NOTE — PROGRESS NOTES
Pt was in a lot of pain this morning. She could not eat her breakfast and just wanted to go back to bed. Daughter is expressing concern about the pt pain medication being too much and making her sleep too much. She states there has to be something better for her. Dr India Dave was notified and he states that there is not much more he can do for her and that he would be up later to evaluate her.  Family informed

## 2018-10-24 PROBLEM — K56.609 SBO (SMALL BOWEL OBSTRUCTION) (HCC): Status: RESOLVED | Noted: 2018-09-24 | Resolved: 2018-10-24

## 2018-10-24 PROBLEM — K56.609 SMALL BOWEL OBSTRUCTION (HCC): Status: RESOLVED | Noted: 2018-09-24 | Resolved: 2018-10-24

## 2018-10-24 LAB
ANION GAP SERPL CALCULATED.3IONS-SCNC: 10 MEQ/L (ref 8–16)
BUN BLDV-MCNC: 18 MG/DL (ref 7–22)
CALCIUM IONIZED: 1.2 MMOL/L (ref 1.12–1.32)
CALCIUM SERPL-MCNC: 9.7 MG/DL (ref 8.5–10.5)
CHLORIDE BLD-SCNC: 92 MEQ/L (ref 98–111)
CO2: 30 MEQ/L (ref 23–33)
CREAT SERPL-MCNC: 0.3 MG/DL (ref 0.4–1.2)
ERYTHROCYTE [DISTWIDTH] IN BLOOD BY AUTOMATED COUNT: 14.9 % (ref 11.5–14.5)
ERYTHROCYTE [DISTWIDTH] IN BLOOD BY AUTOMATED COUNT: 43.5 FL (ref 35–45)
GFR SERPL CREATININE-BSD FRML MDRD: > 90 ML/MIN/1.73M2
GLUCOSE BLD-MCNC: 103 MG/DL (ref 70–108)
GLUCOSE BLD-MCNC: 105 MG/DL (ref 70–108)
GLUCOSE BLD-MCNC: 106 MG/DL (ref 70–108)
GLUCOSE BLD-MCNC: 107 MG/DL (ref 70–108)
GLUCOSE BLD-MCNC: 109 MG/DL (ref 70–108)
GLUCOSE BLD-MCNC: 96 MG/DL (ref 70–108)
HCT VFR BLD CALC: 34.2 % (ref 37–47)
HEMOGLOBIN: 11.6 GM/DL (ref 12–16)
MAGNESIUM: 2 MG/DL (ref 1.6–2.4)
MCH RBC QN AUTO: 27.4 PG (ref 26–33)
MCHC RBC AUTO-ENTMCNC: 33.9 GM/DL (ref 32.2–35.5)
MCV RBC AUTO: 80.9 FL (ref 81–99)
PHOSPHORUS: 4.1 MG/DL (ref 2.4–4.7)
PLATELET # BLD: 366 THOU/MM3 (ref 130–400)
PMV BLD AUTO: 8.9 FL (ref 9.4–12.4)
POTASSIUM SERPL-SCNC: 4.2 MEQ/L (ref 3.5–5.2)
RBC # BLD: 4.23 MILL/MM3 (ref 4.2–5.4)
SODIUM BLD-SCNC: 132 MEQ/L (ref 135–145)
WBC # BLD: 9.9 THOU/MM3 (ref 4.8–10.8)

## 2018-10-24 PROCEDURE — 82330 ASSAY OF CALCIUM: CPT

## 2018-10-24 PROCEDURE — 36415 COLL VENOUS BLD VENIPUNCTURE: CPT

## 2018-10-24 PROCEDURE — 97110 THERAPEUTIC EXERCISES: CPT

## 2018-10-24 PROCEDURE — 97530 THERAPEUTIC ACTIVITIES: CPT

## 2018-10-24 PROCEDURE — C9113 INJ PANTOPRAZOLE SODIUM, VIA: HCPCS | Performed by: FAMILY MEDICINE

## 2018-10-24 PROCEDURE — 99024 POSTOP FOLLOW-UP VISIT: CPT | Performed by: SURGERY

## 2018-10-24 PROCEDURE — 2500000003 HC RX 250 WO HCPCS

## 2018-10-24 PROCEDURE — 1290000000 HC SEMI PRIVATE OTHER R&B

## 2018-10-24 PROCEDURE — 84100 ASSAY OF PHOSPHORUS: CPT

## 2018-10-24 PROCEDURE — 82948 REAGENT STRIP/BLOOD GLUCOSE: CPT

## 2018-10-24 PROCEDURE — 80048 BASIC METABOLIC PNL TOTAL CA: CPT

## 2018-10-24 PROCEDURE — 2500000003 HC RX 250 WO HCPCS: Performed by: SURGERY

## 2018-10-24 PROCEDURE — 97127 HC SP THER IVNTJ W/FOCUS COG FUNCJ: CPT

## 2018-10-24 PROCEDURE — 6360000002 HC RX W HCPCS: Performed by: FAMILY MEDICINE

## 2018-10-24 PROCEDURE — 83735 ASSAY OF MAGNESIUM: CPT

## 2018-10-24 PROCEDURE — 2709999900 HC NON-CHARGEABLE SUPPLY

## 2018-10-24 PROCEDURE — 85027 COMPLETE CBC AUTOMATED: CPT

## 2018-10-24 PROCEDURE — 6360000002 HC RX W HCPCS: Performed by: SURGERY

## 2018-10-24 RX ADMIN — Medication 5 UNITS: at 08:40

## 2018-10-24 RX ADMIN — CALCIUM GLUCONATE: 94 INJECTION, SOLUTION INTRAVENOUS at 17:32

## 2018-10-24 RX ADMIN — KETOROLAC TROMETHAMINE 15 MG: 30 INJECTION, SOLUTION INTRAMUSCULAR at 05:04

## 2018-10-24 RX ADMIN — KETOROLAC TROMETHAMINE 15 MG: 30 INJECTION, SOLUTION INTRAMUSCULAR at 00:30

## 2018-10-24 RX ADMIN — PANTOPRAZOLE SODIUM 40 MG: 40 INJECTION, POWDER, FOR SOLUTION INTRAVENOUS at 08:33

## 2018-10-24 RX ADMIN — KETOROLAC TROMETHAMINE 15 MG: 30 INJECTION, SOLUTION INTRAMUSCULAR at 11:53

## 2018-10-24 RX ADMIN — KETOROLAC TROMETHAMINE 15 MG: 30 INJECTION, SOLUTION INTRAMUSCULAR at 17:32

## 2018-10-24 ASSESSMENT — PAIN SCALES - GENERAL
PAINLEVEL_OUTOF10: 5
PAINLEVEL_OUTOF10: 4
PAINLEVEL_OUTOF10: 5
PAINLEVEL_OUTOF10: 6
PAINLEVEL_OUTOF10: 3
PAINLEVEL_OUTOF10: 5

## 2018-10-24 ASSESSMENT — PAIN DESCRIPTION - LOCATION: LOCATION: HEAD

## 2018-10-24 ASSESSMENT — PAIN DESCRIPTION - DESCRIPTORS: DESCRIPTORS: HEADACHE

## 2018-10-24 ASSESSMENT — PAIN SCALES - WONG BAKER: WONGBAKER_NUMERICALRESPONSE: 8

## 2018-10-24 ASSESSMENT — PAIN DESCRIPTION - PAIN TYPE: TYPE: ACUTE PAIN

## 2018-10-24 NOTE — PROGRESS NOTES
TPN Follow Up Note    Assessment: Patient NPO. Still having abdominal pain. Electrolyte Replacement: none    TPN changes for (today) at 1800:    See dieticians note for macronutrient changes.     Increase NaCl to 260 mEq    Re-check BMP, Mg, PO4, iCa 19/84/94    Kentrell Rowland PharmD, [facility]  10/24/2018 11:55 AM

## 2018-10-24 NOTE — DISCHARGE SUMMARY
discharged from [address] [place] [address]. CM [address] and Rehab Unit  [facility] [address]  [address]  [address]  [place]  [phone]      Will make follow up once discharged from [address] [place] [address]       Discharge condition: deconditioned   Disposition: Skilled Facility  Time spent on discharge: postop patient     Discharge Medications:   Skip Locker   Home Medication Instructions [id number]    Printed on:10/24/18 1324   Medication Information                      acetaminophen (TYLENOL) 325 MG tablet  Take 650 mg by mouth every 6 hours as needed for Pain             Alpha-D-Galactosidase (BEANO PO)  Take by mouth             ferrous sulfate 325 (65 Fe) MG tablet  Take 325 mg by mouth daily (with breakfast)             ibuprofen (ADVIL;MOTRIN) 200 MG tablet  Take 200 mg by mouth every 6 hours as needed for Pain             Multiple Vitamins-Minerals (PRESERVISION AREDS 2 PO)  Take 1 tablet by mouth daily             Omega-3 Fatty Acids (FISH OIL) 1000 MG CAPS  Take 1,000 mg by mouth daily             ranitidine (RANITIDINE ACID REDUCER) 75 MG tablet  Take 75 mg by mouth 2 times daily             Simethicone (GAS-X EXTRA STRENGTH) 125 MG CAPS  Take by mouth               Discharge summary completed on behalf of Dr Santo Boast  Electronically signed by SULEIMAN Ferguson CNP on 10/24/2018 at 1:24 PM Patient seen and examined independently by me. Above discussed and I agree with CNP. Labs, cultures, and radiographs where available were reviewed. See orders for the updated patient care plan.     Nat Asif MD,   10/24/2018   5:44 PM

## 2018-10-24 NOTE — PROGRESS NOTES
assistance    Transfers  Sit to Stand: Stand by assistance  Stand to sit: Stand by assistance       Ambulation 1  Surface: level tile  Device: Rolling Walker  Assistance: Stand by assistance  Quality of Gait: decreased velocity and jon, decreased but equal step length B, forward flexed trunk with downward gaze, fair heel strike B  Distance: 135ft  Comments: required encouragement for distance. cues provided to improve step length B with minimal to no change in gait noted       Exercises:  Exercises  Comments: seated ankle pumps, long arc quads, hip abd, marches, glute sets x10-12 reps each B with continuous cues for performance and encouragement. Patient refused further therex at end of session and adamant to return to bed     Activity Tolerance:  Activity Tolerance: Patient limited by fatigue; Other  Activity Tolerance: poor motivation    Assessment: Body structures, Functions, Activity limitations: Decreased functional mobility , Decreased strength, Decreased endurance, Decreased balance, Decreased safe awareness, Decreased cognition  Assessment: Patient continues to make progress with mobility and has currently met 4/4 STGs. The patient declined further ambulation, step negotiation, and car transfer this date so LTGs unable to be assessed. She requires continued skilled PT to improve safety and tolerance to activity for proper mobility required for safe return home  Prognosis: Good     Discharge Recommendations:  Discharge Recommendations: Continue to assess pending progress    Patient Education:  Patient Education: ther ex, tfers, gait, importance/role of PT    Equipment Recommendations:  Equipment Needed: No    Safety:  Type of devices:  All fall risk precautions in place, Patient at risk for falls, Call light within reach, Telesitter in use, Gait belt, Bed alarm in place, Left in bed  Restraints  Initially in place: No    Plan:  Times per week: 6x  Times per day: Daily  Specific instructions for Next Treatment: B LE strengthening, transfer training, gait training, standing balance  Current Treatment Recommendations: Strengthening, Functional Mobility Training, Transfer Training, Balance Training, Endurance Training, Stair training, Gait Training, Neuromuscular Re-education, Home Exercise Program, Safety Education & Training, Patient/Caregiver Education & Training, Equipment Evaluation, Education, & procurement    Goals:  Patient goals : To go home. Short term goals  Time Frame for Short term goals: 2 weeks  Short term goal 1: Pt to transfer supine <--> sit SBA to enable pt to get in/out of bed. MET  Short term goal 2: Pt to transfer sit <--> stand SBA for increased functional mobility. MET  Short term goal 3: Pt to ambulate >100 feet with RW SBA for household ambulation. MET  Short term goal 4: Pt to improve dynamic standing balance to fair+ to minimize fall risk. MET    Long term goals  Time Frame for Long term goals : 4 weeks  Long term goal 1: Pt to ambulate >200 feet with SBQC SBA for household ambulation. Long term goal 2: Pt to ascend/descend 4 steps without HR SBA for home entry. Long term goal 3: Pt to perform car transfer SBA to enable pt to get in/out of the car.

## 2018-10-24 NOTE — PROGRESS NOTES
[facility]  INPATIENT SPEECH THERAPY  [facility] TCU 8E    TIME   SLP Individual Minutes  Time In: 1330  Time Out: 1400  Minutes: 30  Timed Code Treatment Minutes: 30 Minutes       [x]Daily Note  []Progress Note  []Discharge Note    Date: 10/24/2018  Patient Name: Anitra Angelo        MRN: [id number]    : 1940  (66 y.o.)  Gender: female   Primary Provider: Jose Almaraz MD  Admitting Diagnosis:  SBO s/p bowel resection   Secondary Diagnosis: Cognitive deficits   Precautions: Fall risk   Swallowing Status/Diet: Regular with thin liquids   Swallowing Strategies: n/a  DATE of last MBS:  n/a  Pain:  Pain on level 3/10 in stomach; nursing aware and addressing concerns    Subjective: Pt awake and alert, seen at the bedside. SHORT TERM GOAL #1:  Goal 1: Pt will complete immediate, delayed, and working memory tasks with 70% accuracy given mod cues to improve retention of novel and functional information. INTERVENTIONS: Orientation:  orientation indep use of calendar (month, date, MIRANDA, year, type of place, name, city, state). Patient with no awareness of current floor. ST provided patient with information [address] (3 units of information) and location of where information is located on care board. ST cued patient to recall throughout therapy session with use of external aid. Immediate recall:  3/3 units of information. 10 minute delay:  1/3 indep, 2/3 unable to recall despite max cues     Recall: With use of familiar picture patient unable to recall familiar family names x5    SHORT TERM GOAL #2:  Goal 2: Pt will complete problem solving, reasoning, and executive function tasks (money, checkbook, medications) with 70% accuracy given mod cues to improve awareness for ADL and IADL completion.    INTERVENTIONS: Call light: pt required increased time to locate and discern established safety procedure; 0/3 justifications provided for usage independent, 3/3 given MAX cues to improve awareness

## 2018-10-25 LAB
ANION GAP SERPL CALCULATED.3IONS-SCNC: 9 MEQ/L (ref 8–16)
BUN BLDV-MCNC: 20 MG/DL (ref 7–22)
CALCIUM IONIZED: 1.26 MMOL/L (ref 1.12–1.32)
CALCIUM SERPL-MCNC: 9.3 MG/DL (ref 8.5–10.5)
CHLORIDE BLD-SCNC: 94 MEQ/L (ref 98–111)
CO2: 31 MEQ/L (ref 23–33)
CREAT SERPL-MCNC: 0.5 MG/DL (ref 0.4–1.2)
GFR SERPL CREATININE-BSD FRML MDRD: > 90 ML/MIN/1.73M2
GLUCOSE BLD-MCNC: 110 MG/DL (ref 70–108)
GLUCOSE BLD-MCNC: 113 MG/DL (ref 70–108)
GLUCOSE BLD-MCNC: 114 MG/DL (ref 70–108)
GLUCOSE BLD-MCNC: 116 MG/DL (ref 70–108)
GLUCOSE BLD-MCNC: 121 MG/DL (ref 70–108)
MAGNESIUM: 2.2 MG/DL (ref 1.6–2.4)
PHOSPHORUS: 3.6 MG/DL (ref 2.4–4.7)
POTASSIUM SERPL-SCNC: 3.8 MEQ/L (ref 3.5–5.2)
SODIUM BLD-SCNC: 134 MEQ/L (ref 135–145)

## 2018-10-25 PROCEDURE — 97110 THERAPEUTIC EXERCISES: CPT

## 2018-10-25 PROCEDURE — 97530 THERAPEUTIC ACTIVITIES: CPT

## 2018-10-25 PROCEDURE — 6370000000 HC RX 637 (ALT 250 FOR IP): Performed by: SURGERY

## 2018-10-25 PROCEDURE — 80048 BASIC METABOLIC PNL TOTAL CA: CPT

## 2018-10-25 PROCEDURE — 82948 REAGENT STRIP/BLOOD GLUCOSE: CPT

## 2018-10-25 PROCEDURE — C9113 INJ PANTOPRAZOLE SODIUM, VIA: HCPCS | Performed by: FAMILY MEDICINE

## 2018-10-25 PROCEDURE — 2500000003 HC RX 250 WO HCPCS: Performed by: COUNSELOR

## 2018-10-25 PROCEDURE — 97127 HC SP THER IVNTJ W/FOCUS COG FUNCJ: CPT

## 2018-10-25 PROCEDURE — 82330 ASSAY OF CALCIUM: CPT

## 2018-10-25 PROCEDURE — 6360000002 HC RX W HCPCS: Performed by: SURGERY

## 2018-10-25 PROCEDURE — 97116 GAIT TRAINING THERAPY: CPT

## 2018-10-25 PROCEDURE — 1290000000 HC SEMI PRIVATE OTHER R&B

## 2018-10-25 PROCEDURE — 36415 COLL VENOUS BLD VENIPUNCTURE: CPT

## 2018-10-25 PROCEDURE — 36592 COLLECT BLOOD FROM PICC: CPT

## 2018-10-25 PROCEDURE — 2709999900 HC NON-CHARGEABLE SUPPLY

## 2018-10-25 PROCEDURE — 6360000002 HC RX W HCPCS: Performed by: FAMILY MEDICINE

## 2018-10-25 PROCEDURE — 83735 ASSAY OF MAGNESIUM: CPT

## 2018-10-25 PROCEDURE — 84100 ASSAY OF PHOSPHORUS: CPT

## 2018-10-25 RX ADMIN — KETOROLAC TROMETHAMINE 15 MG: 30 INJECTION, SOLUTION INTRAMUSCULAR at 17:57

## 2018-10-25 RX ADMIN — KETOROLAC TROMETHAMINE 15 MG: 30 INJECTION, SOLUTION INTRAMUSCULAR at 11:54

## 2018-10-25 RX ADMIN — CALCIUM GLUCONATE: 94 INJECTION, SOLUTION INTRAVENOUS at 17:58

## 2018-10-25 RX ADMIN — ONDANSETRON 4 MG: 4 TABLET, ORALLY DISINTEGRATING ORAL at 13:38

## 2018-10-25 RX ADMIN — KETOROLAC TROMETHAMINE 15 MG: 30 INJECTION, SOLUTION INTRAMUSCULAR at 05:47

## 2018-10-25 RX ADMIN — ACETAMINOPHEN 650 MG: 325 TABLET ORAL at 13:37

## 2018-10-25 RX ADMIN — KETOROLAC TROMETHAMINE 15 MG: 30 INJECTION, SOLUTION INTRAMUSCULAR at 00:04

## 2018-10-25 RX ADMIN — PANTOPRAZOLE SODIUM 40 MG: 40 INJECTION, POWDER, FOR SOLUTION INTRAVENOUS at 10:00

## 2018-10-25 ASSESSMENT — PAIN SCALES - GENERAL
PAINLEVEL_OUTOF10: 3
PAINLEVEL_OUTOF10: 2
PAINLEVEL_OUTOF10: 3
PAINLEVEL_OUTOF10: 4
PAINLEVEL_OUTOF10: 0
PAINLEVEL_OUTOF10: 4
PAINLEVEL_OUTOF10: 4

## 2018-10-25 ASSESSMENT — PAIN DESCRIPTION - PAIN TYPE: TYPE: ACUTE PAIN

## 2018-10-25 ASSESSMENT — PAIN DESCRIPTION - LOCATION: LOCATION: ABDOMEN

## 2018-10-25 NOTE — PROGRESS NOTES
ago    COLONOSCOPY  08/11/2017    Dr Colletta Jewel, COLON, [place] [facility] [address] ? [facility]    LUMBAR LAMINECTOMY  4/15/2013    OTHER SURGICAL HISTORY  7/8/2013    evacation of seroma-back     AL EXPLORATORY OF ABDOMEN N/A 10/4/2018    LAPAROTOMY EXPLORATORY, SMALL BOWEL RESECTION X 3, EXTENSIVE LYSIS OF ADHESIONS, ILEOSTOMY FORMATION performed by Nat Asif MD at [address] OFFICE/OUTPT VISIT,PROCEDURE ONLY N/A 6/25/2018    ABDOMINAL EXPLORATION AND ESTENDED LEFT COLON RESECTION, LYSIS OF COMPLEX ADHESIONS performed by Nat Asif MD at [address]  08/11/2017    Dr Dale Pina Left 9/28/2018    EGD BIOPSY performed by Nat Asif MD at [facility]       Restrictions/Precautions:  General Precautions, Fall Risk                    Other position/activity restrictions: abd incision, sx 9-24 SBO repair and ileostomy       Prior Level of Function:  ADL Assistance: Independent  Homemaking Assistance: Needs assistance (shares role with daughter)  Ambulation Assistance: Independent  Transfer Assistance: Independent  Additional Comments: Pt was very IND at [facility] however recently her daughters have been providing increasing assist with IADLs around home. Walks with [facility]    Subjective:  Response To Previous Treatment: Patient with no complaints from previous session. Family / Caregiver Present: Yes (dtr. present)  Subjective: patient  seated up in bedside chair upon arrival, dtr. arrived and observed all activity. pt. reports feeling better today. pt. looking much more alert, talking and smiling throughout session. pt's dtr.  very pleased     Pain:   .  Pain Assessment  Pain Level: 0 (no pain reported this am)       Social/Functional:  Lives With: Daughter (stays with pt )  Type of Home: House  Home Layout: One level  Home Access: Stairs to enter without

## 2018-10-25 NOTE — PROGRESS NOTES
Nutrition Assessment    Type and Reason for Visit: Reassess (Severe Malnutrition; TPN)    Nutrition Recommendations:   Continue FL Diet with Thin Liquids  Will initiate Ensure Compact Supplement TID  Continue TPN. Increase tonight's macronutrients in TPN. Use dosing wt. 57kgm, 25kcals/kgm, 30% kcals from lipids, 1.4 grams protein/kgm to yield ~ 1425kcals, 80 grams protein, 199 grams CHO  Monitor po intake, supplement tolerance. Adjust TPN as needed. Continue to monitor weight. Daily weight checks ordered. Continue MVI, Omega 3's, Iron supplements. Malnutrition Assessment:  · Malnutrition Status: Meets the criteria for severe malnutrition  · Context: Acute illness or injury  · Findings of the 6 clinical characteristics of malnutrition (Minimum of 2 out of 6 clinical characteristics is required to make the diagnosis of moderate or severe Protein Calorie Malnutrition based on AND/ASPEN Guidelines):  1. Energy Intake-Less than or equal to 75%, greater than 7 days    2. Weight Loss-10% loss or greater,  (4 months)  3. Fat Loss-Moderate subcutaneous fat loss, Orbital  4. Muscle Loss-Moderate muscle mass loss, Temples (temporalis muscle), Clavicles (pectoralis and deltoids)  5. Fluid Accumulation-No significant fluid accumulation, Extremities  6.  Strength-Not measured    Nutrition Diagnosis:   · Problem: Severe malnutrition, in context of acute illness or injury  · Etiology: related to Alteration in GI function, Insufficient energy/nutrient consumption     Signs and symptoms:  as evidenced by Presence of wounds, Weight loss, GI abnormality, Moderate loss of subcutaneous fat, Moderate muscle loss    Nutrition Assessment:  · Subjective Assessment: Pt admitted to TCU with SBO - s/p Bowels Resection on 10/4/18. Pt seen. SLP following - just had finished seeing patient (cognitive goals, no diet goals noted). Chart documentation shows that pt has not ate anything.  Pt stated that she is not hungry d/t still having abdominal pain. Talked with Melissa [employer] and pt only ate bites of pudding and sherbet this morning. Does love her coffee though. Family always asked for more of the pureed soups, etc when pt was on a Regular diet awhile back for better tolerance. Diet order per Dr Dov Maddox yesterday afternoon says Full Liquid. SLP therapist notes state Regular Diet with Thin Liquids. Agree that TPN is needed at this time. Pt's po intake is not enough to adequately meet her estimated nutritional needs. Labs this morning: sodium was a little low at 134; Blood sugars are controlled. Medications include MVI, Omega 3's, Iron, Humalog, Toradol, Protonix. No edema. No wounds. BM this morning. Ileostomy output 1200 ml of stool the past 24 hours. Will increase macronutrients in tonight's TPN. Monitor pt's abdominal pain, po intake and TPN tolerance. · Wound Type: Surgical Wound  · Current Nutrition Therapies:  · Oral Diet Orders: Full Liquid   · Oral Diet intake: 0%  · Oral Nutrition Supplement (ONS) Orders:  (Ensure Compact TID)  · ONS intake: Unable to assess; starting today  · Parenteral Nutrition Orders:  · Type and Formula: 3-in-1 Standard   · Lipids: Daily  · Rate/Volume: 100ml/hr  · Duration: Continuous 24 hrs  · Current PN Order Provides: dosing wt. 57kgm, 20kcals/kgm, 30% kcals from lipids, 1.4 grams protein/kgm to yield ~ 1140kcals, 80 grams protein, 141 grams CHO  · Goal PN Orders Provides: tonight's bag (10/25) dosing wt.  57kgm, 25kcals/kgm, 30% kcals from lipids, 1.4 grams protein/kgm to yield ~ 1425kcals, 80 grams protein, 199 grams CHO  · Anthropometric Measures:  · Ht: 5' 3\" (160 cm)   · Current Body Wt: 126 lb 11.2 oz (57.5 kg)  · Admission Body Wt: 142 lb (64.4 kg) (10/18/18, no weight source, +1 BUE edema, +2 BLE edema)  · Usual Body Wt: 154 lb (69.9 kg) (EMR, 6/25/18, actual)  · % Weight Change: 18.8% wt. loss,  almost 4 months  · Ideal Body Wt: 115 lb (52.2 kg), % Ideal Body 109%  · BMI Classification:

## 2018-10-26 ENCOUNTER — APPOINTMENT (OUTPATIENT)
Dept: GENERAL RADIOLOGY | Age: 78
DRG: 393 | End: 2018-10-26
Attending: FAMILY MEDICINE
Payer: MEDICARE

## 2018-10-26 LAB
ANION GAP SERPL CALCULATED.3IONS-SCNC: 10 MEQ/L (ref 8–16)
BUN BLDV-MCNC: 21 MG/DL (ref 7–22)
CALCIUM IONIZED: 1.29 MMOL/L (ref 1.12–1.32)
CALCIUM SERPL-MCNC: 9.4 MG/DL (ref 8.5–10.5)
CHLORIDE BLD-SCNC: 97 MEQ/L (ref 98–111)
CO2: 29 MEQ/L (ref 23–33)
CREAT SERPL-MCNC: 0.4 MG/DL (ref 0.4–1.2)
GFR SERPL CREATININE-BSD FRML MDRD: > 90 ML/MIN/1.73M2
GLUCOSE BLD-MCNC: 100 MG/DL (ref 70–108)
GLUCOSE BLD-MCNC: 115 MG/DL (ref 70–108)
GLUCOSE BLD-MCNC: 117 MG/DL (ref 70–108)
GLUCOSE BLD-MCNC: 99 MG/DL (ref 70–108)
MAGNESIUM: 1.8 MG/DL (ref 1.6–2.4)
PHOSPHORUS: 3.1 MG/DL (ref 2.4–4.7)
POTASSIUM SERPL-SCNC: 3.8 MEQ/L (ref 3.5–5.2)
SODIUM BLD-SCNC: 136 MEQ/L (ref 135–145)

## 2018-10-26 PROCEDURE — 2500000003 HC RX 250 WO HCPCS

## 2018-10-26 PROCEDURE — 6360000002 HC RX W HCPCS: Performed by: FAMILY MEDICINE

## 2018-10-26 PROCEDURE — 6360000002 HC RX W HCPCS: Performed by: SURGERY

## 2018-10-26 PROCEDURE — 2709999900 HC NON-CHARGEABLE SUPPLY

## 2018-10-26 PROCEDURE — 97110 THERAPEUTIC EXERCISES: CPT

## 2018-10-26 PROCEDURE — 36415 COLL VENOUS BLD VENIPUNCTURE: CPT

## 2018-10-26 PROCEDURE — 84100 ASSAY OF PHOSPHORUS: CPT

## 2018-10-26 PROCEDURE — 1290000000 HC SEMI PRIVATE OTHER R&B

## 2018-10-26 PROCEDURE — 6370000000 HC RX 637 (ALT 250 FOR IP): Performed by: SURGERY

## 2018-10-26 PROCEDURE — C9113 INJ PANTOPRAZOLE SODIUM, VIA: HCPCS | Performed by: FAMILY MEDICINE

## 2018-10-26 PROCEDURE — 80048 BASIC METABOLIC PNL TOTAL CA: CPT

## 2018-10-26 PROCEDURE — 97116 GAIT TRAINING THERAPY: CPT

## 2018-10-26 PROCEDURE — 82948 REAGENT STRIP/BLOOD GLUCOSE: CPT

## 2018-10-26 PROCEDURE — 97127 HC SP THER IVNTJ W/FOCUS COG FUNCJ: CPT

## 2018-10-26 PROCEDURE — 97530 THERAPEUTIC ACTIVITIES: CPT

## 2018-10-26 PROCEDURE — 83735 ASSAY OF MAGNESIUM: CPT

## 2018-10-26 PROCEDURE — 82330 ASSAY OF CALCIUM: CPT

## 2018-10-26 PROCEDURE — 71045 X-RAY EXAM CHEST 1 VIEW: CPT

## 2018-10-26 RX ADMIN — CALCIUM GLUCONATE: 94 INJECTION, SOLUTION INTRAVENOUS at 18:37

## 2018-10-26 RX ADMIN — KETOROLAC TROMETHAMINE 15 MG: 30 INJECTION, SOLUTION INTRAMUSCULAR at 17:34

## 2018-10-26 RX ADMIN — ACETAMINOPHEN 650 MG: 325 TABLET ORAL at 06:42

## 2018-10-26 RX ADMIN — KETOROLAC TROMETHAMINE 15 MG: 30 INJECTION, SOLUTION INTRAMUSCULAR at 00:01

## 2018-10-26 RX ADMIN — ACETAMINOPHEN 650 MG: 325 TABLET ORAL at 11:35

## 2018-10-26 RX ADMIN — PANTOPRAZOLE SODIUM 40 MG: 40 INJECTION, POWDER, FOR SOLUTION INTRAVENOUS at 08:06

## 2018-10-26 RX ADMIN — KETOROLAC TROMETHAMINE 15 MG: 30 INJECTION, SOLUTION INTRAMUSCULAR at 06:08

## 2018-10-26 RX ADMIN — KETOROLAC TROMETHAMINE 15 MG: 30 INJECTION, SOLUTION INTRAMUSCULAR at 12:20

## 2018-10-26 ASSESSMENT — PAIN DESCRIPTION - PAIN TYPE: TYPE: ACUTE PAIN

## 2018-10-26 ASSESSMENT — PAIN SCALES - GENERAL
PAINLEVEL_OUTOF10: 4
PAINLEVEL_OUTOF10: 0
PAINLEVEL_OUTOF10: 3
PAINLEVEL_OUTOF10: 0
PAINLEVEL_OUTOF10: 4
PAINLEVEL_OUTOF10: 5
PAINLEVEL_OUTOF10: 4
PAINLEVEL_OUTOF10: 5
PAINLEVEL_OUTOF10: 5

## 2018-10-26 ASSESSMENT — PAIN DESCRIPTION - DESCRIPTORS: DESCRIPTORS: ACHING

## 2018-10-26 ASSESSMENT — PAIN DESCRIPTION - ORIENTATION: ORIENTATION: ANTERIOR;UPPER

## 2018-10-26 ASSESSMENT — PAIN DESCRIPTION - LOCATION
LOCATION: ABDOMEN
LOCATION: ABDOMEN;HEAD
LOCATION: ABDOMEN

## 2018-10-26 ASSESSMENT — PAIN DESCRIPTION - FREQUENCY: FREQUENCY: INTERMITTENT

## 2018-10-26 ASSESSMENT — PAIN DESCRIPTION - ONSET: ONSET: GRADUAL

## 2018-10-26 NOTE — PROGRESS NOTES
TPN Follow Up Note    Assessment: Patient is on full liquid diet. Dr. Mandi Basilio to re-evaluate the patient Monday when he returns.      Electrolyte Replacement: none    TPN changes for (today) at 1800:    See dietician's note for macronutrient changes    Re-check BMP, Mg, PO4, iCa 46/57/77    Reva Black PharmD, [facility]  10/26/2018 12:05 PM

## 2018-10-26 NOTE — PROGRESS NOTES
[facility]  INPATIENT SPEECH THERAPY  [facility] TCU 8E    TIME   SLP Individual Minutes  Time In: 0930  Time Out: 1000  Minutes: 30  Timed Code Treatment Minutes: 30 Minutes       [x]Daily Note  []Progress Note  []Discharge Note    Date: 10/26/2018  Patient Name: Patti Glass        MRN: [id number]    : 1940  (66 y.o.)  Gender: female   Primary Provider: Mary Kate Schwartz MD  Admitting Diagnosis:  SBO s/p bowel resection   Secondary Diagnosis: Cognitive deficits   Precautions: Fall risk   Swallowing Status/Diet: Regular with thin liquids   Swallowing Strategies: n/a  DATE of last MBS:  n/a  Pain:  Pain on level 3/10 in stomach; nursing aware and addressing concerns    Subjective: Pt seen sitting upright in chair with full liquid tray present. Alert with positive attitude    SHORT TERM GOAL #1:  Goal 1: Pt will complete immediate, delayed, and working memory tasks with 70% accuracy given mod cues to improve retention of novel and functional information. INTERVENTIONS: Orientation:   MIRANDA-mod cues  Month-indep  Year-indep  Date-mod cues with use of calendar  Location type-indep  Location name-mod cues with use of careboard    Working memory task, recalling word list in reverse order (3 words):  indep 2/7 min cues    SHORT TERM GOAL #2:  Goal 2: Pt will complete problem solving, reasoning, and executive function tasks (money, checkbook, medications) with 70% accuracy given mod cues to improve awareness for ADL and IADL completion. INTERVENTIONS: Story problems focusing on time concepts:  indep / mod cues    SHORT TERM GOAL #3:  Goal 3: Pt will complete thought organization and divergent naming tasks with 70% accuracy with min cues/naming 11+items in 1 minute to improve processing speed and ability to manage daily schedule.    INTERVENTIONS: Explaining similarity and difference between two objects:  indep2/8 min cues 4/8 mod cues  Vague responses noted with cues for specificity required    SHORT TERM GOAL #4:  Goal 4: Pt will complete alternating, divided, and complex attention tasks with no more than 2 errors within 3-5 minute time span to permit potential return to cooking, cleaning, and housework. INTERVENTIONS: Patient required x2  redirections back to therapeutic tasks within 30 minute session     Long-term Goals  Timeframe for Long-term Goals: 2 weeks  Goal 1: Pt will improve cognitive functioning to a supervision level or higher to promote improved independence for ADL and IADL completion for re-integration into home setting. ONGOING     ASSESSMENT:  Assessment: [x]Progressing towards goals          []Not Progressing towards goals    Patient Tolerance of Treatment:   []Tolerated well [x]Tolerated fair []Required rest breaks [x]Fatigued  Plan for Next Session: Complex attention, working memory  Education:  Learner:  [x]Patient          []Significant Other          []Other: daughter present   Education provided regarding:  [x]Goals and POC   []Diet and swallowing precautions    []Home Exercise Program  [x]Progress and/or discharge information  Method of Education:  [x]Discussion          []Demonstration          []Handout          []Other  Evaluation of Education:   [x]Verbalized understanding   []Demonstrates without assistance  []Demonstrates with assistance  [x]Needs further instruction     []No evidence of learning                  [x]No family present      Plan: [x]Continue with current plan of care    []Modify current plan of care as follows:    []Discharge patient    Discharge Location:    Services/Supervision Recommended:     [x]Patient continues to require treatment by a licensed therapist to address functional deficits as outlined in the established plan of care.     Gracie PANIAGUAO-[id number]

## 2018-10-26 NOTE — PROGRESS NOTES
[facility]  INPATIENT PHYSICAL THERAPY  DAILY NOTE  [facility] TCU 8E - 8E-65/065-A    Time In: 0805  Time Out: 9859  Timed Code Treatment Minutes: 48 Minutes  Minutes: 53          Date: 10/26/2018  Patient Name: Marlene Bunch,  Gender:  female        MRN: [id number]  : 1940  (66 y.o.)     Referring Practitioner: Ordering: Kathleen Herrera MD; Attending: Viki Villarreal MD  Diagnosis: SBO s/p bowel resection  Additional Pertinent Hx: Per ED note 18, pt is a 66 y.o. female who has history of Pylori and cholecystectomy. Patient presents to the Emergency Department for the evaluation of abdominal pain that has been ongoing. Patient reports that she is experiencing abdominal distention, melena, decreased appetite, fear of eating secondary to abdominal pain, and small bowel movements. Patient was admitted to hospital on Sep. 2nd due to small bowel obstruction. She was cared for by Dr. Charan Kraft (general surgeon). Per daughter, patient has been experiencing abdominal complications since left colon resection in . Pt underwent Abd Exploration, Lysis Severe Adhesions, Segmental SB resection x 3, Formation End Ileostomy on 10/4. Pt was a code stroke on 10/12, MRI negative. To TCU 10/16.      Past Medical History:   Diagnosis Date    Arthritis     Cellulitis     Difficult intubation     GERD (gastroesophageal reflux disease)     H pylori ulcer     History of Right leg DVT ([address]     [facility] in [facility] 10/04/2018    Nausea & vomiting     Pneumonia     S/P partial resection of colon 2018    Shingles     Torus mandibularis      Past Surgical History:   Procedure Laterality Date    ABDOMEN SURGERY      ABDOMINAL ADHESION SURGERY  's    Dr Suze Friend  2013    Lysis of Adhesions-Dr. Charan Kraft     BACK SURGERY  //2006/2013    X3    [facility]LE[facility]STECTOMY      [facility]    COLON SURGERY      COLONOSCOPY  over 10 yrs ago

## 2018-10-26 NOTE — PROGRESS NOTES
Standards, Pertinent Labs, Chewing/Swallowing, Patient/Family Education    See Adult Nutrition Doc Flowsheet for more detail.      Electronically signed by Shashank Moraes RD, LD on 10/26/18 at 12:05 PM    Contact [phone]

## 2018-10-27 LAB
ANION GAP SERPL CALCULATED.3IONS-SCNC: 10 MEQ/L (ref 8–16)
BUN BLDV-MCNC: 20 MG/DL (ref 7–22)
CALCIUM IONIZED: 1.13 MMOL/L (ref 1.12–1.32)
CALCIUM SERPL-MCNC: 9.3 MG/DL (ref 8.5–10.5)
CHLORIDE BLD-SCNC: 98 MEQ/L (ref 98–111)
CO2: 26 MEQ/L (ref 23–33)
CREAT SERPL-MCNC: 0.3 MG/DL (ref 0.4–1.2)
GFR SERPL CREATININE-BSD FRML MDRD: > 90 ML/MIN/1.73M2
GLUCOSE BLD-MCNC: 104 MG/DL (ref 70–108)
GLUCOSE BLD-MCNC: 110 MG/DL (ref 70–108)
GLUCOSE BLD-MCNC: 110 MG/DL (ref 70–108)
GLUCOSE BLD-MCNC: 114 MG/DL (ref 70–108)
GLUCOSE BLD-MCNC: 115 MG/DL (ref 70–108)
GLUCOSE BLD-MCNC: 118 MG/DL (ref 70–108)
GLUCOSE BLD-MCNC: 122 MG/DL (ref 70–108)
MAGNESIUM: 1.8 MG/DL (ref 1.6–2.4)
PHOSPHORUS: 2.8 MG/DL (ref 2.4–4.7)
POTASSIUM SERPL-SCNC: 4.4 MEQ/L (ref 3.5–5.2)
SODIUM BLD-SCNC: 134 MEQ/L (ref 135–145)

## 2018-10-27 PROCEDURE — 1290000000 HC SEMI PRIVATE OTHER R&B

## 2018-10-27 PROCEDURE — 6370000000 HC RX 637 (ALT 250 FOR IP): Performed by: FAMILY MEDICINE

## 2018-10-27 PROCEDURE — 6360000002 HC RX W HCPCS: Performed by: SURGERY

## 2018-10-27 PROCEDURE — 83735 ASSAY OF MAGNESIUM: CPT

## 2018-10-27 PROCEDURE — 82948 REAGENT STRIP/BLOOD GLUCOSE: CPT

## 2018-10-27 PROCEDURE — 80048 BASIC METABOLIC PNL TOTAL CA: CPT

## 2018-10-27 PROCEDURE — 2500000003 HC RX 250 WO HCPCS: Performed by: SURGERY

## 2018-10-27 PROCEDURE — 84100 ASSAY OF PHOSPHORUS: CPT

## 2018-10-27 PROCEDURE — 6360000002 HC RX W HCPCS: Performed by: FAMILY MEDICINE

## 2018-10-27 PROCEDURE — 36415 COLL VENOUS BLD VENIPUNCTURE: CPT

## 2018-10-27 PROCEDURE — 2709999900 HC NON-CHARGEABLE SUPPLY

## 2018-10-27 PROCEDURE — 82330 ASSAY OF CALCIUM: CPT

## 2018-10-27 PROCEDURE — C9113 INJ PANTOPRAZOLE SODIUM, VIA: HCPCS | Performed by: FAMILY MEDICINE

## 2018-10-27 RX ORDER — HYDROCODONE BITARTRATE AND ACETAMINOPHEN 5; 325 MG/1; MG/1
1 TABLET ORAL
Status: DISCONTINUED | OUTPATIENT
Start: 2018-10-27 | End: 2018-10-29

## 2018-10-27 RX ORDER — ACETAMINOPHEN AND CODEINE PHOSPHATE 300; 30 MG/1; MG/1
1 TABLET ORAL
Status: DISCONTINUED | OUTPATIENT
Start: 2018-10-27 | End: 2018-10-27 | Stop reason: RX

## 2018-10-27 RX ORDER — IBUPROFEN 400 MG/1
400 TABLET ORAL
Status: DISCONTINUED | OUTPATIENT
Start: 2018-10-27 | End: 2018-10-29

## 2018-10-27 RX ADMIN — KETOROLAC TROMETHAMINE 15 MG: 30 INJECTION, SOLUTION INTRAMUSCULAR at 06:21

## 2018-10-27 RX ADMIN — HYDROCODONE BITARTRATE AND ACETAMINOPHEN 1 TABLET: 5; 325 TABLET ORAL at 21:03

## 2018-10-27 RX ADMIN — PANTOPRAZOLE SODIUM 40 MG: 40 INJECTION, POWDER, FOR SOLUTION INTRAVENOUS at 08:06

## 2018-10-27 RX ADMIN — KETOROLAC TROMETHAMINE 15 MG: 30 INJECTION, SOLUTION INTRAMUSCULAR at 11:53

## 2018-10-27 RX ADMIN — KETOROLAC TROMETHAMINE 15 MG: 30 INJECTION, SOLUTION INTRAMUSCULAR at 01:11

## 2018-10-27 RX ADMIN — KETOROLAC TROMETHAMINE 15 MG: 30 INJECTION, SOLUTION INTRAMUSCULAR at 18:04

## 2018-10-27 RX ADMIN — CALCIUM GLUCONATE: 94 INJECTION, SOLUTION INTRAVENOUS at 18:26

## 2018-10-27 ASSESSMENT — PAIN SCALES - GENERAL
PAINLEVEL_OUTOF10: 0
PAINLEVEL_OUTOF10: 4
PAINLEVEL_OUTOF10: 4
PAINLEVEL_OUTOF10: 5
PAINLEVEL_OUTOF10: 6
PAINLEVEL_OUTOF10: 0

## 2018-10-27 NOTE — PROGRESS NOTES
Patient: Shai Mendoza [facility]  Unit/Bed: 8E-65/065-A  YOB: 1940  MRN: [id number] Acct: [de-identified]   Admitting Diagnosis: SBO s/p bowel resection  Admit Date:  10/16/2018  Hospital Day: 11    Assessment:     Principal Problem:    S/P ileostomy ([address]  Active Problems:    Severe malnutrition ([address]    Complicated migraine with status migrainosus    Hypophosphatemia  Resolved Problems:    SBO (small bowel obstruction) (HCC)    Small bowel obstruction (HCC)      Plan:     Change the CS to daily as they look well  Change the toradol to alternating tylenol with codeine and motrin  Continue TPN   I encouraged PO intake of liquids  Continue therapies        Subjective:     Patient has complaints of generalized pain and weakness. Medication side effects: none    Scheduled Meds:   ketorolac  15 mg Intravenous Q6H    pantoprazole  40 mg Intravenous Daily    potassium replacement protocol   Other RX Placeholder     Continuous Infusions:   PN-Adult  3 IN 1 Central Line (Custom) 100 mL/hr at 10/27/18 1826     PRN Meds:ondansetron, magnesium hydroxide, docusate sodium, senna, polyethylene glycol, acetaminophen    Review of Systems  Pertinent items are noted in HPI. Objective:     No data found. I/O last 3 completed shifts: In: 1970.6 [P.O.:820]  Out: 450 [Stool:450]  No intake/output data recorded.     BP (!) 131/58   Pulse 71   Temp 97.7 °F (36.5 °C) (Oral)   Resp 17   Ht 5' 3\" (1.6 m)   Wt 130 lb 6.4 oz (59.1 kg)   SpO2 99%   BMI 23.10 kg/m²     BP (!) 131/58   Pulse 71   Temp 97.7 °F (36.5 °C) (Oral)   Resp 17   Ht 5' 3\" (1.6 m)   Wt 130 lb 6.4 oz (59.1 kg)   SpO2 99%   BMI 23.10 kg/m²   General appearance: alert, appears stated age and cooperative  Head: Normocephalic, without obvious abnormality, atraumatic  Lungs: clear to auscultation bilaterally  Heart: regular rate and rhythm, S1, S2 normal, no murmur, click, rub or gallop  Abdomen: soft, non-tender; bowel sounds normal; no masses,

## 2018-10-27 NOTE — PROGRESS NOTES
[place] removed per Dr. Rosa Diaz. Incision clean, dry, and intact.  No complications with staple removal.

## 2018-10-28 LAB
ANION GAP SERPL CALCULATED.3IONS-SCNC: 9 MEQ/L (ref 8–16)
BUN BLDV-MCNC: 19 MG/DL (ref 7–22)
CALCIUM IONIZED: 1.25 MMOL/L (ref 1.12–1.32)
CALCIUM SERPL-MCNC: 9.2 MG/DL (ref 8.5–10.5)
CHLORIDE BLD-SCNC: 99 MEQ/L (ref 98–111)
CO2: 26 MEQ/L (ref 23–33)
CREAT SERPL-MCNC: 0.3 MG/DL (ref 0.4–1.2)
GFR SERPL CREATININE-BSD FRML MDRD: > 90 ML/MIN/1.73M2
GLUCOSE BLD-MCNC: 101 MG/DL (ref 70–108)
GLUCOSE BLD-MCNC: 102 MG/DL (ref 70–108)
GLUCOSE BLD-MCNC: 102 MG/DL (ref 70–108)
GLUCOSE BLD-MCNC: 123 MG/DL (ref 70–108)
MAGNESIUM: 1.6 MG/DL (ref 1.6–2.4)
PHOSPHORUS: 3 MG/DL (ref 2.4–4.7)
POTASSIUM SERPL-SCNC: 3.6 MEQ/L (ref 3.5–5.2)
SODIUM BLD-SCNC: 134 MEQ/L (ref 135–145)

## 2018-10-28 PROCEDURE — 6360000002 HC RX W HCPCS: Performed by: FAMILY MEDICINE

## 2018-10-28 PROCEDURE — 80048 BASIC METABOLIC PNL TOTAL CA: CPT

## 2018-10-28 PROCEDURE — 82948 REAGENT STRIP/BLOOD GLUCOSE: CPT

## 2018-10-28 PROCEDURE — 97530 THERAPEUTIC ACTIVITIES: CPT

## 2018-10-28 PROCEDURE — 97116 GAIT TRAINING THERAPY: CPT

## 2018-10-28 PROCEDURE — 2500000003 HC RX 250 WO HCPCS: Performed by: PHARMACIST

## 2018-10-28 PROCEDURE — 6370000000 HC RX 637 (ALT 250 FOR IP): Performed by: FAMILY MEDICINE

## 2018-10-28 PROCEDURE — 84100 ASSAY OF PHOSPHORUS: CPT

## 2018-10-28 PROCEDURE — 97110 THERAPEUTIC EXERCISES: CPT

## 2018-10-28 PROCEDURE — 97535 SELF CARE MNGMENT TRAINING: CPT

## 2018-10-28 PROCEDURE — 83735 ASSAY OF MAGNESIUM: CPT

## 2018-10-28 PROCEDURE — 82330 ASSAY OF CALCIUM: CPT

## 2018-10-28 PROCEDURE — 36592 COLLECT BLOOD FROM PICC: CPT

## 2018-10-28 PROCEDURE — 0220000000 HC SKILLED NURSING FACILITY

## 2018-10-28 PROCEDURE — C9113 INJ PANTOPRAZOLE SODIUM, VIA: HCPCS | Performed by: FAMILY MEDICINE

## 2018-10-28 PROCEDURE — 1290000000 HC SEMI PRIVATE OTHER R&B

## 2018-10-28 PROCEDURE — 2709999900 HC NON-CHARGEABLE SUPPLY

## 2018-10-28 PROCEDURE — 36415 COLL VENOUS BLD VENIPUNCTURE: CPT

## 2018-10-28 RX ADMIN — IBUPROFEN 400 MG: 400 TABLET, FILM COATED ORAL at 19:57

## 2018-10-28 RX ADMIN — HYDROCODONE BITARTRATE AND ACETAMINOPHEN 1 TABLET: 5; 325 TABLET ORAL at 14:10

## 2018-10-28 RX ADMIN — PANTOPRAZOLE SODIUM 40 MG: 40 INJECTION, POWDER, FOR SOLUTION INTRAVENOUS at 08:44

## 2018-10-28 RX ADMIN — IBUPROFEN 400 MG: 400 TABLET, FILM COATED ORAL at 08:44

## 2018-10-28 RX ADMIN — HYDROCODONE BITARTRATE AND ACETAMINOPHEN 1 TABLET: 5; 325 TABLET ORAL at 22:50

## 2018-10-28 RX ADMIN — CALCIUM GLUCONATE: 94 INJECTION, SOLUTION INTRAVENOUS at 18:25

## 2018-10-28 RX ADMIN — IBUPROFEN 400 MG: 400 TABLET, FILM COATED ORAL at 12:02

## 2018-10-28 RX ADMIN — HYDROCODONE BITARTRATE AND ACETAMINOPHEN 1 TABLET: 5; 325 TABLET ORAL at 10:11

## 2018-10-28 RX ADMIN — HYDROCODONE BITARTRATE AND ACETAMINOPHEN 1 TABLET: 5; 325 TABLET ORAL at 18:04

## 2018-10-28 RX ADMIN — IBUPROFEN 400 MG: 400 TABLET, FILM COATED ORAL at 04:42

## 2018-10-28 RX ADMIN — IBUPROFEN 400 MG: 400 TABLET, FILM COATED ORAL at 16:05

## 2018-10-28 ASSESSMENT — PAIN SCALES - GENERAL
PAINLEVEL_OUTOF10: 0
PAINLEVEL_OUTOF10: 5
PAINLEVEL_OUTOF10: 4
PAINLEVEL_OUTOF10: 3
PAINLEVEL_OUTOF10: 5
PAINLEVEL_OUTOF10: 5
PAINLEVEL_OUTOF10: 4
PAINLEVEL_OUTOF10: 5

## 2018-10-28 ASSESSMENT — PAIN DESCRIPTION - LOCATION: LOCATION: ABDOMEN

## 2018-10-28 NOTE — PROGRESS NOTES
sitting surfaces)    Transfers  Sit to Stand: Stand by assistance  Stand to sit: Stand by assistance       Ambulation 1  Surface: level tile (around several obstacles in hallway)  Device: Rolling Walker  Other Apparatus:  (IV POLE)  Assistance: Stand by assistance  Quality of Gait:  SBA with occasional LT. CGA,   decreased velocity and jon, decreased but equal step length B, forward flexed trunk with downward gaze,(only temporary correction with vc's to look forward vs. down) fair heel strike B ,no unsteadiness  Distance: 180' x 1  Comments: no standing rests needed with each bout of amb. however pace very slow and steady    Exercises:  Exercises  Comments:  reclined in b//s chair 15 reps each b le ap's, quad/glute sets, 12 reps each hip abd/add, heelslides, 10 reps ble saq all for strengthening/endurance. slowly completed, pt. reporting alot of fatigue with ex this am       Activity Tolerance:  Activity Tolerance: Patient limited by fatigue;Patient limited by endurance  Activity Tolerance: good motivation today. pt. very slow  moving            Discharge Recommendations:  Discharge Recommendations: Continue to assess pending progress    Patient Education:  Patient Education: ther ex, tfers, gait, importance/role of PT,      HEP'S ISSUED    Equipment Recommendations:  Equipment Needed: No    Safety:  Type of devices:  All fall risk precautions in place, Patient at risk for falls, Call light within reach, Left in chair, Chair alarm in place, Telesitter in use, Gait belt  Restraints  Initially in place: No    Plan:  Times per week: 6x  Times per day: Daily  Specific instructions for Next Treatment: B LE strengthening, transfer training, gait training, standing balance  Current Treatment Recommendations: Strengthening, Functional Mobility Training, Transfer Training, Balance Training, Endurance Training, Stair training, Gait Training, Neuromuscular Re-education, Home Exercise Program, Safety Education & Training,

## 2018-10-28 NOTE — PROGRESS NOTES
hygiene and clothing management- Unruly [place] assisted)        Transfers  Sit to stand: Contact guard assistance  Stand to sit: Contact guard assistance  Transfer Comments: Education provided to josie Pérez on proper and safe technique to use to assist with transferring patient- demonstrated understanding during all transfers. Education provided to josie Pérez on how to properly doff/don gait belt and where to proper place hands- demonstrated understanding  Toilet Transfers  Equipment Used: Raised toilet seat with rails  Toilet Transfer: Contact guard assistance (Assist provided from Unruly Phoenix)    Balance  Sitting Balance: Supervision  Standing Balance: Contact guard assistance     Time: ~5 minutes  Activity: Static standing to look out window with B support on walker     Functional Mobility  Functional - Mobility Device: Rolling Walker  Activity: Other; To/from bathroom  Assist Level: Contact guard assistance  Functional Mobility Comments: 1 lap around unit at very slow pace with 1 seated rest break during mobility and 1 lengthy seated rest break after mobility. No LOB noted. Education provided to josie Pérez on how to safely assist with ambulating patient with IV pole. Josie Pérez demonstrated understanding. Activity Tolerance:  Activity Tolerance: Patient limited by fatigue    Assessment:     Performance deficits / Impairments: Decreased functional mobility , Decreased endurance, Decreased ADL status, Decreased balance, Decreased strength, Decreased safe awareness, Decreased high-level IADLs  Prognosis: Fair, Good    Discharge Recommendations:  Discharge Recommendations: Patient would benefit from continued therapy after discharge, Continue to assess pending progress    Patient Education:  Patient Education: safety with transfers/mobility and ADL strategeis- education provided to josie Pérez    Equipment Recommendations:   Other: monitor for LHAE    Safety:  Safety Devices in place:

## 2018-10-29 LAB
ANION GAP SERPL CALCULATED.3IONS-SCNC: 8 MEQ/L (ref 8–16)
BACTERIA: NORMAL /HPF
BILIRUBIN URINE: NEGATIVE
BLOOD, URINE: NEGATIVE
BUN BLDV-MCNC: 17 MG/DL (ref 7–22)
CALCIUM IONIZED: 1.29 MMOL/L (ref 1.12–1.32)
CALCIUM SERPL-MCNC: 9.2 MG/DL (ref 8.5–10.5)
CASTS 2: NORMAL /LPF
CASTS UA: NORMAL /LPF
CHARACTER, URINE: NORMAL
CHLORIDE BLD-SCNC: 101 MEQ/L (ref 98–111)
CO2: 27 MEQ/L (ref 23–33)
COLOR: YELLOW
CREAT SERPL-MCNC: 0.3 MG/DL (ref 0.4–1.2)
CRYSTALS, UA: NORMAL
EPITHELIAL CELLS, UA: NORMAL /HPF
GFR SERPL CREATININE-BSD FRML MDRD: > 90 ML/MIN/1.73M2
GLUCOSE BLD-MCNC: 104 MG/DL (ref 70–108)
GLUCOSE BLD-MCNC: 110 MG/DL (ref 70–108)
GLUCOSE URINE: NEGATIVE MG/DL
KETONES, URINE: NEGATIVE
LEUKOCYTE ESTERASE, URINE: NEGATIVE
MAGNESIUM: 1.7 MG/DL (ref 1.6–2.4)
MISCELLANEOUS 2: NORMAL
NITRITE, URINE: NEGATIVE
PH UA: 7.5
PHOSPHORUS: 3.6 MG/DL (ref 2.4–4.7)
POTASSIUM SERPL-SCNC: 3.9 MEQ/L (ref 3.5–5.2)
PROTEIN UA: NEGATIVE
RBC URINE: NORMAL /HPF
RENAL EPITHELIAL, UA: NORMAL
SODIUM BLD-SCNC: 136 MEQ/L (ref 135–145)
SPECIFIC GRAVITY, URINE: 1.01 (ref 1–1.03)
UROBILINOGEN, URINE: 1 EU/DL
WBC UA: NORMAL /HPF
YEAST: NORMAL

## 2018-10-29 PROCEDURE — C9113 INJ PANTOPRAZOLE SODIUM, VIA: HCPCS | Performed by: FAMILY MEDICINE

## 2018-10-29 PROCEDURE — 82330 ASSAY OF CALCIUM: CPT

## 2018-10-29 PROCEDURE — 97110 THERAPEUTIC EXERCISES: CPT

## 2018-10-29 PROCEDURE — 2709999900 HC NON-CHARGEABLE SUPPLY

## 2018-10-29 PROCEDURE — 97127 HC SP THER IVNTJ W/FOCUS COG FUNCJ: CPT

## 2018-10-29 PROCEDURE — 97535 SELF CARE MNGMENT TRAINING: CPT

## 2018-10-29 PROCEDURE — 97530 THERAPEUTIC ACTIVITIES: CPT

## 2018-10-29 PROCEDURE — 81001 URINALYSIS AUTO W/SCOPE: CPT

## 2018-10-29 PROCEDURE — 6360000002 HC RX W HCPCS: Performed by: FAMILY MEDICINE

## 2018-10-29 PROCEDURE — 82948 REAGENT STRIP/BLOOD GLUCOSE: CPT

## 2018-10-29 PROCEDURE — 36415 COLL VENOUS BLD VENIPUNCTURE: CPT

## 2018-10-29 PROCEDURE — 97116 GAIT TRAINING THERAPY: CPT

## 2018-10-29 PROCEDURE — 6370000000 HC RX 637 (ALT 250 FOR IP): Performed by: FAMILY MEDICINE

## 2018-10-29 PROCEDURE — 83735 ASSAY OF MAGNESIUM: CPT

## 2018-10-29 PROCEDURE — 2500000003 HC RX 250 WO HCPCS

## 2018-10-29 PROCEDURE — 80048 BASIC METABOLIC PNL TOTAL CA: CPT

## 2018-10-29 PROCEDURE — 1290000000 HC SEMI PRIVATE OTHER R&B

## 2018-10-29 PROCEDURE — 84100 ASSAY OF PHOSPHORUS: CPT

## 2018-10-29 RX ORDER — MORPHINE SULFATE 2 MG/ML
2 INJECTION, SOLUTION INTRAMUSCULAR; INTRAVENOUS EVERY 4 HOURS PRN
Status: DISCONTINUED | OUTPATIENT
Start: 2018-10-29 | End: 2018-10-31

## 2018-10-29 RX ORDER — DIPHENHYDRAMINE HYDROCHLORIDE 50 MG/ML
12.5 INJECTION INTRAMUSCULAR; INTRAVENOUS EVERY 6 HOURS PRN
Status: DISCONTINUED | OUTPATIENT
Start: 2018-10-29 | End: 2018-11-03

## 2018-10-29 RX ORDER — MORPHINE SULFATE 2 MG/ML
2 INJECTION, SOLUTION INTRAMUSCULAR; INTRAVENOUS
Status: DISCONTINUED | OUTPATIENT
Start: 2018-10-29 | End: 2018-10-29

## 2018-10-29 RX ADMIN — MORPHINE SULFATE 2 MG: 2 INJECTION, SOLUTION INTRAMUSCULAR; INTRAVENOUS at 11:47

## 2018-10-29 RX ADMIN — IBUPROFEN 400 MG: 400 TABLET, FILM COATED ORAL at 08:45

## 2018-10-29 RX ADMIN — MORPHINE SULFATE 2 MG: 2 INJECTION, SOLUTION INTRAMUSCULAR; INTRAVENOUS at 15:43

## 2018-10-29 RX ADMIN — CALCIUM GLUCONATE: 94 INJECTION, SOLUTION INTRAVENOUS at 18:08

## 2018-10-29 RX ADMIN — PANTOPRAZOLE SODIUM 40 MG: 40 INJECTION, POWDER, FOR SOLUTION INTRAVENOUS at 08:45

## 2018-10-29 RX ADMIN — HYDROCODONE BITARTRATE AND ACETAMINOPHEN 1 TABLET: 5; 325 TABLET ORAL at 05:43

## 2018-10-29 RX ADMIN — MORPHINE SULFATE 2 MG: 2 INJECTION, SOLUTION INTRAMUSCULAR; INTRAVENOUS at 19:44

## 2018-10-29 ASSESSMENT — PAIN SCALES - GENERAL
PAINLEVEL_OUTOF10: 6
PAINLEVEL_OUTOF10: 6
PAINLEVEL_OUTOF10: 5
PAINLEVEL_OUTOF10: 6
PAINLEVEL_OUTOF10: 0
PAINLEVEL_OUTOF10: 0
PAINLEVEL_OUTOF10: 5
PAINLEVEL_OUTOF10: 3

## 2018-10-29 ASSESSMENT — PAIN DESCRIPTION - LOCATION: LOCATION: ABDOMEN

## 2018-10-29 ASSESSMENT — PAIN SCALES - WONG BAKER: WONGBAKER_NUMERICALRESPONSE: 4

## 2018-10-29 ASSESSMENT — PAIN DESCRIPTION - PAIN TYPE: TYPE: ACUTE PAIN

## 2018-10-29 NOTE — PROGRESS NOTES
[facility]  INPATIENT OCCUPATIONAL THERAPY  [facility] TCU 8E  DAILY NOTE    Time:  Time In: 945  Time Out: 1027  Timed Code Treatment Minutes: 42 Minutes  Minutes: 42          Date: 10/29/2018  Patient Name: Cathie Mauricio,   Gender: female      Room: [facility]/5-  MRN: [id number]  : 1940  (66 y.o.)  Referring Practitioner: Catrina Chowdhury MD ( Ordering) Dr. Alma Krishna ( Attending)   Diagnosis: SBO s/p bowel resection  Additional Pertinent Hx: Per ED note 18, pt is a 66 y.o. female who has history of Pylori and cholecystectomy. Patient presents to the Emergency Department for the evaluation of abdominal pain that has been ongoing. Patient reports that she is experiencing abdominal distention, melena, decreased appetite, fear of eating secondary to abdominal pain, and small bowel movements. Patient was admitted to hospital on Sep. 2nd due to small bowel obstruction. She was cared for by Dr. Ulysses Bañuelos (general surgeon). Per daughter, patient has been experiencing abdominal complications since left colon resection in . While admitted patient was managed with nasogastric decompression, bowel rest, analgesics for pain control, IV fluid hydration, GI and DVT prophylaxis.      Past Medical History:   Diagnosis Date    Arthritis     Cellulitis     Difficult intubation     GERD (gastroesophageal reflux disease)     H pylori ulcer     History of Right leg DVT ([address]     Ile[facility] in [facility] ([facility] 10/04/2018    Nausea & vomiting     Pneumonia     S/P partial resection of colon 2018    Shingles     Torus mandibularis      Past Surgical History:   Procedure Laterality Date    ABDOMEN SURGERY      ABDOMINAL ADHESION SURGERY  's    Dr Diallo Echeverria  2013    Lysis of Adhesions-Dr. Ulysses Bañuelos     BACK SURGERY  //2006/2013    X3    [facility]LE[facility]STECTOMY      [facility]    COLON SURGERY      COLONOSCOPY  over 10 yrs ago   [facility]

## 2018-10-29 NOTE — PROGRESS NOTES
1345: Updated on pt status and plan of care by nurse. Pt currently resting in bed and states she has right sided abdominal discomfort. Pt states she feels pain medicine is helping. Pt denies needs at this time. Will continue to follow supportively.

## 2018-10-29 NOTE — PROGRESS NOTES
entire time, and construction occurring outside of the room. Long-term Goals  Timeframe for Long-term Goals: 1 week  Goal 1: Pt will improve cognitive functioning to a supervision level or higher to promote improved independence for ADL and IADL completion for re-integration into home setting. ONGOING     ASSESSMENT:  Assessment: [x]Progressing towards goals          []Not Progressing towards goals  SUMMARY:  Pt has met 2/4 STG's this therapy period, and is observed to be making consistent progress thus far. Pt continues to present with a moderate cognitive impairment characterized by deficits in complex problem solving, thought organization, divergent naming, higher level reasoning, working memory, processing speed, and mental flexibility. Expressive and receptive language remain grossly intact with no communicative breakdowns apparent. Pt's prior barriers to success including fatigue and low motivation appear to be diminishing with overall improved alert levels. Continued skilled ST services are medically necessary to decrease risk of medical decline and for improving cognitive domains to obtain optimal level of functionality for re-integration into home and community settings. Highly recommend ST services at discharge for improved integration into home and community setting.      Patient Tolerance of Treatment:   [x]Tolerated well []Tolerated fair []Required rest breaks [x]Fatigued  Plan for Next Session: Complex and divided attention tasks   Education:  Learner:  [x]Patient          []Significant Other          []Other: daughter present   Education provided regarding:  [x]Goals and POC   []Diet and swallowing precautions    []Home Exercise Program  [x]Progress and/or discharge information  Method of Education:  [x]Discussion          []Demonstration          []Handout          []Other  Evaluation of Education:   [x]Verbalized understanding   [x]Demonstrates without assistance-pt  []Demonstrates with

## 2018-10-30 LAB
ANION GAP SERPL CALCULATED.3IONS-SCNC: 12 MEQ/L (ref 8–16)
BUN BLDV-MCNC: 18 MG/DL (ref 7–22)
CALCIUM IONIZED: 1.16 MMOL/L (ref 1.12–1.32)
CALCIUM SERPL-MCNC: 9.6 MG/DL (ref 8.5–10.5)
CHLORIDE BLD-SCNC: 99 MEQ/L (ref 98–111)
CO2: 23 MEQ/L (ref 23–33)
CREAT SERPL-MCNC: 0.3 MG/DL (ref 0.4–1.2)
GFR SERPL CREATININE-BSD FRML MDRD: > 90 ML/MIN/1.73M2
GLUCOSE BLD-MCNC: 104 MG/DL (ref 70–108)
GLUCOSE BLD-MCNC: 127 MG/DL (ref 70–108)
MAGNESIUM: 2.1 MG/DL (ref 1.6–2.4)
PHOSPHORUS: 3.8 MG/DL (ref 2.4–4.7)
POTASSIUM SERPL-SCNC: 4 MEQ/L (ref 3.5–5.2)
SODIUM BLD-SCNC: 134 MEQ/L (ref 135–145)

## 2018-10-30 PROCEDURE — 1290000000 HC SEMI PRIVATE OTHER R&B

## 2018-10-30 PROCEDURE — 36415 COLL VENOUS BLD VENIPUNCTURE: CPT

## 2018-10-30 PROCEDURE — 97110 THERAPEUTIC EXERCISES: CPT

## 2018-10-30 PROCEDURE — 2500000003 HC RX 250 WO HCPCS

## 2018-10-30 PROCEDURE — 6360000002 HC RX W HCPCS: Performed by: FAMILY MEDICINE

## 2018-10-30 PROCEDURE — 80048 BASIC METABOLIC PNL TOTAL CA: CPT

## 2018-10-30 PROCEDURE — 2709999900 HC NON-CHARGEABLE SUPPLY

## 2018-10-30 PROCEDURE — 97116 GAIT TRAINING THERAPY: CPT

## 2018-10-30 PROCEDURE — 82948 REAGENT STRIP/BLOOD GLUCOSE: CPT

## 2018-10-30 PROCEDURE — 84100 ASSAY OF PHOSPHORUS: CPT

## 2018-10-30 PROCEDURE — 83735 ASSAY OF MAGNESIUM: CPT

## 2018-10-30 PROCEDURE — 6370000000 HC RX 637 (ALT 250 FOR IP): Performed by: FAMILY MEDICINE

## 2018-10-30 PROCEDURE — 97127 HC SP THER IVNTJ W/FOCUS COG FUNCJ: CPT

## 2018-10-30 PROCEDURE — 97530 THERAPEUTIC ACTIVITIES: CPT

## 2018-10-30 PROCEDURE — C9113 INJ PANTOPRAZOLE SODIUM, VIA: HCPCS | Performed by: FAMILY MEDICINE

## 2018-10-30 PROCEDURE — 82330 ASSAY OF CALCIUM: CPT

## 2018-10-30 RX ORDER — MEGESTROL ACETATE 40 MG/ML
800 SUSPENSION ORAL DAILY
Status: DISCONTINUED | OUTPATIENT
Start: 2018-10-30 | End: 2018-11-03

## 2018-10-30 RX ADMIN — IBUPROFEN 400 MG: 200 SUSPENSION ORAL at 12:14

## 2018-10-30 RX ADMIN — PANTOPRAZOLE SODIUM 40 MG: 40 INJECTION, POWDER, FOR SOLUTION INTRAVENOUS at 08:44

## 2018-10-30 RX ADMIN — IBUPROFEN 400 MG: 200 SUSPENSION ORAL at 00:00

## 2018-10-30 RX ADMIN — IBUPROFEN 400 MG: 200 SUSPENSION ORAL at 17:55

## 2018-10-30 RX ADMIN — IBUPROFEN 400 MG: 200 SUSPENSION ORAL at 06:06

## 2018-10-30 RX ADMIN — MEGESTROL ACETATE 800 MG: 40 SUSPENSION ORAL at 16:44

## 2018-10-30 RX ADMIN — CALCIUM GLUCONATE: 94 INJECTION, SOLUTION INTRAVENOUS at 18:32

## 2018-10-30 RX ADMIN — IBUPROFEN 400 MG: 200 SUSPENSION ORAL at 23:56

## 2018-10-30 ASSESSMENT — PAIN SCALES - GENERAL
PAINLEVEL_OUTOF10: 3
PAINLEVEL_OUTOF10: 5
PAINLEVEL_OUTOF10: 9
PAINLEVEL_OUTOF10: 4

## 2018-10-30 NOTE — PROGRESS NOTES
[address] [address]  INPATIENT SPEECH THERAPY  [facility]    TIME   SLP Individual Minutes  Time In: 1430  Time Out: 1500  Minutes: 30  Timed Code Treatment Minutes: 30 Minutes       [x]Daily Note  []Progress Note  []Discharge Note    Date: 10/30/2018  Patient Name: Joshua Yancey        MRN: [id number]    : 1940  (66 y.o.)  Gender: female   Primary Provider: Saida Garcia MD  Admitting Diagnosis:  SBO s/p bowel resection   Secondary Diagnosis: Cognitive deficits   Precautions: Fall risk   Swallowing Status/Diet: Regular with thin liquids   Swallowing Strategies: n/a  DATE of last MBS:  n/a  Pain: 0/10     Subjective: Pt sleeping in bed upon arrival - was able to be awakened with verbal prompting but had difficulty maintaining alertness. Improved alertness after elevating head of bed slightly. Patient reports that she is now taking some of her medications in liquid form due to having trouble swallowing larger pills. SHORT TERM GOAL #1:  Goal 1: Pt will complete immediate, delayed, and working memory tasks with 70% accuracy given min cues to improve retention of novel and functional information. INTERVENTIONS:  Patient unable to recall compensatory memory strategies, therefore, reviewed strategies including self-repetition, visualization, and association. Pt then completed short term memory task characterized by recall of 3 item grocery list when provided mod cues to implement use of self-repetition, association, and visualization.   Pt completed with the following results:  Immediate recall: 2/3 items recalled independently; required mod cues to recall 1/3 items   1st Repetition: 2/3 items recalled independently; required mod cues to recall 1/3 items   2nd repetition: 2/3 items recalled independently; required mod cues to recall 1/3 items   5 minute time delay: required mod cues to recall 3/3 items     Pt completed working memory task characterized by mental manipulation of 3 word lists into reverse order. Pt completed 1/4 trials independently and required min cues to complete 1/4 trials, max cues to complete 2/4 trials. SHORT TERM GOAL #2:  Goal 2: Pt will complete problem solving, reasoning, and executive functioning tasks (money, checkbook, medications) with 60% accuracy and min cues to improve awareness for ADL and IADL completion. INTERVENTIONS: Abstract convergent namin/5 independent; 1/5 with min cues; 3/5 with mod cues     SHORT TERM GOAL #3:  Goal 3: Pt will complete thought organization and divergent naming tasks with 70% accuracy with min cues/naming 11+items in 1 minute to improve processing speed and ability to manage daily schedule. INTERVENTIONS:  Did not address due to focus on other goals. SHORT TERM GOAL #4:  Goal 4: Pt will complete alternating, divided, and complex attention tasks with no more than 2 errors within 3-5 minute time span to permit potential return to cooking, cleaning, and housework. INTERVENTIONS: Patient initially demonstrated difficulty maintaining alertness, but once she was able to engage in tasks, she did not have difficulty maintaining attention despite quiet auditory background noise from television. Long-term Goals  Timeframe for Long-term Goals: 1 week  Goal 1: Pt will improve cognitive functioning to a supervision level or higher to promote improved independence for ADL and IADL completion for re-integration into home setting.   ONGOING     ASSESSMENT:  Assessment: [x]Progressing towards goals          []Not Progressing towards goals    Patient Tolerance of Treatment:   []Tolerated well [x]Tolerated fair []Required rest breaks [x]Fatigued  Plan for Next Session: Complex and divided attention tasks, thought organization, short term memory    Education:  Learner:  [x]Patient          []Significant Other          []Other: daughter present   Education provided regarding:  [x]Goals and POC   []Diet and swallowing

## 2018-10-30 NOTE — PROGRESS NOTES
Nutrition Therapies:  · Oral Diet Orders: Full Liquid   · Oral Diet intake: 1-25%  · Oral Nutrition Supplement (ONS) Orders: Clear Liquid Oral Supplement (ensure clear one/day)  · ONS intake: Refused  · Parenteral Nutrition Orders:  · Type and Formula: 3-in-1 Custom   · Lipids: Daily  · Rate/Volume: 100ml/hr  · Duration: Continuous 24 hrs  · Current PN Order Provides: at goal  · Goal PN Orders Provides: Suggest keep base components the same  with next bag of TPN based on dose wt: 57kgm 30kcals/kgm, 1.4 grams protein/kgm, 30% kcals from lipids to yield ~ ~1710kcals, 30% kcals from lipids, 80 grams protein, 1.4 grams protein/kgm 258 grams CHO, based on dose wt. 57kgm. · Anthropometric Measures:  · Ht: 5' 3\" (160 cm)   · Current Body Wt: 131 lb 9.8 oz (59.7 kg)  · Admission Body Wt: 142 lb (64.4 kg) (10/18/18, no weight source, +1 BUE edema, +2 BLE edema)  · Usual Body Wt: 154 lb (69.9 kg) (EMR, 6/25/18, actual)  · % Weight Change: 14.9% wt. loss,  4 months per EMR  · Ideal Body Wt: 115 lb (52.2 kg), % Ideal Body 92%  · BMI Classification: BMI 18.5 - 24.9 Normal Weight  · Comparative Standards (Estimated Nutrition Needs):  · Estimated Daily Total Kcal: 8464-5206 kcals (25-30kcals/kgm wt. of 57kgm 10/22)  · Estimated Daily Protein (g): 68+ grams (1.2 grams+ protein wt. of 57kgm 10/22/18)  · Estimated Daily Total Fluid (ml/day):      Estimated Intake vs Estimated Needs: Insufficient Data (TPN total intake not documented in last day)    Nutrition Risk Level: High    Nutrition Interventions:   Continue current diet, Continue current ONS, Continue Parenteral Nutrition  Continued Inpatient Monitoring, Education Not Indicated    Nutrition Evaluation:   · Evaluation:  (unable to determine, TPN documentation not available)   · Goals: Pt. will meet atleast 75% of nutritional needs via PN until appropriate for oral intake.      · Monitoring: Diet Progression, Meal Intake, Supplement Intake, Diet Tolerance, PN Intake, PN

## 2018-10-30 NOTE — PROGRESS NOTES
Patient stated pain level 2/10, repositioned, heat applied. Continues resting in bed with eyes closed. Midline ABD incision site remains tender, free from redness or drainage. Ileostomy stoma continues to appear beefy red, draining loose, watery brown stool into chaparro bag. Lung sounds remain diminished throughout. TPN infusing into PICC in LUE at 100ml/hr. No adverse reactions noted. Primary nurse updated on abnormal findings. Denies any needs or concerns at this time. Bed low, wheels locked, call light in reach. End of shift report given to primary nurse Luther BECERRIL. Antwan Rutherford 41 SN.

## 2018-10-30 NOTE — PROGRESS NOTES
[facility]  INPATIENT PHYSICAL THERAPY  DAILY NOTE  [facility] 8E - 8E-65/065-A    Time In: 0427  Time Out: 1226  Timed Code Treatment Minutes: 45 Minutes  Minutes: 38          Date: 10/30/2018  Patient Name: Andi Hollingsworth,  Gender:  female        MRN: [id number]  : 1940  (66 y.o.)     Referring Practitioner: Ordering: Bernardino Pastor MD; Attending: Deon Simmons MD  Diagnosis: SBO s/p bowel resection  Additional Pertinent Hx: Per ED note 18, pt is a 66 y.o. female who has history of Pylori and cholecystectomy. Patient presents to the Emergency Department for the evaluation of abdominal pain that has been ongoing. Patient reports that she is experiencing abdominal distention, melena, decreased appetite, fear of eating secondary to abdominal pain, and small bowel movements. Patient was admitted to hospital on Sep. 2nd due to small bowel obstruction. She was cared for by Dr. Malissa Ojeda (general surgeon). Per daughter, patient has been experiencing abdominal complications since left colon resection in . Pt underwent Abd Exploration, Lysis Severe Adhesions, Segmental SB resection x 3, Formation End Ileostomy on 10/4. Pt was a code stroke on 10/12, MRI negative. To TCU 10/16.      Past Medical History:   Diagnosis Date    Arthritis     Cellulitis     Difficult intubation     GERD (gastroesophageal reflux disease)     H pylori ulcer     History of Right leg DVT ([address]     [address] in place [facility] [address] 10/04/2018    Nausea & vomiting     Pneumonia     S/P partial resection of colon 2018    Shingles     Torus mandibularis      Past Surgical History:   Procedure Laterality Date    ABDOMEN SURGERY      ABDOMINAL ADHESION SURGERY  's    Dr Sabrina Wagner  2013    Lysis of Adhesions-Dr. Malissa Ojeda     BACK SURGERY  ///2013    X3    [facility]OMY      [facility]    COLON SURGERY      COLONOSCOPY  over 10 yrs ago 1  Surface: level tile  Device: Rolling Walker  Assistance: Stand by assistance  Quality of Gait: decreased jon, decrease step length, forward flexed posture, downward gaze, steady, no LOB  Distance: 180ft x2, 10ft x2    Stairs  # Steps : 4  Stairs Height: 6\"  Rails: Bilateral  Assistance: Contact guard assistance  Comment: ascends leading with L, descends leading with R, non-reciprocal, steady, slow pace      Exercises:  Exercises  Comments: Performed seated BLE exercises: heel/toe raises, marches, long arc quads, red tband hamstring curls x10 reps, standing heel raises and marches x8-10 reps, all to increase strength for improved functional mobility. Activity Tolerance:  Activity Tolerance: Patient limited by fatigue;Patient limited by endurance; Patient limited by pain    Assessment: Body structures, Functions, Activity limitations: Decreased functional mobility , Decreased strength, Decreased endurance, Decreased balance, Decreased safe awareness, Decreased cognition  Assessment: Patient tolerated session fair this session. Patient limited by stomach ache. Patient very slow moving. Patient would benefit from continued skilled physical therapy to improve functional mobility. Prognosis: Good          Discharge Recommendations:  Discharge Recommendations: Continue to assess pending progress    Patient Education:  Patient Education: gait, therex, stair training, transfers    Equipment Recommendations:  Equipment Needed: No    Safety:  Type of devices:  All fall risk precautions in place, Call light within reach, Chair alarm in place, Gait belt, Patient at risk for falls, Left in chair (family present at end of session)  Restraints  Initially in place: No    Plan:  Times per week: 6x  Times per day: Daily  Specific instructions for Next Treatment: B LE strengthening, transfer training, gait training, standing balance  Current Treatment Recommendations: Strengthening, Functional Mobility Training, Transfer

## 2018-10-31 LAB
ANION GAP SERPL CALCULATED.3IONS-SCNC: 8 MEQ/L (ref 8–16)
BACTERIA: NORMAL /HPF
BILIRUBIN URINE: NEGATIVE
BLOOD, URINE: NEGATIVE
BUN BLDV-MCNC: 21 MG/DL (ref 7–22)
CALCIUM IONIZED: 1.32 MMOL/L (ref 1.12–1.32)
CALCIUM SERPL-MCNC: 9.3 MG/DL (ref 8.5–10.5)
CASTS 2: NORMAL /LPF
CASTS UA: NORMAL /LPF
CHARACTER, URINE: NORMAL
CHLORIDE BLD-SCNC: 99 MEQ/L (ref 98–111)
CO2: 29 MEQ/L (ref 23–33)
COLOR: YELLOW
CREAT SERPL-MCNC: 0.4 MG/DL (ref 0.4–1.2)
CRYSTALS, UA: NORMAL
EPITHELIAL CELLS, UA: NORMAL /HPF
GFR SERPL CREATININE-BSD FRML MDRD: > 90 ML/MIN/1.73M2
GLUCOSE BLD-MCNC: 104 MG/DL (ref 70–108)
GLUCOSE BLD-MCNC: 106 MG/DL (ref 70–108)
GLUCOSE URINE: NEGATIVE MG/DL
KETONES, URINE: NEGATIVE
LEUKOCYTE ESTERASE, URINE: NEGATIVE
MAGNESIUM: 1.9 MG/DL (ref 1.6–2.4)
MISCELLANEOUS 2: NORMAL
NITRITE, URINE: NEGATIVE
PH UA: 8
PHOSPHORUS: 3.9 MG/DL (ref 2.4–4.7)
POTASSIUM SERPL-SCNC: 3.8 MEQ/L (ref 3.5–5.2)
PROTEIN UA: NEGATIVE
RBC URINE: NORMAL /HPF
RENAL EPITHELIAL, UA: NORMAL
SODIUM BLD-SCNC: 136 MEQ/L (ref 135–145)
SPECIFIC GRAVITY, URINE: 1.01 (ref 1–1.03)
UROBILINOGEN, URINE: 0.2 EU/DL
WBC UA: NORMAL /HPF
YEAST: NORMAL

## 2018-10-31 PROCEDURE — 36592 COLLECT BLOOD FROM PICC: CPT

## 2018-10-31 PROCEDURE — C9113 INJ PANTOPRAZOLE SODIUM, VIA: HCPCS | Performed by: FAMILY MEDICINE

## 2018-10-31 PROCEDURE — 6370000000 HC RX 637 (ALT 250 FOR IP): Performed by: FAMILY MEDICINE

## 2018-10-31 PROCEDURE — 2709999900 HC NON-CHARGEABLE SUPPLY

## 2018-10-31 PROCEDURE — 81001 URINALYSIS AUTO W/SCOPE: CPT

## 2018-10-31 PROCEDURE — 2500000003 HC RX 250 WO HCPCS: Performed by: PHARMACIST

## 2018-10-31 PROCEDURE — 6370000000 HC RX 637 (ALT 250 FOR IP): Performed by: SURGERY

## 2018-10-31 PROCEDURE — 97127 HC SP THER IVNTJ W/FOCUS COG FUNCJ: CPT | Performed by: SPEECH-LANGUAGE PATHOLOGIST

## 2018-10-31 PROCEDURE — 82330 ASSAY OF CALCIUM: CPT

## 2018-10-31 PROCEDURE — 82948 REAGENT STRIP/BLOOD GLUCOSE: CPT

## 2018-10-31 PROCEDURE — 83735 ASSAY OF MAGNESIUM: CPT

## 2018-10-31 PROCEDURE — 6360000002 HC RX W HCPCS: Performed by: FAMILY MEDICINE

## 2018-10-31 PROCEDURE — 97535 SELF CARE MNGMENT TRAINING: CPT

## 2018-10-31 PROCEDURE — 84100 ASSAY OF PHOSPHORUS: CPT

## 2018-10-31 PROCEDURE — 80048 BASIC METABOLIC PNL TOTAL CA: CPT

## 2018-10-31 PROCEDURE — 36415 COLL VENOUS BLD VENIPUNCTURE: CPT

## 2018-10-31 PROCEDURE — 1290000000 HC SEMI PRIVATE OTHER R&B

## 2018-10-31 RX ORDER — ACETAMINOPHEN 160 MG/5ML
650 SOLUTION ORAL EVERY 4 HOURS PRN
Status: DISCONTINUED | OUTPATIENT
Start: 2018-10-31 | End: 2018-11-09 | Stop reason: HOSPADM

## 2018-10-31 RX ORDER — MORPHINE SULFATE 2 MG/ML
2 INJECTION, SOLUTION INTRAMUSCULAR; INTRAVENOUS EVERY 4 HOURS PRN
Status: DISCONTINUED | OUTPATIENT
Start: 2018-10-31 | End: 2018-11-03

## 2018-10-31 RX ADMIN — MORPHINE SULFATE 2 MG: 2 INJECTION, SOLUTION INTRAMUSCULAR; INTRAVENOUS at 22:09

## 2018-10-31 RX ADMIN — MEGESTROL ACETATE 800 MG: 40 SUSPENSION ORAL at 08:38

## 2018-10-31 RX ADMIN — ACETAMINOPHEN 650 MG: 160 SOLUTION ORAL at 19:09

## 2018-10-31 RX ADMIN — IBUPROFEN 400 MG: 200 SUSPENSION ORAL at 17:34

## 2018-10-31 RX ADMIN — IBUPROFEN 400 MG: 200 SUSPENSION ORAL at 05:55

## 2018-10-31 RX ADMIN — IBUPROFEN 400 MG: 200 SUSPENSION ORAL at 12:15

## 2018-10-31 RX ADMIN — PANTOPRAZOLE SODIUM 40 MG: 40 INJECTION, POWDER, FOR SOLUTION INTRAVENOUS at 08:38

## 2018-10-31 RX ADMIN — CALCIUM GLUCONATE: 94 INJECTION, SOLUTION INTRAVENOUS at 19:09

## 2018-10-31 ASSESSMENT — PAIN SCALES - GENERAL
PAINLEVEL_OUTOF10: 5
PAINLEVEL_OUTOF10: 4
PAINLEVEL_OUTOF10: 6
PAINLEVEL_OUTOF10: 4
PAINLEVEL_OUTOF10: 5

## 2018-10-31 ASSESSMENT — PAIN DESCRIPTION - LOCATION: LOCATION: ABDOMEN

## 2018-10-31 NOTE — PROGRESS NOTES
Spoke with primary RN. Primary RN stated patients daughter changed the ileostomy pouching system yesterday. Per RN pouching system intact. Extra supplies sent to room yesterday. Also spoke with dietician and primary Rn, in regards to the patients out put. Patient was noted to have 3000 ml out. Recommended to updated Dr. Rosa Diaz, to see if the patient can be given something to slow down her output. Will continue to follow. Will attempt to see patient tomorrow.

## 2018-10-31 NOTE — PROGRESS NOTES
months per EMR  · Ideal Body Wt: 115 lb (52.2 kg), % Ideal Body 92%  · BMI Classification: BMI 18.5 - 24.9 Normal Weight  · Comparative Standards (Estimated Nutrition Needs):  · Estimated Daily Total Kcal: ~1710kcals (30kcals/kgm wt. of 57kgm 10/22)  · Estimated Daily Protein (g): 68+ grams (1.2+ grams protein/kgm wt. of 57kgm 10/22/18)    Estimated Intake vs Estimated Needs:  (Unable to determine, TPN intake not documented on  I & O, however Frankie RN mentions they ran the whole bag yesterday)    Nutrition Risk Level: High    Nutrition Interventions:   Modify current diet (per Dr Jarocho Alicia request ( trial dental soft low fiber no caffeine lactose controlled)  Continued Inpatient Monitoring, Education Completed (ileostomy diet education completed (10/18/18))    Nutrition Evaluation:   · Evaluation: Progressing toward goals   · Goals: Pt. will meet atleast 75% of nutritional needs via PN until appropriate for oral intake. · Monitoring: Diet Progression, Meal Intake, Supplement Intake, Diet Tolerance, PN Intake, PN Tolerance, Skin Integrity, Wound Healing, Ascites/Edema, Weight, Comparative Standards, Pertinent Labs, Diarrhea    See Adult Nutrition Doc Flowsheet for more detail.      Electronically signed by Everton Avendano RD, DIGNA on 10/31/18 at 11:31 AM    Contact Number: [phone]

## 2018-10-31 NOTE — PROGRESS NOTES
COLONOSCOPY  08/11/2017    Dr Brianna Garcia, COLON, DIAGNOSTIC      EYE SURGERY Bilateral 1999    CATARACT REMOVAL   [address] ? [facility]    LUMBAR LAMINECTOMY  4/15/2013    OTHER SURGICAL HISTORY  7/8/2013    evacation of seroma-back     [facility] EXPLORATORY OF ABDOMEN N/A 10/4/2018    LAPAROTOMY EXPLORATORY, SMALL BOWEL RESECTION X 3, EXTENSIVE LYSIS OF ADHESIONS, ILEOSTOMY FORMATION performed by Ajay Hendricks MD at [facility] OFFICE/OUTPT VISIT,PROCEDURE ONLY N/A 6/25/2018    ABDOMINAL EXPLORATION AND ESTENDED LEFT COLON RESECTION, LYSIS OF COMPLEX ADHESIONS performed by Ajay Hendricks MD at [address]  08/11/2017    Dr Godfrey Stake Left 9/28/2018    EGD BIOPSY performed by Ajay Hendricks MD at [facility]       Restrictions/Precautions:  General Precautions, Fall Risk                    Other position/activity restrictions: abd incision, sx 9-24 SBO repair and ileostomy       Prior Level of Function:  ADL Assistance: Independent  Homemaking Assistance: Needs assistance (shares role with daughter)  Ambulation Assistance: Independent  Transfer Assistance: Independent  Additional Comments: Pt was very IND at [facility] however recently her daughters have been providing increasing assist with IADLs around home. Walks with [facility]    Subjective       Subjective: Patient seated in bedside chair upon arrival. Agreeable to OT session    Overall Orientation Status: Within Functional Limits         Pain:  Pain Assessment  Patient Currently in Pain: Yes  Pain Assessment: 0-10  Pain Level: 4  Pain Location: Abdomen       Objective  Overall Cognitive Status: Exceptions  Cognition Comment: slow processing, slow with completing tasks      ADL  Grooming: Setup; Increased time to complete (hair care seated in chair after washed/dried by therapist)  UE Bathing: Supervision; Increased time to complete (seated in chair)  LE Bathing: Minimal goal 2: Pt to tolerate standing greater than 5 mins with 1 hand release with S in preparation for returned to preffered IADL tasks within home including pet care  Short term goal 3: Pt to complete various functional transfers with SBA with 0 cues for safety for inc ind with toileting  Short term goal 4: Pt to complete LE dressing with SBA & LH AE prn  Short term goal 5: Pt will increased UB strength to 4/5 and  strength by 5# ( R 11# at eval and L 9#) to improve ease with functional trnasfers and ADL routine  Long term goals  Time Frame for Long term goals : 3 weeks  Long term goal 1: Pt will complete BADL routing with S and any adaptive technique needed wilbur ncrease indep with dressing routine  Long term goal 2: Pt will complete simple IADL task with S and no cues for safety toincrease indep with simple cooking tasks at home

## 2018-11-01 LAB
EKG ATRIAL RATE: 83 BPM
EKG P AXIS: 78 DEGREES
EKG P-R INTERVAL: 138 MS
EKG Q-T INTERVAL: 370 MS
EKG QRS DURATION: 82 MS
EKG QTC CALCULATION (BAZETT): 434 MS
EKG R AXIS: -33 DEGREES
EKG T AXIS: 61 DEGREES
EKG VENTRICULAR RATE: 83 BPM
GLUCOSE BLD-MCNC: 102 MG/DL (ref 70–108)
GLUCOSE BLD-MCNC: 104 MG/DL (ref 70–108)
GLUCOSE BLD-MCNC: 97 MG/DL (ref 70–108)

## 2018-11-01 PROCEDURE — 99024 POSTOP FOLLOW-UP VISIT: CPT | Performed by: SURGERY

## 2018-11-01 PROCEDURE — 1290000000 HC SEMI PRIVATE OTHER R&B

## 2018-11-01 PROCEDURE — 97530 THERAPEUTIC ACTIVITIES: CPT

## 2018-11-01 PROCEDURE — 93005 ELECTROCARDIOGRAM TRACING: CPT

## 2018-11-01 PROCEDURE — APPSS30 APP SPLIT SHARED TIME 16-30 MINUTES: Performed by: NURSE PRACTITIONER

## 2018-11-01 PROCEDURE — 2709999900 HC NON-CHARGEABLE SUPPLY

## 2018-11-01 PROCEDURE — 99024 POSTOP FOLLOW-UP VISIT: CPT | Performed by: NURSE PRACTITIONER

## 2018-11-01 PROCEDURE — 97110 THERAPEUTIC EXERCISES: CPT

## 2018-11-01 PROCEDURE — 6360000002 HC RX W HCPCS: Performed by: SURGERY

## 2018-11-01 PROCEDURE — 97127 HC SP THER IVNTJ W/FOCUS COG FUNCJ: CPT

## 2018-11-01 PROCEDURE — 6360000002 HC RX W HCPCS: Performed by: FAMILY MEDICINE

## 2018-11-01 PROCEDURE — 97116 GAIT TRAINING THERAPY: CPT

## 2018-11-01 PROCEDURE — 2500000003 HC RX 250 WO HCPCS: Performed by: PHARMACIST

## 2018-11-01 PROCEDURE — C9113 INJ PANTOPRAZOLE SODIUM, VIA: HCPCS | Performed by: FAMILY MEDICINE

## 2018-11-01 PROCEDURE — 82948 REAGENT STRIP/BLOOD GLUCOSE: CPT

## 2018-11-01 PROCEDURE — 6370000000 HC RX 637 (ALT 250 FOR IP): Performed by: SURGERY

## 2018-11-01 PROCEDURE — 6370000000 HC RX 637 (ALT 250 FOR IP): Performed by: FAMILY MEDICINE

## 2018-11-01 RX ADMIN — ACETAMINOPHEN 650 MG: 160 SOLUTION ORAL at 17:43

## 2018-11-01 RX ADMIN — ACETAMINOPHEN 650 MG: 160 SOLUTION ORAL at 10:46

## 2018-11-01 RX ADMIN — PANTOPRAZOLE SODIUM 40 MG: 40 INJECTION, POWDER, FOR SOLUTION INTRAVENOUS at 08:15

## 2018-11-01 RX ADMIN — MORPHINE SULFATE 2 MG: 2 INJECTION, SOLUTION INTRAMUSCULAR; INTRAVENOUS at 22:50

## 2018-11-01 RX ADMIN — ONDANSETRON 4 MG: 4 TABLET, ORALLY DISINTEGRATING ORAL at 08:22

## 2018-11-01 RX ADMIN — MEGESTROL ACETATE 800 MG: 40 SUSPENSION ORAL at 08:15

## 2018-11-01 RX ADMIN — IBUPROFEN 400 MG: 200 SUSPENSION ORAL at 06:01

## 2018-11-01 RX ADMIN — CALCIUM GLUCONATE: 98 INJECTION, SOLUTION INTRAVENOUS at 19:09

## 2018-11-01 RX ADMIN — IBUPROFEN 400 MG: 200 SUSPENSION ORAL at 14:24

## 2018-11-01 RX ADMIN — MORPHINE SULFATE 2 MG: 2 INJECTION, SOLUTION INTRAMUSCULAR; INTRAVENOUS at 08:15

## 2018-11-01 ASSESSMENT — PAIN SCALES - GENERAL
PAINLEVEL_OUTOF10: 4
PAINLEVEL_OUTOF10: 5
PAINLEVEL_OUTOF10: 8
PAINLEVEL_OUTOF10: 5

## 2018-11-01 ASSESSMENT — PAIN SCALES - WONG BAKER: WONGBAKER_NUMERICALRESPONSE: 6

## 2018-11-01 NOTE — PROGRESS NOTES
[facility]  INPATIENT SPEECH THERAPY  [facility] T[facility] 8E    TIME   SLP Individual Minutes  Time In: 1001  Time Out: 1030  Minutes: 29  Timed Code Treatment Minutes: 29 Minutes       [x]Daily Note  []Progress Note  []Discharge Note    Date: 2018  Patient Name: Abbey Gardner        MRN: [id number]    : 1940  (66 y.o.)  Gender: female   Primary Provider: Nitin Melendez MD  Admitting Diagnosis:  SBO s/p bowel resection   Secondary Diagnosis: Cognitive deficits   Precautions: Fall risk   Swallowing Status/Diet: Regular with thin liquids   Swallowing Strategies: n/a  DATE of last MBS:  n/a  Pain: 0/10; pt reports feeling better. Subjective: Patient seen alert and pleasant, while sitting upright in the chair. SHORT TERM GOAL #1:  Goal 1: Pt will complete immediate, delayed, and working memory tasks with 70% accuracy given min cues to improve retention of novel and functional information. INTERVENTIONS:  Orientation- pt stated \"its the end of the month\", ST provided mod visual cues to look to calendar in front of her as an external aid and then able to state today's date. *Significant inconsistencies noted from session to session in regards to pt's ability to recall to look at calendar when asked what date it is    Mental manipulation (3 words): 2/4 mod cues, 2/4 MAX cues  *Difficulties demonstrated     SHORT TERM GOAL #2:  Goal 2: Pt will complete problem solving, reasoning, and executive functioning tasks (money, checkbook, medications) with 60% accuracy and min cues to improve awareness for ADL and IADL completion.    INTERVENTIONS: Reasoning and describing pictures of medical scenarios- 1/6 indep, 2/6 min cues, 2/6 mod cues, 1/6 max cues  Visual scanning executive functioning task- 3/7 indep, 2/7 min cues, 2/7 max cues     SHORT TERM GOAL #3:  Goal 3: Pt will complete thought organization and divergent naming tasks with 70% accuracy with min cues/naming 11+items in 1 minute to

## 2018-11-01 NOTE — PROGRESS NOTES
SURGERY  09/09/2013    Lysis of Adhesions-Dr. Amanda Barnhart     BACK SURGERY  1979/1980/2006/2013    X3    [facility]  [facility]    COLON SURGERY      COLONOSCOPY  over 10 yrs ago    COLONOSCOPY  08/11/2017    Dr Aly Lindsey, COLON, [facility] Bilateral [address] ? [facility]    LUMBAR LAMINECTOMY  4/15/2013    OTHER SURGICAL HISTORY  7/8/2013    evacation of seroma-back     [place] EXPLORATORY OF ABDOMEN N/A 10/4/2018    LAPAROTOMY EXPLORATORY, SMALL BOWEL RESECTION X 3, EXTENSIVE LYSIS OF ADHESIONS, ILEOSTOMY FORMATION performed by Nancy Machuca MD at [address] OFFICE/OUTPT VISIT,PROCEDURE ONLY N/A 6/25/2018    ABDOMINAL EXPLORATION AND ESTENDED LEFT COLON RESECTION, LYSIS OF COMPLEX ADHESIONS performed by Nnacy Machuca MD at [address] [facility]  08/11/2017    Dr Crystal Thomason Left 9/28/2018    EGD BIOPSY performed by Nancy Machuca MD at [address] [facility]           Subjective       Subjective: Pt up in chair upon OT arrival with RN present. RN present, upon OT introducing self, pt c/o abdominal pain, required encouragement to participate in OT session.     Overall Orientation Status: Within Functional Limits         Pain:  Pain Assessment  Patient Currently in Pain: Yes  Kidd-Rivas Pain Rating: Hurts even more  Pre Treatment Pain Screening  Intervention List: Patient able to continue with treatment    Objective  Overall Cognitive Status: Exceptions  Cognition Comment: slow processing, slow with completing tasks                                                                                   Transfers  Sit to stand: Contact guard assistance  Stand to sit: Contact guard assistance       Balance  Sitting Balance: Supervision  Standing Balance: Contact guard assistance     Time: x 3 min   Activity: Pt completed dynamic standing task this date standing x3 min while requiring

## 2018-11-01 NOTE — PROGRESS NOTES
(Abdominal incision Small Bowel resection (10/4/18))  · Current Nutrition Therapies:  · Oral Diet Orders: Dental Soft, Low Fiber (caffeine free, lactose free)   · Oral Diet intake: 1-25%  · Oral Nutrition Supplement (ONS) Orders: None  · ONS intake: Refused  · Parenteral Nutrition Orders:  · Type and Formula: 3-in-1 Custom   · Lipids: Daily  · Additives: per Pharmacy  · Rate/Volume: 100ml/hr  · Duration: Continuous 24 hrs  · Current PN Order Provides: at goal  · Goal PN Orders Provides: suggest keep 3 in 1 TPN the same using dose wt: 57kgm, 30kcals/kgm, 1.4 grams protein/kgm, 30% kcals from lipids to yield ~1710kcals, 30% kcals from lipids, 80 grams protein, 1.4 grams protein/kgm, 258 grams CHO, based on dose wt: 57kgm. · Additional Calories: any oral intake  · Anthropometric Measures:  · Ht: 5' 3\" (160 cm)   · Current Body Wt: 134 lb (60.8 kg) (11/1, no weight source, + BLE edema)  · Admission Body Wt: 142 lb (64.4 kg) (10/18/18, no weight source, +1 BUE edema, +2 BLE edema)  · Usual Body Wt: 154 lb (69.9 kg) (EMR, 6/25/18, actual)  · % Weight Change: 14.9% wt. loss,  4 months per EMR  · Ideal Body Wt: 115 lb (52.2 kg), % Ideal Body 117%  · BMI Classification: BMI 18.5 - 24.9 Normal Weight (23.9)  · Comparative Standards (Estimated Nutrition Needs):  · Estimated Daily Total Kcal: ~1710kcals (30kcals/kgm wt. of 57kgm 10/22)  · Estimated Daily Protein (g): 68+ grams (1.2+ grams protein/kgm wt. of 57kgm 10/22/18)    Estimated Intake vs Estimated Needs: Intake Meets Needs (96% TPN intake last 24 hours.)    Nutrition Risk Level: High    Nutrition Interventions:   Continue current diet, Continue Parenteral Nutrition  Continued Inpatient Monitoring, Education Completed (ileostomy diet education completed (10/18/18))    Nutrition Evaluation:   · Evaluation: Progressing toward goals   · Goals: Pt. will meet atleast 75% of nutritional needs via PN until appropriate for oral intake.      · Monitoring: Meal Intake, PN Intake,

## 2018-11-01 NOTE — PROGRESS NOTES
[facility]  Post Operative Progress Note  Dr Diamond Person    Pt Name: Ankit Goncalves Record Number: [id number]  Date of Birth 1940   Today's Date: 11/1/2018    Hospital day # 12   S/p 1. Abdominal exploration, Lysis of severe adhesions (complex case), Segmental small bowel resection x3 with reanastomosis x3, Formation of end ileostomy. Chief complaint: poor appetite, abdominal distention     Patient was stable overnight. Chart reviewed. Updated by nursing staff. Denies chest discomfort or dyspnea. No N/V; (+) belching, flatus and BM from ileostomy. Tolerating DIET DENTAL SOFT; Low Fiber  PN-Adult  3 IN 1 Central Line (Custom)  PN-Adult  3 IN 1 Central Line (Custom) diet. Pain controlled with analgesia. Up with assistance. States poor appetite, states she is trying to eat more     Past, Family, Social History unchanged from admission. Diet:  DIET DENTAL SOFT; Low Fiber  PN-Adult  3 IN 1 Central Line (Custom)  PN-Adult  3 IN 1 Central Line (Custom)    Medications:  Scheduled Meds:   megestrol  800 mg Oral Daily    ibuprofen  400 mg Oral 4 times per day    pantoprazole  40 mg Intravenous Daily    potassium replacement protocol   Other RX Placeholder     Continuous Infusions:   PN-Adult  3 IN 1 Central Line (Custom)      PN-Adult  3 IN 1 Central Line (Custom) 100 mL/hr at 10/31/18 1916     PRN Meds:morphine, acetaminophen, diphenhydrAMINE, ondansetron, magnesium hydroxide, docusate sodium, senna, polyethylene glycol    Objective:    CBC: No results for input(s): WBC, HGB, PLT in the last 72 hours. BMP:    Recent Labs      10/30/18   0857  10/31/18   0458   NA  134*  136   K  4.0  3.8   CL  99  99   CO2  23  29   BUN  18  21   CREATININE  0.3*  0.4   GLUCOSE  127*  104     Calcium:  Recent Labs      10/31/18   0458   CALCIUM  9.3     Ionized Calcium:No results for input(s): IONCA in the last 72 hours.   Magnesium:  Recent Labs      10/31/18   0458   MG  1.9

## 2018-11-02 LAB
ANION GAP SERPL CALCULATED.3IONS-SCNC: 10 MEQ/L (ref 8–16)
BUN BLDV-MCNC: 20 MG/DL (ref 7–22)
CALCIUM IONIZED: 1.29 MMOL/L (ref 1.12–1.32)
CALCIUM SERPL-MCNC: 9.4 MG/DL (ref 8.5–10.5)
CHLORIDE BLD-SCNC: 99 MEQ/L (ref 98–111)
CO2: 24 MEQ/L (ref 23–33)
CREAT SERPL-MCNC: 0.3 MG/DL (ref 0.4–1.2)
ERYTHROCYTE [DISTWIDTH] IN BLOOD BY AUTOMATED COUNT: 15.9 % (ref 11.5–14.5)
ERYTHROCYTE [DISTWIDTH] IN BLOOD BY AUTOMATED COUNT: 48.2 FL (ref 35–45)
GFR SERPL CREATININE-BSD FRML MDRD: > 90 ML/MIN/1.73M2
GLUCOSE BLD-MCNC: 112 MG/DL (ref 70–108)
GLUCOSE BLD-MCNC: 117 MG/DL (ref 70–108)
GLUCOSE BLD-MCNC: 118 MG/DL (ref 70–108)
GLUCOSE BLD-MCNC: 94 MG/DL (ref 70–108)
HCT VFR BLD CALC: 30.2 % (ref 37–47)
HEMOGLOBIN: 9.8 GM/DL (ref 12–16)
LACTIC ACID: 0.8 MMOL/L (ref 0.5–2.2)
MAGNESIUM: 1.9 MG/DL (ref 1.6–2.4)
MCH RBC QN AUTO: 27.1 PG (ref 26–33)
MCHC RBC AUTO-ENTMCNC: 32.5 GM/DL (ref 32.2–35.5)
MCV RBC AUTO: 83.7 FL (ref 81–99)
PHOSPHORUS: 3.7 MG/DL (ref 2.4–4.7)
PLATELET # BLD: 238 THOU/MM3 (ref 130–400)
PMV BLD AUTO: 9.1 FL (ref 9.4–12.4)
POTASSIUM SERPL-SCNC: 4 MEQ/L (ref 3.5–5.2)
RBC # BLD: 3.61 MILL/MM3 (ref 4.2–5.4)
SODIUM BLD-SCNC: 133 MEQ/L (ref 135–145)
VITAMIN D 25-HYDROXY: 24 NG/ML (ref 30–100)
WBC # BLD: 11.3 THOU/MM3 (ref 4.8–10.8)

## 2018-11-02 PROCEDURE — 85027 COMPLETE CBC AUTOMATED: CPT

## 2018-11-02 PROCEDURE — 36415 COLL VENOUS BLD VENIPUNCTURE: CPT

## 2018-11-02 PROCEDURE — 80048 BASIC METABOLIC PNL TOTAL CA: CPT

## 2018-11-02 PROCEDURE — 6360000002 HC RX W HCPCS: Performed by: SURGERY

## 2018-11-02 PROCEDURE — 82330 ASSAY OF CALCIUM: CPT

## 2018-11-02 PROCEDURE — 97110 THERAPEUTIC EXERCISES: CPT

## 2018-11-02 PROCEDURE — 84100 ASSAY OF PHOSPHORUS: CPT

## 2018-11-02 PROCEDURE — 6360000002 HC RX W HCPCS: Performed by: FAMILY MEDICINE

## 2018-11-02 PROCEDURE — 97530 THERAPEUTIC ACTIVITIES: CPT

## 2018-11-02 PROCEDURE — 92507 TX SP LANG VOICE COMM INDIV: CPT

## 2018-11-02 PROCEDURE — 83735 ASSAY OF MAGNESIUM: CPT

## 2018-11-02 PROCEDURE — C9113 INJ PANTOPRAZOLE SODIUM, VIA: HCPCS | Performed by: FAMILY MEDICINE

## 2018-11-02 PROCEDURE — 6370000000 HC RX 637 (ALT 250 FOR IP): Performed by: FAMILY MEDICINE

## 2018-11-02 PROCEDURE — 83605 ASSAY OF LACTIC ACID: CPT

## 2018-11-02 PROCEDURE — 2709999900 HC NON-CHARGEABLE SUPPLY

## 2018-11-02 PROCEDURE — 1290000000 HC SEMI PRIVATE OTHER R&B

## 2018-11-02 PROCEDURE — 97116 GAIT TRAINING THERAPY: CPT

## 2018-11-02 PROCEDURE — 82306 VITAMIN D 25 HYDROXY: CPT

## 2018-11-02 PROCEDURE — 2500000003 HC RX 250 WO HCPCS: Performed by: SURGERY

## 2018-11-02 PROCEDURE — 82948 REAGENT STRIP/BLOOD GLUCOSE: CPT

## 2018-11-02 RX ORDER — ONDANSETRON 2 MG/ML
4 INJECTION INTRAMUSCULAR; INTRAVENOUS EVERY 6 HOURS PRN
Status: DISCONTINUED | OUTPATIENT
Start: 2018-11-02 | End: 2018-11-03

## 2018-11-02 RX ADMIN — PANTOPRAZOLE SODIUM 40 MG: 40 INJECTION, POWDER, FOR SOLUTION INTRAVENOUS at 07:56

## 2018-11-02 RX ADMIN — IBUPROFEN 400 MG: 200 SUSPENSION ORAL at 19:56

## 2018-11-02 RX ADMIN — ONDANSETRON 4 MG: 2 INJECTION INTRAMUSCULAR; INTRAVENOUS at 23:29

## 2018-11-02 RX ADMIN — ONDANSETRON 4 MG: 4 TABLET, ORALLY DISINTEGRATING ORAL at 12:16

## 2018-11-02 RX ADMIN — MORPHINE SULFATE 2 MG: 2 INJECTION, SOLUTION INTRAMUSCULAR; INTRAVENOUS at 17:57

## 2018-11-02 RX ADMIN — MORPHINE SULFATE 2 MG: 2 INJECTION, SOLUTION INTRAMUSCULAR; INTRAVENOUS at 12:16

## 2018-11-02 RX ADMIN — IBUPROFEN 400 MG: 200 SUSPENSION ORAL at 15:17

## 2018-11-02 RX ADMIN — IBUPROFEN 400 MG: 200 SUSPENSION ORAL at 08:06

## 2018-11-02 RX ADMIN — IBUPROFEN 400 MG: 200 SUSPENSION ORAL at 02:00

## 2018-11-02 RX ADMIN — CALCIUM GLUCONATE: 98 INJECTION, SOLUTION INTRAVENOUS at 18:14

## 2018-11-02 RX ADMIN — MORPHINE SULFATE 2 MG: 2 INJECTION, SOLUTION INTRAMUSCULAR; INTRAVENOUS at 07:56

## 2018-11-02 RX ADMIN — MEGESTROL ACETATE 800 MG: 40 SUSPENSION ORAL at 08:06

## 2018-11-02 RX ADMIN — ONDANSETRON 4 MG: 4 TABLET, ORALLY DISINTEGRATING ORAL at 17:55

## 2018-11-02 ASSESSMENT — PAIN DESCRIPTION - ONSET: ONSET: GRADUAL

## 2018-11-02 ASSESSMENT — PAIN DESCRIPTION - FREQUENCY
FREQUENCY: CONTINUOUS
FREQUENCY: CONTINUOUS

## 2018-11-02 ASSESSMENT — PAIN DESCRIPTION - DESCRIPTORS: DESCRIPTORS: ACHING;TENDER

## 2018-11-02 ASSESSMENT — PAIN SCALES - WONG BAKER: WONGBAKER_NUMERICALRESPONSE: 8

## 2018-11-02 ASSESSMENT — PAIN SCALES - GENERAL
PAINLEVEL_OUTOF10: 4
PAINLEVEL_OUTOF10: 3
PAINLEVEL_OUTOF10: 8
PAINLEVEL_OUTOF10: 7
PAINLEVEL_OUTOF10: 6
PAINLEVEL_OUTOF10: 4
PAINLEVEL_OUTOF10: 3
PAINLEVEL_OUTOF10: 5
PAINLEVEL_OUTOF10: 8
PAINLEVEL_OUTOF10: 7
PAINLEVEL_OUTOF10: 5
PAINLEVEL_OUTOF10: 0
PAINLEVEL_OUTOF10: 6
PAINLEVEL_OUTOF10: 8

## 2018-11-02 ASSESSMENT — PAIN DESCRIPTION - PAIN TYPE
TYPE: ACUTE PAIN

## 2018-11-02 ASSESSMENT — PAIN DESCRIPTION - LOCATION
LOCATION: ABDOMEN

## 2018-11-02 ASSESSMENT — PAIN DESCRIPTION - ORIENTATION
ORIENTATION: ANTERIOR;MID
ORIENTATION: ANTERIOR;MID

## 2018-11-02 ASSESSMENT — PAIN DESCRIPTION - PROGRESSION: CLINICAL_PROGRESSION: GRADUALLY WORSENING

## 2018-11-02 NOTE — PROGRESS NOTES
This nurse has contacted Dr. Callie Feliciano to request a plan of care for this pt. Dr. Callie Feliciano has no plan of care currently and was requesting that I follow up with the admitting doctor. Dietary is looking for a response in regards to keeping this pt on TPN at discharge or weaning her off of the TPN. There is currently no discharge plan set in place.

## 2018-11-02 NOTE — PROGRESS NOTES
[facility]  INPATIENT PHYSICAL THERAPY  DAILY NOTE  [facility] TCU 8E - 8E-65/065-A    Time In: 2706  Time Out: 8346  Timed Code Treatment Minutes: 40 Minutes  Minutes: 44          Date: 2018  Patient Name: Anitra Angelo,  Gender:  female        MRN: [id number]  : 1940  (66 y.o.)     Referring Practitioner: Ordering: Gladys Yarbrough MD; Attending: Link Colmenares MD  Diagnosis: SBO s/p bowel resection  Additional Pertinent Hx: Per ED note 18, pt is a 66 y.o. female who has history of Pylori and cholecystectomy. Patient presents to the Emergency Department for the evaluation of abdominal pain that has been ongoing. Patient reports that she is experiencing abdominal distention, melena, decreased appetite, fear of eating secondary to abdominal pain, and small bowel movements. Patient was admitted to hospital on Sep. 2nd due to small bowel obstruction. She was cared for by Dr. Florentino Doty (general surgeon). Per daughter, patient has been experiencing abdominal complications since left colon resection in . Pt underwent Abd Exploration, Lysis Severe Adhesions, Segmental SB resection x 3, Formation End Ileostomy on 10/4. Pt was a code stroke on 10/12, MRI negative. To TCU 10/16.      Past Medical History:   Diagnosis Date    Arthritis     Cellulitis     Difficult intubation     GERD (gastroesophageal reflux disease)     H pylori ulcer     History of Right leg DVT ([address]     Ile[address] in place ([facility] [address] 10/04/2018    Nausea & vomiting     Pneumonia     S/P partial resection of colon 2018    Shingles     Torus mandibularis      Past Surgical History:   Procedure Laterality Date    ABDOMEN SURGERY      ABDOMINAL ADHESION SURGERY  's    Dr Adams Latham  2013    Lysis of Adhesions-Dr. Florentino Doty     BACK SURGERY  //2006/2013    X3    CHOLECYSTECTOMY      [address] [place] [address]    COLON SURGERY      COLONOSCOPY  over 10 yrs ago Social/Functional:  Lives With: Daughter (stays with pt )  Type of Home: House  Home Layout: One level  Home Access: Stairs to enter without rails  Entrance Stairs - Number of Steps: couple steps  Home Equipment: Rolling walker, Quad cane     Objective:  Supine to Sit: Minimal assistance (with trunk on mat)  Sit to Supine: Minimal assistance (with raising of b le's on mat)  Scooting: Modified independent (scoot to edge of sitting surfaces)    Transfers  Sit to Stand: Stand by assistance (very slow, takes time)  Stand to sit: Contact guard assistance;Stand by assistance (cga-> close sba)       Ambulation 1  Surface: level tile  Device: Rolling Walker  Other Apparatus: Wheelchair follow  Assistance: Stand by assistance  Quality of Gait: decreased jon, decrease step length and height, forward flexed posture, downward gaze, steady, no LOB, very slow pace  Distance: 150' x 2  Comments: no standing rests needed with each bout of amb. however pace very slow but steady  Exercises:  Exercises  Comments: seated on mat pt. completed 12 reps each b le heel-toe raises, laq, marching, hip abd/add, resistance red t-band ham curls, isometric hip add ball squeeze and supine b le ap's, quad/glute sets, saq, heelslides, hip abd/add all for strengthening, endurance     Activity Tolerance:  Activity Tolerance: Patient limited by pain; Patient limited by endurance; Patient limited by fatigue  Activity Tolerance:  pt very slow with all movement             Discharge Recommendations:  Discharge Recommendations: Continue to assess pending progress    Patient Education:  Patient Education: importance of cont mobility and gaining independence with mobility    Equipment Recommendations:  Equipment Needed: No    Safety:  Type of devices:  All fall risk precautions in place, Chair alarm in place, Call light within reach, Left in chair, Gait belt, Patient at risk for falls, Telesitter in use  Restraints  Initially in place: No    Plan:  Times per week: 6x  Times per day: Daily  Specific instructions for Next Treatment: B LE strengthening, transfer training, gait training, standing balance  Current Treatment Recommendations: Strengthening, Functional Mobility Training, Transfer Training, Balance Training, Endurance Training, Stair training, Gait Training, Neuromuscular Re-education, Home Exercise Program, Safety Education & Training, Patient/Caregiver Education & Training, Equipment Evaluation, Education, & procurement    Goals:  Patient goals : To go home. Short term goals  Time Frame for Short term goals: 2 weeks  Short term goal 1: Pt to transfer supine <--> sit MOD I to enable pt to get in/out of bed. Short term goal 2: Pt to transfer sit <--> stand MOD I for increased functional mobility. Short term goal 3: Pt to ambulate >150 feet with  S for household ambulation. Short term goal 4: Pt to improve dynamic standing balance to good- to minimize fall risk. Long term goals  Time Frame for Long term goals : 4 weeks  Long term goal 1: Pt to ambulate >200 feet with [facility] S for household ambulation. Long term goal 2: Pt to ascend/descend 4 steps without HR, with quad cane, and SBA for home entry. Long term goal 3: Pt to perform car transfer SBA to enable pt to get in/out of the car.

## 2018-11-02 NOTE — PROGRESS NOTES
[address] [address]  INPATIENT SPEECH THERAPY  [facility] TCU 8E    TIME   SLP Individual Minutes  Time In: 0800  Time Out: 0830  Minutes: 30  Timed Code Treatment Minutes: 0 Minutes       [x]Daily Note  []Progress Note  []Discharge Note    Date: 2018  Patient Name: Good Hughes        MRN: [id number]    : 1940  (66 y.o.)  Gender: female   Primary Provider: Magy Palafox MD  Admitting Diagnosis:  SBO s/p bowel resection   Secondary Diagnosis: Cognitive deficits   Precautions: Fall risk   Swallowing Status/Diet: Regular with thin liquids   Swallowing Strategies: n/a  DATE of last MBS:  n/a  Pain: Pt reporting high levels of pain; receiving morphine upon ST arrival from nursing staff. Subjective: Following dosage of pain medication, pt appeared alert and pleasantly engaged throughout remainder of session. SHORT TERM GOAL #1:  Goal 1: Pt will complete immediate, delayed, and working memory tasks with 70% accuracy given min cues to improve retention of novel and functional information. INTERVENTIONS:  Orientation- pt independently looked to external aid of calendar and stated entire date with 100% accuracy  *Improvements noted    SHORT TERM GOAL #2:  Goal 2: Pt will complete problem solving, reasoning, and executive functioning tasks (money, checkbook, medications) with 60% accuracy and min cues to improve awareness for ADL and IADL completion. INTERVENTIONS: Reasoning task regarding medical situations- pt with 2/4 indep, 1/4 min cues, 1/4 mod cues  *Decreased circular reasoning noted at this time    Jún[address] Dexter [address] #3:  Goal 3: Pt will complete thought organization and divergent naming tasks with 70% accuracy with min cues/naming 11+items in 1 minute to improve processing speed and ability to manage daily schedule. INTERVENTIONS: Thought organizational task implemented; ST instructed pt to organize a family [place] party and think of all things that may need planned.  Pt began

## 2018-11-02 NOTE — PROGRESS NOTES
(Abdominal incision Small Bowel resection (10/4/18))  · Current Nutrition Therapies:  · Oral Diet Orders: Dental Soft, Low Fiber (caffeine free, lactose free)   · Oral Diet intake: 1-25%  · Oral Nutrition Supplement (ONS) Orders: Standard High Calorie Oral Supplement (Vanilla Ensure Enlive daily)  · ONS intake:  (Initiated today)  · Anthropometric Measures:  · Ht: 5' 3\" (160 cm)   · Current Body Wt: 134 lb 11.2 oz (61.1 kg) (11/1; +trace edema in extremities & generalized edema)  · Admission Body Wt: 142 lb (64.4 kg) (10/18/18, no weight source, +1 BUE edema, +2 BLE edema)  · Usual Body Wt: 154 lb (69.9 kg) (EMR, 6/25/18, actual)  · % Weight Change: 14.9% wt. loss,  4 months per EMR  · Ideal Body Wt: 115 lb (52.2 kg), % Ideal Body 117%  · BMI Classification: BMI 18.5 - 24.9 Normal Weight (23.9)  · Comparative Standards (Estimated Nutrition Needs):  · Estimated Daily Total Kcal: ~1710kcals (30kcals/kgm wt. of 57kgm 10/22)  · Estimated Daily Protein (g): 68+ grams (1.2+ grams protein/kgm wt. of 57kgm 10/22/18)    Estimated Intake vs Estimated Needs: Intake Meets Needs (98% of estimated nutritional needs over the past 24 hours)    Nutrition Risk Level: High    Nutrition Interventions:   Continue current diet, Continue Parenteral Nutrition, Start Calorie Count  Continued Inpatient Monitoring (Ileostomy diet education completed 10/18. Encouraged po intake of meals, liquids and Ensure daily at best effort)    Nutrition Evaluation:   · Evaluation: Progressing toward goals   · Goals: Pt. will meet atleast 75% of nutritional needs via PN until appropriate for oral intake. · Monitoring: Meal Intake, Supplement Intake, Diet Tolerance, PN Intake, PN Tolerance, Skin Integrity, Wound Healing, Ascites/Edema, Pertinent Labs, Weight, Comparative Standards    See Adult Nutrition Doc Flowsheet for more detail.      Electronically signed by Pantera Ny RD, DIGNA on 11/2/18 at 9:38 AM    Contact Number: [phone]

## 2018-11-02 NOTE — PROGRESS NOTES
[facility]  INPATIENT OCCUPATIONAL THERAPY  [facility] TCU 8E  DAILY NOTE    Time:  Time In: 3532  Time Out: 1443  Timed Code Treatment Minutes: 45 Minutes  Minutes: 38          Date: 2018  Patient Name: Andi Hollingsworth,   Gender: female      Room: [facility]65/065-A  MRN: [id number]  : 1940  (66 y.o.)  Referring Practitioner: Evelyne Huerta MD ( Ordering) Dr. Andrew Kearns ( Attending)   Diagnosis: SBO s/p bowel resection  Additional Pertinent Hx: Per ED note 18, pt is a 66 y.o. female who has history of Pylori and cholecystectomy. Patient presents to the Emergency Department for the evaluation of abdominal pain that has been ongoing. Patient reports that she is experiencing abdominal distention, melena, decreased appetite, fear of eating secondary to abdominal pain, and small bowel movements. Patient was admitted to hospital on Sep. 2nd due to small bowel obstruction. She was cared for by Dr. Malissa Ojeda (general surgeon). Per daughter, patient has been experiencing abdominal complications since left colon resection in . While admitted patient was managed with nasogastric decompression, bowel rest, analgesics for pain control, IV fluid hydration, GI and DVT prophylaxis.      Past Medical History:   Diagnosis Date    Arthritis     Cellulitis     Difficult intubation     GERD (gastroesophageal reflux disease)     H pylori ulcer     History of Right leg DVT ([address]     Ileostomy in place ([address] 10/04/2018    Nausea & vomiting     Pneumonia     S/P partial resection of colon 2018    Shingles     Torus mandibularis      Past Surgical History:   Procedure Laterality Date    ABDOMEN SURGERY      ABDOMINAL ADHESION SURGERY      Dr Sabrina Wagner  2013    Lysis of Adhesions-Dr. Malissa Ojeda     BACK SURGERY  /2006/2013    X3    [facility]OMY      [facility]    COLON SURGERY      COLONOSCOPY  over 10 yrs ago   Tatum Cabrera COLONOSCOPY  08/11/2017    Dr Doyle Gómez, COLON, DIAGNOSTIC      EYE SURGERY Bilateral 1999    CATARACT REMOVAL   [address]    LUMBAR LAMINECTOMY  4/15/2013    OTHER SURGICAL HISTORY  7/8/2013    evacation of seroma-back     OR EXPLORATORY OF ABDOMEN N/A 10/4/2018    LAPAROTOMY EXPLORATORY, SMALL BOWEL RESECTION X 3, EXTENSIVE LYSIS OF ADHESIONS, ILEOSTOMY FORMATION performed by Ethlyn Cranker, MD at [facility] OFFICE/OUTPT VISIT,PROCEDURE ONLY N/A 6/25/2018    ABDOMINAL EXPLORATION AND ESTENDED LEFT COLON RESECTION, LYSIS OF COMPLEX ADHESIONS performed by Ethlyn Cranker, MD at [address]  08/11/2017    Dr Felice Parsons Left 9/28/2018    EGD BIOPSY performed by Ethlyn Cranker, MD at [facility]       Restrictions/Precautions:  General Precautions, Fall Risk                    Other position/activity restrictions: abd incision, sx 9-24 SBO repair and ileostomy       Prior Level of Function:  ADL Assistance: Independent  Homemaking Assistance: Needs assistance (shares role with daughter)  Ambulation Assistance: Independent  Transfer Assistance: Independent  Additional Comments: Pt was very IND at [facility] however recently her daughters have been providing increasing assist with IADLs around home.   Walks with SB[facility]    Subjective       Subjective: Pt up in chair upon OT arrival. daughter present, lethargic    Overall Orientation Status: Within Functional Limits         Pain:  Pain Assessment  Patient Currently in Pain: Yes  Pain Assessment: 0-10  Pain Level: 4  Pain Type: Acute pain  Pain Location: Abdomen       Objective  Overall Cognitive Status: Exceptions  Cognition Comment: slow processing, slow with completing tasks       Transfers  Sit to stand: Contact guard assistance (bedside chair and arm chair)  Stand to sit: Contact guard assistance       Functional Mobility  Functional - Mobility Device: Rolling

## 2018-11-03 LAB
ANION GAP SERPL CALCULATED.3IONS-SCNC: 10 MEQ/L (ref 8–16)
BUN BLDV-MCNC: 25 MG/DL (ref 7–22)
CALCIUM IONIZED: 1.3 MMOL/L (ref 1.12–1.32)
CALCIUM SERPL-MCNC: 9.5 MG/DL (ref 8.5–10.5)
CHLORIDE BLD-SCNC: 99 MEQ/L (ref 98–111)
CO2: 25 MEQ/L (ref 23–33)
CREAT SERPL-MCNC: 0.5 MG/DL (ref 0.4–1.2)
GFR SERPL CREATININE-BSD FRML MDRD: > 90 ML/MIN/1.73M2
GLUCOSE BLD-MCNC: 105 MG/DL (ref 70–108)
MAGNESIUM: 1.8 MG/DL (ref 1.6–2.4)
PHOSPHORUS: 3.7 MG/DL (ref 2.4–4.7)
POTASSIUM SERPL-SCNC: 4.1 MEQ/L (ref 3.5–5.2)
SODIUM BLD-SCNC: 134 MEQ/L (ref 135–145)

## 2018-11-03 PROCEDURE — 84100 ASSAY OF PHOSPHORUS: CPT

## 2018-11-03 PROCEDURE — 6370000000 HC RX 637 (ALT 250 FOR IP): Performed by: FAMILY MEDICINE

## 2018-11-03 PROCEDURE — 80048 BASIC METABOLIC PNL TOTAL CA: CPT

## 2018-11-03 PROCEDURE — 2500000003 HC RX 250 WO HCPCS: Performed by: PHARMACIST

## 2018-11-03 PROCEDURE — C9113 INJ PANTOPRAZOLE SODIUM, VIA: HCPCS | Performed by: FAMILY MEDICINE

## 2018-11-03 PROCEDURE — 1290000000 HC SEMI PRIVATE OTHER R&B

## 2018-11-03 PROCEDURE — 36415 COLL VENOUS BLD VENIPUNCTURE: CPT

## 2018-11-03 PROCEDURE — 83735 ASSAY OF MAGNESIUM: CPT

## 2018-11-03 PROCEDURE — 6360000002 HC RX W HCPCS: Performed by: FAMILY MEDICINE

## 2018-11-03 PROCEDURE — 82330 ASSAY OF CALCIUM: CPT

## 2018-11-03 PROCEDURE — 2709999900 HC NON-CHARGEABLE SUPPLY

## 2018-11-03 RX ORDER — MORPHINE SULFATE 20 MG/ML
2.5 SOLUTION ORAL
Status: DISCONTINUED | OUTPATIENT
Start: 2018-11-03 | End: 2018-11-06

## 2018-11-03 RX ORDER — ONDANSETRON 4 MG/1
4 TABLET, ORALLY DISINTEGRATING ORAL 4 TIMES DAILY
Status: DISCONTINUED | OUTPATIENT
Start: 2018-11-03 | End: 2018-11-09 | Stop reason: HOSPADM

## 2018-11-03 RX ORDER — ONDANSETRON 4 MG/1
4 TABLET, ORALLY DISINTEGRATING ORAL ONCE
Status: DISCONTINUED | OUTPATIENT
Start: 2018-11-03 | End: 2018-11-03

## 2018-11-03 RX ADMIN — ONDANSETRON 4 MG: 4 TABLET, ORALLY DISINTEGRATING ORAL at 20:48

## 2018-11-03 RX ADMIN — IBUPROFEN 400 MG: 200 SUSPENSION ORAL at 20:48

## 2018-11-03 RX ADMIN — IBUPROFEN 400 MG: 200 SUSPENSION ORAL at 01:48

## 2018-11-03 RX ADMIN — MORPHINE SULFATE 2 MG: 2 INJECTION, SOLUTION INTRAMUSCULAR; INTRAVENOUS at 09:26

## 2018-11-03 RX ADMIN — PANTOPRAZOLE SODIUM 40 MG: 40 INJECTION, POWDER, FOR SOLUTION INTRAVENOUS at 09:30

## 2018-11-03 RX ADMIN — MEGESTROL ACETATE 800 MG: 40 SUSPENSION ORAL at 09:30

## 2018-11-03 RX ADMIN — CALCIUM GLUCONATE: 98 INJECTION, SOLUTION INTRAVENOUS at 19:57

## 2018-11-03 RX ADMIN — IBUPROFEN 400 MG: 200 SUSPENSION ORAL at 16:41

## 2018-11-03 RX ADMIN — IBUPROFEN 400 MG: 200 SUSPENSION ORAL at 09:31

## 2018-11-03 RX ADMIN — ONDANSETRON 4 MG: 2 INJECTION INTRAMUSCULAR; INTRAVENOUS at 09:26

## 2018-11-03 RX ADMIN — MORPHINE SULFATE 2 MG: 2 INJECTION, SOLUTION INTRAMUSCULAR; INTRAVENOUS at 01:56

## 2018-11-03 ASSESSMENT — PAIN DESCRIPTION - DESCRIPTORS
DESCRIPTORS: ACHING;DISCOMFORT;TENDER
DESCRIPTORS: ACHING

## 2018-11-03 ASSESSMENT — PAIN DESCRIPTION - PAIN TYPE
TYPE: ACUTE PAIN
TYPE: ACUTE PAIN

## 2018-11-03 ASSESSMENT — PAIN SCALES - GENERAL
PAINLEVEL_OUTOF10: 5
PAINLEVEL_OUTOF10: 0
PAINLEVEL_OUTOF10: 6
PAINLEVEL_OUTOF10: 0
PAINLEVEL_OUTOF10: 5
PAINLEVEL_OUTOF10: 0

## 2018-11-03 ASSESSMENT — PAIN DESCRIPTION - LOCATION
LOCATION: ABDOMEN
LOCATION: ABDOMEN

## 2018-11-03 ASSESSMENT — PAIN DESCRIPTION - ORIENTATION
ORIENTATION: MID
ORIENTATION: ANTERIOR;MID

## 2018-11-03 NOTE — PROGRESS NOTES
regular rate and rhythm, S1, S2 normal, no murmur, click, rub or gallop  Abdomen: hypoactive BS, distended, tympanic, generally sore but not acutely so, the ostomy is functioning  Extremities: extremities normal, atraumatic, no cyanosis or edema  Skin: Skin color, texture, turgor normal. No rashes or lesions  Neurologic: weak    Data Review:   Results for Sydney Cancino (MRN [id number]) as of 11/3/2018 20:03   Ref.  Range 11/2/2018 17:28 11/2/2018 20:52 11/3/2018 04:55   Sodium Latest Ref Range: 135 - 145 meq/L   134 (L)   Potassium Latest Ref Range: 3.5 - 5.2 meq/L   4.1   Chloride Latest Ref Range: 98 - 111 meq/L   99   CO2 Latest Ref Range: 23 - 33 meq/L   25   BUN Latest Ref Range: 7 - 22 mg/dL   25 (H)   Creatinine Latest Ref Range: 0.4 - 1.2 mg/dL   0.5   Anion Gap Latest Ref Range: 8.0 - 16.0 meq/L   10.0   Calcium, Ion Latest Ref Range: 1.12 - 1.32 mmol/L   1.30   Est, Glom Filt Rate Latest Units: ml/min/1.73m2   >90   Magnesium Latest Ref Range: 1.6 - 2.4 mg/dL   1.8   Glucose Latest Ref Range: 70 - 108 mg/dL   105   POC Glucose Latest Ref Range: 70 - 108 mg/dl 118 (H) 117 (H)    Calcium Latest Ref Range: 8.5 - 10.5 mg/dL   9.5   Phosphorus Latest Ref Range: 2.4 - 4.7 mg/dL   3.7       Electronically signed by Ness Maldonado MD on 11/3/2018 at 7:57 PM

## 2018-11-04 LAB — GLUCOSE BLD-MCNC: 111 MG/DL (ref 70–108)

## 2018-11-04 PROCEDURE — 97110 THERAPEUTIC EXERCISES: CPT

## 2018-11-04 PROCEDURE — 97530 THERAPEUTIC ACTIVITIES: CPT

## 2018-11-04 PROCEDURE — 2500000003 HC RX 250 WO HCPCS: Performed by: PHARMACIST

## 2018-11-04 PROCEDURE — 6370000000 HC RX 637 (ALT 250 FOR IP): Performed by: FAMILY MEDICINE

## 2018-11-04 PROCEDURE — 6360000002 HC RX W HCPCS: Performed by: FAMILY MEDICINE

## 2018-11-04 PROCEDURE — 82948 REAGENT STRIP/BLOOD GLUCOSE: CPT

## 2018-11-04 PROCEDURE — 2709999900 HC NON-CHARGEABLE SUPPLY

## 2018-11-04 PROCEDURE — 1290000000 HC SEMI PRIVATE OTHER R&B

## 2018-11-04 RX ADMIN — Medication 2.5 MG: at 11:28

## 2018-11-04 RX ADMIN — IBUPROFEN 400 MG: 200 SUSPENSION ORAL at 01:48

## 2018-11-04 RX ADMIN — IBUPROFEN 400 MG: 200 SUSPENSION ORAL at 11:28

## 2018-11-04 RX ADMIN — Medication 2.5 MG: at 20:09

## 2018-11-04 RX ADMIN — IBUPROFEN 400 MG: 200 SUSPENSION ORAL at 20:11

## 2018-11-04 RX ADMIN — Medication 2.5 MG: at 05:54

## 2018-11-04 RX ADMIN — ONDANSETRON 4 MG: 4 TABLET, ORALLY DISINTEGRATING ORAL at 17:17

## 2018-11-04 RX ADMIN — ONDANSETRON 4 MG: 4 TABLET, ORALLY DISINTEGRATING ORAL at 11:29

## 2018-11-04 RX ADMIN — LANSOPRAZOLE 30 MG: 30 TABLET, ORALLY DISINTEGRATING, DELAYED RELEASE ORAL at 05:45

## 2018-11-04 RX ADMIN — CALCIUM GLUCONATE: 98 INJECTION, SOLUTION INTRAVENOUS at 18:18

## 2018-11-04 RX ADMIN — Medication 2.5 MG: at 17:17

## 2018-11-04 RX ADMIN — ONDANSETRON 4 MG: 4 TABLET, ORALLY DISINTEGRATING ORAL at 20:11

## 2018-11-04 RX ADMIN — Medication 2.5 MG: at 22:16

## 2018-11-04 RX ADMIN — IBUPROFEN 400 MG: 200 SUSPENSION ORAL at 17:17

## 2018-11-04 ASSESSMENT — PAIN DESCRIPTION - LOCATION
LOCATION: ABDOMEN
LOCATION: HEAD
LOCATION: ABDOMEN

## 2018-11-04 ASSESSMENT — PAIN DESCRIPTION - PAIN TYPE
TYPE: ACUTE PAIN

## 2018-11-04 ASSESSMENT — PAIN DESCRIPTION - ORIENTATION
ORIENTATION: MID
ORIENTATION: MID

## 2018-11-04 ASSESSMENT — PAIN SCALES - GENERAL
PAINLEVEL_OUTOF10: 6
PAINLEVEL_OUTOF10: 2
PAINLEVEL_OUTOF10: 0
PAINLEVEL_OUTOF10: 0
PAINLEVEL_OUTOF10: 5
PAINLEVEL_OUTOF10: 7
PAINLEVEL_OUTOF10: 0
PAINLEVEL_OUTOF10: 5
PAINLEVEL_OUTOF10: 7
PAINLEVEL_OUTOF10: 7
PAINLEVEL_OUTOF10: 6
PAINLEVEL_OUTOF10: 0

## 2018-11-04 ASSESSMENT — PAIN DESCRIPTION - DESCRIPTORS
DESCRIPTORS: ACHING
DESCRIPTORS: HEADACHE
DESCRIPTORS: ACHING

## 2018-11-04 NOTE — PROGRESS NOTES
08/11/2017    Dr Ammon Lesches, COLON, DIAGNOSTIC      EYE SURGERY Bilateral 1999    CATARACT REMOVAL   [address] ? [facility]    LUMBAR LAMINECTOMY  4/15/2013    OTHER SURGICAL HISTORY  7/8/2013    evacation of seroma-back     CT EXPLORATORY OF ABDOMEN N/A 10/4/2018    LAPAROTOMY EXPLORATORY, SMALL BOWEL RESECTION X 3, EXTENSIVE LYSIS OF ADHESIONS, ILEOSTOMY FORMATION performed by Cheyanne Guidry MD at [address] OFFICE/OUTPT VISIT,PROCEDURE ONLY N/A 6/25/2018    ABDOMINAL EXPLORATION AND ESTENDED LEFT COLON RESECTION, LYSIS OF COMPLEX ADHESIONS performed by Cheyanne Guidry MD at [facility]  08/11/2017    Dr Payton He Left 9/28/2018    EGD BIOPSY performed by Cheyanne Guidry MD at [facility]       Restrictions/Precautions:  General Precautions, Fall Risk     Other position/activity restrictions: abd incision, sx 9-24 SBO repair and ileostomy     Prior Level of Function:  ADL Assistance: Independent  Homemaking Assistance: Needs assistance (shares role with daughter)  Ambulation Assistance: Independent  Transfer Assistance: Independent  Additional Comments: Pt was very IND at [facility] however recently her daughters have been providing increasing assist with IADLs around home. Walks with SBQC    Subjective  Subjective: Patient was lying in bed upon OT arrival. Pt was agreeable to OT session with encouragement. Pt reports she has headache this date. Comments: Rn okayed therapy sessionn     Overall Orientation Status: Within Functional Limits    Pain:  Pain Assessment  Patient Currently in Pain: Yes  Pain Assessment: 0-10  Pain Level: 2  Pain Type: Acute pain  Pain Location: Head  Pain Descriptors: Headache     Objective  Overall Cognitive Status: Exceptions  Arousal/Alertness: Delayed responses to stimuli  Following Commands:  Follows one step commands with increased time  Attention Span: Attends with cues to redirect  Safety Judgement: Decreased awareness of need for assistance;Decreased awareness of need for safety  Problem Solving: Assistance required to generate solutions;Assistance required to implement solutions;Assistance required to identify errors made;Assistance required to correct errors made;Decreased awareness of errors  Insights: Decreased awareness of deficits  Initiation: Requires cues for some  Sequencing: Requires cues for some  Cognition Comment: slow processing, slow with completing tasks    ADL  LE Dressing: Moderate assistance (To don B socks while sitting EOB. Pt requiring encouragement to attempt task. )     Bed mobility  Supine to Sit: Minimal assistance (with trunk )  Scooting: Modified independent    Transfers  Sit to stand: Contact guard assistance (From EOB with min vc for hand placement and safety. )  Stand to sit: Contact guard assistance (Onto recliner. )     Balance  Sitting Balance: Supervision  Standing Balance: Contact guard assistance     Time: 45 seconds  Activity: prep to ambulate      Functional Mobility  Functional - Mobility Device: Rolling Walker  Activity: Other  Assist Level: Contact guard assistance  Functional Mobility Comments: Patient ambulated in room, slow pace, No LOB. Type of ROM/Therapeutic Exercise: AROM  Comment: Completed BUE exercises x15 reps x1 set with very slow pace using red tband. Required visual and verbal cueing for technique of each exercise. Completed to increase strength and activity tolerance required for ADLs and toilet transfers     Activity Tolerance:  Activity Tolerance: Patient limited by fatigue  Activity Tolerance: Pt required extended time for encouragement to engage in session, then pt requiring extended time for all tasks throughout session.     Assessment:   Performance deficits / Impairments: Decreased functional mobility , Decreased endurance, Decreased ADL status, Decreased balance, Decreased strength, Decreased safe awareness,

## 2018-11-05 LAB
ANION GAP SERPL CALCULATED.3IONS-SCNC: 10 MEQ/L (ref 8–16)
BUN BLDV-MCNC: 23 MG/DL (ref 7–22)
CALCIUM IONIZED: 1.27 MMOL/L (ref 1.12–1.32)
CALCIUM SERPL-MCNC: 9.4 MG/DL (ref 8.5–10.5)
CHLORIDE BLD-SCNC: 98 MEQ/L (ref 98–111)
CO2: 24 MEQ/L (ref 23–33)
CREAT SERPL-MCNC: 0.4 MG/DL (ref 0.4–1.2)
GFR SERPL CREATININE-BSD FRML MDRD: > 90 ML/MIN/1.73M2
GLUCOSE BLD-MCNC: 106 MG/DL (ref 70–108)
GLUCOSE BLD-MCNC: 117 MG/DL (ref 70–108)
MAGNESIUM: 1.8 MG/DL (ref 1.6–2.4)
PHOSPHORUS: 3.6 MG/DL (ref 2.4–4.7)
POTASSIUM SERPL-SCNC: 4.2 MEQ/L (ref 3.5–5.2)
SODIUM BLD-SCNC: 132 MEQ/L (ref 135–145)

## 2018-11-05 PROCEDURE — 97116 GAIT TRAINING THERAPY: CPT

## 2018-11-05 PROCEDURE — 82330 ASSAY OF CALCIUM: CPT

## 2018-11-05 PROCEDURE — 97127 HC SP THER IVNTJ W/FOCUS COG FUNCJ: CPT

## 2018-11-05 PROCEDURE — 83735 ASSAY OF MAGNESIUM: CPT

## 2018-11-05 PROCEDURE — 36415 COLL VENOUS BLD VENIPUNCTURE: CPT

## 2018-11-05 PROCEDURE — 6370000000 HC RX 637 (ALT 250 FOR IP): Performed by: FAMILY MEDICINE

## 2018-11-05 PROCEDURE — 2500000003 HC RX 250 WO HCPCS: Performed by: COUNSELOR

## 2018-11-05 PROCEDURE — 97110 THERAPEUTIC EXERCISES: CPT

## 2018-11-05 PROCEDURE — 82948 REAGENT STRIP/BLOOD GLUCOSE: CPT

## 2018-11-05 PROCEDURE — 6360000002 HC RX W HCPCS: Performed by: FAMILY MEDICINE

## 2018-11-05 PROCEDURE — 80048 BASIC METABOLIC PNL TOTAL CA: CPT

## 2018-11-05 PROCEDURE — 97535 SELF CARE MNGMENT TRAINING: CPT

## 2018-11-05 PROCEDURE — 1290000000 HC SEMI PRIVATE OTHER R&B

## 2018-11-05 PROCEDURE — 99024 POSTOP FOLLOW-UP VISIT: CPT | Performed by: SURGERY

## 2018-11-05 PROCEDURE — 84100 ASSAY OF PHOSPHORUS: CPT

## 2018-11-05 PROCEDURE — 97530 THERAPEUTIC ACTIVITIES: CPT

## 2018-11-05 RX ADMIN — Medication 2.5 MG: at 14:43

## 2018-11-05 RX ADMIN — Medication 2.5 MG: at 18:00

## 2018-11-05 RX ADMIN — ONDANSETRON 4 MG: 4 TABLET, ORALLY DISINTEGRATING ORAL at 14:42

## 2018-11-05 RX ADMIN — ONDANSETRON 4 MG: 4 TABLET, ORALLY DISINTEGRATING ORAL at 20:08

## 2018-11-05 RX ADMIN — ONDANSETRON 4 MG: 4 TABLET, ORALLY DISINTEGRATING ORAL at 09:55

## 2018-11-05 RX ADMIN — Medication 2.5 MG: at 23:37

## 2018-11-05 RX ADMIN — LANSOPRAZOLE 30 MG: 30 TABLET, ORALLY DISINTEGRATING, DELAYED RELEASE ORAL at 06:35

## 2018-11-05 RX ADMIN — Medication 2.5 MG: at 09:55

## 2018-11-05 RX ADMIN — ONDANSETRON 4 MG: 4 TABLET, ORALLY DISINTEGRATING ORAL at 17:58

## 2018-11-05 RX ADMIN — CALCIUM GLUCONATE: 98 INJECTION, SOLUTION INTRAVENOUS at 18:08

## 2018-11-05 RX ADMIN — IBUPROFEN 400 MG: 200 SUSPENSION ORAL at 20:08

## 2018-11-05 RX ADMIN — Medication 2.5 MG: at 20:08

## 2018-11-05 RX ADMIN — IBUPROFEN 400 MG: 200 SUSPENSION ORAL at 02:59

## 2018-11-05 RX ADMIN — Medication 2.5 MG: at 06:35

## 2018-11-05 ASSESSMENT — PAIN DESCRIPTION - LOCATION
LOCATION: HEAD
LOCATION: ABDOMEN
LOCATION: ABDOMEN

## 2018-11-05 ASSESSMENT — PAIN SCALES - GENERAL
PAINLEVEL_OUTOF10: 6
PAINLEVEL_OUTOF10: 4
PAINLEVEL_OUTOF10: 8
PAINLEVEL_OUTOF10: 0
PAINLEVEL_OUTOF10: 6
PAINLEVEL_OUTOF10: 5
PAINLEVEL_OUTOF10: 5
PAINLEVEL_OUTOF10: 6
PAINLEVEL_OUTOF10: 4

## 2018-11-05 ASSESSMENT — PAIN DESCRIPTION - ORIENTATION
ORIENTATION: MID
ORIENTATION: MID

## 2018-11-05 ASSESSMENT — PAIN DESCRIPTION - ONSET
ONSET: ON-GOING
ONSET: ON-GOING

## 2018-11-05 ASSESSMENT — PAIN DESCRIPTION - DESCRIPTORS
DESCRIPTORS: ACHING;PRESSURE;TENDER
DESCRIPTORS: ACHING;TENDER

## 2018-11-05 ASSESSMENT — PAIN DESCRIPTION - PAIN TYPE
TYPE: ACUTE PAIN
TYPE: ACUTE PAIN

## 2018-11-05 ASSESSMENT — PAIN DESCRIPTION - FREQUENCY
FREQUENCY: CONTINUOUS
FREQUENCY: CONTINUOUS

## 2018-11-05 ASSESSMENT — PAIN DESCRIPTION - PROGRESSION
CLINICAL_PROGRESSION: NOT CHANGED
CLINICAL_PROGRESSION: NOT CHANGED

## 2018-11-05 NOTE — PROGRESS NOTES
[facility]  INPATIENT SPEECH THERAPY  [facility] TCU 8E    TIME   SLP Individual Minutes  Time In: 5713  Time Out: 7157  Minutes: 30  Timed Code Treatment Minutes: 30 Minutes       []Daily Note  [x]Progress Note  []Discharge Note    Date: 2018  Patient Name: Demetria Boss        MRN: [id number]    : 1940  (66 y.o.)  Gender: female   Primary Provider: Rakan Price MD  Admitting Diagnosis:  SBO s/p bowel resection   Secondary Diagnosis: Cognitive deficits   Precautions: Fall risk   Swallowing Status/Diet: Regular with thin liquids   Swallowing Strategies: n/a  DATE of last MBS:  n/a  Pain: 0/10    Subjective: Pt alert and pleasant for treatment. SHORT TERM GOAL #1:  Goal 1: Pt will complete immediate, delayed, and working memory tasks with 70% accuracy given min cues to improve retention of novel and functional information. - GOAL NOT MET, CONTINUE  INTERVENTIONS: Orientation- pt independently looked to external aid of calendar and stated entire date with 100% accuracy  *Second session in a row in which pt utilizes calendar; Improvements noted    Creation of 5-item grocery list:  Immediate recall: 4/5 indep, 1/5 min categorical cue  Delayed (20 min) recall: 2/5 indep, 2/5 mod cues, 1/5 FO2    SHORT TERM GOAL #2:  Goal 2: Pt will complete problem solving, reasoning, and executive functioning tasks (money, checkbook, medications) with 60% accuracy and min cues to improve awareness for ADL and IADL completion. - GOAL NOT CONSISTENTLY MET, CONTINUE  INTERVENTIONS: ST provided pt with 4 prescription medications and pill box, instructing pt to fill out pill box appropriately. Pt began task by questioning what to do, requiring min cues to initiate task.   1 pill x1 daily- / with min cues  1 pill 4-6 hrs as needed- required mod reasoning cues  2 pills x2 daily-  indep  1 pill x3 daily-  indep  *Pt with minimal awareness into rationale behind task in beginning of session,

## 2018-11-05 NOTE — PROGRESS NOTES
 COLONOSCOPY  08/11/2017    Dr Perri Trevizo, COLON, DIAGNOSTIC      EYE SURGERY Bilateral [address] ? [facility]    LUMBAR LAMINECTOMY  4/15/2013    OTHER SURGICAL HISTORY  7/8/2013    evacation of seroma-back     PA EXPLORATORY OF ABDOMEN N/A 10/4/2018    LAPAROTOMY EXPLORATORY, SMALL BOWEL RESECTION X 3, EXTENSIVE LYSIS OF ADHESIONS, ILEOSTOMY FORMATION performed by Ham Rogers MD at [address] OFFICE/OUTPT VISIT,PROCEDURE ONLY N/A 6/25/2018    ABDOMINAL EXPLORATION AND ESTENDED LEFT COLON RESECTION, LYSIS OF COMPLEX ADHESIONS performed by aHm Rogers MD at [facility]  08/11/2017    Dr Neelam Solares Left 9/28/2018    EGD BIOPSY performed by Ham Rogers MD at [facility]       Restrictions/Precautions:  General Precautions, Fall Risk                    Other position/activity restrictions: abd incision, sx 9-24 SBO repair and ileostomy       Prior Level of Function:  ADL Assistance: Independent  Homemaking Assistance: Needs assistance (shares role with daughter)  Ambulation Assistance: Independent  Transfer Assistance: Independent  Additional Comments: Pt was very IND at [facility] however recently her daughters have been providing increasing assist with IADLs around home. Walks with SBQC    Subjective:  Response To Previous Treatment: Patient with no complaints from previous session. Family / Caregiver Present: No  Subjective: pt in bed upon arival agreeable o therapy. c/o pain in abdomen throughout session, VERY slow moving, much increased time for all activities. pt reporting she has been able to rest well in recent days however is still feeling very tired and weak today.  Nurse(Eloisa) reports pt. has not been feeling well today    Pain:   .  Pain Assessment  Pain Level: 6  Pain Type: Acute pain  Pain Location: Abdomen  Pain Orientation: Mid  Pain Descriptors:

## 2018-11-05 NOTE — PROGRESS NOTES
TPN Follow Up Note    Assessment: Unmeasured UO noted 11/3, none documented 11/4. TPN rate cut back yesterday but sodium continues to fall. Will increase to normal saline concentration. Electrolyte Replacement: none    TPN changes for (today) at 1800:  Increase sodium chloride to 275 mEq    Re-check BMP, Mg, PO4, iCa 11/7    Sudha Candelaria RPh, BCPS, BCGP  11/5/2018     7:23 AM

## 2018-11-06 PROCEDURE — 97110 THERAPEUTIC EXERCISES: CPT

## 2018-11-06 PROCEDURE — 1290000000 HC SEMI PRIVATE OTHER R&B

## 2018-11-06 PROCEDURE — 2500000003 HC RX 250 WO HCPCS: Performed by: SURGERY

## 2018-11-06 PROCEDURE — 97127 HC SP THER IVNTJ W/FOCUS COG FUNCJ: CPT

## 2018-11-06 PROCEDURE — 6370000000 HC RX 637 (ALT 250 FOR IP): Performed by: FAMILY MEDICINE

## 2018-11-06 PROCEDURE — 97116 GAIT TRAINING THERAPY: CPT

## 2018-11-06 PROCEDURE — 6360000002 HC RX W HCPCS: Performed by: FAMILY MEDICINE

## 2018-11-06 RX ORDER — MORPHINE SULFATE 20 MG/ML
5 SOLUTION ORAL
Status: DISCONTINUED | OUTPATIENT
Start: 2018-11-06 | End: 2018-11-09 | Stop reason: HOSPADM

## 2018-11-06 RX ADMIN — LANSOPRAZOLE 30 MG: 30 TABLET, ORALLY DISINTEGRATING, DELAYED RELEASE ORAL at 06:17

## 2018-11-06 RX ADMIN — CALCIUM GLUCONATE: 98 INJECTION, SOLUTION INTRAVENOUS at 18:14

## 2018-11-06 RX ADMIN — Medication 2.5 MG: at 06:17

## 2018-11-06 RX ADMIN — ONDANSETRON 4 MG: 4 TABLET, ORALLY DISINTEGRATING ORAL at 20:43

## 2018-11-06 RX ADMIN — Medication 5 MG: at 18:04

## 2018-11-06 RX ADMIN — ONDANSETRON 4 MG: 4 TABLET, ORALLY DISINTEGRATING ORAL at 10:12

## 2018-11-06 RX ADMIN — Medication 2.5 MG: at 03:02

## 2018-11-06 RX ADMIN — Medication 5 MG: at 20:42

## 2018-11-06 RX ADMIN — Medication 5 MG: at 13:58

## 2018-11-06 RX ADMIN — Medication 5 MG: at 23:50

## 2018-11-06 RX ADMIN — Medication 5 MG: at 12:06

## 2018-11-06 RX ADMIN — Medication 5 MG: at 10:12

## 2018-11-06 RX ADMIN — ONDANSETRON 4 MG: 4 TABLET, ORALLY DISINTEGRATING ORAL at 13:58

## 2018-11-06 RX ADMIN — ONDANSETRON 4 MG: 4 TABLET, ORALLY DISINTEGRATING ORAL at 18:05

## 2018-11-06 ASSESSMENT — PAIN SCALES - GENERAL
PAINLEVEL_OUTOF10: 5
PAINLEVEL_OUTOF10: 3
PAINLEVEL_OUTOF10: 6
PAINLEVEL_OUTOF10: 5
PAINLEVEL_OUTOF10: 8
PAINLEVEL_OUTOF10: 5

## 2018-11-06 NOTE — PROGRESS NOTES
 COLONOSCOPY  08/11/2017    Dr Tushar Tariq, COLON, DIAGNOSTIC      EYE SURGERY Bilateral [address] ? [facility]    LUMBAR LAMINECTOMY  4/15/2013    OTHER SURGICAL HISTORY  7/8/2013    evacation of seroma-back     [place] EXPLORATORY OF ABDOMEN N/A 10/4/2018    LAPAROTOMY EXPLORATORY, SMALL BOWEL RESECTION X 3, EXTENSIVE LYSIS OF ADHESIONS, ILEOSTOMY FORMATION performed by Glenna Castleman, MD at [address] OFFICE/OUTPT VISIT,PROCEDURE ONLY N/A 6/25/2018    ABDOMINAL EXPLORATION AND ESTENDED LEFT COLON RESECTION, LYSIS OF COMPLEX ADHESIONS performed by Glenna Castleman, MD at [address]  08/11/2017    Dr Alexi Pedraza Left 9/28/2018    EGD BIOPSY performed by Glenna Castleman, MD at [facility]       Restrictions/Precautions:  General Precautions, Fall Risk           Other position/activity restrictions: abd incision, sx 9-24 SBO repair and ileostomy       Prior Level of Function:  ADL Assistance: Independent  Homemaking Assistance: Needs assistance (shares role with daughter)  Ambulation Assistance: Independent  Transfer Assistance: Independent  Additional Comments: Pt was very IND at [facility] however recently her daughters have been providing increasing assist with IADLs around home. Walks with [facility]    Subjective:     Subjective: Patient sitting in bedside chaur and agreeable to therapy. Patient requested to use restroom before leaving room. Patient not feeling well today. Patient noted not having much energy again today. Pain:  Yes.   Pain Assessment  Pain Assessment: 0-10  Pain Level: 5 (abdominal pain)       Social/Functional:  Lives With: Daughter (stays with pt )  Type of Home: House  Home Layout: One level  Home Access: Stairs to enter without rails  Entrance Stairs - Number of Steps: couple steps  Home Equipment: Rolling walker, Quad cane     Objective:       Transfers  Sit to Stand:

## 2018-11-06 NOTE — PROGRESS NOTES
TPN Follow Up Note    Assessment:     Unmeasured UO x 2 noted. Per Dr Osei's notes attempts are being made to wean TPN but patient still not eating enough.     Electrolyte Replacement: no labs today    TPN changes for (today) at 1800: no labs today    Re-check BMP, Mg, PO4, iCa 11/7/18 with am labs

## 2018-11-06 NOTE — FLOWSHEET NOTE
[facility]  OCCUPATIONAL THERAPY MISSED TREATMENT NOTE  [facility]  [id number]      Date: 2018  Patient Name: Joshua Yancey        CSN: [id number]   : 1940  (66 y.o.)  Gender: female   Referring Practitioner:  Cheyanne Guidry MD ( Ordering) Dr. Mukul Burrell ( Attending)   Diagnosis: SBO s/p bowel resection         REASON FOR MISSED TREATMENT:  Hold treatment per nursing request.  Will check back as time allows

## 2018-11-07 LAB
ANION GAP SERPL CALCULATED.3IONS-SCNC: 8 MEQ/L (ref 8–16)
BUN BLDV-MCNC: 24 MG/DL (ref 7–22)
CALCIUM SERPL-MCNC: 9.3 MG/DL (ref 8.5–10.5)
CHLORIDE BLD-SCNC: 99 MEQ/L (ref 98–111)
CO2: 26 MEQ/L (ref 23–33)
CREAT SERPL-MCNC: 0.4 MG/DL (ref 0.4–1.2)
GFR SERPL CREATININE-BSD FRML MDRD: > 90 ML/MIN/1.73M2
GLUCOSE BLD-MCNC: 106 MG/DL (ref 70–108)
MAGNESIUM: 1.9 MG/DL (ref 1.6–2.4)
PHOSPHORUS: 3.2 MG/DL (ref 2.4–4.7)
POTASSIUM SERPL-SCNC: 3.9 MEQ/L (ref 3.5–5.2)
SODIUM BLD-SCNC: 133 MEQ/L (ref 135–145)

## 2018-11-07 PROCEDURE — 97127 HC SP THER IVNTJ W/FOCUS COG FUNCJ: CPT

## 2018-11-07 PROCEDURE — 80048 BASIC METABOLIC PNL TOTAL CA: CPT

## 2018-11-07 PROCEDURE — 0220000000 HC SKILLED NURSING FACILITY

## 2018-11-07 PROCEDURE — 84100 ASSAY OF PHOSPHORUS: CPT

## 2018-11-07 PROCEDURE — 97110 THERAPEUTIC EXERCISES: CPT

## 2018-11-07 PROCEDURE — 97116 GAIT TRAINING THERAPY: CPT

## 2018-11-07 PROCEDURE — 97535 SELF CARE MNGMENT TRAINING: CPT

## 2018-11-07 PROCEDURE — 6370000000 HC RX 637 (ALT 250 FOR IP): Performed by: FAMILY MEDICINE

## 2018-11-07 PROCEDURE — 97530 THERAPEUTIC ACTIVITIES: CPT

## 2018-11-07 PROCEDURE — 2709999900 HC NON-CHARGEABLE SUPPLY

## 2018-11-07 PROCEDURE — 2500000003 HC RX 250 WO HCPCS: Performed by: SURGERY

## 2018-11-07 PROCEDURE — 83735 ASSAY OF MAGNESIUM: CPT

## 2018-11-07 PROCEDURE — 36415 COLL VENOUS BLD VENIPUNCTURE: CPT

## 2018-11-07 PROCEDURE — 1290000000 HC SEMI PRIVATE OTHER R&B

## 2018-11-07 PROCEDURE — 6360000002 HC RX W HCPCS: Performed by: FAMILY MEDICINE

## 2018-11-07 RX ADMIN — LANSOPRAZOLE 30 MG: 30 TABLET, ORALLY DISINTEGRATING, DELAYED RELEASE ORAL at 05:54

## 2018-11-07 RX ADMIN — Medication 5 MG: at 11:23

## 2018-11-07 RX ADMIN — ONDANSETRON 4 MG: 4 TABLET, ORALLY DISINTEGRATING ORAL at 21:13

## 2018-11-07 RX ADMIN — Medication 5 MG: at 14:39

## 2018-11-07 RX ADMIN — ONDANSETRON 4 MG: 4 TABLET, ORALLY DISINTEGRATING ORAL at 14:39

## 2018-11-07 RX ADMIN — Medication 5 MG: at 17:00

## 2018-11-07 RX ADMIN — Medication 5 MG: at 19:06

## 2018-11-07 RX ADMIN — ONDANSETRON 4 MG: 4 TABLET, ORALLY DISINTEGRATING ORAL at 16:59

## 2018-11-07 RX ADMIN — Medication 5 MG: at 05:54

## 2018-11-07 RX ADMIN — CALCIUM GLUCONATE: 98 INJECTION, SOLUTION INTRAVENOUS at 19:06

## 2018-11-07 RX ADMIN — Medication 5 MG: at 21:11

## 2018-11-07 RX ADMIN — ONDANSETRON 4 MG: 4 TABLET, ORALLY DISINTEGRATING ORAL at 11:23

## 2018-11-07 ASSESSMENT — PAIN SCALES - GENERAL
PAINLEVEL_OUTOF10: 5
PAINLEVEL_OUTOF10: 4
PAINLEVEL_OUTOF10: 5
PAINLEVEL_OUTOF10: 6
PAINLEVEL_OUTOF10: 5
PAINLEVEL_OUTOF10: 6

## 2018-11-07 ASSESSMENT — PAIN DESCRIPTION - LOCATION: LOCATION: HEAD

## 2018-11-07 ASSESSMENT — PAIN DESCRIPTION - PAIN TYPE: TYPE: ACUTE PAIN

## 2018-11-07 NOTE — FLOWSHEET NOTE
11/06/18 1946   Encounter Summary   Services provided to: Patient and family together   Referral/Consult From: [facility] Children;Family members   Place of [facility] Completed   Continue Visiting Yes  (11/6/18 Hospice Patient Care continue support)   Complexity of Encounter Moderate   Length of Encounter 15 minutes   Spiritual/Voodoo   Type Spiritual support     S: Leia Back is a 66year old white female. At the time of entrance, Leia Back was lying in her bed while two daughters were in the room providing support. Patient was unable to verbally communicate but her daughter told me that the family met with the hospice care nurse today for a plan for home care for patient. She also shared with this  that both patient and family agreed that it is time for patient to be taken home for home hospice care now. The family also shared with  that patient is a member of [employer] in [facility] but they said  has already been contacted. This  provided words of encouragement and nurtured hope. Spiritual care through prayer was provided. Patient and family wee receptive of the visit and were approachable. This  will follow up with both spiritual and emotional support to this patient as the family move to the next step through their medical needs in hospice care.

## 2018-11-07 NOTE — PROGRESS NOTES
Met with patient and daughter Demetria Jacinto in patient's room. Daughter and patient voice they would like hospice services at discharge. Susan Alvarenga she would like patient to remain in rehab until the weekend if possible. Discussed patient needing to be able to participate in therapy to remain till Friday; both verbalized understanding. Reviewed disease process and need for TPN to be stopped prior to discharge. Demetria Jacinto states she has talked to Dr. Olson Else about this and he will ween down when discharge established. Daughter and patient refused hospital bed and state they do not know of any other equipment needed. Discussed comfort medications and hospice staff will leave scripts on chart for physician to sign when closer to discharge. Stated this nurse will be back tomorrow if any additional questions. Emotional support given and 24 hr availability restated.

## 2018-11-07 NOTE — PROGRESS NOTES
Nutrition Assessment (Parenteral Nutrition)    Type and Reason for Visit: Reassess (TPN, PO, wt. check)    Nutrition Recommendations:   Unable to document in care plan, someone else is using it. Continue current TPN regimen using dose wt: 57gkm, 30kcals/kgm, 30% kcals from lipids, 1.4 grams protein/kgm. Nutrition Assessment: Pt. admitted with SBO s/p Bowel Resection 10/4/18. Pt continues on TPN at goal 80ml/hr. Ileostomy output improved to 1750ml 11/6-11/7 [id number]. Pt. had emesis yesterday & meals & had been NPO yesterday per RN. Diet is now full liquids & pt just received breakfast tray. Katy RN mentions daughter just told her they would like pt. to go with hospice. Malnutrition Assessment:  · Malnutrition Status: Meets the criteria for severe malnutrition  · Context: Acute illness or injury  · Findings of the 6 clinical characteristics of malnutrition (Minimum of 2 out of 6 clinical characteristics is required to make the diagnosis of moderate or severe Protein Calorie Malnutrition based on AND/ASPEN Guidelines):  1. Energy Intake-Greater than 75%,  (atleast 1 day per RN)    2. Weight Loss-10% loss or greater,  (4 months)  3. Fat Loss-Moderate subcutaneous fat loss, Orbital  4. Muscle Loss-Moderate muscle mass loss, Temples (temporalis muscle), Clavicles (pectoralis and deltoids)  5. Fluid Accumulation-No significant fluid accumulation, Extremities    Nutrition Risk Level: High    Nutrient Needs:  · Estimated Daily Total Kcal: ~1710kcals (30kcals/kgm wt. of 57kgm 10/22)  · Estimated Daily Protein (g): 68+ grams (1.2+ grams protein/kgm wt.  of 57kgm 10/22/18)  Nutrition Diagnosis:   · Problem: Severe malnutrition, in context of acute illness or injury  · Etiology: related to Alteration in GI function, Insufficient energy/nutrient consumption     Signs and symptoms:  as evidenced by Presence of wounds, Weight loss, GI abnormality, Moderate loss of subcutaneous fat, Moderate muscle loss    Objective AM    Contact Number: [phone]

## 2018-11-07 NOTE — PROGRESS NOTES
TPN Follow Up Note    Assessment:    Hospice has been consulted and family is awaiting plan from them. They are aware that TPN may not continue    Electrolyte Replacement: none    TPN changes for (today) at 1800: none    Re-check BMP, Mg, PO4, iCa - none for tomorrow.   Will check in a couple days if therapy continues

## 2018-11-07 NOTE — PROGRESS NOTES
focus on other STG's. SHORT TERM GOAL #4:  Goal 4: Pt will complete alternating, divided, and complex attention tasks with no more than 2 errors within 3-5 minute time span to permit potential return to cooking, cleaning, and housework. INTERVENTIONS: Complex divided and sustained attention task implemented utilizing playing cards. ST instructed pt to sort cards into piles of face cards, black odds, black evens, red odds, and red evens. Pt verbalized understanding but that this would \"hurt her brain\". ST assisted pt in initiating tasks. Cards sorted correctly- 10/25 indep, 15/25 required min-mod assist  *Despite ST and pt's daughter providing encouraging cues, task had to be shortened d/t pt fatigue and feeling \"overwhelmed\"  *Noted deficits with divided attention  *Pt demonstrated disorganization of sorting as well; not able to continuously put cards into piles, but often making her own piles instead    Long-term Goals  Timeframe for Long-term Goals: 1 week  Goal 1: Pt will improve cognitive functioning to a supervision level or higher to promote improved independence for ADL and IADL completion for re-integration into home setting.   ONGOING     ASSESSMENT:  Assessment: [x]Progressing towards goals          []Not Progressing towards goals    Patient Tolerance of Treatment:   [x]Tolerated well []Tolerated fair []Required rest breaks []Fatigued  Plan for Next Session: thought organization, problem solving, delayed recall   Education:  Learner:  [x]Patient          []Significant Other          [x]Other: daughter   Education provided regarding:  [x]Goals and POC   []Diet and swallowing precautions    []Home Exercise Program  [x]Progress and/or discharge information  Method of Education:  [x]Discussion          []Demonstration          []Handout          []Other  Evaluation of Education:   [x]Verbalized understanding   []Demonstrates without assistance   []Demonstrates with assistance  [x]Needs further

## 2018-11-07 NOTE — PROGRESS NOTES
[facility]  INPATIENT OCCUPATIONAL THERAPY  [facility]  DAILY NOTE    Time:  Time In: 0700  Time Out: 3869  Timed Code Treatment Minutes: 43 Minutes  Minutes: 42          Date: 2018  Patient Name: Demetria Boss,   Gender: female      Room: [facility] [place]65/065-A  MRN: [id number]  : 1940  (66 y.o.)  Referring Practitioner: Lois Finnegan MD ( Ordering) Dr. Lily Hawkins ( Attending)   Diagnosis: SBO s/p bowel resection  Additional Pertinent Hx: Per ED note 18, pt is a 66 y.o. female who has history of Pylori and cholecystectomy. Patient presents to the Emergency Department for the evaluation of abdominal pain that has been ongoing. Patient reports that she is experiencing abdominal distention, melena, decreased appetite, fear of eating secondary to abdominal pain, and small bowel movements. Patient was admitted to hospital on Sep. 2nd due to small bowel obstruction. She was cared for by Dr. Silvia Astorga (general surgeon). Per daughter, patient has been experiencing abdominal complications since left colon resection in . While admitted patient was managed with nasogastric decompression, bowel rest, analgesics for pain control, IV fluid hydration, GI and DVT prophylaxis.      Past Medical History:   Diagnosis Date    Arthritis     Cellulitis     Difficult intubation     GERD (gastroesophageal reflux disease)     H pylori ulcer     History of Right leg DVT ([address]     Ile[facility] in place ([facility] [address] 10/04/2018    Nausea & vomiting     Pneumonia     S/P partial resection of colon 2018    Shingles     Torus mandibularis      Past Surgical History:   Procedure Laterality Date    ABDOMEN SURGERY      ABDOMINAL ADHESION SURGERY  's    Dr Pat Silverman  2013    Lysis of Adhesions-Dr. Silvia Astorga     BACK SURGERY  //2006/2013    X3    [facility]OMY      [facility]    COLON SURGERY      COLONOSCOPY  over 10 yrs ago   Renee Carcamo task )          Bed mobility  Supine to Sit: Stand by assistance  Sit to Supine: Stand by assistance    Transfers  Sit to stand: Contact guard assistance  Stand to sit: Contact guard assistance       Balance  Sitting Balance: Supervision  Standing Balance: Contact guard assistance     Time: x1 min x3 trials   Activity: static standing at RW to adjust clothing      Functional Mobility  Functional Mobility Comments: pt declined to ambualte this am stated she just didnt feel up to it. Comment: BUE exercises with red theraband x10 reps x1 set in all planes and joints  sitting EOB. Pt required RB in between each exercise due to faigue and SOB. Pt completed exercises to increase strength and actiivyt tolerance for ADLS and toliet transfers           Activity Tolerance:  Activity Tolerance: Patient limited by fatigue;Patient limited by pain    Assessment:     Performance deficits / Impairments: Decreased functional mobility , Decreased endurance, Decreased ADL status, Decreased balance, Decreased strength, Decreased safe awareness, Decreased high-level IADLs  Prognosis: Fair, Good    Discharge Recommendations:  Discharge Recommendations: Home with Home health OT    Patient Education:  Patient Education: safety with transfers , ADL strategies, BUE exercises    Equipment Recommendations: Other: monitor for LHAE    Safety:  Safety Devices in place: Yes  Type of devices: All fall risk precautions in place, Call light within reach, Gait belt, Bed alarm in place, Left in bed, Nurse notified (telesitter, wound and ostomy to come and check her ostomoy area per RN )    Plan:  Times per week: 6x  Current Treatment Recommendations: Strengthening, Balance Training, Functional Mobility Training, Endurance Training, Safety Education & Training, Self-Care / ADL, Patient/Caregiver Education & Training, Home Management Training, Equipment Evaluation, Education, & procurement    Goals:  Patient goals :  To go home    Short term

## 2018-11-08 PROBLEM — E87.1 HYPONATREMIA: Status: ACTIVE | Noted: 2018-11-08

## 2018-11-08 PROBLEM — R10.33 PERIUMBILICAL ABDOMINAL PAIN: Status: ACTIVE | Noted: 2018-11-08

## 2018-11-08 PROBLEM — K66.0 INTRA-ABDOMINAL ADHESIONS: Status: ACTIVE | Noted: 2018-11-08

## 2018-11-08 PROBLEM — R53.1 WEAKNESS GENERALIZED: Status: ACTIVE | Noted: 2018-11-08

## 2018-11-08 PROBLEM — R63.0 ANOREXIA: Status: ACTIVE | Noted: 2018-11-08

## 2018-11-08 PROBLEM — R29.898 SEVERE MUSCLE DECONDITIONING: Status: ACTIVE | Noted: 2018-11-08

## 2018-11-08 PROBLEM — Z78.9 ON TOTAL PARENTERAL NUTRITION (TPN): Status: ACTIVE | Noted: 2018-11-08

## 2018-11-08 LAB
GLUCOSE BLD-MCNC: 116 MG/DL (ref 70–108)
MAGNESIUM: 1.8 MG/DL (ref 1.6–2.4)
PHOSPHORUS: 3 MG/DL (ref 2.4–4.7)

## 2018-11-08 PROCEDURE — 84100 ASSAY OF PHOSPHORUS: CPT

## 2018-11-08 PROCEDURE — 82948 REAGENT STRIP/BLOOD GLUCOSE: CPT

## 2018-11-08 PROCEDURE — 6370000000 HC RX 637 (ALT 250 FOR IP): Performed by: FAMILY MEDICINE

## 2018-11-08 PROCEDURE — 97110 THERAPEUTIC EXERCISES: CPT

## 2018-11-08 PROCEDURE — 2500000003 HC RX 250 WO HCPCS: Performed by: SURGERY

## 2018-11-08 PROCEDURE — 97116 GAIT TRAINING THERAPY: CPT

## 2018-11-08 PROCEDURE — 36415 COLL VENOUS BLD VENIPUNCTURE: CPT

## 2018-11-08 PROCEDURE — 97127 HC SP THER IVNTJ W/FOCUS COG FUNCJ: CPT

## 2018-11-08 PROCEDURE — 6360000002 HC RX W HCPCS: Performed by: FAMILY MEDICINE

## 2018-11-08 PROCEDURE — 2709999900 HC NON-CHARGEABLE SUPPLY

## 2018-11-08 PROCEDURE — 97530 THERAPEUTIC ACTIVITIES: CPT

## 2018-11-08 PROCEDURE — 97535 SELF CARE MNGMENT TRAINING: CPT

## 2018-11-08 PROCEDURE — 1290000000 HC SEMI PRIVATE OTHER R&B

## 2018-11-08 PROCEDURE — 83735 ASSAY OF MAGNESIUM: CPT

## 2018-11-08 RX ADMIN — ONDANSETRON 4 MG: 4 TABLET, ORALLY DISINTEGRATING ORAL at 18:39

## 2018-11-08 RX ADMIN — Medication 5 MG: at 19:47

## 2018-11-08 RX ADMIN — Medication 5 MG: at 09:44

## 2018-11-08 RX ADMIN — Medication 5 MG: at 01:06

## 2018-11-08 RX ADMIN — Medication 5 MG: at 05:32

## 2018-11-08 RX ADMIN — LANSOPRAZOLE 30 MG: 30 TABLET, ORALLY DISINTEGRATING, DELAYED RELEASE ORAL at 05:34

## 2018-11-08 RX ADMIN — SENNOSIDES 8.6 MG: 8.6 TABLET ORAL at 05:32

## 2018-11-08 RX ADMIN — ONDANSETRON 4 MG: 4 TABLET, ORALLY DISINTEGRATING ORAL at 22:43

## 2018-11-08 RX ADMIN — CALCIUM GLUCONATE: 98 INJECTION, SOLUTION INTRAVENOUS at 18:38

## 2018-11-08 RX ADMIN — Medication 5 MG: at 12:49

## 2018-11-08 RX ADMIN — Medication 5 MG: at 22:43

## 2018-11-08 RX ADMIN — ONDANSETRON 4 MG: 4 TABLET, ORALLY DISINTEGRATING ORAL at 09:45

## 2018-11-08 RX ADMIN — ONDANSETRON 4 MG: 4 TABLET, ORALLY DISINTEGRATING ORAL at 12:52

## 2018-11-08 ASSESSMENT — PAIN DESCRIPTION - PAIN TYPE: TYPE: ACUTE PAIN

## 2018-11-08 ASSESSMENT — PAIN SCALES - GENERAL
PAINLEVEL_OUTOF10: 0
PAINLEVEL_OUTOF10: 6
PAINLEVEL_OUTOF10: 6
PAINLEVEL_OUTOF10: 5
PAINLEVEL_OUTOF10: 4
PAINLEVEL_OUTOF10: 6
PAINLEVEL_OUTOF10: 5
PAINLEVEL_OUTOF10: 3
PAINLEVEL_OUTOF10: 6
PAINLEVEL_OUTOF10: 0

## 2018-11-08 ASSESSMENT — PAIN DESCRIPTION - LOCATION: LOCATION: HEAD

## 2018-11-08 NOTE — PROGRESS NOTES
task implemented utilizing playing cards. ST instructed pt to sort cards into piles of face cards, black odds, black evens, red odds, and red evens. Pt verbalized understanding but that this would \"hurt her brain\". ST assisted pt in initiating tasks. Cards sorted correctly- 10/25 indep, 15/25 required min-mod assist  *Despite ST and pt's daughter providing encouraging cues, task had to be shortened d/t pt fatigue and feeling \"overwhelmed\"  *Noted deficits with divided attention  *Pt demonstrated disorganization of sorting as well; not able to continuously put cards into piles, but often making her own piles instead    Long-term Goals  Timeframe for Long-term Goals: 1 week  Goal 1: Pt will improve cognitive functioning to a supervision level or higher to promote improved independence for ADL and IADL completion for re-integration into home setting.  - GOAL  NOT MET    ASSESSMENT:  Assessment: [x]Progressing towards goals          []Not Progressing towards goals  SUMMARY:   Pt has met 1/4 STG's this therapy period and 0/1 LTG's. Pt has been making inconsistent progress during length of stay on TCU. Pt continues to present with a moderate cognitive impairment characterized by deficits in complex problem solving, thought organization, divergent naming, higher level reasoning, working memory, processing speed, and mental flexibility. Expressive and receptive language remain grossly intact with no communicative breakdowns apparent. Barriers to success have continued to be fluctuating alert levels and low motivation to participate d/t pain. Following multiple consultations, pt and pt's family has decided to choose [facility] hospice services upon discharge to provide comfort and care.        Patient Tolerance of Treatment:   [x]Tolerated well []Tolerated fair []Required rest breaks []Fatigued     Education:  Learner:  [x]Patient          []Significant Other          [x]Other: daughter   Education provided regarding:  [x]Goals

## 2018-11-08 NOTE — PROGRESS NOTES
illness or injury  · Etiology: related to Alteration in GI function, Insufficient energy/nutrient consumption     Signs and symptoms:  as evidenced by Presence of wounds, Weight loss, GI abnormality, Moderate loss of subcutaneous fat, Moderate muscle loss    Objective Information:  · Nutrition-Focused Physical Findings: Pt appears tired. · Wound Type:  (Abdominal incision Small Bowel resection (10/4/18))  · Current Nutrition Therapies:  · Oral Diet Orders: Full Liquid   · Oral Diet intake:  (some coffee & milk)  · Oral Nutrition Supplement (ONS) Orders: Standard High Calorie Oral Supplement  · ONS intake:  (not documented, patient reports acceptance and drinking some)  · Parenteral Nutrition Orders:  · Type and Formula: 3-in-1 Custom (57 kgm, 30kcals/kgm, 1.4 grams protein/kgm, 30% lipid kcals)   · Lipids: Daily  · Rate/Volume: 80 ml/hr  · Duration: Continuous 24 hrs  · Current PN Order Provides: at goal  · Goal PN Orders Provides: suggest keep 3 in 1 TPN the same using dose wt: 57kgm, 30kcals/kgm, 1.4 grams protein/kgm, 30% kcals from lipids to yield ~1710kcals, 30% kcals from lipids, 80 grams protein, 1.4 grams protein/kgm, 258 grams CHO, based on dose wt: 57kgm. · Anthropometric Measures:  · Ht: 5' 3\" (160 cm)   · Current Body Wt: 128 lb 4.9 oz (58.2 kg) (11/8)  · Admission Body Wt: 142 lb (64.4 kg) (10/18/18, no weight source, +1 BUE edema, +2 BLE edema)  · Usual Body Wt: 154 lb (69.9 kg) (EMR, 6/25/18, actual)  · % Weight Change: 15.6% wt. loss,  16.9% wt. loss in 4 months  · Ideal Body Wt: 115 lb (52.2 kg), % Ideal Body 111%   · Body mass index is 22.75 kg/m². · BMI Classification: BMI 18.5 - 24.9 Normal Weight    Nutrition Interventions:   Diet per Dr. Amber Sy Parenteral Nutrition . Await family & pt plan of care.    Continued Inpatient Monitoring, Education not appropriate at this time, Coordination of Care    Nutrition Evaluation:   · Evaluation: Progressing toward goals   · Goals: Pt. will meet

## 2018-11-08 NOTE — PLAN OF CARE
Demetria Alcantaraer  [id number]    This document includes comprehensive careplan information as well as current active orders for this admission to Transitional Care Unit (8E). A copy was given to the patient and/or patient representative after team conference, 10/30/2018.     Current Active Orders:  Orders Placed This Encounter   Procedures    XR Acute Abd Series Chest 1 VW    CT ABDOMEN PELVIS WO CONTRAST Additional Contrast? None    XR CHEST PORTABLE    Comprehensive Metabolic Panel    CBC Auto Differential    Anion Gap    Glomerular Filtration Rate, Estimated    Potassium    CBC    Comprehensive metabolic panel    Lipase    Anion Gap    Glomerular Filtration Rate, Estimated    Magnesium    Phosphorus    Basic Metabolic Panel    Magnesium    Phosphorus    Calcium, Ionized    Anion Gap    Glomerular Filtration Rate, Estimated    Basic Metabolic Panel    Phosphorus    Magnesium    Calcium, Ionized    Phosphorus    Basic Metabolic Panel    Anion Gap    Glomerular Filtration Rate, Estimated    Anion Gap    Glomerular Filtration Rate, Estimated    Basic Metabolic Panel    Phosphorus    Magnesium    Calcium, Ionized    Anion Gap    Glomerular Filtration Rate, Estimated    Basic Metabolic Panel    Phosphorus    Magnesium    Calcium, Ionized    Anion Gap    Glomerular Filtration Rate, Estimated    Basic Metabolic Panel    Phosphorus    Magnesium    Calcium, Ionized    CBC    Anion Gap    Glomerular Filtration Rate, Estimated    Basic Metabolic Panel    Phosphorus    Magnesium    Calcium, Ionized    Anion Gap    Glomerular Filtration Rate, Estimated    Basic Metabolic Panel    Magnesium    Phosphorus    Calcium, Ionized    Anion Gap    Glomerular Filtration Rate, Estimated    Basic Metabolic Panel    Phosphorus    Magnesium    Calcium, Ionized    Anion Gap    Glomerular Filtration Rate, Estimated    Calcium, Ionized    Phosphorus    Basic Metabolic
Patti Glass  [id number]    This document includes baseline careplan information as well as current active orders for this admission to Transitional Care Unit (8E). A copy was given to the patient and/or patient representative after team conference, 10/23/2018.     Current Active Orders:  Orders Placed This Encounter   Procedures    XR Acute Abd Series Chest 1 VW    Comprehensive Metabolic Panel    CBC Auto Differential    Anion Gap    Glomerular Filtration Rate, Estimated    Potassium    CBC    Comprehensive metabolic panel    Lipase    Anion Gap    Glomerular Filtration Rate, Estimated    Magnesium    Phosphorus    Basic Metabolic Panel    Magnesium    Phosphorus    Calcium, Ionized    Anion Gap    Glomerular Filtration Rate, Estimated    Basic Metabolic Panel    Phosphorus    Magnesium    Calcium, Ionized    Phosphorus    Basic Metabolic Panel    Anion Gap    Glomerular Filtration Rate, Estimated    Anion Gap    Glomerular Filtration Rate, Estimated    Basic Metabolic Panel    Phosphorus    Magnesium    Calcium, Ionized    Anion Gap    Glomerular Filtration Rate, Estimated    Basic Metabolic Panel    Phosphorus    Magnesium    Calcium, Ionized    DIET GENERAL;    Place intermittent pneumatic compression device    Elevate heels off of bed at all times if patient is not able to move lower extremities    Maintain HOB at the lowest elevation consistent with medical plan of care    Place PPD    Vital signs    Waffle cushion in chair when up    Weigh patient    Place pressure redistribution overlay/mattress/bariatric bed    nursing communication - potassium replacement protocol    DNR comfort care - arrest    Consult to Recreation Therapy    Consult to Social Work    Inpatient consult to [address] Pharmacy to Dose: Other - See Comments:    Pharmacy to Dose: TPN    Inpatient consult to Spiritual Services    Inpatient consult
Problem: Bowel/Gastric:  Goal: Ability to achieve a regular elimination pattern will improve      Outcome: Ongoing  Pt has an ileostomy hooked to a chaparro bag due to large amount of watery output. Problem: Pain:  Goal: Pain level will decrease      Outcome: Ongoing  Pt has constant pain in her abdomen. Pt takes morphine q2hr    Problem: Mobility - Impaired:  Goal: Mobility will improve      Outcome: Ongoing  Pt continues to work with PT and OT    Problem: Infection - Surgical Site:  Goal: Will show no infection signs and symptoms      Outcome: Ongoing  Incision is clean dry and intact. Healed. Problem: Falls - Risk of:  Goal: Will remain free from falls      Outcome: Ongoing  Pt is up with 1 assist and her cane. Call light and bedside table within reach.  Video monitor used for safety    Problem: DISCHARGE BARRIERS  Goal: Patient's continuum of care needs are met  Outcome: Ongoing  [facility]
Problem: Bowel/Gastric:  Goal: Ability to achieve a regular elimination pattern will improve      Outcome: Ongoing  Small bowel obstruction. Dr Santiago El is following. Ileostomy formed. Problem: Pain:  Goal: Pain level will decrease      Outcome: Ongoing  Pt is in constant pain in her abdomen. Morphine q2hr given non effective. Problem: Mobility - Impaired:  Goal: Mobility will improve      Outcome: Ongoing  Continues to work with PT and OT    Problem: Infection - Surgical Site:  Goal: Will show no infection signs and symptoms      Outcome: Ongoing  Incision is clean dry intact. Healed. Problem: Falls - Risk of:  Goal: Will remain free from falls      Outcome: Ongoing  Video monitor used for pt safety.  Gait belt and walker used with ambulation    Problem: DISCHARGE BARRIERS  Goal: Patient's continuum of care needs are met  Outcome: Ongoing  Team conference held every tuesday
Problem: Bowel/Gastric:  Goal: Ability to achieve a regular elimination pattern will improve  Ability to achieve a regular elimination pattern will improve   Outcome: Ongoing  Pt ileostomy is hooked up to a chaparro bag. Problem: Pain:  Goal: Pain level will decrease  Pain level will decrease   Outcome: Ongoing  Pain controlled with ice and scheduled pain medication    Problem: Mobility - Impaired:  Goal: Mobility will improve  Mobility will improve   Outcome: Ongoing  Pt continues to work with PT and OT    Problem: Bleeding:  Goal: Will show no signs and symptoms of excessive bleeding  Will show no signs and symptoms of excessive bleeding   Outcome: Ongoing  Pt is not on anticoagulants. Will continue to monitor    Problem: Infection - Surgical Site:  Goal: Will show no infection signs and symptoms  Will show no infection signs and symptoms   Outcome: Ongoing  No fever. No signs of infection    Problem: Falls - Risk of:  Goal: Will remain free from falls  Will remain free from falls   Outcome: Ongoing  Pt is impulsive.  Video monitor for pt safety    Problem: DISCHARGE BARRIERS  Goal: Patient's continuum of care needs are met  Outcome: Ongoing  Team conference held every tuesday
Problem: Bowel/Gastric:  Goal: Control of bowel function will improve  Control of bowel function will improve   Outcome: Ongoing    Goal: Ability to achieve a regular elimination pattern will improve  Ability to achieve a regular elimination pattern will improve   Outcome: Ongoing  Pt continues to eliminate bowel frequently. Pt does not maintain a regular elimination pattern. Problem: Skin Integrity:  Goal: Risk for impaired skin integrity will decrease  Risk for impaired skin integrity will decrease   Outcome: Ongoing  Risk for impaired skin integrity will decrease as pt is able to ambulate more and be mobile. Problem: Pain:  Goal: Pain level will decrease  Pain level will decrease   Outcome: Ongoing  Pt has pain throughout there abdomen and continues to take oral medication for relief. Goal: Control of acute pain  Control of acute pain   Outcome: Ongoing  Pt does not have control of acute pain continues to take oral medications. Goal: Control of chronic pain  Control of chronic pain   Outcome: Ongoing      Problem: Mobility - Impaired:  Goal: Mobility will improve  Mobility will improve   Outcome: Ongoing  Pt mobility will continue to improve throughout therapy. Problem: Bleeding:  Goal: Will show no signs and symptoms of excessive bleeding  Will show no signs and symptoms of excessive bleeding   Outcome: Ongoing  Pt presents with no signs or symptoms of excessive bleeding. Will continue to monitor. Problem: Infection - Surgical Site:  Goal: Will show no infection signs and symptoms  Will show no infection signs and symptoms   Outcome: Ongoing  Pt presents with no signs and symptoms of infection. Will continue to monitor. Problem: Falls - Risk of:  Goal: Will remain free from falls  Will remain free from falls   Outcome: Ongoing  Pt remains free of falls. Call light and personal belongings within reach. Chair and bed alarm active. Will continue to hourly round on pt.   Goal: Absence of physical
Problem: DISCHARGE BARRIERS  Goal: Patient's continuum of care needs are met    Intervention: INVOLVE PATIENT/S.O. IN DISCHARGE PLANNING PROCESS  The patient has been admitted to TCU for rehab therapies following recent surgery for small bowel obstruction. See progress note for more information. Care plan reviewed with patient and she verbalizes understanding of the plan of care and contributes to goal setting.   CHETAN Malagon
Problem: HH LEARNING/TEACHING NEEDS-OSTOMY  Goal: Will be independent with ostomy care      Outcome: Ongoing  In to see patient to complete another ileostomy lesson with patient and daughter. Daughter had gathered supplies. Patient is sitting up in chair. Patient continues to need high output pouching, stool liquid brown with some soft brownish yellow noted to be coming from the stoma. Daughter removed the pouching system. She cleansed the stoma and peristomal skin with warm wet wash cloth and dried. Stoma red and moist.  Sutures noted to the muco juction. Sutures noted to have some redness. Patient also seems to be developing a hernia above the stoma. Patient's abdomen is protruding. Daughter measured the stoma and cut to fit the 21/4 inch josseline flat flange. Daughter applied the stoma paste to the cut edge and applied the flange. Daughter then applied the pouch and hooked to the chaparro bag. Daughter did well and states she is feeling fairly comfortable with the change. High output and drainable pouches left in the room. Called Dr Greg Davila and he is aware of the distention and possible hernia. He also states dietician may begin weaning down the TPN. Spoke with dietician on the unit rg this. He ok'd Abd binder. Marcela Singer obtained and whole cut out for the ileostomy pouch. Staff assisting in putting vandana shirt on patient. Will place binder. Suggested using abd pad over the scabbed incision line to protect from binder. Care plan reviewed with patient and RN. Patient and RN verbalize understanding of the plan of care and contribute to goal setting.
Problem: Nutrition  Goal: Optimal nutrition therapy  Outcome: Ongoing  Nutrition Problem:  Moderate malnutrition, in context of chronic illness  Intervention: Food and/or Nutrient Delivery: Continue current diet, Continue current ONS  Nutritional Goals: Patient will consume 75% or greater of most meals and ONS during LOS
Problem: Nutrition  Goal: Optimal nutrition therapy  Outcome: Ongoing  Nutrition Problem: Moderate malnutrition, in context of chronic illness  Intervention: Food and/or Nutrient Delivery: Continue current diet, Continue current ONS (as pt. able to tolerate)  Nutritional Goals: Pt. will meet atleast 75% estimated needs from TPN during LOS until diet tolerated  during LOS.
Problem: Nutrition  Goal: Optimal nutrition therapy  Outcome: Ongoing  Nutrition Problem: Severe malnutrition, in context of acute illness or injury  Intervention: Food and/or Nutrient Delivery: Continue current diet, Vitamin Supplement, Modify current ONS, Modify current Parenteral Nutrition  Nutritional Goals: Pt. will meet atleast 75% of nutritional needs via PN until appropriate for oral intake.
Problem: Pain:  Goal: Pain level will decrease      Outcome: Ongoing  Pt is taking multiple pain medications. Encouraging the use of adjusting her diet to promote healing to her stomach. Using ice for incision pain    Problem: Infection - Surgical Site:  Goal: Will show no infection signs and symptoms      Outcome: Ongoing  Incision is kept clean dry and intact    Problem: Falls - Risk of:  Goal: Will remain free from falls      Outcome: Ongoing  Video monitor in use for pt safety. Call light and bedsidet able within reach.     Problem: DISCHARGE BARRIERS  Goal: Patient's continuum of care needs are met  Outcome: Ongoing  reconference on 11/6
themselves and will report back regarding their decision for ongoing care. This worker will remain available to provide any needed assistance with discharge plans.  CHETAN Keene

## 2018-11-08 NOTE — PROGRESS NOTES
Alert to person place and time. SEDA 3mm to 2mm brisk. Mucous membranes pink and moist. Hair shiny. Skin warm and dry. Speech clear. Denies difficulty swallowing. Lung sounds strong and clear through out anterior posterior and lateral.Respiration easy and regular. No cough noted. Bowel sounds active in upper and lower right. Diminished in upper and lower left. Abdomen round and firm non tender. Midline incision clean and dry with no redness or swelling. Ileostomy intact lower left quadrant brown drainage. Stoma pink and moist.Denies passing gas. Radial pulse strong and equal bilaterally. Hand grasp weak bilaterally. Arm drift positive bilaterally. PICC line left peripheral no redness swelling or warmth. TPN infusing Exactamix 80 ml/hr. Skin turgor sluggish. Capillary refill less than 3 seconds. No edema. Pedal pulse strong and equal bilaterally. Pedal push and pull weak  bilaterally. Jyoti's sign negative bilateral. Leg lift  weak bilaterally. Denies numbness and tingling. Bed in low position wheels locked. Side rails up times 2. Call light and bed side table within reach. Denies  needs at this time. Resting with eyes closed in the chair.

## 2018-11-08 NOTE — PROGRESS NOTES
Ambulation 1  Surface: level tile  Device: Rolling Walker  Assistance: Stand by assistance  Quality of Gait: decreased jon, decreased step length, forward flexed posture, downward gaze  Distance: ~100ft x1  Ambulation 2  Surface - 2: level tile  Device 2: Small Base Quad Care  Assistance 2: Stand by assistance  Quality of Gait 2: decreased step length, decreased jon, occasional cues for sequencing with cane, forward flexed posture, downward gaze  Distance: 10ft x1, 100ft x1    Stairs  # Steps : 4  Stairs Height: 6\"  Rails: Left ascending  Device: Marissa Wood  Assistance: Contact guard assistance  Comment: completed 4 stairs x2,  first trial patient used L ascending rail and quad cane, good technique, second trial used quad cane and no rail, slower pace, slight unsteady at times with descending         Exercises:  Exercises  Comments: Performed seated BLE exercises: heel/toe raises, marches, long arc quads x10 reps to increase strength. Extra time to complete due to fatigue and abdominal pain. Activity Tolerance:  Activity Tolerance: Patient limited by fatigue;Patient limited by endurance; Patient limited by pain    Assessment: Body structures, Functions, Activity limitations: Decreased functional mobility , Decreased strength, Decreased endurance, Decreased balance, Decreased safe awareness, Decreased cognition  Assessment: Patient tolerated session fairly well. Patient able to perform stair training with quad cane and CGA. Patient would benefit from continued skilled physical therapy to increase strength, endurance and balance for improved functional mobility. Prognosis: Good          Discharge Recommendations:  Discharge Recommendations: Continue to assess pending progress    Patient Education:  Patient Education: stair training, therex, gait with quad cane    Equipment Recommendations:  Equipment Needed: No    Safety:  Type of devices:  All fall risk precautions in place, Call light

## 2018-11-09 VITALS
HEART RATE: 85 BPM | DIASTOLIC BLOOD PRESSURE: 63 MMHG | TEMPERATURE: 98.2 F | SYSTOLIC BLOOD PRESSURE: 106 MMHG | OXYGEN SATURATION: 95 % | BODY MASS INDEX: 22.75 KG/M2 | HEIGHT: 63 IN | WEIGHT: 128.4 LBS | RESPIRATION RATE: 17 BRPM

## 2018-11-09 LAB
MAGNESIUM: 1.8 MG/DL (ref 1.6–2.4)
PHOSPHORUS: 3.4 MG/DL (ref 2.4–4.7)

## 2018-11-09 PROCEDURE — 6360000002 HC RX W HCPCS: Performed by: FAMILY MEDICINE

## 2018-11-09 PROCEDURE — 2709999900 HC NON-CHARGEABLE SUPPLY

## 2018-11-09 PROCEDURE — 84100 ASSAY OF PHOSPHORUS: CPT

## 2018-11-09 PROCEDURE — 6370000000 HC RX 637 (ALT 250 FOR IP): Performed by: SURGERY

## 2018-11-09 PROCEDURE — 6370000000 HC RX 637 (ALT 250 FOR IP): Performed by: FAMILY MEDICINE

## 2018-11-09 PROCEDURE — 36415 COLL VENOUS BLD VENIPUNCTURE: CPT

## 2018-11-09 PROCEDURE — 83735 ASSAY OF MAGNESIUM: CPT

## 2018-11-09 RX ORDER — ONDANSETRON 4 MG/1
4 TABLET, ORALLY DISINTEGRATING ORAL 4 TIMES DAILY
Qty: 30 TABLET | Refills: 0 | Status: SHIPPED | OUTPATIENT
Start: 2018-11-09

## 2018-11-09 RX ADMIN — LANSOPRAZOLE 30 MG: 30 TABLET, ORALLY DISINTEGRATING, DELAYED RELEASE ORAL at 08:24

## 2018-11-09 RX ADMIN — Medication 5 MG: at 11:30

## 2018-11-09 RX ADMIN — Medication 5 MG: at 09:26

## 2018-11-09 RX ADMIN — Medication 5 MG: at 06:54

## 2018-11-09 RX ADMIN — ONDANSETRON 4 MG: 4 TABLET, ORALLY DISINTEGRATING ORAL at 08:23

## 2018-11-09 RX ADMIN — ACETAMINOPHEN 650 MG: 160 SOLUTION ORAL at 08:23

## 2018-11-09 ASSESSMENT — PAIN SCALES - GENERAL
PAINLEVEL_OUTOF10: 7
PAINLEVEL_OUTOF10: 3
PAINLEVEL_OUTOF10: 4
PAINLEVEL_OUTOF10: 5
PAINLEVEL_OUTOF10: 7
PAINLEVEL_OUTOF10: 3
PAINLEVEL_OUTOF10: 4

## 2018-11-09 NOTE — PROGRESS NOTES
[facility] - [id number]               Date: 2018  Patient Name: Reyes Pearce,  Gender:  female        MRN: [id number]  : 1940  (66 y.o.)     Referring Practitioner: Ordering: Lorri Virgen MD; Attending: Bradley Howard MD  Diagnosis: SBO s/p bowel resection  Additional Pertinent Hx: Per ED note 18, pt is a 66 y.o. female who has history of Pylori and cholecystectomy. Patient presents to the Emergency Department for the evaluation of abdominal pain that has been ongoing. Patient reports that she is experiencing abdominal distention, melena, decreased appetite, fear of eating secondary to abdominal pain, and small bowel movements. Patient was admitted to hospital on Sep. 2nd due to small bowel obstruction. She was cared for by Dr. Shanita Nicole (general surgeon). Per daughter, patient has been experiencing abdominal complications since left colon resection in . Pt underwent Abd Exploration, Lysis Severe Adhesions, Segmental SB resection x 3, Formation End Ileostomy on 10/4. Pt was a code stroke on 10/12, MRI negative. To TCU 10/16.      Past Medical History:   Diagnosis Date    Arthritis     Cellulitis     Difficult intubation     GERD (gastroesophageal reflux disease)     H pylori ulcer     History of Right leg DVT ([address]     Ileostomy in place ([address] 10/04/2018    Nausea & vomiting     Pneumonia     S/P partial resection of colon 2018    Shingles     Torus mandibularis 2006     Past Surgical History:   Procedure Laterality Date    ABDOMEN SURGERY      ABDOMINAL ADHESION SURGERY  's    Dr Karlo Franco  2013    Lysis of Adhesions-Dr. Shanita Nicole     BACK SURGERY  //2006/2013    X3    CHOLECYSTECTOMY      [facility]    COLON SURGERY      COLONOSCOPY  over 10 yrs ago    COLONOSCOPY  2017    Dr Kim Vargas, COLON, DIAGNOSTIC     

## 2018-11-09 NOTE — PROGRESS NOTES
[facility]  Recreational Therapy  Discharge Note  Transitional Care Unit           Date:  11/9/2018       Patient Name: Azael Rojas      MRN: [id number]       YOB: 1940 (74 y.o.)       Gender: female  Diagnosis: SBO s/p bowel resection  Referring Practitioner: Arnoldo Gutiérrez MD ( Ordering) Dr. Clyde Guzmán ( Attending)     Patient discharged from Recreational Therapy at this time. See recreational therapy notes for details.     Electronically signed by: HUNTER Montelongo  Date: 11/9/2018

## 2018-11-09 NOTE — FLOWSHEET NOTE
[facility]  Transitional Care Unit  Occupational Therapy  Discharge Note  Date: 2018  Patient Name: Yoly Echavarria        MRN: [id number]   : 1940  (66 y.o.)  Gender: female   Referring Practitioner: Ordering: Estefany Durand MD; Attending: Susana Garland MD  Diagnosis: SBO s/p bowel resection  Additional Pertinent Hx: Per ED note 18, pt is a 66 y.o. female who has history of Pylori and cholecystectomy. Patient presents to the Emergency Department for the evaluation of abdominal pain that has been ongoing. Patient reports that she is experiencing abdominal distention, melena, decreased appetite, fear of eating secondary to abdominal pain, and small bowel movements. Patient was admitted to hospital on Sep. 2nd due to small bowel obstruction. She was cared for by Dr. Lobo Burgess (general surgeon). Per daughter, patient has been experiencing abdominal complications since left colon resection in . While admitted patient was managed with nasogastric decompression, bowel rest, analgesics for pain control, IV fluid hydration, GI and DVT prophylaxis. ASSESSMENT:    Pt refused OT this date d/t increased abdominal pain and wanting to rest and conserve energy for home. Pt reports having no further questions or concerns at this time. Discharge Recommendations: Pt being discharged home with daughter and hospice care this date and has made limited progress in OT throughout stay d/t medical complications. No further OT indicated at this time.         Goals  Short term goals  Time Frame for Short term goals: 1 weeks  Short term goal 1: Pt to complete functional mobility to/from bathroom with SBA, RW, and min vcs for safety NOT MET  Short term goal 2: Pt to tolerate standing greater than 5 mins with 1 hand release with S in preparation for returned to preffered IADL tasks within home including pet care NOT MET  Short term goal 3: Pt to complete various functional transfers with

## 2018-11-09 NOTE — PROGRESS NOTES
Nutrition Assessment    Type and Reason for Visit: Reassess (TPN + po diet monitor)    Nutrition Recommendations: Diet per doctor, oral intake as desired. Nutrition Assessment: Patient admitted with small bowel obstruction s/p bowel resection 10/4/18, has bowel obstruction but no additional surgeries planned at this time per MD notes. RN reports plan for home with hospice today, TPN has been discontinued. Ileostomy output last 24 hours 2625ml. Poor oral intake recorded 420ml last 24 hours, taking some liquids per nursing staff. Current magnesium 1.8, phosphorus 3.4; Patient meets criteria for severe malnutrition and at risk for further compromise due to minimial oral intake of full liquid diet, will monitor intake for remainder of stay. Malnutrition Assessment:  · Malnutrition Status: Meets the criteria for severe malnutrition  · Context: Acute illness or injury  · Findings of the 6 clinical characteristics of malnutrition (Minimum of 2 out of 6 clinical characteristics is required to make the diagnosis of moderate or severe Protein Calorie Malnutrition based on AND/ASPEN Guidelines):  1. Energy Intake-Greater than 75%,  (atleast 1 day per RN)    2. Weight Loss-10% loss or greater,  (4 months)  3. Fat Loss-Moderate subcutaneous fat loss, Orbital  4. Muscle Loss-Moderate muscle mass loss, Temples (temporalis muscle), Clavicles (pectoralis and deltoids)  5. Fluid Accumulation-No significant fluid accumulation, Extremities  6.  Strength-Not measured    Nutrition Risk Level: Moderate    Nutrient Needs:  · Estimated Daily Total Kcal: ~1710kcals (30kcals/kgm wt. of 57kgm 10/22)  · Estimated Daily Protein (g): 68+ grams (1.2+ grams protein/kgm wt.  of 57kgm 10/22/18)    Nutrition Diagnosis:   · Problem: Severe malnutrition, in context of acute illness or injury  · Etiology: related to Alteration in GI function, Insufficient energy/nutrient consumption     Signs and symptoms:  as evidenced by Presence of

## 2019-11-18 NOTE — PROGRESS NOTES
TPN Follow Up Note    Assessment: Patient still on full liquid diet. Consumed 240ml this AM at breakfast. Appetite improving.     Electrolyte Replacement: none    TPN changes for (today) at 1800:    Decrease magnesium to 4.06meq    Re-check BMP, Mg, PO4, iCa 93/80/7644    Christie Cobian, PharmD, [facility]  10/30/2018 10:54 AM Calm

## 2021-08-17 NOTE — PROGRESS NOTES
Blood Pressure Record Sheet  To take your blood pressure, you will need a blood pressure machine. You can buy a blood pressure machine (blood pressure monitor) at your clinic, drug store, or online. When choosing one, consider:  · An automatic monitor that has an arm cuff.  · A cuff that wraps snugly around your upper arm. You should be able to fit only one finger between your arm and the cuff.  · A device that stores blood pressure reading results.  · Do not choose a monitor that measures your blood pressure from your wrist or finger.  Follow your health care provider's instructions for how to take your blood pressure. To use this form:  · Get one reading in the morning (a.m.) before you take any medicines.  · Get one reading in the evening (p.m.) before supper.  · Take at least 2 readings with each blood pressure check. This makes sure the results are correct. Wait 1-2 minutes between measurements.  · Write down the results in the spaces on this form.  · Repeat this once a week, or as told by your health care provider.  · Make a follow-up appointment with your health care provider to discuss the results.  Blood pressure log  Date: _______________________  · a.m. _____________________(1st reading) _____________________(2nd reading)  · p.m. _____________________(1st reading) _____________________(2nd reading)  Date: _______________________  · a.m. _____________________(1st reading) _____________________(2nd reading)  · p.m. _____________________(1st reading) _____________________(2nd reading)  Date: _______________________  · a.m. _____________________(1st reading) _____________________(2nd reading)  · p.m. _____________________(1st reading) _____________________(2nd reading)  Date: _______________________  · a.m. _____________________(1st reading) _____________________(2nd reading)  · p.m. _____________________(1st reading) _____________________(2nd reading)  Date: _______________________  · a.m.  HD  #5     POD #5    No real GIF  Still distended    NPO on TPN    Abd distended, tympaniic, dressing saturated serosand, changed this am, drain serosang 70 cc past 24    Cont current Rx _____________________(1st reading) _____________________(2nd reading)  · p.m. _____________________(1st reading) _____________________(2nd reading)  This information is not intended to replace advice given to you by your health care provider. Make sure you discuss any questions you have with your health care provider.  Document Revised: 04/07/2021 Document Reviewed: 04/07/2021  [employer]vier Patient Education © 2021 [employer]

## 2022-01-20 NOTE — PROGRESS NOTES
PROCEDURE:    Diagnostic Fiberoptic laryngoscopy    Endoscopic examination of the nasopharynx, including adenoid tissues, eustachian tubes, and surrounding structures, is necessary due to the presenting complaint.  Prior to the exam, the procedure was explained to the patient and verbal consent was obtained. Topical anesthesia with 4% lidocaine and Afrin was applied to the nares.    The scope was passed examining the nasopharyngeal structures bilaterally.    Findings:  Examination of the nasopharynx is clear.  Bilateral eustachian tubes are clear.  Base of tongue vallecula and epiglottis were normal.  Piriform sinuses normal.  Subglottis normal.  Vocal cord movement bilaterally was normal.  Interarytenoid area was normal.    Jesús Amezquita MD  Otolaryngology - Head and Neck Surgery  Rhinology and Endoscopic Skull base Surgery     Primary RN stated pouching system was changed yesterday. Primary RN called and stated the flange appears to be pulling apart. Sent new pouching systems. Will continue to follow.

## (undated) DEVICE — TOTAL TRAY, DB, 100% SILI FOLEY, 16FR 10: Brand: MEDLINE

## (undated) DEVICE — SOLUTION IV 500ML 0.9% SOD CHL PH 5 INJ USP VIAFLX PLAS

## (undated) DEVICE — SPONGE DRN W4XL4IN RAYON/POLYESTER 6 PLY NONWOVEN PRECUT

## (undated) DEVICE — 3M™ TRANSPORE™ WHITE SURGICAL TAPE 1534-2, 2 INCH X 10 YARD (5CM X 9,1M), 6 ROLLS/CARTON 10 CARTONS/CASE: Brand: 3M™ TRANSPORE™

## (undated) DEVICE — SOLUTION IV 1000ML 0.9% SOD CHL PH 5 INJ USP VIAFLX PLAS

## (undated) DEVICE — KIT,ANTI FOG,W/SPONGE & FLUID,SOFT PACK: Brand: MEDLINE

## (undated) DEVICE — GLOVE SURG SZ 65 THK91MIL LTX FREE SYN POLYISOPRENE

## (undated) DEVICE — YANKAUER,POOLE TIP,STERILE,50/CS: Brand: MEDLINE

## (undated) DEVICE — RELOAD STPL H1.5X3.6XL60MM REG TISS BLU B FRM AUTO RET PIN

## (undated) DEVICE — Z DISCONTINUED USE 2660780 STAPLER INT L26CM STPL 33MM OPN INTLUMN CRV CIR ADJ HT

## (undated) DEVICE — GLOVE ORANGE PI 7   MSG9070

## (undated) DEVICE — 2-PIECE DRAINABLE OSTOMY POUCH: Brand: NEW IMAGE

## (undated) DEVICE — STAPLER INT L55MM CUT LN L53MM STPL LN L57MM BLU B FRM 8

## (undated) DEVICE — DRAIN,WOUND,15FR,3/16,FULL-FLUTED: Brand: MEDLINE

## (undated) DEVICE — AGENT HEMSTAT W4XL8IN OXIDIZED REGENERATED CELOS ABSRB

## (undated) DEVICE — AGENT HEMSTAT 3GM PURIFIED PLNT STARCH PWD ABSRB ARISTA AH

## (undated) DEVICE — CORE TRUMPET FOR SINGLE SOLUTION BAG: Brand: CORE DYNAMICS

## (undated) DEVICE — PACK PROCEDURE SURG SET UP SRMC

## (undated) DEVICE — RESERVOIR,SUCTION,100CC,SILICONE: Brand: MEDLINE

## (undated) DEVICE — 35 ML SYRINGE LUER-LOCK TIP: Brand: MONOJECT

## (undated) DEVICE — BLANKET THER PED W24XL30IN FAB COVERING FOR HTP 1500 HEAT

## (undated) DEVICE — SCOPE WARMER THAT PRE-WARMS MULTIPLE LAPAROSCOPES.: Brand: JOSNOE MEDICAL INC

## (undated) DEVICE — YANKAUER,BULB TIP,W/O VENT,RIGID,STERILE: Brand: MEDLINE

## (undated) DEVICE — Device

## (undated) DEVICE — FORCEP RAD JAW W/NEEDLE 160CM

## (undated) DEVICE — SPONGE LAP W18XL18IN WHT COT 4 PLY FLD STRUNG RADPQ DISP ST

## (undated) DEVICE — PAD,ABDOMINAL,5"X9",ST,LF,25/BX: Brand: MEDLINE INDUSTRIES, INC.

## (undated) DEVICE — 2-PIECE OSTOMY SKIN BARRIER, CERAPLUS: Brand: NEW IMAGE

## (undated) DEVICE — TOWEL,OR,DSP,ST,WHITE,DLX,XR,4/PK,20PK/C: Brand: MEDLINE

## (undated) DEVICE — SUTURE VCRL SZ 0 L18IN ABSRB VLT SUTUPAK PRECUT W/O NDL J106T

## (undated) DEVICE — SOLUTION IV IRRIG WATER 1000ML POUR BRL 2F7114

## (undated) DEVICE — Z CONVERTED USE 2715898 SWABSTICK MEDICATED W1.75XL6.5IN 1.6ML 3.15% CHG 70% ISO

## (undated) DEVICE — TUBING, SUCTION, 1/4" X 20', STRAIGHT: Brand: MEDLINE INDUSTRIES, INC.

## (undated) DEVICE — SET CATH 20GA L1.75IN RAD ART POLYUR RADPQ W/ INTEGR

## (undated) DEVICE — ROYAL SILK SURGICAL GOWN, XXL: Brand: CONVERTORS

## (undated) DEVICE — DRAPE,U/SHT,SPLIT,FILM,60X84,STERILE: Brand: MEDLINE

## (undated) DEVICE — SUTURE SILK 2-0 CP-1 30IN N ABSRB BRAID BLK 443H

## (undated) DEVICE — SET LNR RED GRN W/ BASE CLEANASCOPE

## (undated) DEVICE — 500ML,PRESSURE INFUSER W/STOPCOCK: Brand: MEDLINE

## (undated) DEVICE — BLADE CLIPPER GEN PURP NS

## (undated) DEVICE — 4-PORT MANIFOLD: Brand: NEPTUNE 2

## (undated) DEVICE — SUTURE PERMA-HAND SZ 3-0 L30IN NONABSORBABLE BLK L60MM KS 622H

## (undated) DEVICE — PAD,NON-ADHERENT,3X8,STERILE,LF,1/PK: Brand: MEDLINE

## (undated) DEVICE — SPLINT ARMBRD W3XL10.5IN POLYFOAM DLX A LN

## (undated) DEVICE — GARMENT,MEDLINE,DVT,INT,CALF,MED, GEN2: Brand: MEDLINE

## (undated) DEVICE — PACK PROC LAP II AURORA

## (undated) DEVICE — STAPLER INT L75MM CUT LN L73MM STPL LN L77MM BLU B FRM 8

## (undated) DEVICE — BLADE LARYNSCP SZ 3 ENH DIR INTUB GLIDESCOPE MCGRATH MAC

## (undated) DEVICE — GLOVE ORANGE PI 8   MSG9080

## (undated) DEVICE — SET CATH 20GA L1.5IN RAD ART POLYUR RADPQ W/ INTEGR 0.018IN

## (undated) DEVICE — 450 ML BOTTLE OF 0.05% CHLORHEXIDINE GLUCONATE IN 99.95% STERILE WATER FOR IRRIGATION, USP AND APPLICATOR.: Brand: IRRISEPT ANTIMICROBIAL WOUND LAVAGE

## (undated) DEVICE — 3M™ WARMING BLANKET, UPPER BODY, 10 PER CASE, 42268: Brand: BAIR HUGGER™

## (undated) DEVICE — GENERAL LAPAROSCOPY PACK-LF: Brand: MEDLINE INDUSTRIES, INC.

## (undated) DEVICE — ULTRACLEAN ACCESSORY ELECTRODE 6" (15.24 CM) COATED BLADE: Brand: ULTRACLEAN

## (undated) DEVICE — PRESSURE MONITORING SET: Brand: TRUWAVE

## (undated) DEVICE — TUBING PRSS 36 M F

## (undated) DEVICE — DRAPE,EXTREMITY,89X128,STERILE: Brand: MEDLINE

## (undated) DEVICE — STAPLER INT L60MM REG TISS BLU B FRM 8 FIRING 2 ROW AUTO

## (undated) DEVICE — COUNTER NDL 40 COUNT HLD 70 FOAM BLK ADH W/ MAG

## (undated) DEVICE — CONMED SCOPE SAVER BITE BLOCK, 20X27 MM: Brand: SCOPE SAVER

## (undated) DEVICE — ENDO KIT: Brand: MEDLINE INDUSTRIES, INC.

## (undated) DEVICE — PREP SOL PVP IODINE 4%  4 OZ/BTL

## (undated) DEVICE — NEEDLE INSUF L120MM DIA2MM DISP FOR PNEUMOPERI ENDOPATH

## (undated) DEVICE — TROCAR ENDOSCP L100MM DIA11MM DIL TIP STBL SL DISP ENDOPATH

## (undated) DEVICE — SPONGE GZ W4XL4IN COT 12 PLY TYP VII WVN C FLD DSGN

## (undated) DEVICE — UNIVERSAL MONOPOLAR LAPAROSCOPIC CABLE 10FT, 4MM PIN CONNECTOR: Brand: CONMED

## (undated) DEVICE — DRESSING GRMCDL 6 12FR D1N CNTR HOLE 4MM ANTMCRBL PRTCTVE DI

## (undated) DEVICE — BASIC SINGLE BASIN BTC-LF: Brand: MEDLINE INDUSTRIES, INC.

## (undated) DEVICE — JELLY,LUBE,STERILE,FLIP TOP,TUBE,2-OZ: Brand: MEDLINE

## (undated) DEVICE — SUTURE ABSORBABLE BRAIDED 2-0 CT-1 27 IN UD VICRYL J259H

## (undated) DEVICE — EXCEL 10FT (3.05 M) INSUFFLATION TUBING SET WITH 0.1 MICRON FILTER: Brand: EXCEL

## (undated) DEVICE — RELOAD STPL L55MM OPN H3.8MM CLS H1.5MM WIRE DIA0.2MM REG

## (undated) DEVICE — SYRINGE CATH TIP 50ML

## (undated) DEVICE — TROCAR ENDOSCP SHFT L100MM DIA5MM DIL TIP ENDOPATH XCEL

## (undated) DEVICE — SOLUTION IV 1000ML LAC RINGERS PH 6.5 INJ USP VIAFLX PLAS

## (undated) DEVICE — CONNECTOR TBNG AUX H2O JET DISP FOR OLY 160/180 SER

## (undated) DEVICE — BREAST HERNIA PACK: Brand: MEDLINE INDUSTRIES, INC.

## (undated) DEVICE — GOWN,SIRUS,NON REINFRCD,LARGE,SET IN SL: Brand: MEDLINE

## (undated) DEVICE — LINER SUCT CANSTR 1500CC SEMI RIG W/ POR HYDROPHOBIC SHUT

## (undated) DEVICE — GLOVE ORANGE PI 8 1/2   MSG9085

## (undated) DEVICE — TOWEL,OR,DSP,ST,BLUE,DLX,4/PK,20PK/CS: Brand: MEDLINE

## (undated) DEVICE — Y-TYPE TUR/BLADDER IRRIGATION SET, REGULATING CLAMP

## (undated) DEVICE — APPLIER CLP L L13IN TI MULT RNG HNDL 20 CLP STR LIGACLP

## (undated) DEVICE — PATIENT RETURN ELECTRODE, SINGLE-USE, CONTACT QUALITY MONITORING, ADULT, WITH 9FT CORD, FOR PATIENTS WEIGING OVER 33LBS. (15KG): Brand: MEGADYNE

## (undated) DEVICE — COVER ARMBRD W13XL28.5IN IMPERV BLU FOR OP RM